# Patient Record
Sex: MALE | Race: WHITE | NOT HISPANIC OR LATINO | URBAN - METROPOLITAN AREA
[De-identification: names, ages, dates, MRNs, and addresses within clinical notes are randomized per-mention and may not be internally consistent; named-entity substitution may affect disease eponyms.]

---

## 2018-07-27 DIAGNOSIS — K21.00 GERD WITH ESOPHAGITIS: Primary | ICD-10-CM

## 2018-07-27 RX ORDER — OMEPRAZOLE 40 MG/1
CAPSULE, DELAYED RELEASE ORAL EVERY 24 HOURS
COMMUNITY
Start: 2017-08-08 | End: 2018-07-27 | Stop reason: SDUPTHER

## 2018-07-30 RX ORDER — OMEPRAZOLE 40 MG/1
40 CAPSULE, DELAYED RELEASE ORAL EVERY 24 HOURS
Qty: 30 CAPSULE | Refills: 4 | Status: SHIPPED | OUTPATIENT
Start: 2018-07-30 | End: 2019-02-04 | Stop reason: SDUPTHER

## 2018-08-24 DIAGNOSIS — G47.09 OTHER INSOMNIA: ICD-10-CM

## 2018-08-24 DIAGNOSIS — F32.89 OTHER DEPRESSION: Primary | ICD-10-CM

## 2018-08-24 RX ORDER — TRAZODONE HYDROCHLORIDE 100 MG/1
TABLET ORAL
Qty: 180 TABLET | Refills: 0 | Status: SHIPPED | OUTPATIENT
Start: 2018-08-24 | End: 2019-09-20

## 2018-08-30 RX ORDER — BISACODYL 10 MG
SUPPOSITORY, RECTAL RECTAL EVERY 24 HOURS
COMMUNITY
Start: 2017-02-08 | End: 2019-08-21

## 2018-08-30 RX ORDER — ALBUTEROL SULFATE 90 UG/1
AEROSOL, METERED RESPIRATORY (INHALATION)
COMMUNITY
Start: 2017-11-07 | End: 2018-12-13 | Stop reason: SDUPTHER

## 2018-08-30 RX ORDER — METOPROLOL TARTRATE 50 MG/1
TABLET, FILM COATED ORAL EVERY 12 HOURS
COMMUNITY
Start: 2017-12-18 | End: 2018-10-12 | Stop reason: SDUPTHER

## 2018-08-30 RX ORDER — DOCUSATE SODIUM 100 MG/1
CAPSULE, LIQUID FILLED ORAL EVERY 24 HOURS
COMMUNITY
Start: 2017-02-08 | End: 2019-08-21

## 2018-08-30 RX ORDER — OLANZAPINE 5 MG/1
TABLET ORAL EVERY 24 HOURS
COMMUNITY
Start: 2018-02-20 | End: 2018-10-10 | Stop reason: SDUPTHER

## 2018-08-30 RX ORDER — BLOOD-GLUCOSE METER
EACH MISCELLANEOUS
COMMUNITY
Start: 2018-03-27 | End: 2019-08-21

## 2018-08-30 RX ORDER — ECHINACEA PURPUREA AERIAL 350 MG
CAPSULE ORAL
COMMUNITY
End: 2019-08-21

## 2018-08-30 RX ORDER — TRAZODONE HYDROCHLORIDE 100 MG/1
TABLET ORAL EVERY 24 HOURS
COMMUNITY
Start: 2017-08-22 | End: 2018-09-04

## 2018-08-30 RX ORDER — LANOLIN ALCOHOL/MO/W.PET/CERES
CREAM (GRAM) TOPICAL
COMMUNITY
Start: 2017-02-08 | End: 2019-09-20

## 2018-08-30 RX ORDER — OXYCODONE HYDROCHLORIDE AND ACETAMINOPHEN 5; 325 MG/1; MG/1
1 TABLET ORAL 2 TIMES DAILY
COMMUNITY
Start: 2018-03-27 | End: 2019-03-22 | Stop reason: SDUPTHER

## 2018-08-30 RX ORDER — VENLAFAXINE HYDROCHLORIDE 75 MG/1
CAPSULE, EXTENDED RELEASE ORAL EVERY 24 HOURS
COMMUNITY
Start: 2018-06-01 | End: 2019-08-21

## 2018-08-30 RX ORDER — ZOLPIDEM TARTRATE 10 MG/1
TABLET ORAL EVERY 24 HOURS
COMMUNITY
Start: 2018-05-21 | End: 2018-09-04 | Stop reason: SDUPTHER

## 2018-08-30 RX ORDER — SENNA PLUS 8.6 MG/1
TABLET ORAL EVERY 24 HOURS
COMMUNITY
Start: 2017-02-08 | End: 2019-08-21

## 2018-08-30 RX ORDER — FUROSEMIDE 20 MG/1
TABLET ORAL EVERY 24 HOURS
COMMUNITY
Start: 2017-02-17 | End: 2019-08-21

## 2018-08-30 RX ORDER — DULOXETIN HYDROCHLORIDE 30 MG/1
CAPSULE, DELAYED RELEASE ORAL EVERY 24 HOURS
COMMUNITY
Start: 2017-02-09 | End: 2019-08-21

## 2018-08-30 RX ORDER — TRIAMCINOLONE ACETONIDE 1 MG/G
CREAM TOPICAL EVERY 12 HOURS
COMMUNITY
Start: 2018-03-27 | End: 2019-08-21

## 2018-08-30 RX ORDER — ROSUVASTATIN CALCIUM 10 MG/1
TABLET, COATED ORAL
COMMUNITY
Start: 2018-06-01 | End: 2019-06-17 | Stop reason: SDUPTHER

## 2018-08-30 RX ORDER — DILTIAZEM HYDROCHLORIDE 360 MG/1
CAPSULE, EXTENDED RELEASE ORAL EVERY 24 HOURS
COMMUNITY
Start: 2014-07-31 | End: 2019-08-21

## 2018-08-30 RX ORDER — DOXAZOSIN MESYLATE 1 MG/1
TABLET ORAL
COMMUNITY
Start: 2018-02-27 | End: 2019-03-11 | Stop reason: SDUPTHER

## 2018-08-30 RX ORDER — SENNOSIDES 8.6 MG
CAPSULE ORAL 3 TIMES DAILY
COMMUNITY
Start: 2017-02-08

## 2018-08-30 RX ORDER — BUDESONIDE AND FORMOTEROL FUMARATE DIHYDRATE 160; 4.5 UG/1; UG/1
AEROSOL RESPIRATORY (INHALATION) EVERY 12 HOURS
COMMUNITY
Start: 2017-02-08 | End: 2019-08-21

## 2018-09-04 ENCOUNTER — OFFICE VISIT (OUTPATIENT)
Dept: FAMILY MEDICINE CLINIC | Facility: CLINIC | Age: 68
End: 2018-09-04
Payer: MEDICARE

## 2018-09-04 VITALS
WEIGHT: 254.4 LBS | DIASTOLIC BLOOD PRESSURE: 72 MMHG | OXYGEN SATURATION: 98 % | SYSTOLIC BLOOD PRESSURE: 132 MMHG | RESPIRATION RATE: 16 BRPM | HEART RATE: 88 BPM | TEMPERATURE: 98.2 F

## 2018-09-04 DIAGNOSIS — G47.00 PERSISTENT INSOMNIA: ICD-10-CM

## 2018-09-04 DIAGNOSIS — E11.9 TYPE 2 DIABETES MELLITUS WITHOUT COMPLICATION, WITH LONG-TERM CURRENT USE OF INSULIN (HCC): Primary | ICD-10-CM

## 2018-09-04 DIAGNOSIS — E78.2 MIXED HYPERLIPIDEMIA: ICD-10-CM

## 2018-09-04 DIAGNOSIS — I10 BENIGN ESSENTIAL HYPERTENSION: ICD-10-CM

## 2018-09-04 DIAGNOSIS — Z79.4 TYPE 2 DIABETES MELLITUS WITHOUT COMPLICATION, WITH LONG-TERM CURRENT USE OF INSULIN (HCC): Primary | ICD-10-CM

## 2018-09-04 PROBLEM — H40.9 GLAUCOMA: Status: ACTIVE | Noted: 2018-09-04

## 2018-09-04 PROBLEM — I50.22 CHRONIC SYSTOLIC HEART FAILURE (HCC): Status: ACTIVE | Noted: 2017-08-25

## 2018-09-04 PROBLEM — K21.9 GASTROESOPHAGEAL REFLUX DISEASE WITHOUT ESOPHAGITIS: Status: ACTIVE | Noted: 2017-08-25

## 2018-09-04 PROCEDURE — 99214 OFFICE O/P EST MOD 30 MIN: CPT | Performed by: FAMILY MEDICINE

## 2018-09-04 RX ORDER — ZOLPIDEM TARTRATE 10 MG/1
10 TABLET ORAL
Qty: 30 TABLET | Refills: 5 | Status: SHIPPED | OUTPATIENT
Start: 2018-09-04 | End: 2019-02-22 | Stop reason: SINTOL

## 2018-09-04 NOTE — PROGRESS NOTES
Assessment/Plan:    No problem-specific Assessment & Plan notes found for this encounter  Problem List Items Addressed This Visit     Benign essential hypertension    Relevant Medications    diltiazem (TIAZAC) 360 MG 24 hr capsule    doxazosin (CARDURA) 1 mg tablet    furosemide (LASIX) 20 mg tablet    metoprolol tartrate (LOPRESSOR) 50 mg tablet    Other Relevant Orders    CBC and differential    Comprehensive metabolic panel    Lipid panel    TSH, 3rd generation with Free T4 reflex    Mixed hyperlipidemia    Relevant Medications    rosuvastatin (CRESTOR) 10 MG tablet    Other Relevant Orders    Lipid panel    TSH, 3rd generation with Free T4 reflex    Persistent insomnia    Relevant Medications    zolpidem (AMBIEN) 10 mg tablet    Type 2 diabetes mellitus (HCC) - Primary    Relevant Medications    insulin detemir (LEVEMIR FLEXTOUCH) 100 Units/mL injection pen    Linagliptin (TRADJENTA) 5 MG TABS    Other Relevant Orders    Hemoglobin A1C            Subjective:      Patient ID: Demetrius Watts is a 70 y o  male  78-year-old male with past medical history of coronary artery disease status post CABG, COPD, type 2 diabetes, hyperlipidemia, hypertension, anxiety/depression, and persistent severe insomnia presents today for follow-up of his chronic conditions  He states that he does see his podiatrist, ophthalmologist, cardiologist routinely  He denies any specific concerns or complaints today  He is taking all of his medications as prescribed without any side effects  The following portions of the patient's history were reviewed and updated as appropriate: allergies, current medications, past family history, past medical history, past social history, past surgical history and problem list     Review of Systems   Constitutional: Negative for appetite change and unexpected weight change  Eyes: Negative for visual disturbance  Respiratory: Negative for chest tightness and shortness of breath  Cardiovascular: Negative for chest pain, palpitations and leg swelling  Genitourinary: Negative for frequency  Skin: Negative for color change  Neurological: Negative for dizziness  Psychiatric/Behavioral: Positive for sleep disturbance  Objective:      /72 (BP Location: Left arm, Patient Position: Sitting, Cuff Size: Standard)   Pulse 88   Temp 98 2 °F (36 8 °C) (Tympanic)   Resp 16   Wt 115 kg (254 lb 6 4 oz)   SpO2 98%          Physical Exam   Constitutional: He appears well-developed and well-nourished  No distress  HENT:   Head: Normocephalic and atraumatic  Neck: Normal range of motion  Neck supple  Carotid bruit is not present  No edema present  Cardiovascular: Normal rate, regular rhythm and normal heart sounds  Pulmonary/Chest: Effort normal and breath sounds normal  He has no wheezes  He has no rales  Neurological: He is alert  Psychiatric: He has a normal mood and affect   His behavior is normal  Judgment and thought content normal

## 2018-09-04 NOTE — PATIENT INSTRUCTIONS
Foot Care for People with Diabetes   AMBULATORY CARE:   What you need to know about foot care:   · Foot care helps protect your feet and prevent foot ulcers or sores  Long-term high blood sugar levels can damage the blood vessels and nerves in your legs and feet  This damage makes it hard to feel pressure, pain, temperature, and touch  You may not be able to feel a cut or sore, or shoes that are too tight  Foot care is needed to prevent serious problems, such as an infection or amputation  · Diabetes may cause your toes to become crooked or curved under  These changes may affect the way you walk and can lead to increased pressure on your foot  The pressure can decrease blood flow to your feet  Lack of blood flow increases your risk for a foot ulcer  Do not ignore small problems, such as dry skin or small wounds  These can become life-threatening over time without proper care  Contact your healthcare provider if:   · Your feet become numb, weak, or hard to move  · You have pus draining from a sore on your foot  · You have a wound on your foot that gets bigger, deeper, or does not heal      · You see blisters, cuts, scratches, calluses, or sores on your foot  · You have a fever, and your feet become red, warm, and swollen  · Your toenails become thick, curled, or yellow  · You find it hard to check your feet because your vision is poor  · You have questions or concerns about your condition or care  How to care for your feet:   · Check your feet each day  Look at your whole foot, including the bottom, and between and under your toes  Check for wounds, corns, and calluses  Use a mirror to see the bottom of your feet  The skin on your feet may be shiny, tight, or darker than normal  Your feet may also be cold and pale  Feel your feet by running your hands along the tops, bottoms, sides, and between your toes   Redness, swelling, and warmth are signs of blood flow problems that can lead to a foot ulcer  Do not try to remove corns or calluses yourself  · Wash your feet each day with soap and warm water  Do not use hot water, because this can injure your foot  Dry your feet gently with a towel after you wash them  Dry between and under your toes  · Apply lotion or a moisturizer on your dry feet  Ask your healthcare provider what lotions are best to use  Do not put lotion or moisturizer between your toes  · Cut your toenails correctly  File or cut your toenails straight across  Use a soft brush to clean around your toenails  If your toenails are very thick, you may need to have a healthcare provider or specialist cut them  · Protect your feet  Do not walk barefoot or wear your shoes without socks  Check your shoes for rocks or other objects that can hurt your feet  Wear cotton socks to help keep your feet dry  Wear socks without toe seams, or wear them with the seams inside out  Change your socks each day  Do not wear socks that are dirty or damp  · Wear shoes that fit well  Wear shoes that do not rub against any area of your feet  Your shoes should be ½ to ¾ inch (1 to 2 centimeters) longer than your feet  Your shoes should also have extra space around the widest part of your feet  Walking or athletic shoes with laces or straps that adjust are best  Ask your healthcare provider for help to choose shoes that fit you best  Ask him if you need to wear an insert, orthotic, or bandage on your feet  · Go to your follow-up visits  Your healthcare provider will do a foot exam at least once a year  You may need a foot exam more often if you have nerve damage, foot deformities, or ulcers  He will check for nerve damage and how well you can feel your feet  He will check your shoes to see if they fit well  Follow up with your healthcare provider or foot specialist as directed: You will need to have your feet checked at least once a year   You may need a foot exam more often if you have nerve damage, foot deformities, or ulcers  Write down your questions so you remember to ask them during your visits  © 2017 [employer] [employer] Information is for End User's use only and may not be sold, redistributed or otherwise used for commercial purposes  All illustrations and images included in CareNotes® are the copyrighted property of A [employer]  or Kiran [employer]  The above information is an  only  It is not intended as medical advice for individual conditions or treatments  Talk to your doctor, nurse or pharmacist before following any medical regimen to see if it is safe and effective for you

## 2018-10-10 DIAGNOSIS — F31.81 BIPOLAR 2 DISORDER, MAJOR DEPRESSIVE EPISODE (HCC): Primary | ICD-10-CM

## 2018-10-10 RX ORDER — OLANZAPINE 5 MG/1
TABLET ORAL
Qty: 90 TABLET | Refills: 0 | Status: SHIPPED | OUTPATIENT
Start: 2018-10-10 | End: 2019-02-04 | Stop reason: SDUPTHER

## 2018-10-12 ENCOUNTER — TELEPHONE (OUTPATIENT)
Dept: FAMILY MEDICINE CLINIC | Facility: CLINIC | Age: 68
End: 2018-10-12

## 2018-10-12 DIAGNOSIS — I10 ESSENTIAL HYPERTENSION: Primary | ICD-10-CM

## 2018-10-15 NOTE — TELEPHONE ENCOUNTER
Please call patient and inform that due to me leaving the practice in 2 weeks, I would recommend that he obtain the oxygen prescription through his pulmonary specialist   The issue is because there is significant paperwork involved through Medicare and his specialist would be able to follow through with it

## 2018-10-16 RX ORDER — METOPROLOL TARTRATE 50 MG/1
TABLET, FILM COATED ORAL
Qty: 180 TABLET | Refills: 1 | Status: SHIPPED | OUTPATIENT
Start: 2018-10-16 | End: 2019-04-11 | Stop reason: SDUPTHER

## 2018-10-17 NOTE — TELEPHONE ENCOUNTER
I called this patient and informed him and he states he will call his specialist for the order  He is aware that you are leaving the practice and does not need anything else at this time

## 2018-10-21 DIAGNOSIS — E11.9 TYPE 2 DIABETES MELLITUS WITHOUT COMPLICATION, WITHOUT LONG-TERM CURRENT USE OF INSULIN (HCC): Primary | ICD-10-CM

## 2018-10-21 DIAGNOSIS — E11.65 TYPE 2 DIABETES MELLITUS WITH HYPERGLYCEMIA, WITH LONG-TERM CURRENT USE OF INSULIN (HCC): ICD-10-CM

## 2018-10-21 DIAGNOSIS — Z79.4 TYPE 2 DIABETES MELLITUS WITH HYPERGLYCEMIA, WITH LONG-TERM CURRENT USE OF INSULIN (HCC): ICD-10-CM

## 2018-10-22 RX ORDER — INSULIN DETEMIR 100 [IU]/ML
INJECTION, SOLUTION SUBCUTANEOUS
Qty: 5 PEN | Refills: 2 | Status: SHIPPED | OUTPATIENT
Start: 2018-10-22 | End: 2019-01-08 | Stop reason: SDUPTHER

## 2018-12-05 ENCOUNTER — TRANSITIONAL CARE MANAGEMENT (OUTPATIENT)
Dept: FAMILY MEDICINE CLINIC | Facility: CLINIC | Age: 68
End: 2018-12-05

## 2018-12-10 RX ORDER — GABAPENTIN 100 MG/1
CAPSULE ORAL
Refills: 5 | COMMUNITY
Start: 2018-11-11 | End: 2019-08-21

## 2018-12-10 RX ORDER — LANCETS
EACH MISCELLANEOUS
Refills: 3 | COMMUNITY
Start: 2018-09-29

## 2018-12-10 RX ORDER — WARFARIN SODIUM 1 MG/1
TABLET ORAL
Refills: 3 | COMMUNITY
Start: 2018-10-21 | End: 2019-08-21

## 2018-12-10 RX ORDER — TIOTROPIUM BROMIDE 18 UG/1
CAPSULE ORAL; RESPIRATORY (INHALATION)
Refills: 3 | COMMUNITY
Start: 2018-11-11

## 2018-12-11 ENCOUNTER — OFFICE VISIT (OUTPATIENT)
Dept: FAMILY MEDICINE CLINIC | Facility: CLINIC | Age: 68
End: 2018-12-11
Payer: MEDICARE

## 2018-12-11 VITALS
TEMPERATURE: 96.8 F | HEIGHT: 71 IN | HEART RATE: 77 BPM | RESPIRATION RATE: 20 BRPM | WEIGHT: 240 LBS | BODY MASS INDEX: 33.6 KG/M2 | OXYGEN SATURATION: 95 % | SYSTOLIC BLOOD PRESSURE: 122 MMHG | DIASTOLIC BLOOD PRESSURE: 70 MMHG

## 2018-12-11 DIAGNOSIS — I10 ESSENTIAL HYPERTENSION: ICD-10-CM

## 2018-12-11 DIAGNOSIS — E11.22 TYPE 2 DIABETES MELLITUS WITH STAGE 3 CHRONIC KIDNEY DISEASE, WITH LONG-TERM CURRENT USE OF INSULIN (HCC): ICD-10-CM

## 2018-12-11 DIAGNOSIS — I48.20 ATRIAL FIBRILLATION, CHRONIC (HCC): ICD-10-CM

## 2018-12-11 DIAGNOSIS — R41.0 DELIRIUM: ICD-10-CM

## 2018-12-11 DIAGNOSIS — G47.09 OTHER INSOMNIA: ICD-10-CM

## 2018-12-11 DIAGNOSIS — Z79.4 TYPE 2 DIABETES MELLITUS WITH STAGE 3 CHRONIC KIDNEY DISEASE, WITH LONG-TERM CURRENT USE OF INSULIN (HCC): ICD-10-CM

## 2018-12-11 DIAGNOSIS — J18.9 PNEUMONIA OF RIGHT LOWER LOBE DUE TO INFECTIOUS ORGANISM: ICD-10-CM

## 2018-12-11 DIAGNOSIS — K56.41 FECAL IMPACTION (HCC): ICD-10-CM

## 2018-12-11 DIAGNOSIS — J39.8 TRACHEAL STENOSIS: ICD-10-CM

## 2018-12-11 DIAGNOSIS — K21.00 GERD WITH ESOPHAGITIS: ICD-10-CM

## 2018-12-11 DIAGNOSIS — E11.42 TYPE 2 DIABETES MELLITUS WITH DIABETIC POLYNEUROPATHY, WITH LONG-TERM CURRENT USE OF INSULIN (HCC): ICD-10-CM

## 2018-12-11 DIAGNOSIS — R34 OLIGURIA: Primary | ICD-10-CM

## 2018-12-11 DIAGNOSIS — Z79.4 TYPE 2 DIABETES MELLITUS WITH DIABETIC POLYNEUROPATHY, WITH LONG-TERM CURRENT USE OF INSULIN (HCC): ICD-10-CM

## 2018-12-11 DIAGNOSIS — F32.89 OTHER DEPRESSION: ICD-10-CM

## 2018-12-11 DIAGNOSIS — G93.40 ENCEPHALOPATHY: ICD-10-CM

## 2018-12-11 DIAGNOSIS — G47.33 OSA (OBSTRUCTIVE SLEEP APNEA): ICD-10-CM

## 2018-12-11 DIAGNOSIS — N18.30 TYPE 2 DIABETES MELLITUS WITH STAGE 3 CHRONIC KIDNEY DISEASE, WITH LONG-TERM CURRENT USE OF INSULIN (HCC): ICD-10-CM

## 2018-12-11 DIAGNOSIS — J44.9 CHRONIC OBSTRUCTIVE PULMONARY DISEASE, UNSPECIFIED COPD TYPE (HCC): ICD-10-CM

## 2018-12-11 PROBLEM — J96.10 CHRONIC RESPIRATORY FAILURE (HCC): Status: ACTIVE | Noted: 2017-01-30

## 2018-12-11 PROBLEM — E11.9 INSULIN DEPENDENT TYPE 2 DIABETES MELLITUS (HCC): Status: ACTIVE | Noted: 2018-12-11

## 2018-12-11 PROBLEM — G60.8 PERIPHERAL SENSORY NEUROPATHY: Status: ACTIVE | Noted: 2018-06-29

## 2018-12-11 PROBLEM — R41.82 ALTERED MENTAL STATE: Status: ACTIVE | Noted: 2018-08-30

## 2018-12-11 PROCEDURE — 99495 TRANSJ CARE MGMT MOD F2F 14D: CPT | Performed by: INTERNAL MEDICINE

## 2018-12-11 RX ORDER — WARFARIN SODIUM 1 MG/1
TABLET ORAL
Qty: 60 TABLET | Refills: 3 | Status: CANCELLED | OUTPATIENT
Start: 2018-12-11

## 2018-12-11 RX ORDER — OMEPRAZOLE 40 MG/1
40 CAPSULE, DELAYED RELEASE ORAL EVERY 24 HOURS
Qty: 90 CAPSULE | Refills: 3 | Status: CANCELLED | OUTPATIENT
Start: 2018-12-11

## 2018-12-11 RX ORDER — BUDESONIDE AND FORMOTEROL FUMARATE DIHYDRATE 160; 4.5 UG/1; UG/1
AEROSOL RESPIRATORY (INHALATION)
Refills: 0 | Status: CANCELLED | OUTPATIENT
Start: 2018-12-11

## 2018-12-11 RX ORDER — REPAGLINIDE 0.5 MG/1
0.5 TABLET ORAL
Qty: 180 TABLET | Refills: 11 | Status: CANCELLED | OUTPATIENT
Start: 2018-12-11

## 2018-12-11 RX ORDER — REPAGLINIDE 0.5 MG/1
0.5 TABLET ORAL
COMMUNITY
End: 2019-08-21

## 2018-12-11 RX ORDER — IPRATROPIUM BROMIDE AND ALBUTEROL SULFATE 2.5; .5 MG/3ML; MG/3ML
3 SOLUTION RESPIRATORY (INHALATION) EVERY 6 HOURS PRN
Refills: 0 | Status: CANCELLED | OUTPATIENT
Start: 2018-12-11

## 2018-12-11 RX ORDER — NITROGLYCERIN 80 MG/1
1 PATCH TRANSDERMAL DAILY
Qty: 30 PATCH | Refills: 0 | Status: CANCELLED | OUTPATIENT
Start: 2018-12-11

## 2018-12-11 RX ORDER — METOPROLOL TARTRATE 50 MG/1
TABLET, FILM COATED ORAL
Qty: 180 TABLET | Refills: 0 | Status: CANCELLED | OUTPATIENT
Start: 2018-12-11

## 2018-12-11 RX ORDER — ROSUVASTATIN CALCIUM 10 MG/1
10 TABLET, COATED ORAL DAILY
Qty: 90 TABLET | Refills: 3 | Status: CANCELLED | OUTPATIENT
Start: 2018-12-11

## 2018-12-11 RX ORDER — GABAPENTIN 100 MG/1
100 CAPSULE ORAL
Qty: 30 CAPSULE | Refills: 11 | Status: CANCELLED | OUTPATIENT
Start: 2018-12-11

## 2018-12-11 RX ORDER — TRIAMCINOLONE ACETONIDE 1 MG/G
CREAM TOPICAL 2 TIMES DAILY
Qty: 30 G | Refills: 6 | Status: CANCELLED | OUTPATIENT
Start: 2018-12-11

## 2018-12-11 RX ORDER — NITROGLYCERIN 80 MG/1
1 PATCH TRANSDERMAL DAILY
COMMUNITY
End: 2019-08-21

## 2018-12-11 RX ORDER — LANCETS
EACH MISCELLANEOUS
Qty: 100 EACH | Refills: 0 | Status: CANCELLED | OUTPATIENT
Start: 2018-12-11

## 2018-12-11 RX ORDER — LEVOFLOXACIN 750 MG/1
750 TABLET ORAL EVERY 24 HOURS
COMMUNITY
End: 2019-08-21

## 2018-12-11 RX ORDER — IPRATROPIUM BROMIDE AND ALBUTEROL SULFATE 2.5; .5 MG/3ML; MG/3ML
3 SOLUTION RESPIRATORY (INHALATION) EVERY 2 HOUR PRN
COMMUNITY
Start: 2017-02-01 | End: 2019-02-22

## 2018-12-11 NOTE — PROGRESS NOTES
Assessment/Plan: This patient was discharged from the hospital, following a complex medical admission with right perihilar pneumonia type 2 diabetes mellitus insulin dependent with peripheral neuropathy chronic renal failure with acute deterioration, COPD, obstructive sleep apnea, chronic atrial fibrillation on warfarin 1 mg daily and 2 mg on Monday and Friday, and delirium felt to be secondary to the patient's pneumonia as well as Ambien therapy  The Ambien was discontinued  Patient has been home 8 days and cannot remember the last time he moved his bowels he was urinating freely until 2 or 3 days ago at which time he started urinating 1-2 tbsp of urine every hour and today he has not urinated all  He also has low back pain  Patient will need to have stat laboratory studies done straight catheterization evaluation for fecal impaction and treatment of fecal impaction if necessary  He lives at home alone  His medical condition require stabilization due to his complexity  He was referred to the emergency room and I talked to the staff at in the emergency room personally  Diet reviewed  Lifestyle modifications reviewed  Medications reviewed and ordered  Laboratory tests and studies reviewed and ordered  All patient's questions answered to patient satisfaction  Diagnoses and all orders for this visit:    Oliguria    Type 2 diabetes mellitus with diabetic polyneuropathy, with long-term current use of insulin (HCC)    Fecal impaction (HCC)    Chronic obstructive pulmonary disease, unspecified COPD type ([facility] )  -     budesonide-formoterol (SYMBICORT) 160-4 5 mcg/act inhaler;   -     gabapentin (NEURONTIN) 100 mg capsule;  Take 1 capsule (100 mg total) by mouth daily at bedtime  -     warfarin (COUMADIN) 1 mg tablet; TAKE 1 TO 2 TABLETS BY MOUTH EVERY DAY OR AS DIRECTED  -     Linagliptin (TRADJENTA) 5 MG TABS; Take 5 mg by mouth daily    Pneumonia of right lower lobe due to infectious organism [facility])    Essential hypertension  -     budesonide-formoterol (SYMBICORT) 160-4 5 mcg/act inhaler;   -     gabapentin (NEURONTIN) 100 mg capsule; Take 1 capsule (100 mg total) by mouth daily at bedtime  -     warfarin (COUMADIN) 1 mg tablet; TAKE 1 TO 2 TABLETS BY MOUTH EVERY DAY OR AS DIRECTED  -     Linagliptin (TRADJENTA) 5 MG TABS; Take 5 mg by mouth daily    GERD with esophagitis  -     budesonide-formoterol (SYMBICORT) 160-4 5 mcg/act inhaler;   -     gabapentin (NEURONTIN) 100 mg capsule; Take 1 capsule (100 mg total) by mouth daily at bedtime  -     warfarin (COUMADIN) 1 mg tablet; TAKE 1 TO 2 TABLETS BY MOUTH EVERY DAY OR AS DIRECTED  -     Linagliptin (TRADJENTA) 5 MG TABS; Take 5 mg by mouth daily    Other depression  -     budesonide-formoterol (SYMBICORT) 160-4 5 mcg/act inhaler;   -     gabapentin (NEURONTIN) 100 mg capsule; Take 1 capsule (100 mg total) by mouth daily at bedtime  -     warfarin (COUMADIN) 1 mg tablet; TAKE 1 TO 2 TABLETS BY MOUTH EVERY DAY OR AS DIRECTED  -     Linagliptin (TRADJENTA) 5 MG TABS; Take 5 mg by mouth daily    Other insomnia  -     budesonide-formoterol (SYMBICORT) 160-4 5 mcg/act inhaler;   -     gabapentin (NEURONTIN) 100 mg capsule;  Take 1 capsule (100 mg total) by mouth daily at bedtime  -     warfarin (COUMADIN) 1 mg tablet; TAKE 1 TO 2 TABLETS BY MOUTH EVERY DAY OR AS DIRECTED  -     Linagliptin (TRADJENTA) 5 MG TABS; Take 5 mg by mouth daily    Encephalopathy    Delirium    Atrial fibrillation, chronic (HCC)    SENTHIL (obstructive sleep apnea)    Tracheal stenosis    Type 2 diabetes mellitus with stage 3 chronic kidney disease, with long-term current use of insulin ([facility])    Insulin dependent type 2 diabetes mellitus ([facility] [address] )    Other orders  -     gabapentin (NEURONTIN) 100 mg capsule; TAKE ONE CAPSULE BY MOUTH EVERY EVENING AS NEEDED  -     Lancets (ONETOUCH ULTRASOFT) lancets; CHECK BLOOD SUGARS EVERY DAY  -     SPIRIVA HANDIHALER 18 MCG inhalation capsule; INHALE 1 CAPSULE (18 MCG TOTAL) DAILY  -     warfarin (COUMADIN) 1 mg tablet; TAKE 1 TO 2 TABLETS BY MOUTH EVERY DAY OR AS DIRECTED  -     ipratropium-albuterol (DUO-NEB) 0 5-2 5 mg/3 mL nebulizer solution; Inhale 3 mL every 2 (two) hours as needed  -     levofloxacin (LEVAQUIN) 750 mg tablet; Take 750 mg by mouth every 24 hours  -     nitroglycerin (NITRODUR) 0 4 mg/hr; Place 1 patch on the skin daily  -     repaglinide (PRANDIN) 0 5 mg tablet; Take 0 5 mg by mouth 3 (three) times a day before meals  -     Cancel: ipratropium-albuterol (DUO-NEB) 0 5-2 5 mg/3 mL nebulizer solution; Take 1 vial (3 mL total) by nebulization every 6 (six) hours as needed for wheezing or shortness of breath  -     Cancel: Lancets (ONETOUCH ULTRASOFT) lancets; Use as instructed  -     Cancel: metoprolol tartrate (LOPRESSOR) 50 mg tablet;   -     Cancel: nitroglycerin (NITRODUR) 0 4 mg/hr; Place 1 patch on the skin daily  -     Cancel: omeprazole (PriLOSEC) 40 MG capsule; Take 1 capsule (40 mg total) by mouth every 24 hours  -     Cancel: repaglinide (PRANDIN) 0 5 mg tablet; Take 1 tablet (0 5 mg total) by mouth 3 (three) times a day before meals  -     Cancel: rosuvastatin (CRESTOR) 10 MG tablet; Take 1 tablet (10 mg total) by mouth daily  -     Cancel: triamcinolone (KENALOG) 0 1 % cream; Apply topically 2 (two) times a day        Subjective:      Patient ID: Gilbert Pete is a 70 y o  male  HPI   This 79-year-old male hospitalized at [facility] and discharged 8 days ago following admission for pneumonia right per perihilar and basilar, acute on chronic renal failure, insulin-dependent type 2 diabetes mellitus with peripheral neuropathy, delirium secondary to Ambien therapy and underlying infection, chronic atrial fibrillation on warfarin, COPD  Since discharge patient has not had a bowel movement that he can remember  He does have a visiting nurse and is post be taking senna but I am not certain whether he is or not  He is very worried about constipation and thinks he has severe low back pain because of it  In addition 2 days ago he started urinating in small amounts 1-2 tbsp at a time every hour and today he has not urinated at all      Current Outpatient Prescriptions:     acetaminophen (TYLENOL) 650 mg CR tablet, 3 (three) times a day, Disp: , Rfl:     albuterol (VENTOLIN HFA) 90 mcg/act inhaler, inhale 2 puff by inhalation route  every 4 - 6 hours as needed, Disp: , Rfl:     bisacodyl (DULCOLAX) 10 mg suppository, every 24 hours, Disp: , Rfl:     Blood Glucose Monitoring Suppl (ONE TOUCH ULTRA 2) w/Device KIT, Test blood sugar BID, Disp: , Rfl:     budesonide-formoterol (SYMBICORT) 160-4 5 mcg/act inhaler, Every 12 hours, Disp: , Rfl:     DULoxetine (CYMBALTA) 30 mg delayed release capsule, every 24 hours, Disp: , Rfl:     gabapentin (NEURONTIN) 100 mg capsule, TAKE ONE CAPSULE BY MOUTH EVERY EVENING AS NEEDED, Disp: , Rfl: 5    glucose blood test strip, TEST UP TO 3 TIMES PER DAY, Disp: , Rfl:     Insulin Pen Needle 32G X 6 MM MISC, test blood sugar 1 times a day, Disp: , Rfl:     ipratropium-albuterol (DUO-NEB) 0 5-2 5 mg/3 mL nebulizer solution, Inhale 3 mL every 2 (two) hours as needed, Disp: , Rfl:     Lancets (ONETOUCH ULTRASOFT) lancets, CHECK BLOOD SUGARS EVERY DAY, Disp: , Rfl: 3    LEVEMIR FLEXTOUCH 100 units/mL injection pen, INJECT 40 UNITS BY SUBCUTANEOUS ROUTE PER INSTRUCTIONS, Disp: 5 pen, Rfl: 2    levofloxacin (LEVAQUIN) 750 mg tablet, Take 750 mg by mouth every 24 hours, Disp: , Rfl:     Linagliptin (TRADJENTA) 5 MG TABS, every 24 hours, Disp: , Rfl:     metoprolol tartrate (LOPRESSOR) 50 mg tablet, TAKE 1 TABLET BY ORAL ROUTE 2 TIMES EVERY DAY WITH MEALS, Disp: 180 tablet, Rfl: 1    Milk Thistle 140 MG CAPS, takes 175mg 2 tabs am, 2 tabs pm, Disp: , Rfl:     nitroglycerin (NITRODUR) 0 4 mg/hr, Place 1 patch on the skin daily, Disp: , Rfl:     OLANZapine (ZyPREXA) 5 mg tablet, TAKE 1 TABLET BY MOUTH AT BEDTIME AS NEEDED FOR RESTLESSNESS/AGITATION, Disp: 90 tablet, Rfl: 0    omeprazole (PriLOSEC) 40 MG capsule, Take 1 capsule (40 mg total) by mouth every 24 hours, Disp: 30 capsule, Rfl: 4    repaglinide (PRANDIN) 0 5 mg tablet, Take 0 5 mg by mouth 3 (three) times a day before meals, Disp: , Rfl:     rosuvastatin (CRESTOR) 10 MG tablet, TAKE 1 TABLET BY ORAL ROUTE EVERY DAY, Disp: , Rfl:     SPIRIVA HANDIHALER 18 MCG inhalation capsule, INHALE 1 CAPSULE (18 MCG TOTAL) DAILY  , Disp: , Rfl: 3    triamcinolone (KENALOG) 0 1 % cream, Every 12 hours, Disp: , Rfl:     warfarin (COUMADIN) 1 mg tablet, TAKE 1 TO 2 TABLETS BY MOUTH EVERY DAY OR AS DIRECTED, Disp: , Rfl: 3    diltiazem (TIAZAC) 360 MG 24 hr capsule, every 24 hours, Disp: , Rfl:     docusate sodium (COLACE) 100 mg capsule, every 24 hours, Disp: , Rfl:     doxazosin (CARDURA) 1 mg tablet, TAKE 1 TABLET BY ORAL ROUTE EVERY DAY, Disp: , Rfl:     furosemide (LASIX) 20 mg tablet, every 24 hours, Disp: , Rfl:     melatonin 3 mg, qd, Disp: , Rfl:     oxyCODONE-acetaminophen (PERCOCET) 5-325 mg per tablet, take 1 tablet by oral route  every 6 hours as needed for severe pain, Disp: , Rfl:     Polyethylene Glycol 3350 (MIRALAX PO), every 24 hours, Disp: , Rfl:     senna (SENOKOT) 8 6 MG tablet, every 24 hours, Disp: , Rfl:     traZODone (DESYREL) 100 mg tablet, TAKE 2 TABLET BY ORAL ROUTE EVERY DAY AT BEDTIME, Disp: 180 tablet, Rfl: 0    venlafaxine (EFFEXOR XR) 75 mg 24 hr capsule, every 24 hours, Disp: , Rfl:     zolpidem (AMBIEN) 10 mg tablet, Take 1 tablet (10 mg total) by mouth daily at bedtime as needed for sleep (Patient not taking: Reported on 12/11/2018 ), Disp: 30 tablet, Rfl: 5    The following portions of the patient's history were reviewed and updated as appropriate: allergies, current medications, past family history, past medical history, past social history, past surgical history and problem list     Review of Systems Constitutional: Negative for appetite change, fatigue, fever and unexpected weight change  HENT: Negative for rhinorrhea, sinus pain, sinus pressure, sneezing and sore throat  Eyes: Negative for visual disturbance  Respiratory: Positive for shortness of breath  Negative for cough, chest tightness and wheezing  Cardiovascular: Negative for chest pain, palpitations and leg swelling  Gastrointestinal: Positive for abdominal distention and abdominal pain  Negative for blood in stool, constipation, diarrhea, nausea and vomiting  Endocrine: Negative for polydipsia and polyuria  Genitourinary: Positive for decreased urine volume and difficulty urinating  Negative for dysuria, hematuria and urgency  Musculoskeletal: Negative for arthralgias, back pain, joint swelling and neck pain  Skin: Negative for rash  Allergic/Immunologic: Negative for environmental allergies  Neurological: Negative for tremors, weakness, light-headedness, numbness and headaches  Hematological: Does not bruise/bleed easily  Psychiatric/Behavioral: Negative for agitation, behavioral problems, confusion and dysphoric mood  The patient is not nervous/anxious  No family history on file      Past Medical History:   Diagnosis Date    Colonic polyp 2012    Disc displacement, cervical     Peripheral vascular disease ([facility] [address] ) 2011    Femoral- popliteal bypass       Past Surgical History:   Procedure Laterality Date    BYPASS FEMORAL-POPLITEAL  2011    COLONOSCOPY  2012    With polypectomy    CORONARY ARTERY BYPASS GRAFT  2012    LAMINECTOMY  2010       Social History     Social History    Marital status: Single     Spouse name: N/A    Number of children: 1    Years of education:       Occupational History    retired      Social History Main Topics    Smoking status: Former Smoker     Packs/day: 1 00     Years: 37 00     Types: Cigarettes     Quit date: 1/1/2014    Smokeless tobacco: Never Used      Comment: No passive smoke exposure    Alcohol use No    Drug use: Unknown    Sexual activity: No     Other Topics Concern    None     Social History Narrative    Pt states he drinks soda daily       Allergies   Allergen Reactions    No Active Allergies          Objective:      /70   Pulse 77   Temp (!) 96 8 °F (36 °C)   Resp 20   Ht 5' 11" (1 803 m)   Wt 109 kg (240 lb)   SpO2 95%   BMI 33 47 kg/m²        Physical Exam   Constitutional: He is oriented to person, place, and time  He appears well-developed and well-nourished  No distress  HENT:   Head: Normocephalic and atraumatic  Nose: Nose normal    Mouth/Throat: Oropharynx is clear and moist  No oropharyngeal exudate  Eyes: Pupils are equal, round, and reactive to light  Conjunctivae and EOM are normal  No scleral icterus  Neck: Normal range of motion  Neck supple  No JVD present  No tracheal deviation present  No thyromegaly present  Cardiovascular: Normal rate and normal heart sounds  Exam reveals no gallop and no friction rub  No murmur heard  Irregular rhythm   Pulmonary/Chest: Effort normal  No respiratory distress  He has no wheezes  He has no rales  He exhibits no tenderness  Chest is clear to auscultation percussion   Abdominal: Soft  Bowel sounds are normal  He exhibits no distension and no mass  There is tenderness  There is no rebound and no guarding  Patient has bowel sounds but is tender in the left lower quadrant above the pubis but no masses palpable  There is suprapubic dullness to below the umbilicus however there is no palpable bladder and no other tenderness in the lower abdomen  Moderate abdominal distention is noted  Musculoskeletal: Normal range of motion  He exhibits no edema or deformity  Lymphadenopathy:     He has no cervical adenopathy  Neurological: He is alert and oriented to person, place, and time  No cranial nerve deficit  Coordination normal    Skin: Skin is warm and dry  No rash noted  Psychiatric: He has a normal mood and affect   His behavior is normal  Judgment and thought content normal

## 2018-12-12 ENCOUNTER — TELEPHONE (OUTPATIENT)
Dept: FAMILY MEDICINE CLINIC | Facility: CLINIC | Age: 68
End: 2018-12-12

## 2018-12-12 NOTE — TELEPHONE ENCOUNTER
Pt's sister called stating that the pt was discharged from the hospital today and wanted to know if they can make different living  arrangement for pt after the holidays     Sister's phone # [phone] could you pls give sister a call

## 2018-12-13 DIAGNOSIS — J44.9 CHRONIC OBSTRUCTIVE PULMONARY DISEASE, UNSPECIFIED COPD TYPE (HCC): Primary | ICD-10-CM

## 2018-12-13 DIAGNOSIS — K57.92 DIVERTICULITIS: ICD-10-CM

## 2018-12-13 RX ORDER — ALBUTEROL SULFATE 90 UG/1
1 AEROSOL, METERED RESPIRATORY (INHALATION) EVERY 4 HOURS PRN
Qty: 1 INHALER | Refills: 3 | Status: SHIPPED | OUTPATIENT
Start: 2018-12-13

## 2018-12-13 RX ORDER — CEPHALEXIN 500 MG/1
500 CAPSULE ORAL 3 TIMES DAILY
Qty: 21 CAPSULE | Refills: 0 | Status: SHIPPED | OUTPATIENT
Start: 2018-12-13 | End: 2018-12-23

## 2018-12-13 NOTE — TELEPHONE ENCOUNTER
GREGG for Pts sister to call back  Pt has no one listed on his chart for HIPAA so no medical information can be given to her

## 2018-12-13 NOTE — TELEPHONE ENCOUNTER
Jorge Parsons from [address] [place] [address] [address] home nursing called back to let us know the Er gave Pt Flagyl which interferes with Pts Warfarin  Per Dr Moncho Yanes Pt should stop the Flagyl and start Keflex 500mg TID  Pt verbalizes       I made an appt with Pt to see Dr Fabienne Chavarria on the  12/19/18 per Pts schedule/transportation

## 2018-12-13 NOTE — PROGRESS NOTES
cough with green sputumCough: Patient complains of {respiratory symptoms:85411}  Symptoms began {numbers; 0-10:95069} {units:11} ago  The cough is {cough description:5714::"non-productive","without wheezing, dyspnea or hemoptysis"} and is aggravated by {cough aggravation:39923} Associated symptoms include:{cough - associated symptoms:49058}  Patient {does/do/not:67918} have new pets  Patient {does/do/not:95426} have a history of asthma  Patient {does/do/not:18154} have a history of environmental allergens  Patient {has/not:96804} recent travel  Patient {does/do/not:28401} have a history of smoking  Patient  {has/not:25195} previous Chest X-ray  Patient {has/not:88960} had a PPD done

## 2018-12-13 NOTE — TELEPHONE ENCOUNTER
Home Nurse called with concerns with Pts medication and update Dr Jordy Marsh on Pts hospital stay  Message left for home nurse to call back    [phone]

## 2018-12-18 ENCOUNTER — TELEPHONE (OUTPATIENT)
Dept: FAMILY MEDICINE CLINIC | Facility: CLINIC | Age: 68
End: 2018-12-18

## 2018-12-18 LAB — INR PPP: 1.6 (ref 0.86–1.17)

## 2018-12-18 NOTE — TELEPHONE ENCOUNTER
Rosina from [facility] called in regards to patient, she stated that the patient is non-complaint with his medications  She also stated that he is taking Percocet that was found in the house    Hope can be reached at   [phone]

## 2018-12-19 ENCOUNTER — TELEPHONE (OUTPATIENT)
Dept: FAMILY MEDICINE CLINIC | Facility: CLINIC | Age: 68
End: 2018-12-19

## 2018-12-19 ENCOUNTER — OFFICE VISIT (OUTPATIENT)
Dept: FAMILY MEDICINE CLINIC | Facility: CLINIC | Age: 68
End: 2018-12-19
Payer: MEDICARE

## 2018-12-19 VITALS
BODY MASS INDEX: 34.17 KG/M2 | RESPIRATION RATE: 18 BRPM | TEMPERATURE: 96.8 F | WEIGHT: 245 LBS | DIASTOLIC BLOOD PRESSURE: 96 MMHG | HEART RATE: 77 BPM | OXYGEN SATURATION: 95 % | SYSTOLIC BLOOD PRESSURE: 148 MMHG

## 2018-12-19 DIAGNOSIS — Z09 HOSPITAL DISCHARGE FOLLOW-UP: Primary | ICD-10-CM

## 2018-12-19 DIAGNOSIS — Z11.59 NEED FOR HEPATITIS C SCREENING TEST: ICD-10-CM

## 2018-12-19 DIAGNOSIS — Z79.4 TYPE 2 DIABETES MELLITUS WITHOUT COMPLICATION, WITH LONG-TERM CURRENT USE OF INSULIN (HCC): ICD-10-CM

## 2018-12-19 DIAGNOSIS — F32.A DEPRESSION, UNSPECIFIED DEPRESSION TYPE: ICD-10-CM

## 2018-12-19 DIAGNOSIS — E11.9 TYPE 2 DIABETES MELLITUS WITHOUT COMPLICATION, WITH LONG-TERM CURRENT USE OF INSULIN (HCC): ICD-10-CM

## 2018-12-19 DIAGNOSIS — F17.209 NICOTINE DEPENDENCE WITH NICOTINE-INDUCED DISORDER, UNSPECIFIED NICOTINE PRODUCT TYPE: ICD-10-CM

## 2018-12-19 DIAGNOSIS — Z78.9 ALTERATION IN PERFORMANCE OF ACTIVITIES OF DAILY LIVING: ICD-10-CM

## 2018-12-19 DIAGNOSIS — Z79.899 MEDICATION MANAGEMENT: ICD-10-CM

## 2018-12-19 PROCEDURE — 99214 OFFICE O/P EST MOD 30 MIN: CPT | Performed by: SPECIALIST

## 2018-12-19 RX ORDER — METRONIDAZOLE 500 MG/1
TABLET ORAL
Refills: 0 | COMMUNITY
Start: 2018-12-12 | End: 2019-01-08 | Stop reason: ALTCHOICE

## 2018-12-19 NOTE — TELEPHONE ENCOUNTER
Impression: Question mild acute sigmoid diverticulitis  No renal calculi or hydronephrosis  Diffuse atherosclerotic disease  Cholelithiasis  Workstation:[id number]   Result Narrative   History: Abdominal pain, suprapubic, constipation  Low back pain  Exam: Nonenhanced CT of the abdomen and pelvis      Technique: Using helical technique, axial images were obtained through the  abdomen and pelvis  Coronal and sagittal reformations were performed      Comparison: Prior CT scans of the chest including most recent study dated  12/12/2017        Abdomen:  Lung Bases: Stable mild chronic interstitial changes at the lung bases, right  greater than left  No pleural effusions  Diffuse coronary artery calcifications  Liver: Normal    Gallbladder/Bile ducts: Cholelithiasis  Spleen: Spleen not enlarged  Pancreas: No pancreatitis  Adrenal glands: Normal     Kidneys/Ureters: No renal calculi or hydronephrosis  Nonspecific perinephric  stranding, not significantly changed when compared with the visualized portions  of the kidneys on prior CT scan of the chest      Bowel/Mesentery: Scattered left-sided colonic diverticula  There is mild   proximal to mid sigmoid wall thickening with minimal pericolonic stranding  There appears to be thickening of a diverticulum along the posterior aspect of  the proximal sigmoid  Lymph nodes: No retroperitoneal or mesenteric lymphadenopathy  Vessels: Normal caliber aorta with diffuse atherosclerotic calcifications  Pelvis:  No mass, lymphadenopathy or free fluid  Urinary Bladder: Normal      Bones: Degenerative changes lower lumbar spine       Other Result Information   Interface, Rad Results In - 12/11/2018  7:44 PM EST  History: Abdominal pain, suprapubic, constipation  Low back pain  Exam: Nonenhanced CT of the abdomen and pelvis      Technique: Using helical technique, axial images were obtained through the  abdomen and pelvis   Coronal and sagittal reformations were performed      Comparison: Prior CT scans of the chest including most recent study dated  12/12/2017        Abdomen:  Lung Bases: Stable mild chronic interstitial changes at the lung bases, right  greater than left  No pleural effusions  Diffuse coronary artery calcifications  Liver: Normal    Gallbladder/Bile ducts: Cholelithiasis  Spleen: Spleen not enlarged  Pancreas: No pancreatitis  Adrenal glands: Normal     Kidneys/Ureters: No renal calculi or hydronephrosis  Nonspecific perinephric  stranding, not significantly changed when compared with the visualized portions  of the kidneys on prior CT scan of the chest      Bowel/Mesentery: Scattered left-sided colonic diverticula  There is mild   proximal to mid sigmoid wall thickening with minimal pericolonic stranding  There appears to be thickening of a diverticulum along the posterior aspect of  the proximal sigmoid  Lymph nodes: No retroperitoneal or mesenteric lymphadenopathy  Vessels: Normal caliber aorta with diffuse atherosclerotic calcifications  Pelvis:  No mass, lymphadenopathy or free fluid  Urinary Bladder: Normal      Bones: Degenerative changes lower lumbar spine      IMPRESSION:  Impression: Question mild acute sigmoid diverticulitis  No renal calculi or hydronephrosis  Diffuse atherosclerotic disease  Cholelithiasis              Workstation:[id number]   Status

## 2018-12-19 NOTE — PROGRESS NOTES
Assessment/Plan:    Pt  Seen  For  Post  Er  Follow  Up  [facility]   Ct  Positive  gqall  Stones  And diverticulitis   Symptoms  Are  Gone    Main  Reason  He  Is  Here  Is  For  Placement  In  Nursing  Home       Has  Been  Seen  By   [address]  Depression  scale  >  Than  5      mmse  27/30    Activities  Daily  Living  Scores  Well   But  Does  Feel  He  Needs   Help  At  Times    Does  Not  Drive    Blind  Right  Eye    Can 't  Walk  Greater  Than  100  Feet      Has  Left  Calf  Pain    Copd  And  Cad    ckd3     cabg        When  Asked  Why he  Thinks  He  Needs  To  Be  In a  Home  He  Answers   I  Can't  Drive    Dep[ressed  Lonely  Needs  Reminders  For  meds   Unsteady  On  Feet   And  limited  qability  To  Walk            Diagnoses and all orders for this visit:    Hospital discharge follow-up    Depression, unspecified depression type    Medication management    Nicotine dependence with nicotine-induced disorder, unspecified nicotine product type  -     CT lung screening program; Future    Type 2 diabetes mellitus without complication, with long-term current use of insulin (HCC)  -     Microalbumin / creatinine urine ratio    Need for hepatitis C screening test  -     Hepatitis C antibody; Future    Alteration in performance of activities of daily living    Other orders  -     metroNIDAZOLE (FLAGYL) 500 mg tablet; TAKE 1 TABLET BY MOUTH 3 TIMES A DAY FOR 7 DAYS          Subjective:      Patient ID: Nuria Lynch is a 70 y o  male  69 Yo  Male  Recently  Treated   For  Diverticulitis      Wants  To  Go to  Nursing  Home         Diabetes   Hypoglycemia symptoms include dizziness  Pertinent negatives for hypoglycemia include no headaches, pallor, seizures, speech difficulty or tremors  Pertinent negatives for diabetes include no chest pain, no fatigue and no weakness     Depression   Pertinent negatives include no abdominal pain, arthralgias, chest pain, chills, coughing, diaphoresis, fatigue, fever, headaches, joint swelling, myalgias, neck pain, numbness, rash or weakness  The following portions of the patient's history were reviewed and updated as appropriate: allergies, current medications, past family history, past medical history, past social history, past surgical history and problem list     Review of Systems   Constitutional: Positive for activity change  Negative for chills, diaphoresis, fatigue and fever  HENT: Negative for voice change  Eyes: Positive for visual disturbance  Respiratory: Positive for shortness of breath  Negative for cough, chest tightness and wheezing  Cardiovascular: Negative for chest pain, palpitations and leg swelling  Left  Calf  pains   Gastrointestinal: Negative for abdominal distention and abdominal pain  Genitourinary: Negative for difficulty urinating and dysuria  Musculoskeletal: Positive for back pain and gait problem  Negative for arthralgias, joint swelling, myalgias, neck pain and neck stiffness  Skin: Negative for color change, pallor, rash and wound  Neurological: Positive for dizziness and light-headedness  Negative for tremors, seizures, syncope, facial asymmetry, speech difficulty, weakness, numbness and headaches  Psychiatric/Behavioral: Positive for depression  Negative for agitation and behavioral problems  Objective:      /96 (BP Location: Left arm, Patient Position: Standing, Cuff Size: Large)   Pulse 77   Temp (!) 96 8 °F (36 °C) (Tympanic)   Resp 18   Wt 111 kg (245 lb)   SpO2 95%   BMI 34 17 kg/m²          Physical Exam   Constitutional: He is oriented to person, place, and time  No distress  HENT:   Mouth/Throat: No oropharyngeal exudate  dentures   Neck: No JVD present  Cardiovascular: Normal rate  No murmur heard  sl irreg   Pulmonary/Chest: Effort normal and breath sounds normal    Abdominal: He exhibits no distension     Musculoskeletal: He exhibits no edema or tenderness  Neurological: He is alert and oriented to person, place, and time  Coordination normal    Sl  unsteady   Skin: Skin is warm and dry  He is not diaphoretic     Psychiatric:   Flat  affect

## 2018-12-19 NOTE — PROGRESS NOTES
Patient's shoes and socks removed  Right Foot/Ankle   Right Foot Inspection  Skin Exam: skin normal and skin intact no dry skin, no warmth, no callus, no erythema, no maceration, no abnormal color, no pre-ulcer, no ulcer and no callus                              Vascular    The right DP pulse is 2+  The right PT pulse is 2+  Left Foot/Ankle  Left Foot Inspection  Skin Exam: skin normal and skin intactno dry skin, no warmth, no erythema, no maceration, normal color, no pre-ulcer, no ulcer and no callus                                           Vascular    The left DP pulse is 2+  The left PT pulse is 2+  Assign Risk Category:  No deformity present;  No loss of protective sensation;        Risk: 0

## 2018-12-21 ENCOUNTER — ANTICOAG VISIT (OUTPATIENT)
Dept: FAMILY MEDICINE CLINIC | Facility: CLINIC | Age: 68
End: 2018-12-21

## 2018-12-24 DIAGNOSIS — I48.20 CHRONIC ATRIAL FIBRILLATION (HCC): Primary | ICD-10-CM

## 2018-12-24 NOTE — PROGRESS NOTES
[facility] home nursing called asking for a PT/INR order so they can draw Pts blood work at home  Orders placed and faxed to [phone]    Home nursing will be faxing over orders for a home health aid for Dr Adam Draft to sign  Please place on Dr Alessandro George desk for his signature

## 2018-12-26 ENCOUNTER — TELEPHONE (OUTPATIENT)
Dept: OTHER | Facility: OTHER | Age: 68
End: 2018-12-26

## 2018-12-31 ENCOUNTER — ANTICOAG VISIT (OUTPATIENT)
Dept: FAMILY MEDICINE CLINIC | Facility: CLINIC | Age: 68
End: 2018-12-31

## 2019-01-02 ENCOUNTER — TELEPHONE (OUTPATIENT)
Dept: FAMILY MEDICINE CLINIC | Facility: CLINIC | Age: 69
End: 2019-01-02

## 2019-01-03 LAB — INR PPP: 2.4 (ref 0.86–1.17)

## 2019-01-08 ENCOUNTER — ANTICOAG VISIT (OUTPATIENT)
Dept: GYNECOLOGY | Facility: CLINIC | Age: 69
End: 2019-01-08

## 2019-01-08 ENCOUNTER — OFFICE VISIT (OUTPATIENT)
Dept: FAMILY MEDICINE CLINIC | Facility: CLINIC | Age: 69
End: 2019-01-08
Payer: MEDICARE

## 2019-01-08 VITALS
SYSTOLIC BLOOD PRESSURE: 160 MMHG | HEART RATE: 68 BPM | OXYGEN SATURATION: 96 % | RESPIRATION RATE: 16 BRPM | WEIGHT: 246.6 LBS | DIASTOLIC BLOOD PRESSURE: 80 MMHG | HEIGHT: 71 IN | TEMPERATURE: 97.9 F | BODY MASS INDEX: 34.52 KG/M2

## 2019-01-08 DIAGNOSIS — E11.65 TYPE 2 DIABETES MELLITUS WITH HYPERGLYCEMIA, WITH LONG-TERM CURRENT USE OF INSULIN (HCC): ICD-10-CM

## 2019-01-08 DIAGNOSIS — J43.2 CENTRILOBULAR EMPHYSEMA (HCC): ICD-10-CM

## 2019-01-08 DIAGNOSIS — I70.249 ATHEROSCLEROSIS OF NATIVE ARTERY OF BOTH LOWER EXTREMITIES WITH BILATERAL ULCERATION, UNSPECIFIED ULCERATION SITE (HCC): ICD-10-CM

## 2019-01-08 DIAGNOSIS — I70.239 ATHEROSCLEROSIS OF NATIVE ARTERY OF BOTH LOWER EXTREMITIES WITH BILATERAL ULCERATION, UNSPECIFIED ULCERATION SITE (HCC): ICD-10-CM

## 2019-01-08 DIAGNOSIS — I25.810 ARTERIOSCLEROSIS OF CORONARY ARTERY BYPASS GRAFT: ICD-10-CM

## 2019-01-08 DIAGNOSIS — K57.30 DIVERTICULAR DISEASE OF COLON: ICD-10-CM

## 2019-01-08 DIAGNOSIS — E78.2 MIXED HYPERLIPIDEMIA: ICD-10-CM

## 2019-01-08 DIAGNOSIS — I50.22 CHRONIC SYSTOLIC HEART FAILURE (HCC): ICD-10-CM

## 2019-01-08 DIAGNOSIS — I10 BENIGN ESSENTIAL HYPERTENSION: ICD-10-CM

## 2019-01-08 DIAGNOSIS — Z79.01 CURRENT USE OF LONG TERM ANTICOAGULATION: ICD-10-CM

## 2019-01-08 DIAGNOSIS — G47.33 OSA (OBSTRUCTIVE SLEEP APNEA): ICD-10-CM

## 2019-01-08 DIAGNOSIS — Z79.4 TYPE 2 DIABETES MELLITUS WITH HYPERGLYCEMIA, WITH LONG-TERM CURRENT USE OF INSULIN (HCC): ICD-10-CM

## 2019-01-08 DIAGNOSIS — F32.A DEPRESSIVE DISORDER: ICD-10-CM

## 2019-01-08 DIAGNOSIS — I48.20 ATRIAL FIBRILLATION, CHRONIC (HCC): ICD-10-CM

## 2019-01-08 DIAGNOSIS — N18.30 STAGE 3 CHRONIC KIDNEY DISEASE (HCC): ICD-10-CM

## 2019-01-08 DIAGNOSIS — I48.91 ATRIAL FIBRILLATION, UNSPECIFIED TYPE (HCC): Primary | ICD-10-CM

## 2019-01-08 PROCEDURE — 99214 OFFICE O/P EST MOD 30 MIN: CPT | Performed by: INTERNAL MEDICINE

## 2019-01-09 ENCOUNTER — TELEPHONE (OUTPATIENT)
Dept: FAMILY MEDICINE CLINIC | Facility: CLINIC | Age: 69
End: 2019-01-09

## 2019-01-09 DIAGNOSIS — Z79.4 TYPE 2 DIABETES MELLITUS WITHOUT COMPLICATION, WITH LONG-TERM CURRENT USE OF INSULIN (HCC): Primary | ICD-10-CM

## 2019-01-09 DIAGNOSIS — E11.9 TYPE 2 DIABETES MELLITUS WITHOUT COMPLICATION, WITH LONG-TERM CURRENT USE OF INSULIN (HCC): Primary | ICD-10-CM

## 2019-01-09 PROBLEM — Z79.01 CURRENT USE OF LONG TERM ANTICOAGULATION: Status: ACTIVE | Noted: 2019-01-09

## 2019-01-09 PROBLEM — E11.65 UNCONTROLLED TYPE 2 DIABETES MELLITUS WITH HYPERGLYCEMIA (HCC): Status: ACTIVE | Noted: 2017-01-25

## 2019-01-09 PROBLEM — N18.30 STAGE 3 CHRONIC KIDNEY DISEASE (HCC): Status: ACTIVE | Noted: 2019-01-09

## 2019-01-09 NOTE — TELEPHONE ENCOUNTER
The sister called for pt and wants to make sure that you are working on paperwork for [facility] for his [facility]   She needs this soon      # [phone] Hailee Ulloa

## 2019-01-09 NOTE — PROGRESS NOTES
Assessment/Plan:      Diet reviewed  Lifestyle modifications reviewed  Medications reviewed and ordered  Laboratory tests and studies reviewed and ordered  All patient's questions answered to patient satisfaction  Diagnoses and all orders for this visit:    Atrial fibrillation, unspecified type ([facility] [address] )  -     CBC and differential; Future  -     Comprehensive metabolic panel; Future  -     Protime-INR; Future    Stage 3 chronic kidney disease (HCC)    Benign essential hypertension    Chronic systolic heart failure (HCC)    Diverticular disease of colon    Centrilobular emphysema (HCC)    SENTHIL (obstructive sleep apnea)    Arteriosclerosis of coronary artery bypass graft    Atherosclerosis of native artery of both lower extremities with bilateral ulceration, unspecified ulceration site (HCC)    Atrial fibrillation, chronic (HCC)    Mixed hyperlipidemia    Depressive disorder    Current use of long term anticoagulation        Subjective:      Patient ID: Una Delgado is a 70 y o  male  HPI  This 48-year-old male is being seen for the following:    Because of poor compliance he is considering moving to [facility] where he can have closer medical supervision of his complex medical condition      Insulin-dependent type 2 diabetes mellitus poorly controlled with diabetic polyneuropathy    Diabetic nephropathy with stage 3 chronic kidney disease    Next hyperlipidemia    Obesity    Peripheral arterial disease    Benign essential hypertension    Current Outpatient Prescriptions:     acetaminophen (TYLENOL) 650 mg CR tablet, 3 (three) times a day, Disp: , Rfl:     albuterol (VENTOLIN HFA) 90 mcg/act inhaler, Inhale 1 puff every 4 (four) hours as needed for wheezing, Disp: 1 Inhaler, Rfl: 3    bisacodyl (DULCOLAX) 10 mg suppository, every 24 hours, Disp: , Rfl:     Blood Glucose Monitoring Suppl (ONE TOUCH ULTRA 2) w/Device KIT, Test blood sugar BID, Disp: , Rfl:     budesonide-formoterol (SYMBICORT) 160-4 5 mcg/act inhaler, Every 12 hours, Disp: , Rfl:     diltiazem (TIAZAC) 360 MG 24 hr capsule, every 24 hours, Disp: , Rfl:     docusate sodium (COLACE) 100 mg capsule, every 24 hours, Disp: , Rfl:     doxazosin (CARDURA) 1 mg tablet, TAKE 1 TABLET BY ORAL ROUTE EVERY DAY, Disp: , Rfl:     DULoxetine (CYMBALTA) 30 mg delayed release capsule, every 24 hours, Disp: , Rfl:     furosemide (LASIX) 20 mg tablet, every 24 hours, Disp: , Rfl:     gabapentin (NEURONTIN) 100 mg capsule, TAKE ONE CAPSULE BY MOUTH EVERY EVENING AS NEEDED, Disp: , Rfl: 5    glucose blood test strip, TEST UP TO 3 TIMES PER DAY, Disp: , Rfl:     insulin detemir (LEVEMIR FLEXTOUCH) 100 Units/mL injection pen, Inject 40 Units under the skin daily, Disp: 5 pen, Rfl: 11    Insulin Pen Needle 32G X 6 MM MISC, Inject as directed 3 (three) times a day, Disp: 100 each, Rfl: 11    ipratropium-albuterol (DUO-NEB) 0 5-2 5 mg/3 mL nebulizer solution, Inhale 3 mL every 2 (two) hours as needed, Disp: , Rfl:     Lancets (ONETOUCH ULTRASOFT) lancets, CHECK BLOOD SUGARS EVERY DAY, Disp: , Rfl: 3    levofloxacin (LEVAQUIN) 750 mg tablet, Take 750 mg by mouth every 24 hours, Disp: , Rfl:     Linagliptin (TRADJENTA) 5 MG TABS, every 24 hours, Disp: , Rfl:     melatonin 3 mg, qd, Disp: , Rfl:     metoprolol tartrate (LOPRESSOR) 50 mg tablet, TAKE 1 TABLET BY ORAL ROUTE 2 TIMES EVERY DAY WITH MEALS, Disp: 180 tablet, Rfl: 1    Milk Thistle 140 MG CAPS, takes 175mg 2 tabs am, 2 tabs pm, Disp: , Rfl:     nitroglycerin (NITRODUR) 0 4 mg/hr, Place 1 patch on the skin daily, Disp: , Rfl:     OLANZapine (ZyPREXA) 5 mg tablet, TAKE 1 TABLET BY MOUTH AT BEDTIME AS NEEDED FOR RESTLESSNESS/AGITATION, Disp: 90 tablet, Rfl: 0    omeprazole (PriLOSEC) 40 MG capsule, Take 1 capsule (40 mg total) by mouth every 24 hours, Disp: 30 capsule, Rfl: 4    oxyCODONE-acetaminophen (PERCOCET) 5-325 mg per tablet, take 1 tablet by oral route  every 6 hours as needed for severe pain, Disp: , Rfl:     Polyethylene Glycol 3350 (MIRALAX PO), every 24 hours, Disp: , Rfl:     repaglinide (PRANDIN) 0 5 mg tablet, Take 0 5 mg by mouth 3 (three) times a day before meals, Disp: , Rfl:     rosuvastatin (CRESTOR) 10 MG tablet, TAKE 1 TABLET BY ORAL ROUTE EVERY DAY, Disp: , Rfl:     senna (SENOKOT) 8 6 MG tablet, every 24 hours, Disp: , Rfl:     SPIRIVA HANDIHALER 18 MCG inhalation capsule, INHALE 1 CAPSULE (18 MCG TOTAL) DAILY  , Disp: , Rfl: 3    traZODone (DESYREL) 100 mg tablet, TAKE 2 TABLET BY ORAL ROUTE EVERY DAY AT BEDTIME, Disp: 180 tablet, Rfl: 0    triamcinolone (KENALOG) 0 1 % cream, Every 12 hours, Disp: , Rfl:     venlafaxine (EFFEXOR XR) 75 mg 24 hr capsule, every 24 hours, Disp: , Rfl:     warfarin (COUMADIN) 1 mg tablet, TAKE 1 TO 2 TABLETS BY MOUTH EVERY DAY OR AS DIRECTED, Disp: , Rfl: 3    zolpidem (AMBIEN) 10 mg tablet, Take 1 tablet (10 mg total) by mouth daily at bedtime as needed for sleep (Patient not taking: Reported on 12/11/2018 ), Disp: 30 tablet, Rfl: 5    The following portions of the patient's history were reviewed and updated as appropriate: allergies, current medications, past family history, past medical history, past social history, past surgical history and problem list     Review of Systems   Constitutional: Negative for appetite change, fatigue, fever and unexpected weight change  HENT: Negative for rhinorrhea, sinus pain, sinus pressure, sneezing and sore throat  Eyes: Negative for visual disturbance  Respiratory: Negative for cough, chest tightness, shortness of breath and wheezing  Cardiovascular: Negative for chest pain, palpitations and leg swelling  Gastrointestinal: Negative for abdominal distention, abdominal pain, blood in stool, constipation, diarrhea, nausea and vomiting  Endocrine: Negative for polydipsia and polyuria  Genitourinary: Negative for decreased urine volume, difficulty urinating, dysuria, hematuria and urgency  Musculoskeletal: Negative for arthralgias, back pain, joint swelling and neck pain  Skin: Negative for rash  Allergic/Immunologic: Negative for environmental allergies  Neurological: Negative for tremors, weakness, light-headedness, numbness and headaches  Hematological: Does not bruise/bleed easily  Psychiatric/Behavioral: Negative for agitation, behavioral problems, confusion and dysphoric mood  The patient is not nervous/anxious  History reviewed  No pertinent family history  Past Medical History:   Diagnosis Date    Colonic polyp 2012    Disc displacement, cervical     Peripheral vascular disease ([address] ) 2011    Femoral- popliteal bypass       Past Surgical History:   Procedure Laterality Date    BYPASS FEMORAL-POPLITEAL  2011    COLONOSCOPY  2012    With polypectomy    CORONARY ARTERY BYPASS GRAFT  2012    LAMINECTOMY  2010       Social History     Social History    Marital status: Single     Spouse name: N/A    Number of children: 1    Years of education:       Occupational History    retired      Social History Main Topics    Smoking status: Former Smoker     Packs/day: 1 00     Years: 37 00     Types: Cigarettes     Quit date: 1/1/2014    Smokeless tobacco: Never Used      Comment: No passive smoke exposure    Alcohol use No    Drug use: No    Sexual activity: No     Other Topics Concern    None     Social History Narrative    Pt states he drinks soda daily       Allergies   Allergen Reactions    No Active Allergies     No Known Allergies          Objective:      /80 (BP Location: Right arm, Patient Position: Sitting, Cuff Size: Large)   Pulse 68   Temp 97 9 °F (36 6 °C) (Tympanic)   Resp 16   Ht 5' 11" (1 803 m)   Wt 112 kg (246 lb 9 6 oz)   SpO2 96%   BMI 34 39 kg/m²        Physical Exam   Constitutional: He is oriented to person, place, and time  He appears well-developed and well-nourished  No distress  HENT:   Head: Normocephalic and atraumatic  Nose: Nose normal    Mouth/Throat: Oropharynx is clear and moist  No oropharyngeal exudate  Eyes: Pupils are equal, round, and reactive to light  Conjunctivae and EOM are normal  No scleral icterus  Neck: Normal range of motion  Neck supple  No JVD present  No tracheal deviation present  No thyromegaly present  Cardiovascular: Normal rate and regular rhythm  Exam reveals no gallop and no friction rub  Murmur heard  Pulmonary/Chest: Effort normal  No respiratory distress  He has no wheezes  He has no rales  He exhibits no tenderness  Abdominal: Soft  Bowel sounds are normal  He exhibits no distension and no mass  There is no tenderness  There is no rebound and no guarding  Musculoskeletal: Normal range of motion  He exhibits no edema or deformity  Lymphadenopathy:     He has no cervical adenopathy  Neurological: He is alert and oriented to person, place, and time  No cranial nerve deficit  Coordination normal    Skin: Skin is warm and dry  No rash noted  Psychiatric: He has a normal mood and affect   His behavior is normal  Judgment and thought content normal

## 2019-01-09 NOTE — TELEPHONE ENCOUNTER
Ligia Walsh would have been handling that  Can you contact her  th or fri and find out its status andlet the family know please

## 2019-01-09 NOTE — TELEPHONE ENCOUNTER
Called for refill of pen needles not insulin  Per patient Radha Chinchilla told him he could have 11 refills

## 2019-01-10 ENCOUNTER — TELEPHONE (OUTPATIENT)
Dept: FAMILY MEDICINE CLINIC | Facility: CLINIC | Age: 69
End: 2019-01-10

## 2019-01-10 DIAGNOSIS — E11.9 TYPE 2 DIABETES MELLITUS WITHOUT COMPLICATION, WITH LONG-TERM CURRENT USE OF INSULIN (HCC): ICD-10-CM

## 2019-01-10 DIAGNOSIS — Z79.4 TYPE 2 DIABETES MELLITUS WITHOUT COMPLICATION, WITH LONG-TERM CURRENT USE OF INSULIN (HCC): ICD-10-CM

## 2019-01-10 LAB
ALBUMIN SERPL-MCNC: 3.6 G/DL (ref 3.6–5.1)
ALBUMIN/GLOB SERPL: 1.2 (CALC) (ref 1–2.5)
ALP SERPL-CCNC: 60 U/L (ref 40–115)
ALT SERPL-CCNC: 37 U/L (ref 9–46)
AST SERPL-CCNC: 30 U/L (ref 10–35)
BASOPHILS # BLD AUTO: 40 CELLS/UL (ref 0–200)
BASOPHILS NFR BLD AUTO: 0.5 %
BILIRUB SERPL-MCNC: 0.3 MG/DL (ref 0.2–1.2)
BUN SERPL-MCNC: 17 MG/DL (ref 7–25)
BUN/CREAT SERPL: 11 (CALC) (ref 6–22)
CALCIUM SERPL-MCNC: 9 MG/DL (ref 8.6–10.3)
CHLORIDE SERPL-SCNC: 102 MMOL/L (ref 98–110)
CO2 SERPL-SCNC: 25 MMOL/L (ref 20–32)
CREAT SERPL-MCNC: 1.58 MG/DL (ref 0.7–1.25)
EOSINOPHIL # BLD AUTO: 253 CELLS/UL (ref 15–500)
EOSINOPHIL NFR BLD AUTO: 3.2 %
ERYTHROCYTE [DISTWIDTH] IN BLOOD BY AUTOMATED COUNT: 14 % (ref 11–15)
GLOBULIN SER CALC-MCNC: 2.9 G/DL (CALC) (ref 1.9–3.7)
GLUCOSE SERPL-MCNC: 90 MG/DL (ref 65–99)
HCT VFR BLD AUTO: 44 % (ref 38.5–50)
HGB BLD-MCNC: 14.5 G/DL (ref 13.2–17.1)
INR PPP: 5.8
INR PPP: 5.8 (ref 0.86–1.17)
LYMPHOCYTES # BLD AUTO: 1280 CELLS/UL (ref 850–3900)
LYMPHOCYTES NFR BLD AUTO: 16.2 %
MCH RBC QN AUTO: 28.5 PG (ref 27–33)
MCHC RBC AUTO-ENTMCNC: 33 G/DL (ref 32–36)
MCV RBC AUTO: 86.4 FL (ref 80–100)
MONOCYTES # BLD AUTO: 751 CELLS/UL (ref 200–950)
MONOCYTES NFR BLD AUTO: 9.5 %
NEUTROPHILS # BLD AUTO: 5577 CELLS/UL (ref 1500–7800)
NEUTROPHILS NFR BLD AUTO: 70.6 %
PLATELET # BLD AUTO: 225 THOUSAND/UL (ref 140–400)
PMV BLD REES-ECKER: 9.5 FL (ref 7.5–12.5)
POTASSIUM SERPL-SCNC: 4.2 MMOL/L (ref 3.5–5.3)
PROT SERPL-MCNC: 6.5 G/DL (ref 6.1–8.1)
PROTHROMBIN TIME: 58.5 SEC (ref 9–11.5)
RBC # BLD AUTO: 5.09 MILLION/UL (ref 4.2–5.8)
SL AMB EGFR AFRICAN AMERICAN: 51 ML/MIN/1.73M2
SL AMB EGFR NON AFRICAN AMERICAN: 44 ML/MIN/1.73M2
SODIUM SERPL-SCNC: 137 MMOL/L (ref 135–146)
WBC # BLD AUTO: 7.9 THOUSAND/UL (ref 3.8–10.8)

## 2019-01-10 RX ORDER — PREDNISOLONE ACETATE 10 MG/ML
SUSPENSION/ DROPS OPHTHALMIC
Refills: 3 | COMMUNITY
Start: 2019-01-07 | End: 2019-08-21

## 2019-01-10 RX ORDER — PEN NEEDLE, DIABETIC 32GX 5/32"
NEEDLE, DISPOSABLE MISCELLANEOUS
Refills: 0 | OUTPATIENT
Start: 2019-01-10

## 2019-01-10 RX ORDER — ATROPINE SULFATE 10 MG/ML
SOLUTION/ DROPS OPHTHALMIC
Refills: 3 | COMMUNITY
Start: 2019-01-07

## 2019-01-10 RX ORDER — DORZOLAMIDE HCL 20 MG/ML
SOLUTION/ DROPS OPHTHALMIC
Refills: 3 | COMMUNITY
Start: 2019-01-07

## 2019-01-10 NOTE — TELEPHONE ENCOUNTER
I sent in an order , see if its correct please  If not call pharmacy and see what they want us to order, they have past orders

## 2019-01-10 NOTE — TELEPHONE ENCOUNTER
There no needles in his chart   Dr Honey Coburn, please advise what size needle patient needs so we can send to pharmacy

## 2019-01-10 NOTE — TELEPHONE ENCOUNTER
Patient called and requested his needles for his insulin pens be sent to [facility] [phone]

## 2019-01-10 NOTE — TELEPHONE ENCOUNTER
I called [place] and she did not get the forms  So, I called the sister back and just asked to have Cal resend any forms attn Dr Barrett Harris to our private fax and we will complete them  Sister said no problem at all, and will have them sent for today or tomm

## 2019-01-10 NOTE — PROGRESS NOTES
I called pt and told him to stop warfarin for full 6 days ie 4 more days and resume 7/15 at 1 mg daily  Repeat 7/22  Pt has retinal hemorrhage   Pleease note in book

## 2019-01-11 ENCOUNTER — TELEPHONE (OUTPATIENT)
Dept: FAMILY MEDICINE CLINIC | Facility: CLINIC | Age: 69
End: 2019-01-11

## 2019-01-11 NOTE — TELEPHONE ENCOUNTER
Okay I talked to home care and they state that he was very non compliant, and that he no longer needed or qualified for home care as he is able to handle his meds, and that this patient also is going to be placed in [facility]    Will call patient back and inform him

## 2019-01-11 NOTE — TELEPHONE ENCOUNTER
This patient called and states that home care released him and informed him that he is better  The patient does not think he can stop home care, he is in need of bathing care, etc     Please help arrange with [address]   He will also need new home care orders    Thanks!

## 2019-01-14 ENCOUNTER — TELEPHONE (OUTPATIENT)
Dept: FAMILY MEDICINE CLINIC | Facility: CLINIC | Age: 69
End: 2019-01-14

## 2019-01-14 NOTE — TELEPHONE ENCOUNTER
Patient's sister wants to know if you received a form for the waiver program and if your working on it to get it fill   Please advise her name is johnnie and her phone number is [phone]

## 2019-01-15 ENCOUNTER — TELEPHONE (OUTPATIENT)
Dept: FAMILY MEDICINE CLINIC | Facility: CLINIC | Age: 69
End: 2019-01-15

## 2019-01-15 ENCOUNTER — ANTICOAG VISIT (OUTPATIENT)
Dept: GYNECOLOGY | Facility: CLINIC | Age: 69
End: 2019-01-15

## 2019-01-18 ENCOUNTER — OFFICE VISIT (OUTPATIENT)
Dept: FAMILY MEDICINE CLINIC | Facility: CLINIC | Age: 69
End: 2019-01-18
Payer: MEDICARE

## 2019-01-18 VITALS
OXYGEN SATURATION: 96 % | HEART RATE: 78 BPM | RESPIRATION RATE: 20 BRPM | SYSTOLIC BLOOD PRESSURE: 124 MMHG | BODY MASS INDEX: 34.03 KG/M2 | TEMPERATURE: 97.6 F | WEIGHT: 244 LBS | DIASTOLIC BLOOD PRESSURE: 80 MMHG

## 2019-01-18 DIAGNOSIS — J43.2 CENTRILOBULAR EMPHYSEMA (HCC): ICD-10-CM

## 2019-01-18 DIAGNOSIS — K57.30 DIVERTICULAR DISEASE OF COLON: ICD-10-CM

## 2019-01-18 DIAGNOSIS — G60.8 PERIPHERAL SENSORY NEUROPATHY: ICD-10-CM

## 2019-01-18 DIAGNOSIS — Z79.4 TYPE 2 DIABETES MELLITUS WITH HYPERGLYCEMIA, WITH LONG-TERM CURRENT USE OF INSULIN (HCC): Primary | ICD-10-CM

## 2019-01-18 DIAGNOSIS — I25.810 ARTERIOSCLEROSIS OF CORONARY ARTERY BYPASS GRAFT: ICD-10-CM

## 2019-01-18 DIAGNOSIS — I70.249 ATHEROSCLEROSIS OF NATIVE ARTERY OF BOTH LOWER EXTREMITIES WITH BILATERAL ULCERATION, UNSPECIFIED ULCERATION SITE (HCC): ICD-10-CM

## 2019-01-18 DIAGNOSIS — I70.239 ATHEROSCLEROSIS OF NATIVE ARTERY OF BOTH LOWER EXTREMITIES WITH BILATERAL ULCERATION, UNSPECIFIED ULCERATION SITE (HCC): ICD-10-CM

## 2019-01-18 DIAGNOSIS — E11.65 TYPE 2 DIABETES MELLITUS WITH HYPERGLYCEMIA, WITH LONG-TERM CURRENT USE OF INSULIN (HCC): Primary | ICD-10-CM

## 2019-01-18 DIAGNOSIS — I48.20 ATRIAL FIBRILLATION, CHRONIC (HCC): ICD-10-CM

## 2019-01-18 DIAGNOSIS — G47.33 OSA (OBSTRUCTIVE SLEEP APNEA): ICD-10-CM

## 2019-01-18 DIAGNOSIS — Z79.01 CURRENT USE OF LONG TERM ANTICOAGULATION: ICD-10-CM

## 2019-01-18 DIAGNOSIS — N18.30 STAGE 3 CHRONIC KIDNEY DISEASE (HCC): ICD-10-CM

## 2019-01-18 DIAGNOSIS — I10 ESSENTIAL HYPERTENSION: ICD-10-CM

## 2019-01-18 PROCEDURE — 99214 OFFICE O/P EST MOD 30 MIN: CPT | Performed by: INTERNAL MEDICINE

## 2019-01-21 ENCOUNTER — TELEPHONE (OUTPATIENT)
Dept: FAMILY MEDICINE CLINIC | Facility: CLINIC | Age: 69
End: 2019-01-21

## 2019-01-21 NOTE — TELEPHONE ENCOUNTER
Patient called and would like to know what dosage he should take  of his Coumadin  Please call him asap because has been waiting since Friday    He will be home till noon because has a doctor appt at that time, please call him at [phone]

## 2019-01-22 NOTE — PROGRESS NOTES
Assessment/Plan:  The patient was considering moving and to [facility] in the assisted-living section the however decided he would rather stay at home and apply for medical waiver in order to get proper nursing support as well as   I discussed this in detail with the patient and completed the form improving his application  The patient is struggling medically at home and needs assistance and direction following even the most basic instructions  He has complex medical case which requires support in the home if he his stay there and not live in assisted living  Diet reviewed  Lifestyle modifications reviewed  Medications reviewed and ordered  Laboratory tests and studies reviewed and ordered  All patient's questions answered to patient satisfaction  Visit lasted 40 min with greater than 50% counseling      Diagnoses and all orders for this visit:    Type 2 diabetes mellitus with hyperglycemia, with long-term current use of insulin (HCC)  -     Microalbumin / creatinine urine ratio    Current use of long term anticoagulation    Stage 3 chronic kidney disease (HCC)    Peripheral sensory neuropathy    Essential hypertension    Atrial fibrillation, chronic (HCC)    Atherosclerosis of native artery of both lower extremities with bilateral ulceration, unspecified ulceration site ([facility] [address] )    Arteriosclerosis of coronary artery bypass graft    SENTHIL (obstructive sleep apnea)    Centrilobular emphysema (HCC)    Diverticular disease of colon    Other orders  -     Cancel: Hepatitis C antibody; Future        Subjective:      Patient ID: Carina Luis is a 70 y o  male  HPI  This 50-year-old male is here to review his current living situation and medical issues and discuss applying for a home waiver program and complete the proper forms      Current Outpatient Prescriptions:     acetaminophen (TYLENOL) 650 mg CR tablet, 3 (three) times a day, Disp: , Rfl:     albuterol (VENTOLIN HFA) 90 mcg/act inhaler, Inhale 1 puff every 4 (four) hours as needed for wheezing, Disp: 1 Inhaler, Rfl: 3    atropine (ISOPTO ATROPINE) 1 % ophthalmic solution, PUT 1 DROP INTO RIGHT EYE 3 TIMES A DAY, Disp: , Rfl: 3    bisacodyl (DULCOLAX) 10 mg suppository, every 24 hours, Disp: , Rfl:     Blood Glucose Monitoring Suppl (ONE TOUCH ULTRA 2) w/Device KIT, Test blood sugar BID, Disp: , Rfl:     budesonide-formoterol (SYMBICORT) 160-4 5 mcg/act inhaler, Every 12 hours, Disp: , Rfl:     diltiazem (TIAZAC) 360 MG 24 hr capsule, every 24 hours, Disp: , Rfl:     docusate sodium (COLACE) 100 mg capsule, every 24 hours, Disp: , Rfl:     dorzolamide (TRUSOPT) 2 % ophthalmic solution, INSTILL 1 DROP INTO AFFECTED EYE 3 TIMES A DAY, Disp: , Rfl: 3    doxazosin (CARDURA) 1 mg tablet, TAKE 1 TABLET BY ORAL ROUTE EVERY DAY, Disp: , Rfl:     DULoxetine (CYMBALTA) 30 mg delayed release capsule, every 24 hours, Disp: , Rfl:     furosemide (LASIX) 20 mg tablet, every 24 hours, Disp: , Rfl:     gabapentin (NEURONTIN) 100 mg capsule, TAKE ONE CAPSULE BY MOUTH EVERY EVENING AS NEEDED, Disp: , Rfl: 5    glucose blood test strip, TEST UP TO 3 TIMES PER DAY, Disp: , Rfl:     insulin detemir (LEVEMIR FLEXTOUCH) 100 Units/mL injection pen, Inject 40 Units under the skin daily, Disp: 5 pen, Rfl: 11    Insulin Pen Needle 32G X 6 MM MISC, Inject as directed 3 (three) times a day Poorly controlled insulin dependent DM2, Disp: 100 each, Rfl: 11    ipratropium-albuterol (DUO-NEB) 0 5-2 5 mg/3 mL nebulizer solution, Inhale 3 mL every 2 (two) hours as needed, Disp: , Rfl:     Lancets (ONETOUCH ULTRASOFT) lancets, CHECK BLOOD SUGARS EVERY DAY, Disp: , Rfl: 3    Linagliptin (TRADJENTA) 5 MG TABS, every 24 hours, Disp: , Rfl:     melatonin 3 mg, qd, Disp: , Rfl:     metoprolol tartrate (LOPRESSOR) 50 mg tablet, TAKE 1 TABLET BY ORAL ROUTE 2 TIMES EVERY DAY WITH MEALS, Disp: 180 tablet, Rfl: 1    Milk Thistle 140 MG CAPS, takes 175mg 2 tabs am, 2 tabs pm, Disp: , Rfl:     nitroglycerin (NITRODUR) 0 4 mg/hr, Place 1 patch on the skin daily, Disp: , Rfl:     omeprazole (PriLOSEC) 40 MG capsule, Take 1 capsule (40 mg total) by mouth every 24 hours, Disp: 30 capsule, Rfl: 4    oxyCODONE-acetaminophen (PERCOCET) 5-325 mg per tablet, take 1 tablet by oral route  every 6 hours as needed for severe pain, Disp: , Rfl:     Polyethylene Glycol 3350 (MIRALAX PO), every 24 hours, Disp: , Rfl:     prednisoLONE acetate (PRED FORTE) 1 % ophthalmic suspension, INSTILL 1 DROP BY OPHTHALMIC ROUTE 3 TIMES EVERY DAY IN RIGHT EYE, Disp: , Rfl: 3    repaglinide (PRANDIN) 0 5 mg tablet, Take 0 5 mg by mouth 3 (three) times a day before meals, Disp: , Rfl:     rosuvastatin (CRESTOR) 10 MG tablet, TAKE 1 TABLET BY ORAL ROUTE EVERY DAY, Disp: , Rfl:     senna (SENOKOT) 8 6 MG tablet, every 24 hours, Disp: , Rfl:     SPIRIVA HANDIHALER 18 MCG inhalation capsule, INHALE 1 CAPSULE (18 MCG TOTAL) DAILY  , Disp: , Rfl: 3    traZODone (DESYREL) 100 mg tablet, TAKE 2 TABLET BY ORAL ROUTE EVERY DAY AT BEDTIME, Disp: 180 tablet, Rfl: 0    triamcinolone (KENALOG) 0 1 % cream, Every 12 hours, Disp: , Rfl:     venlafaxine (EFFEXOR XR) 75 mg 24 hr capsule, every 24 hours, Disp: , Rfl:     warfarin (COUMADIN) 1 mg tablet, TAKE 1 TO 2 TABLETS BY MOUTH EVERY DAY OR AS DIRECTED, Disp: , Rfl: 3    zolpidem (AMBIEN) 10 mg tablet, Take 1 tablet (10 mg total) by mouth daily at bedtime as needed for sleep, Disp: 30 tablet, Rfl: 5    levofloxacin (LEVAQUIN) 750 mg tablet, Take 750 mg by mouth every 24 hours, Disp: , Rfl:     OLANZapine (ZyPREXA) 5 mg tablet, TAKE 1 TABLET BY MOUTH AT BEDTIME AS NEEDED FOR RESTLESSNESS/AGITATION, Disp: 90 tablet, Rfl: 0    The following portions of the patient's history were reviewed and updated as appropriate: allergies, current medications, past family history, past medical history, past social history, past surgical history and problem list     Review of Systems Constitutional: Negative for appetite change, fatigue, fever and unexpected weight change  HENT: Negative for rhinorrhea, sinus pain, sinus pressure, sneezing and sore throat  Eyes: Negative for visual disturbance  Respiratory: Negative for cough, chest tightness, shortness of breath and wheezing  Cardiovascular: Negative for chest pain, palpitations and leg swelling  Gastrointestinal: Negative for abdominal distention, abdominal pain, blood in stool, constipation, diarrhea, nausea and vomiting  Endocrine: Negative for polydipsia and polyuria  Genitourinary: Negative for decreased urine volume, difficulty urinating, dysuria, hematuria and urgency  Musculoskeletal: Negative for arthralgias, back pain, joint swelling and neck pain  Skin: Negative for rash  Allergic/Immunologic: Negative for environmental allergies  Neurological: Negative for tremors, weakness, light-headedness, numbness and headaches  Hematological: Does not bruise/bleed easily  Psychiatric/Behavioral: Negative for agitation, behavioral problems, confusion and dysphoric mood  The patient is not nervous/anxious  History reviewed  No pertinent family history      Past Medical History:   Diagnosis Date    Colonic polyp 2012    Disc displacement, cervical     Peripheral vascular disease ([facility] [address] ) 2011    Femoral- popliteal bypass       Past Surgical History:   Procedure Laterality Date    BYPASS FEMORAL-POPLITEAL  2011    COLONOSCOPY  2012    With polypectomy    CORONARY ARTERY BYPASS GRAFT  2012    LAMINECTOMY  2010       Social History     Social History    Marital status: Single     Spouse name: N/A    Number of children: 1    Years of education:       Occupational History    retired      Social History Main Topics    Smoking status: Former Smoker     Packs/day: 1 00     Years: 37 00     Types: Cigarettes     Quit date: 1/1/2014    Smokeless tobacco: Never Used      Comment: No passive smoke exposure    Alcohol use No    Drug use: No    Sexual activity: No     Other Topics Concern    None     Social History Narrative    Pt states he drinks soda daily       Allergies   Allergen Reactions    No Active Allergies     No Known Allergies          Objective:      /80 (BP Location: Left arm, Patient Position: Sitting, Cuff Size: Large)   Pulse 78   Temp 97 6 °F (36 4 °C) (Tympanic)   Resp 20   Wt 111 kg (244 lb)   SpO2 96%   BMI 34 03 kg/m²        Physical Exam   Constitutional: He is oriented to person, place, and time  He appears well-developed and well-nourished  No distress  HENT:   Head: Normocephalic and atraumatic  Nose: Nose normal    Mouth/Throat: Oropharynx is clear and moist  No oropharyngeal exudate  Eyes: Pupils are equal, round, and reactive to light  Conjunctivae and EOM are normal  No scleral icterus  Neck: Normal range of motion  Neck supple  No JVD present  No tracheal deviation present  No thyromegaly present  Cardiovascular: Normal rate and normal heart sounds  Exam reveals no gallop and no friction rub  No murmur heard  Pulmonary/Chest: Effort normal  No respiratory distress  He has no wheezes  He has no rales  He exhibits no tenderness  Abdominal: Soft  Bowel sounds are normal  He exhibits no distension and no mass  There is no tenderness  There is no rebound and no guarding  Musculoskeletal: Normal range of motion  He exhibits no edema or deformity  Lymphadenopathy:     He has no cervical adenopathy  Neurological: He is alert and oriented to person, place, and time  No cranial nerve deficit  Coordination normal    Skin: Skin is warm and dry  No rash noted  Psychiatric: He has a normal mood and affect   His behavior is normal  Judgment and thought content normal

## 2019-01-23 ENCOUNTER — TELEPHONE (OUTPATIENT)
Dept: FAMILY MEDICINE CLINIC | Facility: CLINIC | Age: 69
End: 2019-01-23

## 2019-01-23 LAB
INR PPP: 1.4
PROTHROMBIN TIME: 14.4 SEC (ref 9–11.5)

## 2019-01-23 NOTE — TELEPHONE ENCOUNTER
Area of aging forms completed wrong for waiver program    1  Timeframe needs to be long term    2   Level of care needs to be nursing facility clinically eligible    Please fax back asap they are faxing us a new script  [phone]

## 2019-01-24 ENCOUNTER — TELEPHONE (OUTPATIENT)
Dept: FAMILY MEDICINE CLINIC | Facility: CLINIC | Age: 69
End: 2019-01-24

## 2019-01-24 NOTE — TELEPHONE ENCOUNTER
Patient called for his PT/INR results and his Coumadin dosage    Please advise the patient [phone]

## 2019-01-25 ENCOUNTER — TELEPHONE (OUTPATIENT)
Dept: FAMILY MEDICINE CLINIC | Facility: CLINIC | Age: 69
End: 2019-01-25

## 2019-01-25 NOTE — TELEPHONE ENCOUNTER
I spoke to Pt about Coumadin instructions, they are as followed, per Dr Maddie Parmar;    2mg to be taken today, Monday and Friday    1mg every other day    Repeat BW Thursday 01/31/19    Pt verbalizes understanding

## 2019-02-04 DIAGNOSIS — K21.00 GERD WITH ESOPHAGITIS: ICD-10-CM

## 2019-02-04 DIAGNOSIS — F31.81 BIPOLAR 2 DISORDER, MAJOR DEPRESSIVE EPISODE (HCC): ICD-10-CM

## 2019-02-05 RX ORDER — OMEPRAZOLE 40 MG/1
CAPSULE, DELAYED RELEASE ORAL
Qty: 30 CAPSULE | Refills: 3 | Status: SHIPPED | OUTPATIENT
Start: 2019-02-05 | End: 2019-04-18 | Stop reason: SDUPTHER

## 2019-02-05 RX ORDER — OLANZAPINE 5 MG/1
TABLET ORAL
Qty: 90 TABLET | Refills: 0 | Status: SHIPPED | OUTPATIENT
Start: 2019-02-05 | End: 2019-05-20 | Stop reason: SDUPTHER

## 2019-02-14 ENCOUNTER — TRANSITIONAL CARE MANAGEMENT (OUTPATIENT)
Dept: FAMILY MEDICINE CLINIC | Facility: CLINIC | Age: 69
End: 2019-02-14

## 2019-02-22 ENCOUNTER — OFFICE VISIT (OUTPATIENT)
Dept: FAMILY MEDICINE CLINIC | Facility: CLINIC | Age: 69
End: 2019-02-22
Payer: COMMERCIAL

## 2019-02-22 VITALS
TEMPERATURE: 97.9 F | WEIGHT: 249 LBS | SYSTOLIC BLOOD PRESSURE: 142 MMHG | OXYGEN SATURATION: 96 % | HEART RATE: 60 BPM | DIASTOLIC BLOOD PRESSURE: 72 MMHG | BODY MASS INDEX: 34.86 KG/M2 | HEIGHT: 71 IN | RESPIRATION RATE: 18 BRPM

## 2019-02-22 DIAGNOSIS — F31.81 BIPOLAR 2 DISORDER, MAJOR DEPRESSIVE EPISODE (HCC): Primary | ICD-10-CM

## 2019-02-22 DIAGNOSIS — Z79.4 INSULIN DEPENDENT TYPE 2 DIABETES MELLITUS (HCC): ICD-10-CM

## 2019-02-22 DIAGNOSIS — G47.33 OSA (OBSTRUCTIVE SLEEP APNEA): ICD-10-CM

## 2019-02-22 DIAGNOSIS — I48.20 ATRIAL FIBRILLATION, CHRONIC (HCC): ICD-10-CM

## 2019-02-22 DIAGNOSIS — Z79.01 CURRENT USE OF LONG TERM ANTICOAGULATION: ICD-10-CM

## 2019-02-22 DIAGNOSIS — N18.4 TYPE 2 DIABETES MELLITUS WITH STAGE 4 CHRONIC KIDNEY DISEASE, WITHOUT LONG-TERM CURRENT USE OF INSULIN (HCC): ICD-10-CM

## 2019-02-22 DIAGNOSIS — I25.810 ARTERIOSCLEROSIS OF CORONARY ARTERY BYPASS GRAFT: ICD-10-CM

## 2019-02-22 DIAGNOSIS — Z79.4 TYPE 2 DIABETES MELLITUS WITH DIABETIC POLYNEUROPATHY, WITH LONG-TERM CURRENT USE OF INSULIN (HCC): ICD-10-CM

## 2019-02-22 DIAGNOSIS — E11.65 UNCONTROLLED TYPE 2 DIABETES MELLITUS WITH HYPERGLYCEMIA (HCC): ICD-10-CM

## 2019-02-22 DIAGNOSIS — J43.2 CENTRILOBULAR EMPHYSEMA (HCC): ICD-10-CM

## 2019-02-22 DIAGNOSIS — E11.65 TYPE 2 DIABETES MELLITUS WITH HYPERGLYCEMIA, WITH LONG-TERM CURRENT USE OF INSULIN (HCC): ICD-10-CM

## 2019-02-22 DIAGNOSIS — E11.42 TYPE 2 DIABETES MELLITUS WITH DIABETIC POLYNEUROPATHY, WITH LONG-TERM CURRENT USE OF INSULIN (HCC): ICD-10-CM

## 2019-02-22 DIAGNOSIS — I10 BENIGN ESSENTIAL HYPERTENSION: ICD-10-CM

## 2019-02-22 DIAGNOSIS — E11.9 INSULIN DEPENDENT TYPE 2 DIABETES MELLITUS (HCC): ICD-10-CM

## 2019-02-22 DIAGNOSIS — E11.22 TYPE 2 DIABETES MELLITUS WITH STAGE 4 CHRONIC KIDNEY DISEASE, WITHOUT LONG-TERM CURRENT USE OF INSULIN (HCC): ICD-10-CM

## 2019-02-22 DIAGNOSIS — Z79.4 TYPE 2 DIABETES MELLITUS WITH HYPERGLYCEMIA, WITH LONG-TERM CURRENT USE OF INSULIN (HCC): ICD-10-CM

## 2019-02-22 PROBLEM — R33.9 URINARY RETENTION: Status: ACTIVE | Noted: 2019-02-06

## 2019-02-22 PROCEDURE — 99214 OFFICE O/P EST MOD 30 MIN: CPT | Performed by: INTERNAL MEDICINE

## 2019-02-22 RX ORDER — ALBUTEROL SULFATE 2.5 MG/3ML
2.5 SOLUTION RESPIRATORY (INHALATION) EVERY 6 HOURS PRN
Qty: 100 VIAL | Refills: 6 | Status: SHIPPED | OUTPATIENT
Start: 2019-02-22

## 2019-02-22 NOTE — PROGRESS NOTES
Assessment/Plan:  Patient become short of breath very easily  And leads a sedentary life his house  Was recently of the [facility] about 5 weeks ago for several days had echo, and full workup and found to have COPD exacerbation  He was discharged on duo nebs which she uses twice a day in addition to his multiple other inhalers  He will continue these  He has finished his taper prednisone and is off this now with improvement in his blood sugars which now run about 200  Patient has succeeded applying for the waiver has been approved and is now awaiting services  Diet reviewed  Lifestyle modifications reviewed  Medications reviewed and ordered  Laboratory tests and studies reviewed and ordered  All patient's questions answered to patient satisfaction  Diagnoses and all orders for this visit:    Bipolar 2 disorder, major depressive episode ([facility] )    Type 2 diabetes mellitus with hyperglycemia, with long-term current use of insulin ([facility] )    Current use of long term anticoagulation    Insulin dependent type 2 diabetes mellitus ([facility] )    Type 2 diabetes mellitus with stage 4 chronic kidney disease, without long-term current use of insulin ([facility])    Type 2 diabetes mellitus with diabetic polyneuropathy, with long-term current use of insulin ([facility])    Uncontrolled type 2 diabetes mellitus with hyperglycemia (HCC)    Centrilobular emphysema (HCC)    SENTHIL (obstructive sleep apnea)    Atrial fibrillation, chronic (HCC)    Arteriosclerosis of coronary artery bypass graft    Benign essential hypertension    Other orders  -     albuterol (2 5 mg/3 mL) 0 083 % nebulizer solution; Take 1 vial (2 5 mg total) by nebulization every 6 (six) hours as needed for wheezing or shortness of breath        Subjective:      Patient ID: Deb Negro is a 70 y o  male      HPI  This 62-year-old male was hospitalized for exacerbation of COPD with an echo that demonstrated ejection fraction 55% with no significant wall motion abnormalities noted but limited visualization was present  He was sent home on tapering doses of prednisone which caused elevation of sugars but did resulted in gradual improvement  Patient complains now of shortness of breath with exertion S and says he can walk half a block  He was given oxygen to use at night so apparently has document nocturnal hypoxia and I suspect exertional desaturation  He is using Ventolin his rescue inhaler Symbicort 160 2 twice a day, Spiriva once daily  He is on warfarin long-term and will be getting a prothrombin time done Monday in 3 days    Apparently he got confused in the hospital during 1 of his hospitalization and mention today that it was from the Ambien which was stopped and he does not take it any further      Current Outpatient Medications:     acetaminophen (TYLENOL) 650 mg CR tablet, 3 (three) times a day, Disp: , Rfl:     albuterol (VENTOLIN HFA) 90 mcg/act inhaler, Inhale 1 puff every 4 (four) hours as needed for wheezing, Disp: 1 Inhaler, Rfl: 3    atropine (ISOPTO ATROPINE) 1 % ophthalmic solution, PUT 1 DROP INTO RIGHT EYE 3 TIMES A DAY, Disp: , Rfl: 3    bisacodyl (DULCOLAX) 10 mg suppository, every 24 hours, Disp: , Rfl:     Blood Glucose Monitoring Suppl (ONE TOUCH ULTRA 2) w/Device KIT, Test blood sugar BID, Disp: , Rfl:     budesonide-formoterol (SYMBICORT) 160-4 5 mcg/act inhaler, Every 12 hours, Disp: , Rfl:     diltiazem (TIAZAC) 360 MG 24 hr capsule, every 24 hours, Disp: , Rfl:     docusate sodium (COLACE) 100 mg capsule, every 24 hours, Disp: , Rfl:     dorzolamide (TRUSOPT) 2 % ophthalmic solution, INSTILL 1 DROP INTO AFFECTED EYE 3 TIMES A DAY, Disp: , Rfl: 3    doxazosin (CARDURA) 1 mg tablet, TAKE 1 TABLET BY ORAL ROUTE EVERY DAY, Disp: , Rfl:     DULoxetine (CYMBALTA) 30 mg delayed release capsule, every 24 hours, Disp: , Rfl:     furosemide (LASIX) 20 mg tablet, every 24 hours, Disp: , Rfl:     gabapentin (NEURONTIN) 100 mg capsule, TAKE ONE CAPSULE BY MOUTH EVERY EVENING AS NEEDED, Disp: , Rfl: 5    glucose blood test strip, TEST UP TO 3 TIMES PER DAY, Disp: , Rfl:     insulin detemir (LEVEMIR FLEXTOUCH) 100 Units/mL injection pen, Inject 40 Units under the skin daily, Disp: 5 pen, Rfl: 11    Insulin Pen Needle 32G X 6 MM MISC, Inject as directed 3 (three) times a day Poorly controlled insulin dependent DM2, Disp: 100 each, Rfl: 11    Lancets (ONETOUCH ULTRASOFT) lancets, CHECK BLOOD SUGARS EVERY DAY, Disp: , Rfl: 3    levofloxacin (LEVAQUIN) 750 mg tablet, Take 750 mg by mouth every 24 hours, Disp: , Rfl:     Linagliptin (TRADJENTA) 5 MG TABS, every 24 hours, Disp: , Rfl:     melatonin 3 mg, qd, Disp: , Rfl:     metoprolol tartrate (LOPRESSOR) 50 mg tablet, TAKE 1 TABLET BY ORAL ROUTE 2 TIMES EVERY DAY WITH MEALS, Disp: 180 tablet, Rfl: 1    Milk Thistle 140 MG CAPS, takes 175mg 2 tabs am, 2 tabs pm, Disp: , Rfl:     nitroglycerin (NITRODUR) 0 4 mg/hr, Place 1 patch on the skin daily, Disp: , Rfl:     OLANZapine (ZyPREXA) 5 mg tablet, TAKE 1 TABLET BY MOUTH AT BEDTIME AS NEEDED FOR RESTLESSNESS/AGITATION, Disp: 90 tablet, Rfl: 0    omeprazole (PriLOSEC) 40 MG capsule, TAKE 1 CAPSULE BY ORAL ROUTE EVERY DAY, Disp: 30 capsule, Rfl: 3    oxyCODONE-acetaminophen (PERCOCET) 5-325 mg per tablet, take 1 tablet by oral route  every 6 hours as needed for severe pain, Disp: , Rfl:     Polyethylene Glycol 3350 (MIRALAX PO), every 24 hours, Disp: , Rfl:     prednisoLONE acetate (PRED FORTE) 1 % ophthalmic suspension, INSTILL 1 DROP BY OPHTHALMIC ROUTE 3 TIMES EVERY DAY IN RIGHT EYE, Disp: , Rfl: 3    repaglinide (PRANDIN) 0 5 mg tablet, Take 0 5 mg by mouth 3 (three) times a day before meals, Disp: , Rfl:     rosuvastatin (CRESTOR) 10 MG tablet, TAKE 1 TABLET BY ORAL ROUTE EVERY DAY, Disp: , Rfl:     senna (SENOKOT) 8 6 MG tablet, every 24 hours, Disp: , Rfl:     SPIRIVA HANDIHALER 18 MCG inhalation capsule, INHALE 1 CAPSULE (18 MCG TOTAL) DAILY  , Disp: , Rfl: 3    traZODone (DESYREL) 100 mg tablet, TAKE 2 TABLET BY ORAL ROUTE EVERY DAY AT BEDTIME, Disp: 180 tablet, Rfl: 0    triamcinolone (KENALOG) 0 1 % cream, Every 12 hours, Disp: , Rfl:     venlafaxine (EFFEXOR XR) 75 mg 24 hr capsule, every 24 hours, Disp: , Rfl:     warfarin (COUMADIN) 1 mg tablet, TAKE 1 TO 2 TABLETS BY MOUTH EVERY DAY OR AS DIRECTED, Disp: , Rfl: 3    The following portions of the patient's history were reviewed and updated as appropriate: allergies, current medications, past family history, past medical history, past social history, past surgical history and problem list     Review of Systems   Constitutional: Negative for appetite change, fatigue, fever and unexpected weight change  HENT: Negative for rhinorrhea, sinus pressure, sinus pain, sneezing and sore throat  Eyes: Negative for visual disturbance  Respiratory: Negative for cough, chest tightness, shortness of breath and wheezing  Cardiovascular: Negative for chest pain, palpitations and leg swelling  Gastrointestinal: Negative for abdominal distention, abdominal pain, blood in stool, constipation, diarrhea, nausea and vomiting  Endocrine: Negative for polydipsia and polyuria  Genitourinary: Negative for decreased urine volume, difficulty urinating, dysuria, hematuria and urgency  Musculoskeletal: Negative for arthralgias, back pain, joint swelling and neck pain  Skin: Negative for rash  Allergic/Immunologic: Negative for environmental allergies  Neurological: Negative for tremors, weakness, light-headedness, numbness and headaches  Hematological: Does not bruise/bleed easily  Psychiatric/Behavioral: Negative for agitation, behavioral problems, confusion and dysphoric mood  The patient is not nervous/anxious  History reviewed  No pertinent family history      Past Medical History:   Diagnosis Date    Colonic polyp 2012    Disc displacement, cervical     Peripheral vascular disease ([facility] [address] ) 2011    Femoral- popliteal bypass       Past Surgical History:   Procedure Laterality Date    BYPASS FEMORAL-POPLITEAL  2011    COLONOSCOPY  2012    With polypectomy    CORONARY ARTERY BYPASS GRAFT  2012    LAMINECTOMY  2010       Social History     Socioeconomic History    Marital status: Single     Spouse name: None    Number of children: 1    Years of education:      Highest education level: None   Occupational History    Occupation: retired   Social Needs    Financial resource strain: None    Food insecurity:     Worry: None     Inability: None    Transportation needs:     Medical: None     Non-medical: None   Tobacco Use    Smoking status: Former Smoker     Packs/day: 1 00     Years: 37 00     Pack years: 37 00     Types: Cigarettes     Last attempt to quit: 2014     Years since quittin 1    Smokeless tobacco: Never Used    Tobacco comment: No passive smoke exposure   Substance and Sexual Activity    Alcohol use: No    Drug use: No    Sexual activity: Never     Partners: Female   Lifestyle    Physical activity:     Days per week: None     Minutes per session: None    Stress: None   Relationships    Social connections:     Talks on phone: None     Gets together: None     Attends [employer] service: None     Active member of club or organization: None     Attends meetings of clubs or organizations: None     Relationship status: None    Intimate partner violence:     Fear of current or ex partner: None     Emotionally abused: None     Physically abused: None     Forced sexual activity: None   Other Topics Concern    None   Social History Narrative    Pt states he drinks soda daily       Allergies   Allergen Reactions    No Active Allergies     No Known Allergies          Objective:      /72   Pulse 60   Temp 97 9 °F (36 6 °C)   Resp 18   Ht 5' 11" (1 803 m)   Wt 113 kg (249 lb)   SpO2 96%   BMI 34 73 kg/m²        Physical Exam Constitutional: He is oriented to person, place, and time  He appears well-developed and well-nourished  No distress  HENT:   Head: Normocephalic and atraumatic  Nose: Nose normal    Mouth/Throat: Oropharynx is clear and moist  No oropharyngeal exudate  Eyes: Pupils are equal, round, and reactive to light  Conjunctivae and EOM are normal  No scleral icterus  Neck: Normal range of motion  Neck supple  No JVD present  No tracheal deviation present  No thyromegaly present  Cardiovascular: Normal rate, regular rhythm and normal heart sounds  Exam reveals no gallop and no friction rub  No murmur heard  Pulmonary/Chest: Effort normal  No respiratory distress  He has no wheezes  He has no rales  He exhibits no tenderness  Abdominal: Soft  Bowel sounds are normal  He exhibits no distension and no mass  There is no tenderness  There is no rebound and no guarding  Musculoskeletal: Normal range of motion  He exhibits no edema or deformity  Lymphadenopathy:     He has no cervical adenopathy  Neurological: He is alert and oriented to person, place, and time  No cranial nerve deficit  Coordination normal    Skin: Skin is warm and dry  No rash noted  Psychiatric: He has a normal mood and affect   His behavior is normal  Judgment and thought content normal

## 2019-03-07 DIAGNOSIS — E11.65 TYPE 2 DIABETES MELLITUS WITH HYPERGLYCEMIA, WITH LONG-TERM CURRENT USE OF INSULIN (HCC): ICD-10-CM

## 2019-03-07 DIAGNOSIS — Z79.4 TYPE 2 DIABETES MELLITUS WITH HYPERGLYCEMIA, WITH LONG-TERM CURRENT USE OF INSULIN (HCC): ICD-10-CM

## 2019-03-07 DIAGNOSIS — F31.81 BIPOLAR 2 DISORDER, MAJOR DEPRESSIVE EPISODE (HCC): Primary | ICD-10-CM

## 2019-03-07 RX ORDER — LINAGLIPTIN 5 MG/1
TABLET, FILM COATED ORAL
Qty: 90 TABLET | Refills: 0 | Status: SHIPPED | OUTPATIENT
Start: 2019-03-07 | End: 2019-06-05 | Stop reason: SDUPTHER

## 2019-03-11 DIAGNOSIS — I10 BENIGN ESSENTIAL HYPERTENSION: Primary | ICD-10-CM

## 2019-03-12 RX ORDER — DOXAZOSIN MESYLATE 1 MG/1
TABLET ORAL
Qty: 90 TABLET | Refills: 2 | Status: SHIPPED | OUTPATIENT
Start: 2019-03-12 | End: 2019-09-20

## 2019-03-18 ENCOUNTER — TELEPHONE (OUTPATIENT)
Dept: FAMILY MEDICINE CLINIC | Facility: CLINIC | Age: 69
End: 2019-03-18

## 2019-03-18 NOTE — TELEPHONE ENCOUNTER
Patient home nurse said patient has doxazosin 1mg on his list from the hospital  Patient is usually not on this at home  They were curious where this originated from  I said patient has appointment 3- with  and he will review all medications with him  Home nurse said that's great  She said we should send a updated list home with patient

## 2019-03-20 DIAGNOSIS — G47.00 PERSISTENT INSOMNIA: ICD-10-CM

## 2019-03-20 NOTE — PROGRESS NOTES
Assessment and Plan:  Problem List Items Addressed This Visit     None        Health Maintenance Due   Topic Date Due    Hepatitis C Screening  1950    URINE MICROALBUMIN  04/12/1959    BMI: Followup Plan  04/12/1967         HPI:  Patient Active Problem List   Diagnosis    Arteriosclerosis of coronary artery bypass graft    Atrial fibrillation, chronic (HCC)    Benign essential hypertension    Chronic obstructive lung disease (HCC)    Chronic systolic heart failure (HCC)    Depressive disorder    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    Diverticular disease of colon    Gastroesophageal reflux disease without esophagitis    Glaucoma    Gout    Mixed hyperlipidemia    Persistent insomnia    Type 2 diabetes mellitus with diabetic polyneuropathy, with long-term current use of insulin (HCC)    Altered mental state    Chest wall pain, chronic    Chronic respiratory failure (HCC)    Delirium    Encephalopathy    Essential hypertension    SENTHIL (obstructive sleep apnea)    Peripheral sensory neuropathy    Tracheal stenosis    Type 2 diabetes mellitus with stage 4 chronic kidney disease, without long-term current use of insulin ([facility])    Insulin dependent type 2 diabetes mellitus ([address]    Atherosclerosis of native artery of extremity ([address] )    Obesity    Uncontrolled type 2 diabetes mellitus with hyperglycemia ([facility])    Stage 3 chronic kidney disease ([address] )    Peripheral vascular disease ([address] )    Current use of long term anticoagulation    Urinary retention     Past Medical History:   Diagnosis Date    Colonic polyp 2012    Disc displacement, cervical     Peripheral vascular disease ([address] ) 2011    Femoral- popliteal bypass     Past Surgical History:   Procedure Laterality Date    BYPASS FEMORAL-POPLITEAL  2011    COLONOSCOPY  2012    With polypectomy    CORONARY ARTERY BYPASS GRAFT  2012    LAMINECTOMY  2010     No family history on file    Social History     Tobacco Use Smoking Status Former Smoker    Packs/day: 1 00    Years: 37 00    Pack years: 37 00    Types: Cigarettes    Last attempt to quit: 2014    Years since quittin 2   Smokeless Tobacco Never Used   Tobacco Comment    No passive smoke exposure     Social History     Substance and Sexual Activity   Alcohol Use No      Social History     Substance and Sexual Activity   Drug Use No         Current Outpatient Medications   Medication Sig Dispense Refill    acetaminophen (TYLENOL) 650 mg CR tablet 3 (three) times a day      albuterol (2 5 mg/3 mL) 0 083 % nebulizer solution Take 1 vial (2 5 mg total) by nebulization every 6 (six) hours as needed for wheezing or shortness of breath 100 vial 6    albuterol (VENTOLIN HFA) 90 mcg/act inhaler Inhale 1 puff every 4 (four) hours as needed for wheezing 1 Inhaler 3    atropine (ISOPTO ATROPINE) 1 % ophthalmic solution PUT 1 DROP INTO RIGHT EYE 3 TIMES A DAY  3    bisacodyl (DULCOLAX) 10 mg suppository every 24 hours      Blood Glucose Monitoring Suppl (ONE TOUCH ULTRA 2) w/Device KIT Test blood sugar BID      budesonide-formoterol (SYMBICORT) 160-4 5 mcg/act inhaler Every 12 hours      diltiazem (TIAZAC) 360 MG 24 hr capsule every 24 hours      docusate sodium (COLACE) 100 mg capsule every 24 hours      dorzolamide (TRUSOPT) 2 % ophthalmic solution INSTILL 1 DROP INTO AFFECTED EYE 3 TIMES A DAY  3    doxazosin (CARDURA) 1 mg tablet TAKE 1 TABLET BY ORAL ROUTE EVERY DAY 90 tablet 2    DULoxetine (CYMBALTA) 30 mg delayed release capsule every 24 hours      furosemide (LASIX) 20 mg tablet every 24 hours      gabapentin (NEURONTIN) 100 mg capsule TAKE ONE CAPSULE BY MOUTH EVERY EVENING AS NEEDED  5    glucose blood test strip TEST UP TO 3 TIMES PER DAY      insulin detemir (LEVEMIR FLEXTOUCH) 100 Units/mL injection pen Inject 40 Units under the skin daily 5 pen 11    Insulin Pen Needle 32G X 6 MM MISC Inject as directed 3 (three) times a day Poorly controlled insulin dependent DM2 100 each 11    Lancets (ONETOUCH ULTRASOFT) lancets CHECK BLOOD SUGARS EVERY DAY  3    levofloxacin (LEVAQUIN) 750 mg tablet Take 750 mg by mouth every 24 hours      melatonin 3 mg qd      metoprolol tartrate (LOPRESSOR) 50 mg tablet TAKE 1 TABLET BY ORAL ROUTE 2 TIMES EVERY DAY WITH MEALS 180 tablet 1    Milk Thistle 140 MG CAPS takes 175mg 2 tabs am, 2 tabs pm      nitroglycerin (NITRODUR) 0 4 mg/hr Place 1 patch on the skin daily      OLANZapine (ZyPREXA) 5 mg tablet TAKE 1 TABLET BY MOUTH AT BEDTIME AS NEEDED FOR RESTLESSNESS/AGITATION 90 tablet 0    omeprazole (PriLOSEC) 40 MG capsule TAKE 1 CAPSULE BY ORAL ROUTE EVERY DAY 30 capsule 3    oxyCODONE-acetaminophen (PERCOCET) 5-325 mg per tablet take 1 tablet by oral route  every 6 hours as needed for severe pain      Polyethylene Glycol 3350 (MIRALAX PO) every 24 hours      prednisoLONE acetate (PRED FORTE) 1 % ophthalmic suspension INSTILL 1 DROP BY OPHTHALMIC ROUTE 3 TIMES EVERY DAY IN RIGHT EYE  3    repaglinide (PRANDIN) 0 5 mg tablet Take 0 5 mg by mouth 3 (three) times a day before meals      rosuvastatin (CRESTOR) 10 MG tablet TAKE 1 TABLET BY ORAL ROUTE EVERY DAY      senna (SENOKOT) 8 6 MG tablet every 24 hours      SPIRIVA HANDIHALER 18 MCG inhalation capsule INHALE 1 CAPSULE (18 MCG TOTAL) DAILY  3    TRADJENTA 5 MG TABS TAKE 1 TABLET BY MOUTH EVERY DAY 90 tablet 0    traZODone (DESYREL) 100 mg tablet TAKE 2 TABLET BY ORAL ROUTE EVERY DAY AT BEDTIME 180 tablet 0    triamcinolone (KENALOG) 0 1 % cream Every 12 hours      venlafaxine (EFFEXOR XR) 75 mg 24 hr capsule every 24 hours      warfarin (COUMADIN) 1 mg tablet TAKE 1 TO 2 TABLETS BY MOUTH EVERY DAY OR AS DIRECTED  3     No current facility-administered medications for this visit        Allergies   Allergen Reactions    No Active Allergies     No Known Allergies      Immunization History   Administered Date(s) Administered     Influenza (IM) Preservative Free 10/17/2012    INFLUENZA 02/12/2013, 12/23/2013, 01/28/2015, 10/14/2015, 11/14/2016, 12/19/2017, 11/30/2018    Influenza Split 10/09/2013    Influenza Split High Dose Preservative Free IM 11/25/2014, 10/14/2015, 10/12/2016, 11/14/2016, 12/19/2017, 11/30/2018    Influenza TIV (IM) 09/25/2012    Pneumococcal Conjugate 13-Valent 10/14/2015, 12/03/2018    Pneumococcal Polysaccharide PPV23 10/01/2011, 12/19/2016       Patient Care Team:  Nori Gray MD as PCP - General (Internal Medicine)    Medicare Screening Tests and Risk Assessments:  Gucci Juarez is here for his Subsequent Wellness visit  Health Risk Assessment:  Patient rates overall health as good  Patient feels that their physical health rating is Same  Eyesight was rated as Same  Hearing was rated as Same  Patient feels that their emotional and mental health rating is Same  Pain experienced by patient in the last 7 days has been None  Patient states that he has experienced no weight loss or gain in last 6 months  Emotional/Mental Health:  Patient has been feeling nervous/anxious  PHQ-9 Depression Screening:    Frequency of the following problems over the past two weeks:      1  Little interest or pleasure in doing things: 0 - not at all      2  Feeling down, depressed, or hopeless: 0 - not at all  PHQ-2 Score: 0          Broken Bones/Falls: Fall Risk Assessment:    In the past year, patient has experienced: No history of falling in past year          Bladder/Bowel:  Patient has not leaked urine accidently in the last six months  Patient reports no loss of bowel control  Immunizations:  Patient has had a flu vaccination within the last year  Patient has received a pneumonia shot  Patient has received a shingles shot  Patient has received tetanus/diphtheria shot  Home Safety:  Patient does not have trouble with stairs inside or outside of their home     Patient currently reports that there are no safety hazards present in home, working smoke alarms, working carbon monoxide detectors  Preventative Screenings:   prostate cancer screen performed, colon cancer screen completed, cholesterol screen completed, glaucoma eye exam completed,     Nutrition:  Current diet: Regular with servings of the following:    Medications:  Patient is not currently taking any over-the-counter supplements  Patient is able to manage medications  Lifestyle Choices:  Patient reports no tobacco use  Patient has not smoked or used tobacco in the past   Patient reports no alcohol use  Patient drives a vehicle  Patient wears seat belt  Activities of Daily Living:  Can get out of bed by his or her self, able to dress self, able to make own meals, able to do own shopping, able to bathe self, can do own laundry/housekeeping, can manage own money, pay bills and track expenses    Previous Hospitalizations:  No hospitalization or ED visit in past 12 months        Advanced Directives:  Patient has decided on a power of   Patient has spoken to designated power of   Patient has completed advanced directive  No exam data present    Physical Exam:  Review of Systems   Gastrointestinal: Negative for bowel incontinence  Psychiatric/Behavioral: The patient is not nervous/anxious  There were no vitals filed for this visit  There is no height or weight on file to calculate BMI      Physical Exam

## 2019-03-22 ENCOUNTER — OFFICE VISIT (OUTPATIENT)
Dept: FAMILY MEDICINE CLINIC | Facility: CLINIC | Age: 69
End: 2019-03-22
Payer: COMMERCIAL

## 2019-03-22 VITALS
HEIGHT: 71 IN | RESPIRATION RATE: 19 BRPM | WEIGHT: 245 LBS | DIASTOLIC BLOOD PRESSURE: 70 MMHG | BODY MASS INDEX: 34.3 KG/M2 | TEMPERATURE: 97.8 F | HEART RATE: 80 BPM | SYSTOLIC BLOOD PRESSURE: 140 MMHG | OXYGEN SATURATION: 96 %

## 2019-03-22 DIAGNOSIS — Z79.4 TYPE 2 DIABETES MELLITUS WITH HYPERGLYCEMIA, WITH LONG-TERM CURRENT USE OF INSULIN (HCC): ICD-10-CM

## 2019-03-22 DIAGNOSIS — Z79.4 INSULIN DEPENDENT TYPE 2 DIABETES MELLITUS (HCC): ICD-10-CM

## 2019-03-22 DIAGNOSIS — E11.65 TYPE 2 DIABETES MELLITUS WITH HYPERGLYCEMIA, WITH LONG-TERM CURRENT USE OF INSULIN (HCC): ICD-10-CM

## 2019-03-22 DIAGNOSIS — E11.9 INSULIN DEPENDENT TYPE 2 DIABETES MELLITUS (HCC): ICD-10-CM

## 2019-03-22 DIAGNOSIS — I10 BENIGN ESSENTIAL HYPERTENSION: ICD-10-CM

## 2019-03-22 DIAGNOSIS — J44.1 ACUTE EXACERBATION OF CHRONIC OBSTRUCTIVE PULMONARY DISEASE (COPD) (HCC): Primary | ICD-10-CM

## 2019-03-22 DIAGNOSIS — G47.00 PERSISTENT INSOMNIA: ICD-10-CM

## 2019-03-22 DIAGNOSIS — G60.8 PERIPHERAL SENSORY NEUROPATHY: ICD-10-CM

## 2019-03-22 PROCEDURE — 99214 OFFICE O/P EST MOD 30 MIN: CPT | Performed by: INTERNAL MEDICINE

## 2019-03-22 RX ORDER — OXYCODONE HYDROCHLORIDE AND ACETAMINOPHEN 5; 325 MG/1; MG/1
1 TABLET ORAL 2 TIMES DAILY
Qty: 60 TABLET | Refills: 0 | Status: SHIPPED | OUTPATIENT
Start: 2019-03-22 | End: 2019-09-20

## 2019-03-22 RX ORDER — ZOLPIDEM TARTRATE 5 MG/1
5 TABLET ORAL
Qty: 30 TABLET | Refills: 0 | Status: SHIPPED | OUTPATIENT
Start: 2019-03-22 | End: 2019-08-21

## 2019-03-22 NOTE — PROGRESS NOTES
Assessment/Plan:  Patient was in the emergency room yesterday for shortness of breath apparently was given prednisone 60 milligrams by mouth and nebulizer treatment and sent home with appointments [facility]  He still feels short of breath  He has not obtained his prednisone prescription  We discussed that is likely that her sugars will go up when he is on the prednisone and that is a problem that he should go to the emergency room  He feels that he is comfortable and wants prescriptions for his Percocet and zolpidem  Told he could only have 5 milligram zolpidem not 10 milligrams and that he should be careful not to over sedate him self  He has been on chronic zolpidem 10 milligrams daily in the past and has run out  The Percocet he only takes if he has pain in his legs or his knee and takes 1 or 2 at the most daily for 3 days in a row at the most     Diet reviewed  Lifestyle modifications reviewed  Medications reviewed and ordered  Laboratory tests and studies reviewed and ordered  All patient's questions answered to patient satisfaction  Diagnoses and all orders for this visit:    Acute exacerbation of chronic obstructive pulmonary disease (COPD) ([facility] )    Type 2 diabetes mellitus with hyperglycemia, with long-term current use of insulin ([facility])  -     oxyCODONE-acetaminophen (PERCOCET) 5-325 mg per tablet; Take 1 tablet by mouth 2 (two) times a dayMax Daily Amount: 2 tablets  -     zolpidem (AMBIEN) 5 mg tablet; Take 1 tablet (5 mg total) by mouth daily at bedtime as needed for sleep    Insulin dependent type 2 diabetes mellitus (HCC)  -     oxyCODONE-acetaminophen (PERCOCET) 5-325 mg per tablet; Take 1 tablet by mouth 2 (two) times a dayMax Daily Amount: 2 tablets  -     zolpidem (AMBIEN) 5 mg tablet;  Take 1 tablet (5 mg total) by mouth daily at bedtime as needed for sleep    Benign essential hypertension    Persistent insomnia  -     oxyCODONE-acetaminophen (PERCOCET) 5-325 mg per tablet; Take 1 tablet by mouth 2 (two) times a dayMax Daily Amount: 2 tablets  -     zolpidem (AMBIEN) 5 mg tablet; Take 1 tablet (5 mg total) by mouth daily at bedtime as needed for sleep    Peripheral sensory neuropathy  -     oxyCODONE-acetaminophen (PERCOCET) 5-325 mg per tablet; Take 1 tablet by mouth 2 (two) times a dayMax Daily Amount: 2 tablets  -     zolpidem (AMBIEN) 5 mg tablet; Take 1 tablet (5 mg total) by mouth daily at bedtime as needed for sleep        Subjective:      Patient ID: Deb Negro is a 70 y o  male  HPI  This is a 79-year-old male with a long history of COPD who was seen in [facility] Emergency room yesterday for exacerbation of COPD given 60 mg of prednisone orally and a prescription for prednisone to take along with his nebulizer at home  He is on home oxygen also  He has insulin-dependent diabetes mellitus and sugars tend to be somewhat labile although sugar in the emergency room was well controlled  He has not yet filled the prescription for prednisone  He is not coughing and has no fever  But he does feel a little breathless and is mildly dyspneic at rest   He says he did not sleep well last night because he did in have any Ambien which she takes 10 mg HS daily and he has run out of  He has also run out of his Percocet which she takes if needed to a day for 3 days at a time for bilateral calf pain and right knee pain      Current Outpatient Medications:     acetaminophen (TYLENOL) 650 mg CR tablet, 3 (three) times a day, Disp: , Rfl:     albuterol (2 5 mg/3 mL) 0 083 % nebulizer solution, Take 1 vial (2 5 mg total) by nebulization every 6 (six) hours as needed for wheezing or shortness of breath, Disp: 100 vial, Rfl: 6    albuterol (VENTOLIN HFA) 90 mcg/act inhaler, Inhale 1 puff every 4 (four) hours as needed for wheezing, Disp: 1 Inhaler, Rfl: 3    atropine (ISOPTO ATROPINE) 1 % ophthalmic solution, PUT 1 DROP INTO RIGHT EYE 3 TIMES A DAY, Disp: , Rfl: 3    bisacodyl (DULCOLAX) 10 mg suppository, every 24 hours, Disp: , Rfl:     Blood Glucose Monitoring Suppl (ONE TOUCH ULTRA 2) w/Device KIT, Test blood sugar BID, Disp: , Rfl:     budesonide-formoterol (SYMBICORT) 160-4 5 mcg/act inhaler, Every 12 hours, Disp: , Rfl:     diltiazem (TIAZAC) 360 MG 24 hr capsule, every 24 hours, Disp: , Rfl:     docusate sodium (COLACE) 100 mg capsule, every 24 hours, Disp: , Rfl:     dorzolamide (TRUSOPT) 2 % ophthalmic solution, INSTILL 1 DROP INTO AFFECTED EYE 3 TIMES A DAY, Disp: , Rfl: 3    doxazosin (CARDURA) 1 mg tablet, TAKE 1 TABLET BY ORAL ROUTE EVERY DAY, Disp: 90 tablet, Rfl: 2    DULoxetine (CYMBALTA) 30 mg delayed release capsule, every 24 hours, Disp: , Rfl:     furosemide (LASIX) 20 mg tablet, every 24 hours, Disp: , Rfl:     gabapentin (NEURONTIN) 100 mg capsule, TAKE ONE CAPSULE BY MOUTH EVERY EVENING AS NEEDED, Disp: , Rfl: 5    glucose blood test strip, TEST UP TO 3 TIMES PER DAY, Disp: , Rfl:     insulin detemir (LEVEMIR FLEXTOUCH) 100 Units/mL injection pen, Inject 40 Units under the skin daily, Disp: 5 pen, Rfl: 11    Insulin Pen Needle 32G X 6 MM MISC, Inject as directed 3 (three) times a day Poorly controlled insulin dependent DM2, Disp: 100 each, Rfl: 11    Lancets (ONETOUCH ULTRASOFT) lancets, CHECK BLOOD SUGARS EVERY DAY, Disp: , Rfl: 3    levofloxacin (LEVAQUIN) 750 mg tablet, Take 750 mg by mouth every 24 hours, Disp: , Rfl:     melatonin 3 mg, qd, Disp: , Rfl:     metoprolol tartrate (LOPRESSOR) 50 mg tablet, TAKE 1 TABLET BY ORAL ROUTE 2 TIMES EVERY DAY WITH MEALS, Disp: 180 tablet, Rfl: 1    Milk Thistle 140 MG CAPS, takes 175mg 2 tabs am, 2 tabs pm, Disp: , Rfl:     nitroglycerin (NITRODUR) 0 4 mg/hr, Place 1 patch on the skin daily, Disp: , Rfl:     OLANZapine (ZyPREXA) 5 mg tablet, TAKE 1 TABLET BY MOUTH AT BEDTIME AS NEEDED FOR RESTLESSNESS/AGITATION, Disp: 90 tablet, Rfl: 0    omeprazole (PriLOSEC) 40 MG capsule, TAKE 1 CAPSULE BY ORAL ROUTE EVERY DAY, Disp: 30 capsule, Rfl: 3    oxyCODONE-acetaminophen (PERCOCET) 5-325 mg per tablet, Take 1 tablet by mouth 2 (two) times a dayMax Daily Amount: 2 tablets, Disp: 60 tablet, Rfl: 0    Polyethylene Glycol 3350 (MIRALAX PO), every 24 hours, Disp: , Rfl:     prednisoLONE acetate (PRED FORTE) 1 % ophthalmic suspension, INSTILL 1 DROP BY OPHTHALMIC ROUTE 3 TIMES EVERY DAY IN RIGHT EYE, Disp: , Rfl: 3    repaglinide (PRANDIN) 0 5 mg tablet, Take 0 5 mg by mouth 3 (three) times a day before meals, Disp: , Rfl:     rosuvastatin (CRESTOR) 10 MG tablet, TAKE 1 TABLET BY ORAL ROUTE EVERY DAY, Disp: , Rfl:     senna (SENOKOT) 8 6 MG tablet, every 24 hours, Disp: , Rfl:     SPIRIVA HANDIHALER 18 MCG inhalation capsule, INHALE 1 CAPSULE (18 MCG TOTAL) DAILY  , Disp: , Rfl: 3    TRADJENTA 5 MG TABS, TAKE 1 TABLET BY MOUTH EVERY DAY, Disp: 90 tablet, Rfl: 0    traZODone (DESYREL) 100 mg tablet, TAKE 2 TABLET BY ORAL ROUTE EVERY DAY AT BEDTIME, Disp: 180 tablet, Rfl: 0    triamcinolone (KENALOG) 0 1 % cream, Every 12 hours, Disp: , Rfl:     venlafaxine (EFFEXOR XR) 75 mg 24 hr capsule, every 24 hours, Disp: , Rfl:     warfarin (COUMADIN) 1 mg tablet, TAKE 1 TO 2 TABLETS BY MOUTH EVERY DAY OR AS DIRECTED, Disp: , Rfl: 3    zolpidem (AMBIEN) 5 mg tablet, Take 1 tablet (5 mg total) by mouth daily at bedtime as needed for sleep, Disp: 30 tablet, Rfl: 0    The following portions of the patient's history were reviewed and updated as appropriate: allergies, current medications, past family history, past medical history, past social history, past surgical history and problem list     Review of Systems   Respiratory: Positive for shortness of breath  No family history on file      Past Medical History:   Diagnosis Date    Colonic polyp 2012    Disc displacement, cervical     Peripheral vascular disease ([facility] [address] ) 2011    Femoral- popliteal bypass       Past Surgical History: Procedure Laterality Date    BYPASS FEMORAL-POPLITEAL      COLONOSCOPY  2012    With polypectomy    CORONARY ARTERY BYPASS GRAFT  2012    LAMINECTOMY  2010       Social History     Socioeconomic History    Marital status: Single     Spouse name: Not on file    Number of children: 1    Years of education:      Highest education level: Not on file   Occupational History    Occupation: retired   Social Needs    Financial resource strain: Not on file    Food insecurity:     Worry: Not on file     Inability: Not on file   [employer] needs:     Medical: Not on file     Non-medical: Not on file   Tobacco Use    Smoking status: Former Smoker     Packs/day: 1 00     Years: 37 00     Pack years: 37 00     Types: Cigarettes     Last attempt to quit: 2014     Years since quittin 2    Smokeless tobacco: Never Used    Tobacco comment: No passive smoke exposure   Substance and Sexual Activity    Alcohol use: No    Drug use: No    Sexual activity: Never     Partners: Female   Lifestyle    Physical activity:     Days per week: Not on file     Minutes per session: Not on file    Stress: Not on file   Relationships    Social connections:     Talks on phone: Not on file     Gets together: Not on file     Attends [employer] service: Not on file     Active member of club or organization: Not on file     Attends meetings of clubs or organizations: Not on file     Relationship status: Not on file    Intimate partner violence:     Fear of current or ex partner: Not on file     Emotionally abused: Not on file     Physically abused: Not on file     Forced sexual activity: Not on file   Other Topics Concern    Not on file   Social History Narrative    Pt states he drinks soda daily       Allergies   Allergen Reactions    No Active Allergies     No Known Allergies          Objective:      /70   Pulse 80   Temp 97 8 °F (36 6 °C)   Resp 19   Ht 5' 11" (1 803 m)   Wt 111 kg (245 lb)   SpO2 96% BMI 34 17 kg/m²        Physical Exam   Constitutional: He is oriented to person, place, and time  He appears well-developed and well-nourished  No distress  HENT:   Head: Normocephalic and atraumatic  Nose: Nose normal    Mouth/Throat: Oropharynx is clear and moist  No oropharyngeal exudate  Eyes: Pupils are equal, round, and reactive to light  Conjunctivae and EOM are normal  No scleral icterus  Neck: Normal range of motion  Neck supple  No JVD present  No tracheal deviation present  No thyromegaly present  Cardiovascular: Normal rate, regular rhythm and normal heart sounds  Exam reveals no gallop and no friction rub  No murmur heard  Pulmonary/Chest: Effort normal  No respiratory distress  He has no wheezes  He has no rales  He exhibits no tenderness  Abdominal: Soft  Bowel sounds are normal  He exhibits no distension and no mass  There is no tenderness  There is no rebound and no guarding  Musculoskeletal: Normal range of motion  He exhibits no edema or deformity  Lymphadenopathy:     He has no cervical adenopathy  Neurological: He is alert and oriented to person, place, and time  No cranial nerve deficit  Coordination normal    Skin: Skin is warm and dry  No rash noted  Psychiatric: He has a normal mood and affect   His behavior is normal  Judgment and thought content normal

## 2019-03-29 ENCOUNTER — PATIENT OUTREACH (OUTPATIENT)
Dept: FAMILY MEDICINE CLINIC | Facility: CLINIC | Age: 69
End: 2019-03-29

## 2019-03-29 DIAGNOSIS — Z78.9 ENROLLED IN CHRONIC CARE MANAGEMENT: Primary | ICD-10-CM

## 2019-03-29 NOTE — PROGRESS NOTES
Contacted [facility] to check on respiratory status  He saw pulmonary this week and was started on prednisone taper  He did  medication and is knowledgeable of taper dosage  He states he was not taking Symbicort properly, he was only using it daily  He is now taking 2 puffs BID and states his breathing has improved  He is also using nebulizer treatments as needed  He wears oxygen at 2L nightly, does uses at times during the day  He has started the waiver process and will be having assessment done to set up caregiver services  He drives and does not have transportation issues  He is having no issues affording his medications  He abruptly ended our conversation, saying he was lying down  He was agreeable to further outreach, will continue to follow

## 2019-04-02 RX ORDER — ZOLPIDEM TARTRATE 10 MG/1
TABLET ORAL
Qty: 30 TABLET | OUTPATIENT
Start: 2019-04-02

## 2019-04-11 DIAGNOSIS — I10 ESSENTIAL HYPERTENSION: ICD-10-CM

## 2019-04-11 RX ORDER — METOPROLOL TARTRATE 50 MG/1
50 TABLET, FILM COATED ORAL EVERY 12 HOURS SCHEDULED
Qty: 180 TABLET | Refills: 1 | Status: SHIPPED | OUTPATIENT
Start: 2019-04-11

## 2019-04-12 ENCOUNTER — PATIENT OUTREACH (OUTPATIENT)
Dept: FAMILY MEDICINE CLINIC | Facility: CLINIC | Age: 69
End: 2019-04-12

## 2019-04-18 DIAGNOSIS — K21.00 GERD WITH ESOPHAGITIS: ICD-10-CM

## 2019-04-19 ENCOUNTER — TELEPHONE (OUTPATIENT)
Dept: FAMILY MEDICINE CLINIC | Facility: CLINIC | Age: 69
End: 2019-04-19

## 2019-04-19 RX ORDER — OMEPRAZOLE 40 MG/1
CAPSULE, DELAYED RELEASE ORAL
Qty: 30 CAPSULE | Refills: 11 | Status: SHIPPED | OUTPATIENT
Start: 2019-04-19 | End: 2019-08-21

## 2019-05-03 ENCOUNTER — PATIENT OUTREACH (OUTPATIENT)
Dept: FAMILY MEDICINE CLINIC | Facility: CLINIC | Age: 69
End: 2019-05-03

## 2019-05-20 DIAGNOSIS — F31.81 BIPOLAR 2 DISORDER, MAJOR DEPRESSIVE EPISODE (HCC): ICD-10-CM

## 2019-05-20 RX ORDER — OLANZAPINE 5 MG/1
TABLET ORAL
Qty: 90 TABLET | Refills: 0 | Status: SHIPPED | OUTPATIENT
Start: 2019-05-20 | End: 2019-08-21

## 2019-05-28 ENCOUNTER — PATIENT OUTREACH (OUTPATIENT)
Dept: FAMILY MEDICINE CLINIC | Facility: CLINIC | Age: 69
End: 2019-05-28

## 2019-06-05 DIAGNOSIS — E11.65 TYPE 2 DIABETES MELLITUS WITH HYPERGLYCEMIA, WITH LONG-TERM CURRENT USE OF INSULIN (HCC): ICD-10-CM

## 2019-06-05 DIAGNOSIS — F31.81 BIPOLAR 2 DISORDER, MAJOR DEPRESSIVE EPISODE (HCC): ICD-10-CM

## 2019-06-05 DIAGNOSIS — Z79.4 TYPE 2 DIABETES MELLITUS WITH HYPERGLYCEMIA, WITH LONG-TERM CURRENT USE OF INSULIN (HCC): ICD-10-CM

## 2019-06-17 DIAGNOSIS — I10 ESSENTIAL HYPERTENSION: Primary | ICD-10-CM

## 2019-06-17 RX ORDER — ROSUVASTATIN CALCIUM 10 MG/1
10 TABLET, COATED ORAL DAILY
Qty: 90 TABLET | Refills: 1 | Status: SHIPPED | OUTPATIENT
Start: 2019-06-17

## 2019-08-14 ENCOUNTER — TELEPHONE (OUTPATIENT)
Dept: FAMILY MEDICINE CLINIC | Facility: CLINIC | Age: 69
End: 2019-08-14

## 2019-08-14 NOTE — TELEPHONE ENCOUNTER
Pt called to tell us that [facility] is releasing him this Sat and he does not feel he is ready to go home  Pt request I call nursing home to see if there is anything our office can do to help  I called and spoke to Pts  who states Pts insurance will no longer cover Pt to stay and Pt has no other way to pay for his stay with them  He also states that Pt is being released because he no longer meets the criteria for nursing home care  I tried to call Pt back to let him know but number on Pts chart is not working       Pt has a f/u appt with Dr Yevgeniy Leal on 08/30/19

## 2019-08-21 ENCOUNTER — OFFICE VISIT (OUTPATIENT)
Dept: FAMILY MEDICINE CLINIC | Facility: CLINIC | Age: 69
End: 2019-08-21
Payer: COMMERCIAL

## 2019-08-21 VITALS
TEMPERATURE: 98.9 F | SYSTOLIC BLOOD PRESSURE: 132 MMHG | OXYGEN SATURATION: 96 % | DIASTOLIC BLOOD PRESSURE: 80 MMHG | HEIGHT: 71 IN | HEART RATE: 73 BPM | RESPIRATION RATE: 18 BRPM | BODY MASS INDEX: 34.17 KG/M2

## 2019-08-21 DIAGNOSIS — R35.1 NOCTURIA: ICD-10-CM

## 2019-08-21 DIAGNOSIS — N18.30 STAGE 3 CHRONIC KIDNEY DISEASE (HCC): ICD-10-CM

## 2019-08-21 DIAGNOSIS — R53.82 CHRONIC FATIGUE: ICD-10-CM

## 2019-08-21 DIAGNOSIS — I25.810 ATHEROSCLEROSIS OF CORONARY ARTERY BYPASS GRAFT OF NATIVE HEART WITHOUT ANGINA PECTORIS: ICD-10-CM

## 2019-08-21 DIAGNOSIS — I48.20 ATRIAL FIBRILLATION, CHRONIC (HCC): ICD-10-CM

## 2019-08-21 DIAGNOSIS — J43.2 CENTRILOBULAR EMPHYSEMA (HCC): ICD-10-CM

## 2019-08-21 DIAGNOSIS — K21.00 GERD WITH ESOPHAGITIS: ICD-10-CM

## 2019-08-21 DIAGNOSIS — G60.8 PERIPHERAL SENSORY NEUROPATHY: ICD-10-CM

## 2019-08-21 DIAGNOSIS — F31.81 BIPOLAR 2 DISORDER, MAJOR DEPRESSIVE EPISODE (HCC): ICD-10-CM

## 2019-08-21 DIAGNOSIS — Z79.4 TYPE 2 DIABETES MELLITUS WITH HYPERGLYCEMIA, WITH LONG-TERM CURRENT USE OF INSULIN (HCC): ICD-10-CM

## 2019-08-21 DIAGNOSIS — I10 ESSENTIAL HYPERTENSION: Primary | ICD-10-CM

## 2019-08-21 DIAGNOSIS — Z79.4 INSULIN DEPENDENT TYPE 2 DIABETES MELLITUS (HCC): ICD-10-CM

## 2019-08-21 DIAGNOSIS — E78.2 MIXED HYPERLIPIDEMIA: ICD-10-CM

## 2019-08-21 DIAGNOSIS — J34.89 RHINORRHEA: ICD-10-CM

## 2019-08-21 DIAGNOSIS — E11.65 TYPE 2 DIABETES MELLITUS WITH HYPERGLYCEMIA, WITH LONG-TERM CURRENT USE OF INSULIN (HCC): ICD-10-CM

## 2019-08-21 DIAGNOSIS — E11.9 INSULIN DEPENDENT TYPE 2 DIABETES MELLITUS (HCC): ICD-10-CM

## 2019-08-21 DIAGNOSIS — Z79.01 CURRENT USE OF LONG TERM ANTICOAGULATION: ICD-10-CM

## 2019-08-21 DIAGNOSIS — F32.A DEPRESSIVE DISORDER: ICD-10-CM

## 2019-08-21 DIAGNOSIS — G47.33 OSA (OBSTRUCTIVE SLEEP APNEA): ICD-10-CM

## 2019-08-21 DIAGNOSIS — K57.30 DIVERTICULAR DISEASE OF COLON: ICD-10-CM

## 2019-08-21 PROCEDURE — 1036F TOBACCO NON-USER: CPT | Performed by: SPECIALIST

## 2019-08-21 PROCEDURE — 3079F DIAST BP 80-89 MM HG: CPT | Performed by: SPECIALIST

## 2019-08-21 PROCEDURE — 99214 OFFICE O/P EST MOD 30 MIN: CPT | Performed by: SPECIALIST

## 2019-08-21 PROCEDURE — 1160F RVW MEDS BY RX/DR IN RCRD: CPT | Performed by: SPECIALIST

## 2019-08-21 PROCEDURE — 3075F SYST BP GE 130 - 139MM HG: CPT | Performed by: SPECIALIST

## 2019-08-21 PROCEDURE — 3066F NEPHROPATHY DOC TX: CPT | Performed by: SPECIALIST

## 2019-08-21 RX ORDER — MELATONIN
1000 DAILY
COMMUNITY

## 2019-08-21 RX ORDER — NICOTINE POLACRILEX 4 MG
15 LOZENGE BUCCAL ONCE
COMMUNITY
End: 2019-09-20

## 2019-08-21 RX ORDER — FAMOTIDINE 20 MG/1
20 TABLET, FILM COATED ORAL 2 TIMES DAILY
COMMUNITY

## 2019-08-21 RX ORDER — BUDESONIDE 0.5 MG/2ML
0.5 INHALANT ORAL 2 TIMES DAILY
COMMUNITY
End: 2019-09-20

## 2019-08-21 RX ORDER — FLUTICASONE PROPIONATE 50 MCG
2 SPRAY, SUSPENSION (ML) NASAL DAILY
Qty: 1 BOTTLE | Refills: 2 | Status: SHIPPED | OUTPATIENT
Start: 2019-08-21 | End: 2019-09-20

## 2019-08-21 RX ORDER — LORAZEPAM 0.5 MG/1
0.5 TABLET ORAL EVERY 8 HOURS PRN
COMMUNITY

## 2019-08-21 NOTE — PATIENT INSTRUCTIONS
Go to [facility] Emergency Room as per your request if you feel that you cannot be home because of your symptoms    If possible do all the blood tests and a noncontrast CT scan of your chest and we could see you within 1 week    This would help us to determine whether or shortness of breath is all due to his chronic obstructive pulmonary disease or is there underlying congestive heart failure or a combination of both   this would allow is to adjust her medications    Increase year pulmonary treatments with nebulized 4 times a day and 4 year watery nasal discharge which could be making you feel uncomfortable this could be allergy I recommend Flonase 2 sprays each nostril daily

## 2019-08-21 NOTE — PROGRESS NOTES
Assessment/Plan:    Pt  Was  At  [facility]     Wants  To   Go  To  [address] [place] [address]        I   Wrote  For  Lab  And   Ct   Increase  Nebulizer  To   Qid    Try  flonase    Says  He  Is  Going  To  [facility]   ER    Needs  Help   Does  Not  Want  To  Be  Home             Patient seen in office today  During the visit I was accompanied by MA while physical exam was completed  No problem-specific Assessment & Plan notes found for this encounter  Problem List Items Addressed This Visit     None            Subjective:     BMI Counseling: Body mass index is 34 17 kg/m²  Discussed the patient's BMI with him  The BMI is above average  BMI counseling and education was provided to the patient  Nutrition recommendations include decreasing overall calorie intake  Patient ID: Bladimir Chan is a 78 y o  male      71-year-old male who was at [place] [address] and discharged to home he is short of breath with 20 ft exertion he has watery nasal discharge he does not have a cough he does have headaches does not complain of wheezing he smoked age 11 2 age 57 3 pack-a-day and worked as a  his known COPD approximately 10 years ago had coronary artery bypass graft he does not have chest pain or palpitations he is noted to have chronic atrial fibrillation and is on anticoagulation he is a diabetic on insulin and his home sugars seem pretty much under control hemoglobin A1c was ordered as well as other laboratory test including a proBNP    Also I CT of the chest without contrast since he has been a long smoker and short of breath    After ordering the test and increasing his nebulizer from twice a day to 4 times a day and adding Flonase for watery nasal discharge he is very reluctant to follow through on this testing and a follow-up within a week    With this in mind he said I am going to [facility] I did tell him that if he does at take along the paperwork I gave him and perhaps they can do it on the spot and decide whether he truly needs to be in the hospital based on testing    He is fairly comfortable at rest sitting in the chair on 1 L nasal oximetry is over 90% he is not having chest pain and is not short of breath at rest and also there is no edema of the lower legs      The following portions of the patient's history were reviewed and updated as appropriate: allergies, current medications, past family history, past medical history, past social history, past surgical history and problem list     Review of Systems   Constitutional: Positive for activity change and fatigue  Negative for appetite change, chills and diaphoresis  HENT: Positive for congestion, dental problem and rhinorrhea  Negative for sinus pressure, sinus pain and voice change  Full dentures    Neck is watery nasal discharge and appears to have some congestion related to this and postnasal drip   Eyes: Negative for visual disturbance  Respiratory: Positive for shortness of breath  Negative for cough, choking, chest tightness, wheezing and stridor  According to the patient he does not have wheezing   Cardiovascular: Negative for chest pain, palpitations and leg swelling  Gastrointestinal: Negative for abdominal distention, abdominal pain and blood in stool  Genitourinary: Negative for difficulty urinating  Musculoskeletal: Negative for arthralgias, back pain, gait problem, joint swelling, myalgias, neck pain and neck stiffness  Skin: Negative  Neurological: Positive for weakness and headaches  Negative for dizziness, tremors, seizures, syncope, facial asymmetry, speech difficulty, light-headedness and numbness  Psychiatric/Behavioral: Positive for sleep disturbance           Objective:      /80   Pulse 73   Temp 98 9 °F (37 2 °C)   Resp 18   Ht 5' 11" (1 803 m)   SpO2 96%   BMI 34 17 kg/m²          Physical Exam Constitutional: He is oriented to person, place, and time  Non-toxic appearance  He appears ill  No distress  Patient seen sitting in a chair 1 L nasal oxygen not short of breath at rest but he does have a watery nasal discharge and some nasal congestion   HENT:   Mouth/Throat: No oropharyngeal exudate  Full dentures present lower once do not appear to be fitted well   Neck: No JVD present  Cardiovascular:   He has a scar from bypass surgery that was done approximately 10 years ago he has a soft heart tones I did not heart murmur and slightly irregular but rate is controlled   Pulmonary/Chest: Tachypnea noted  No respiratory distress  He has decreased breath sounds  He has wheezes  He has no rhonchi  He has no rales  He exhibits tenderness and deformity  He exhibits no mass, no laceration, no edema and no retraction  Abdominal: He exhibits no distension  Musculoskeletal:        Right lower leg: He exhibits no edema  Left lower leg: He exhibits no edema  Neurological: He is alert and oriented to person, place, and time  Skin: No rash noted  No erythema  Psychiatric: His mood appears anxious     Mood is somewhat anxious but also unhappy about being home and looking to be placed in a nursing home

## 2019-08-30 ENCOUNTER — TRANSITIONAL CARE MANAGEMENT (OUTPATIENT)
Dept: FAMILY MEDICINE CLINIC | Facility: CLINIC | Age: 69
End: 2019-08-30

## 2019-08-30 ENCOUNTER — OFFICE VISIT (OUTPATIENT)
Dept: FAMILY MEDICINE CLINIC | Facility: CLINIC | Age: 69
End: 2019-08-30
Payer: COMMERCIAL

## 2019-08-30 VITALS
DIASTOLIC BLOOD PRESSURE: 70 MMHG | HEIGHT: 71 IN | BODY MASS INDEX: 31.36 KG/M2 | OXYGEN SATURATION: 93 % | WEIGHT: 224 LBS | HEART RATE: 93 BPM | SYSTOLIC BLOOD PRESSURE: 138 MMHG

## 2019-08-30 DIAGNOSIS — IMO0001 TRANSITION OF CARE PERFORMED WITH SHARING OF CLINICAL SUMMARY: Primary | ICD-10-CM

## 2019-08-30 DIAGNOSIS — E11.65 UNCONTROLLED TYPE 2 DIABETES MELLITUS WITH HYPERGLYCEMIA (HCC): ICD-10-CM

## 2019-08-30 DIAGNOSIS — J44.1 COPD EXACERBATION (HCC): ICD-10-CM

## 2019-08-30 DIAGNOSIS — Z79.4 TYPE 2 DIABETES MELLITUS WITH DIABETIC POLYNEUROPATHY, WITH LONG-TERM CURRENT USE OF INSULIN (HCC): ICD-10-CM

## 2019-08-30 DIAGNOSIS — E11.42 TYPE 2 DIABETES MELLITUS WITH DIABETIC POLYNEUROPATHY, WITH LONG-TERM CURRENT USE OF INSULIN (HCC): ICD-10-CM

## 2019-08-30 DIAGNOSIS — E11.9 INSULIN DEPENDENT TYPE 2 DIABETES MELLITUS (HCC): ICD-10-CM

## 2019-08-30 DIAGNOSIS — J96.11 CHRONIC RESPIRATORY FAILURE WITH HYPOXIA (HCC): ICD-10-CM

## 2019-08-30 DIAGNOSIS — J43.2 CENTRILOBULAR EMPHYSEMA (HCC): ICD-10-CM

## 2019-08-30 DIAGNOSIS — Z79.4 INSULIN DEPENDENT TYPE 2 DIABETES MELLITUS (HCC): ICD-10-CM

## 2019-08-30 PROBLEM — B35.3 TINEA PEDIS OF BOTH FEET: Status: ACTIVE | Noted: 2019-07-05

## 2019-08-30 PROBLEM — J96.20 ACUTE ON CHRONIC RESPIRATORY FAILURE (HCC): Status: ACTIVE | Noted: 2017-01-30

## 2019-08-30 PROCEDURE — 99496 TRANSJ CARE MGMT HIGH F2F 7D: CPT | Performed by: INTERNAL MEDICINE

## 2019-08-30 RX ORDER — PREDNISONE 20 MG/1
20 TABLET ORAL 2 TIMES DAILY
COMMUNITY
Start: 2019-08-26 | End: 2019-09-20 | Stop reason: ALTCHOICE

## 2019-08-30 RX ORDER — ERGOCALCIFEROL (VITAMIN D2) 1250 MCG
50000 CAPSULE ORAL
COMMUNITY
End: 2019-09-20

## 2019-08-30 RX ORDER — CHOLECALCIFEROL (VITAMIN D3) 1250 MCG
1 CAPSULE ORAL WEEKLY
Refills: 0 | COMMUNITY
Start: 2019-08-16

## 2019-08-30 NOTE — PROGRESS NOTES
Assessment/Plan:   Patient's medications were reviewed  He is not on prednisone 40 mg daily at this time has prescriptions for 20 mg pills  It was suggested that she become short of breath that he start the 20 mg daily and then make plans to come and see us or go to the emergency room if he does not respond  He will continue his other medications as listed along with his home oxygen  2 liters/minute  No problem-specific Assessment & Plan notes found for this encounter  Problem List Items Addressed This Visit        Endocrine    Type 2 diabetes mellitus with diabetic polyneuropathy, with long-term current use of insulin ([facility])    Insulin dependent type 2 diabetes mellitus ([facility] )    Uncontrolled type 2 diabetes mellitus with hyperglycemia ([facility])       Respiratory    Chronic obstructive lung disease (HCC)    Relevant Medications    predniSONE 20 mg tablet    Chronic respiratory failure (HCC)    COPD exacerbation ([facility])    Relevant Medications    predniSONE 20 mg tablet      Other Visit Diagnoses     Transition of care performed with sharing of clinical summary    -  Primary           Subjective:     Patient ID: Robi Zhao is a 78 y o  male  This 79-year-old male insulin-dependent type 2 diabetic with severe COPD was hospitalized at [facility] for 4 days and has now been home for 4 days  He was hospitalized with acute hypoxic respiratory failure  He is oxygen dependent on 2 L as he was before the admission, the the the period he has been home for 4 days but has not been able to obtain prednisone 40 mg that he was post to take and his breathing has not deteriorated  He is using his nebulizer 4 times a day  He feels that he does not want to take prednisone as he is back to his baseline status at this time  He is living at home  This is a transitional care visit  Review of Systems   Constitutional: Negative for appetite change, fatigue, fever and unexpected weight change     HENT: Negative for rhinorrhea, sinus pressure, sinus pain, sneezing and sore throat  Eyes: Negative for visual disturbance  Respiratory: Negative for cough, chest tightness, shortness of breath and wheezing  Cardiovascular: Negative for chest pain, palpitations and leg swelling  Gastrointestinal: Negative for abdominal distention, abdominal pain, blood in stool, constipation, diarrhea and vomiting  Endocrine: Negative for polydipsia and polyuria  Genitourinary: Negative for decreased urine volume, difficulty urinating, dysuria, hematuria and urgency  Musculoskeletal: Negative for arthralgias, back pain, joint swelling and neck pain  Skin: Negative for rash  Allergic/Immunologic: Negative for environmental allergies  Neurological: Negative for tremors, weakness, light-headedness, numbness and headaches  Hematological: Does not bruise/bleed easily  Psychiatric/Behavioral: Negative for agitation, behavioral problems, confusion and dysphoric mood  The patient is not nervous/anxious  Objective:     Physical Exam   Constitutional: He is oriented to person, place, and time  He appears well-developed and well-nourished  No distress  HENT:   Head: Normocephalic and atraumatic  Nose: Nose normal    Mouth/Throat: Oropharynx is clear and moist  No oropharyngeal exudate  Eyes: Pupils are equal, round, and reactive to light  Conjunctivae and EOM are normal  No scleral icterus  Neck: Normal range of motion  Neck supple  No JVD present  No tracheal deviation present  No thyromegaly present  Cardiovascular: Normal rate, regular rhythm and normal heart sounds  Exam reveals no gallop and no friction rub  No murmur heard  Pulmonary/Chest: Effort normal  No respiratory distress  He has no wheezes  He has no rales  He exhibits no tenderness  Abdominal: Soft  Bowel sounds are normal  He exhibits no distension and no mass  There is no tenderness  There is no rebound and no guarding  Musculoskeletal: Normal range of motion  He exhibits no edema or deformity  Lymphadenopathy:     He has no cervical adenopathy  Neurological: He is alert and oriented to person, place, and time  No cranial nerve deficit  Coordination normal    Skin: Skin is warm and dry  No rash noted  Psychiatric: He has a normal mood and affect  His behavior is normal  Judgment and thought content normal          Vitals:    08/30/19 1301   BP: 138/70   BP Location: Right arm   Patient Position: Sitting   Cuff Size: Standard   Pulse: 93   SpO2: 93%   Weight: 102 kg (224 lb)   Height: 5' 11" (1 803 m)       Transitional Care Management Review:  Betty Jaimes is a 78 y o  male here for TCM follow up  During the TCM phone call patient stated:    TCM Call (since 7/30/2019)     Date and time call was made  8/28/2019 12:51 PM    Hospital care reviewed  Discussed with Inpatient Physician; Records reviewed    Patient was hospitialized at  [facility]    Date of Admission  08/22/19    Date of discharge  08/26/19    Diagnosis  copd    Disposition  Home    Were the patients medications reviewed and updated  No    Current Symptoms  Weakness; Fatigue    Weakness severity  Moderate    Fatigue severity  Moderate      TCM Call (since 7/30/2019)     Post hospital issues  None    Should patient be enrolled in anticoag monitoring? No    Scheduled for follow up?   Yes    Did you obtain your prescribed medications  Yes    Do you need help managing your prescriptions or medications  No    Is transportation to your appointment needed  No    I have advised the patient to call PCP with any new or worsening symptoms  sharan Crane MD

## 2019-09-16 ENCOUNTER — TELEPHONE (OUTPATIENT)
Dept: FAMILY MEDICINE CLINIC | Facility: CLINIC | Age: 69
End: 2019-09-16

## 2019-09-16 NOTE — PROGRESS NOTES
Assessment and Plan:     Problem List Items Addressed This Visit     None           Preventive health issues were discussed with patient, and age appropriate screening tests were ordered as noted in patient's After Visit Summary  Personalized health advice and appropriate referrals for health education or preventive services given if needed, as noted in patient's After Visit Summary       History of Present Illness:     Patient presents for Medicare Annual Wellness visit    Patient Care Team:  Pedro Pablo Curiel MD as PCP - General (Internal Medicine)     Problem List:     Patient Active Problem List   Diagnosis    Arteriosclerosis of coronary artery bypass graft    Atrial fibrillation, chronic (HCC)    Benign essential hypertension    Chronic obstructive lung disease ([address] )    Chronic systolic heart failure (HCC)    Depressive disorder    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    Diverticular disease of colon    Gastroesophageal reflux disease without esophagitis    Glaucoma    Gout    Mixed hyperlipidemia    Persistent insomnia    Type 2 diabetes mellitus with diabetic polyneuropathy, with long-term current use of insulin ([address] )    Altered mental state    Chest wall pain, chronic    Chronic respiratory failure ([address] )    Delirium    Encephalopathy    Essential hypertension    SENTHIL (obstructive sleep apnea)    Peripheral sensory neuropathy    Tracheal stenosis    Type 2 diabetes mellitus with stage 4 chronic kidney disease, without long-term current use of insulin ([facility])    Insulin dependent type 2 diabetes mellitus ([address] )    Atherosclerosis of native artery of extremity ([facility])    Obesity    Uncontrolled type 2 diabetes mellitus with hyperglycemia ([facility])    Stage 3 chronic kidney disease ([facility])    Peripheral vascular disease ([address] )    Current use of long term anticoagulation    Urinary retention    Tinea pedis of both feet    COPD exacerbation ([address] )    Acute on chronic respiratory failure [facility])      Past Medical and Surgical History:     Past Medical History:   Diagnosis Date    Colonic polyp 2012    Disc displacement, cervical     Peripheral vascular disease ([address] ) 2011    Femoral- popliteal bypass     Past Surgical History:   Procedure Laterality Date    BYPASS FEMORAL-POPLITEAL  2011    COLONOSCOPY  2012    With polypectomy    CORONARY ARTERY BYPASS GRAFT  2012    LAMINECTOMY        Family History:     No family history on file     Social History:     Social History     Socioeconomic History    Marital status: Single     Spouse name: Not on file    Number of children: 1    Years of education:      Highest education level: Not on file   Occupational History    Occupation: retired   Social Needs    Financial resource strain: Not on file    Food insecurity:     Worry: Not on file     Inability: Not on file   [employer] needs:     Medical: Not on file     Non-medical: Not on file   Tobacco Use    Smoking status: Former Smoker     Packs/day: 1 00     Years: 37 00     Pack years: 37 00     Types: Cigarettes     Last attempt to quit: 2014     Years since quittin 7    Smokeless tobacco: Never Used    Tobacco comment: No passive smoke exposure   Substance and Sexual Activity    Alcohol use: No    Drug use: No    Sexual activity: Never     Partners: Female   Lifestyle    Physical activity:     Days per week: Not on file     Minutes per session: Not on file    Stress: Not on file   Relationships    Social connections:     Talks on phone: Not on file     Gets together: Not on file     Attends Mu-ism service: Not on file     Active member of club or organization: Not on file     Attends meetings of clubs or organizations: Not on file     Relationship status: Not on file    Intimate partner violence:     Fear of current or ex partner: Not on file     Emotionally abused: Not on file     Physically abused: Not on file     Forced sexual activity: Not on file Other Topics Concern    Not on file   Social History Narrative    Pt states he drinks soda daily       Medications and Allergies:     Current Outpatient Medications   Medication Sig Dispense Refill    acetaminophen (TYLENOL) 650 mg CR tablet 3 (three) times a day      albuterol (2 5 mg/3 mL) 0 083 % nebulizer solution Take 1 vial (2 5 mg total) by nebulization every 6 (six) hours as needed for wheezing or shortness of breath (Patient taking differently: Take 2 5 mg by nebulization every 6 (six) hours as needed for wheezing or shortness of breath ) 100 vial 6    albuterol (VENTOLIN HFA) 90 mcg/act inhaler Inhale 1 puff every 4 (four) hours as needed for wheezing (Patient taking differently: Inhale 1 puff every 4 (four) hours as needed for wheezing ) 1 Inhaler 3    atropine (ISOPTO ATROPINE) 1 % ophthalmic solution PUT 1 DROP INTO RIGHT EYE 3 TIMES A DAY  3    bisacodyl (DULCOLAX) 5 mg EC tablet Take 10 mg by mouth daily as needed      budesonide (PULMICORT) 0 5 mg/2 mL nebulizer solution Take 0 5 mg by nebulization 2 (two) times a day Rinse mouth after use        cholecalciferol (VITAMIN D3) 1,000 units tablet Take 1,000 Units by mouth daily      Cholecalciferol (VITAMIN D3) 42719 units CAPS Take 1 capsule by mouth once a week  0    dorzolamide (TRUSOPT) 2 % ophthalmic solution INSTILL 1 DROP INTO AFFECTED EYE 3 TIMES A DAY  3    doxazosin (CARDURA) 1 mg tablet TAKE 1 TABLET BY ORAL ROUTE EVERY DAY 90 tablet 2    ergocalciferol (ERGOCALCIFEROL) 88448 units capsule Take 50,000 Units by mouth      famotidine (PEPCID) 20 mg tablet Take 20 mg by mouth 2 (two) times a day      fluticasone (FLONASE) 50 mcg/act nasal spray 2 sprays into each nostril daily 1 Bottle 2    glucose 40 % Take 15 g by mouth once      glucose blood test strip TEST UP TO 3 TIMES PER DAY      insulin aspart (NOVOLOG FLEXPEN) 100 Units/mL injection pen Inject under the skin 3 (three) times a day with meals      insulin detemir (LEVEMIR FLEXTOUCH) 100 Units/mL injection pen Inject 40 Units under the skin daily 5 pen 11    Insulin Pen Needle 32G X 6 MM MISC Inject as directed 3 (three) times a day Poorly controlled insulin dependent DM2 100 each 11    Lancets (ONETOUCH ULTRASOFT) lancets CHECK BLOOD SUGARS EVERY DAY  3    LORazepam (ATIVAN) 0 5 mg tablet Take 0 5 mg by mouth every 8 (eight) hours as needed for anxiety      melatonin 3 mg qd      metoprolol tartrate (LOPRESSOR) 50 mg tablet Take 1 tablet (50 mg total) by mouth every 12 (twelve) hours 180 tablet 1    oxyCODONE-acetaminophen (PERCOCET) 5-325 mg per tablet Take 1 tablet by mouth 2 (two) times a dayMax Daily Amount: 2 tablets 60 tablet 0    predniSONE 20 mg tablet Take 20 mg by mouth 2 (two) times a day      rivaroxaban (XARELTO) 15 mg tablet Take 15 mg by mouth      rosuvastatin (CRESTOR) 10 MG tablet Take 1 tablet (10 mg total) by mouth daily 90 tablet 1    sitaGLIPtin (JANUVIA) 25 mg tablet Take 25 mg by mouth daily      SPIRIVA HANDIHALER 18 MCG inhalation capsule INHALE 1 CAPSULE (18 MCG TOTAL) DAILY  3    traZODone (DESYREL) 100 mg tablet TAKE 2 TABLET BY ORAL ROUTE EVERY DAY AT BEDTIME (Patient taking differently: 50 mg ) 180 tablet 0     No current facility-administered medications for this visit        Allergies   Allergen Reactions    No Active Allergies     No Known Allergies       Immunizations:     Immunization History   Administered Date(s) Administered     Influenza (IM) Preservative Free 10/17/2012    INFLUENZA 02/12/2013, 12/23/2013, 01/28/2015, 10/14/2015, 11/14/2016, 12/19/2017, 11/30/2018    Influenza Split 10/09/2013    Influenza Split High Dose Preservative Free IM 11/25/2014, 10/14/2015, 10/12/2016, 11/14/2016, 12/19/2017, 11/30/2018    Influenza TIV (IM) 09/25/2012    Pneumococcal Conjugate 13-Valent 10/14/2015, 12/03/2018    Pneumococcal Polysaccharide PPV23 10/01/2011, 12/19/2016      Health Maintenance:         Topic Date Due    Hepatitis C Screening  1950    CRC Screening: Colonoscopy  12/13/2028         Topic Date Due    INFLUENZA VACCINE  07/01/2019      Medicare Health Risk Assessment:     There were no vitals taken for this visit           PREVENTIVE SCREENINGS      Cardiovascular Screening:    General: Screening Not Indicated and History Lipid Disorder      Diabetes Screening:     General: Screening Not Indicated and History Diabetes      Colorectal Cancer Screening:     General: Screening Current      Abdominal Aortic Aneurysm (AAA) Screening:    Risk factors include: age between 73-69 yo and tobacco use        Lung Cancer Screening:     General: Screening Not Indicated      Ck Murrell MD

## 2019-09-16 NOTE — TELEPHONE ENCOUNTER
Pt was seen in ER on 09/15/19 and scheduled for a f/u visit with Dr Olivier Madrid for 09/20/19 at 10am

## 2019-09-20 ENCOUNTER — OFFICE VISIT (OUTPATIENT)
Dept: FAMILY MEDICINE CLINIC | Facility: CLINIC | Age: 69
End: 2019-09-20
Payer: COMMERCIAL

## 2019-09-20 VITALS
WEIGHT: 219 LBS | RESPIRATION RATE: 19 BRPM | DIASTOLIC BLOOD PRESSURE: 100 MMHG | HEART RATE: 66 BPM | BODY MASS INDEX: 31.35 KG/M2 | OXYGEN SATURATION: 97 % | HEIGHT: 70 IN | SYSTOLIC BLOOD PRESSURE: 160 MMHG | TEMPERATURE: 97.2 F

## 2019-09-20 DIAGNOSIS — E11.9 INSULIN DEPENDENT TYPE 2 DIABETES MELLITUS (HCC): ICD-10-CM

## 2019-09-20 DIAGNOSIS — F31.81 BIPOLAR 2 DISORDER, MAJOR DEPRESSIVE EPISODE (HCC): ICD-10-CM

## 2019-09-20 DIAGNOSIS — I10 ESSENTIAL HYPERTENSION: ICD-10-CM

## 2019-09-20 DIAGNOSIS — Z72.0 TOBACCO ABUSE: ICD-10-CM

## 2019-09-20 DIAGNOSIS — J44.1 COPD EXACERBATION (HCC): ICD-10-CM

## 2019-09-20 DIAGNOSIS — J96.11 CHRONIC RESPIRATORY FAILURE WITH HYPOXIA (HCC): ICD-10-CM

## 2019-09-20 DIAGNOSIS — Z00.00 MEDICARE ANNUAL WELLNESS VISIT, SUBSEQUENT: Primary | ICD-10-CM

## 2019-09-20 DIAGNOSIS — I48.20 ATRIAL FIBRILLATION, CHRONIC (HCC): ICD-10-CM

## 2019-09-20 DIAGNOSIS — Z79.4 INSULIN DEPENDENT TYPE 2 DIABETES MELLITUS (HCC): ICD-10-CM

## 2019-09-20 DIAGNOSIS — E11.42 TYPE 2 DIABETES MELLITUS WITH DIABETIC POLYNEUROPATHY, WITH LONG-TERM CURRENT USE OF INSULIN (HCC): ICD-10-CM

## 2019-09-20 DIAGNOSIS — Z23 ENCOUNTER FOR IMMUNIZATION: ICD-10-CM

## 2019-09-20 DIAGNOSIS — I25.810 ATHEROSCLEROSIS OF CORONARY ARTERY BYPASS GRAFT OF NATIVE HEART WITHOUT ANGINA PECTORIS: ICD-10-CM

## 2019-09-20 DIAGNOSIS — Z79.4 TYPE 2 DIABETES MELLITUS WITH DIABETIC POLYNEUROPATHY, WITH LONG-TERM CURRENT USE OF INSULIN (HCC): ICD-10-CM

## 2019-09-20 DIAGNOSIS — J43.2 CENTRILOBULAR EMPHYSEMA (HCC): ICD-10-CM

## 2019-09-20 DIAGNOSIS — N18.30 CHRONIC KIDNEY DISEASE (CKD), ACTIVE MEDICAL MANAGEMENT WITHOUT DIALYSIS, STAGE 3 (MODERATE) (HCC): ICD-10-CM

## 2019-09-20 PROCEDURE — 99214 OFFICE O/P EST MOD 30 MIN: CPT | Performed by: INTERNAL MEDICINE

## 2019-09-20 PROCEDURE — 90662 IIV NO PRSV INCREASED AG IM: CPT

## 2019-09-20 PROCEDURE — 3008F BODY MASS INDEX DOCD: CPT | Performed by: INTERNAL MEDICINE

## 2019-09-20 PROCEDURE — 1170F FXNL STATUS ASSESSED: CPT

## 2019-09-20 PROCEDURE — 1125F AMNT PAIN NOTED PAIN PRSNT: CPT

## 2019-09-20 PROCEDURE — G0439 PPPS, SUBSEQ VISIT: HCPCS | Performed by: INTERNAL MEDICINE

## 2019-09-20 PROCEDURE — G0008 ADMIN INFLUENZA VIRUS VAC: HCPCS

## 2019-09-20 RX ORDER — VARENICLINE TARTRATE 1 MG/1
1 TABLET, FILM COATED ORAL 2 TIMES DAILY
Qty: 60 TABLET | Refills: 1 | Status: SHIPPED | OUTPATIENT
Start: 2019-09-20 | End: 2021-05-14

## 2019-09-20 RX ORDER — TRAZODONE HYDROCHLORIDE 50 MG/1
100 TABLET ORAL
COMMUNITY

## 2019-09-20 RX ORDER — VARENICLINE TARTRATE 25 MG
KIT ORAL
Qty: 53 TABLET | Refills: 0 | Status: SHIPPED | OUTPATIENT
Start: 2019-09-20 | End: 2021-05-14

## 2019-09-20 RX ORDER — BUDESONIDE 0.5 MG/2ML
0.5 INHALANT ORAL 2 TIMES DAILY
COMMUNITY
End: 2019-09-20 | Stop reason: SDUPTHER

## 2019-09-20 RX ORDER — BUDESONIDE 0.5 MG/2ML
0.5 INHALANT ORAL 2 TIMES DAILY
Qty: 120 VIAL | Refills: 6 | Status: SHIPPED | OUTPATIENT
Start: 2019-09-20

## 2019-09-20 RX ORDER — PREDNISONE 10 MG/1
10 TABLET ORAL DAILY
Qty: 7 TABLET | Refills: 0 | Status: SHIPPED | OUTPATIENT
Start: 2019-09-20 | End: 2019-09-27

## 2019-09-20 NOTE — PROGRESS NOTES
Assessment and Plan:     Problem List Items Addressed This Visit        Endocrine    Type 2 diabetes mellitus with diabetic polyneuropathy, with long-term current use of insulin ([facility])    Type 2 diabetes mellitus with stage 4 chronic kidney disease, without long-term current use of insulin ([facility])    Relevant Medications    varenicline (CHANTIX FRANCK) 0 5 MG X 11 & 1 MG X 42 tablet    varenicline (CHANTIX) 1 mg tablet    predniSONE 10 mg tablet    Insulin dependent type 2 diabetes mellitus ([facility])    Relevant Medications    varenicline (CHANTIX FRANCK) 0 5 MG X 11 & 1 MG X 42 tablet    varenicline (CHANTIX) 1 mg tablet    predniSONE 10 mg tablet       Respiratory    Chronic obstructive lung disease ([facility])    Relevant Medications    budesonide (PULMICORT) 0 5 mg/2 mL nebulizer solution    predniSONE 10 mg tablet    Chronic respiratory failure ([facility])    Relevant Medications    budesonide (PULMICORT) 0 5 mg/2 mL nebulizer solution    varenicline (CHANTIX FRANCK) 0 5 MG X 11 & 1 MG X 42 tablet    varenicline (CHANTIX) 1 mg tablet    predniSONE 10 mg tablet    COPD exacerbation ([facility])    Relevant Medications    budesonide (PULMICORT) 0 5 mg/2 mL nebulizer solution    varenicline (CHANTIX FRANCK) 0 5 MG X 11 & 1 MG X 42 tablet    varenicline (CHANTIX) 1 mg tablet    predniSONE 10 mg tablet       Cardiovascular and Mediastinum    Arteriosclerosis of coronary artery bypass graft    Atrial fibrillation, chronic ([facility])    Relevant Medications    varenicline (CHANTIX FRANCK) 0 5 MG X 11 & 1 MG X 42 tablet    varenicline (CHANTIX) 1 mg tablet    predniSONE 10 mg tablet    Essential hypertension       Other    Tobacco abuse    Relevant Medications    varenicline (CHANTIX FRANCK) 0 5 MG X 11 & 1 MG X 42 tablet    varenicline (CHANTIX) 1 mg tablet    predniSONE 10 mg tablet      Other Visit Diagnoses     Medicare annual wellness visit, subsequent    -  Primary    Relevant Medications    varenicline (CHANTIX FRANCK) 0 5 MG X 11 & 1 MG X 42 tablet varenicline (CHANTIX) 1 mg tablet    predniSONE 10 mg tablet    Bipolar 2 disorder, major depressive episode (HCC)        Relevant Medications    traZODone (DESYREL) 50 mg tablet    varenicline (CHANTIX FRANCK) 0 5 MG X 11 & 1 MG X 42 tablet    varenicline (CHANTIX) 1 mg tablet    Other Relevant Orders    Microalbumin / creatinine urine ratio    Encounter for immunization        Relevant Orders    influenza vaccine, high-dose, PF 0 5 mL (Completed)           Preventive health issues were discussed with patient, and age appropriate screening tests were ordered as noted in patient's After Visit Summary  Personalized health advice and appropriate referrals for health education or preventive services given if needed, as noted in patient's After Visit Summary  History of Present Illness:     Patient presents for Welcome to Medicare visit       Patient Care Team:  Tigist Shelby MD as PCP - General (Internal Medicine)     Review of Systems:     Review of Systems   Problem List:     Patient Active Problem List   Diagnosis    Arteriosclerosis of coronary artery bypass graft    Atrial fibrillation, chronic (HCC)    Benign essential hypertension    Chronic obstructive lung disease ([address] )    Chronic systolic heart failure (HCC)    Depressive disorder    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    Diverticular disease of colon    Gastroesophageal reflux disease without esophagitis    Glaucoma    Gout    Mixed hyperlipidemia    Persistent insomnia    Type 2 diabetes mellitus with diabetic polyneuropathy, with long-term current use of insulin ([address] )    Altered mental state    Chest wall pain, chronic    Chronic respiratory failure ([address] )    Delirium    Encephalopathy    Essential hypertension    SENTHIL (obstructive sleep apnea)    Peripheral sensory neuropathy    Tracheal stenosis    Type 2 diabetes mellitus with stage 4 chronic kidney disease, without long-term current use of insulin ([facility])    Insulin dependent type 2 diabetes mellitus ([place] [address] )    Atherosclerosis of native artery of extremity ([place] [address] )    Obesity    Uncontrolled type 2 diabetes mellitus with hyperglycemia (HCC)    Stage 3 chronic kidney disease ([place] [address] )    Peripheral vascular disease ([place] [address] )    Current use of long term anticoagulation    Urinary retention    Tinea pedis of both feet    COPD exacerbation ([place] [address] )    Acute on chronic respiratory failure ([place] [address] )    Tobacco abuse      Past Medical and Surgical History:     Past Medical History:   Diagnosis Date    Colonic polyp 2012    Disc displacement, cervical     Peripheral vascular disease ([place] [address] ) 2011    Femoral- popliteal bypass     Past Surgical History:   Procedure Laterality Date    BYPASS FEMORAL-POPLITEAL  2011    COLONOSCOPY  2012    With polypectomy    CORONARY ARTERY BYPASS GRAFT  2012    LAMINECTOMY  2010      Family History:     Family History   Family history unknown: Yes      Social History:     Social History     Socioeconomic History    Marital status: Single     Spouse name: None    Number of children: 1    Years of education:      Highest education level: None   Occupational History    Occupation: retired   Social Needs    Financial resource strain: None    Food insecurity:     Worry: None     Inability: None    Transportation needs:     Medical: None     Non-medical: None   Tobacco Use    Smoking status: Current Some Day Smoker     Packs/day: 1 00     Years: 37 00     Pack years: 37 00     Types: Cigarettes     Last attempt to quit: 2014     Years since quittin 7    Smokeless tobacco: Never Used    Tobacco comment: Pt states he restarted smoking a few weeks ago   Substance and Sexual Activity    Alcohol use: No    Drug use: No    Sexual activity: Not Currently     Partners: Female     Birth control/protection: None   Lifestyle    Physical activity:     Days per week: None     Minutes per session: None    Stress: None   Relationships    Social connections:     Talks on phone: None     Gets together: None     Attends Hoahaoism service: None     Active member of club or organization: None     Attends meetings of clubs or organizations: None     Relationship status: None    Intimate partner violence:     Fear of current or ex partner: None     Emotionally abused: None     Physically abused: None     Forced sexual activity: None   Other Topics Concern    None   Social History Narrative    Pt states he drinks soda daily      Medications and Allergies:     Current Outpatient Medications   Medication Sig Dispense Refill    acetaminophen (TYLENOL) 650 mg CR tablet 3 (three) times a day      albuterol (2 5 mg/3 mL) 0 083 % nebulizer solution Take 1 vial (2 5 mg total) by nebulization every 6 (six) hours as needed for wheezing or shortness of breath (Patient taking differently: Take 2 5 mg by nebulization every 6 (six) hours as needed for wheezing or shortness of breath ) 100 vial 6    albuterol (VENTOLIN HFA) 90 mcg/act inhaler Inhale 1 puff every 4 (four) hours as needed for wheezing (Patient taking differently: Inhale 1 puff every 4 (four) hours as needed for wheezing ) 1 Inhaler 3    atropine (ISOPTO ATROPINE) 1 % ophthalmic solution PUT 1 DROP INTO RIGHT EYE 3 TIMES A DAY  3    budesonide (PULMICORT) 0 5 mg/2 mL nebulizer solution Take 1 vial (0 5 mg total) by nebulization 2 (two) times a day Rinse mouth after use   120 vial 6    cholecalciferol (VITAMIN D3) 1,000 units tablet Take 1,000 Units by mouth daily      Cholecalciferol (VITAMIN D3) 04990 units CAPS Take 1 capsule by mouth once a week  0    dorzolamide (TRUSOPT) 2 % ophthalmic solution INSTILL 1 DROP INTO AFFECTED EYE 3 TIMES A DAY  3    famotidine (PEPCID) 20 mg tablet Take 20 mg by mouth 2 (two) times a day      glucose blood test strip TEST UP TO 3 TIMES PER DAY      insulin aspart (NOVOLOG FLEXPEN) 100 Units/mL injection pen Inject under the skin 3 (three) times a day with meals      insulin detemir (LEVEMIR FLEXTOUCH) 100 Units/mL injection pen Inject 40 Units under the skin daily 5 pen 11    Insulin Pen Needle 32G X 6 MM MISC Inject as directed 3 (three) times a day Poorly controlled insulin dependent DM2 100 each 11    Lancets (ONETOUCH ULTRASOFT) lancets CHECK BLOOD SUGARS EVERY DAY  3    LORazepam (ATIVAN) 0 5 mg tablet Take 0 5 mg by mouth every 8 (eight) hours as needed for anxiety      metoprolol tartrate (LOPRESSOR) 50 mg tablet Take 1 tablet (50 mg total) by mouth every 12 (twelve) hours 180 tablet 1    rivaroxaban (XARELTO) 15 mg tablet Take 15 mg by mouth      rosuvastatin (CRESTOR) 10 MG tablet Take 1 tablet (10 mg total) by mouth daily 90 tablet 1    SPIRIVA HANDIHALER 18 MCG inhalation capsule INHALE 1 CAPSULE (18 MCG TOTAL) DAILY  3    traZODone (DESYREL) 50 mg tablet Take 50 mg by mouth daily at bedtime      bisacodyl (DULCOLAX) 5 mg EC tablet Take 10 mg by mouth daily as needed      predniSONE 10 mg tablet Take 1 tablet (10 mg total) by mouth daily for 7 days 7 tablet 0    varenicline (CHANTIX FRANCK) 0 5 MG X 11 & 1 MG X 42 tablet Take one 0 5mg tab by mouth 1x daily for 3 days, then increase to one 0 5mg tab 2x daily for 3 days, then increase to one 1mg tab 2x daily 53 tablet 0    varenicline (CHANTIX) 1 mg tablet Take 1 tablet (1 mg total) by mouth 2 (two) times a day Start after starter franck completed  60 tablet 1     No current facility-administered medications for this visit        Allergies   Allergen Reactions    No Active Allergies     No Known Allergies       Immunizations:     Immunization History   Administered Date(s) Administered     Influenza (IM) Preservative Free 10/17/2012    INFLUENZA 02/12/2013, 12/23/2013, 01/28/2015, 10/14/2015, 11/14/2016, 12/19/2017, 11/30/2018    Influenza Split 10/09/2013    Influenza Split High Dose Preservative Free IM 11/25/2014, 10/14/2015, 10/12/2016, 11/14/2016, 12/19/2017, 11/30/2018    Influenza TIV (IM) 09/25/2012    Influenza, high dose seasonal 0 5 mL 09/20/2019    Pneumococcal Conjugate 13-Valent 10/14/2015, 12/03/2018    Pneumococcal Polysaccharide PPV23 10/01/2011, 12/19/2016      Health Maintenance:         Topic Date Due    Hepatitis C Screening  1950    CRC Screening: Colonoscopy  12/13/2028         Topic Date Due    INFLUENZA VACCINE  07/01/2019      Medicare Screening Tests and Risk Assessments:     Marcelo Ruelas is here for his Subsequent Wellness visit  Health Risk Assessment:   Patient rates overall health as fair  Patient feels that their physical health rating is same  Eyesight was rated as same  Hearing was rated as same  Patient feels that their emotional and mental health rating is same  Pain experienced in the last 7 days has been some  Patient's pain rating has been 4/10  Depression Screening:   PHQ-2 Score: 0  PHQ-9 Score: 0      Fall Risk Screening: In the past year, patient has experienced: no history of falling in past year      Home Safety:  Patient does not have trouble with stairs inside or outside of their home  Patient has working smoke alarms and has no working carbon monoxide detector  Home safety hazards include: not having non-slip bath and/or shower mats  Pt has caretakers that come into the home to help PT with everyday needs    Nutrition:   Current diet is Regular  Medications:   Patient is not currently taking any over-the-counter supplements  Patient is able to manage medications  Activities of Daily Living (ADLs)/Instrumental Activities of Daily Living (IADLs):   Walk and transfer into and out of bed and chair?: Yes  Dress and groom yourself?: Yes    Bathe or shower yourself?: No    Feed yourself?  Yes  Do your laundry/housekeeping?: No  Manage your money, pay your bills and track your expenses?: Yes  Make your own meals?: No    Do your own shopping?: No    ADL comments: Pt has caretakers that come into the home to help PT with everyday needs    Previous Hospitalizations:   Any hospitalizations or ED visits within the last 12 months?: Yes    How many hospitalizations have you had in the last year?: more than 4    Advance Care Planning:   Living will: Yes    Durable POA for healthcare:  Yes    Advanced directive: Yes    Advanced directive counseling given: Yes    Patient declined ACP directive: Yes    End of Life Decisions reviewed with patient: Yes    Provider agrees with end of life decisions: Yes      Cognitive Screening:   Provider or family/friend/caregiver concerned regarding cognition?: No    PREVENTIVE SCREENINGS      Cardiovascular Screening:    General: Screening Not Indicated, History Lipid Disorder and Risks and Benefits Discussed      Diabetes Screening:     General: Screening Not Indicated, History Diabetes and Risks and Benefits Discussed      Colorectal Cancer Screening:     General: Screening Current      Prostate Cancer Screening:    General: Risks and Benefits Discussed      Osteoporosis Screening:    General: Risks and Benefits Discussed      Abdominal Aortic Aneurysm (AAA) Screening:    Risk factors include: age between 73-69 yo and tobacco use        General: Risks and Benefits Discussed      Lung Cancer Screening:     General: Screening Not Indicated and Risks and Benefits Discussed      Hepatitis C Screening:    General: Risks and Benefits Discussed    No exam data present     Physical Exam:     /100   Pulse 66   Temp (!) 97 2 °F (36 2 °C)   Resp 19   Ht 5' 10" (1 778 m)   Wt 99 3 kg (219 lb)   SpO2 97%   BMI 31 42 kg/m²     Physical Exam    Joselyn Crane MD

## 2019-09-20 NOTE — PROGRESS NOTES
Assessment/Plan:  Patient was given add Chantix prescription and a starter pack with 3 months worth of medicine with the refills  He has report if he has any difficulty but tolerated well in the past   The process was reviewed in detail  Patient will continue with prednisone 10 mg daily for 7 days and then stop  Nebulized albuterol and budesonide were refilled  He is also on Spiriva once daily  Other medicines were reviewed and ordered and continued  Diet reviewed  Lifestyle modifications reviewed  Medications reviewed and ordered  Laboratory tests and studies reviewed and ordered  All patient's questions answered to patient satisfaction  Chief Complaint   Patient presents with   [facility]ness Visit     Pt here for annual wellness exam    Hypertension     Pts BP in office today is 160/100    Diabetes     last A1C dated 08/02/19 was 6 3         Diagnoses and all orders for this visit:    Medicare annual wellness visit, subsequent  -     varenicline (CHANTIX FRANCK) 0 5 MG X 11 & 1 MG X 42 tablet; Take one 0 5mg tab by mouth 1x daily for 3 days, then increase to one 0 5mg tab 2x daily for 3 days, then increase to one 1mg tab 2x daily  -     varenicline (CHANTIX) 1 mg tablet; Take 1 tablet (1 mg total) by mouth 2 (two) times a day Start after starter franck completed  -     predniSONE 10 mg tablet; Take 1 tablet (10 mg total) by mouth daily for 7 days    COPD exacerbation (HCC)  -     varenicline (CHANTIX FRANCK) 0 5 MG X 11 & 1 MG X 42 tablet; Take one 0 5mg tab by mouth 1x daily for 3 days, then increase to one 0 5mg tab 2x daily for 3 days, then increase to one 1mg tab 2x daily  -     varenicline (CHANTIX) 1 mg tablet; Take 1 tablet (1 mg total) by mouth 2 (two) times a day Start after starter franck completed  -     predniSONE 10 mg tablet;  Take 1 tablet (10 mg total) by mouth daily for 7 days    Chronic respiratory failure with hypoxia (HCC)  -     budesonide (PULMICORT) 0 5 mg/2 mL nebulizer solution; Take 1 vial (0 5 mg total) by nebulization 2 (two) times a day Rinse mouth after use  -     varenicline (CHANTIX FRANCK) 0 5 MG X 11 & 1 MG X 42 tablet; Take one 0 5mg tab by mouth 1x daily for 3 days, then increase to one 0 5mg tab 2x daily for 3 days, then increase to one 1mg tab 2x daily  -     varenicline (CHANTIX) 1 mg tablet; Take 1 tablet (1 mg total) by mouth 2 (two) times a day Start after starter franck completed  -     predniSONE 10 mg tablet; Take 1 tablet (10 mg total) by mouth daily for 7 days    Insulin dependent type 2 diabetes mellitus (HCC)  -     varenicline (CHANTIX FRANCK) 0 5 MG X 11 & 1 MG X 42 tablet; Take one 0 5mg tab by mouth 1x daily for 3 days, then increase to one 0 5mg tab 2x daily for 3 days, then increase to one 1mg tab 2x daily  -     varenicline (CHANTIX) 1 mg tablet; Take 1 tablet (1 mg total) by mouth 2 (two) times a day Start after starter franck completed  -     predniSONE 10 mg tablet; Take 1 tablet (10 mg total) by mouth daily for 7 days    Tobacco abuse  -     varenicline (CHANTIX FRANCK) 0 5 MG X 11 & 1 MG X 42 tablet; Take one 0 5mg tab by mouth 1x daily for 3 days, then increase to one 0 5mg tab 2x daily for 3 days, then increase to one 1mg tab 2x daily  -     varenicline (CHANTIX) 1 mg tablet; Take 1 tablet (1 mg total) by mouth 2 (two) times a day Start after starter franck completed  -     predniSONE 10 mg tablet; Take 1 tablet (10 mg total) by mouth daily for 7 days    Essential hypertension    Bipolar 2 disorder, major depressive episode (HCC)  -     Microalbumin / creatinine urine ratio    Centrilobular emphysema (HCC)    Chronic kidney disease (CKD), active medical management without dialysis, stage 3 (moderate) (HCC)  -     varenicline (CHANTIX FRANCK) 0 5 MG X 11 & 1 MG X 42 tablet; Take one 0 5mg tab by mouth 1x daily for 3 days, then increase to one 0 5mg tab 2x daily for 3 days, then increase to one 1mg tab 2x daily  -     varenicline (CHANTIX) 1 mg tablet;  Take 1 tablet (1 mg total) by mouth 2 (two) times a day Start after starter franck completed  -     predniSONE 10 mg tablet; Take 1 tablet (10 mg total) by mouth daily for 7 days    Type 2 diabetes mellitus with diabetic polyneuropathy, with long-term current use of insulin (HCC)    Atrial fibrillation, chronic (HCC)  -     varenicline (CHANTIX FRANCK) 0 5 MG X 11 & 1 MG X 42 tablet; Take one 0 5mg tab by mouth 1x daily for 3 days, then increase to one 0 5mg tab 2x daily for 3 days, then increase to one 1mg tab 2x daily  -     varenicline (CHANTIX) 1 mg tablet; Take 1 tablet (1 mg total) by mouth 2 (two) times a day Start after starter franck completed  -     predniSONE 10 mg tablet; Take 1 tablet (10 mg total) by mouth daily for 7 days    Atherosclerosis of coronary artery bypass graft of native heart without angina pectoris    Encounter for immunization  -     influenza vaccine, high-dose, PF 0 5 mL    Other orders  -     traZODone (DESYREL) 50 mg tablet; Take 50 mg by mouth daily at bedtime  -     Discontinue: budesonide (PULMICORT) 0 5 mg/2 mL nebulizer solution; Take 0 5 mg by nebulization 2 (two) times a day Rinse mouth after use  Subjective: This 59-year-old male was in the emergency room again 6 days ago for increasing shortness of breath  He was taken by ambulance to the emergency room where he received nebulizer treatments and corticosteroids and oxygen with improvement  The record was reviewed and a in its entirety  Chest x-ray revealed no evidence of congestive heart failure  His BNP was 310  GFR was 50  He is oxygen dependent at home 2 liters/minute  Patient was sent home on 60 mil g of prednisone daily and this is his last day  Patient has started smoking again a couple months ago after hospitalization when there was increase stress in his life with his family and smoking 1 pack per day    He stop with Chantix in the past and requested to a Chantix prescription that he knows he will have to pay for        Patient ID: Ninoska Saenz is a 78 y o  male          Current Outpatient Medications:     acetaminophen (TYLENOL) 650 mg CR tablet, 3 (three) times a day, Disp: , Rfl:     albuterol (2 5 mg/3 mL) 0 083 % nebulizer solution, Take 1 vial (2 5 mg total) by nebulization every 6 (six) hours as needed for wheezing or shortness of breath (Patient taking differently: Take 2 5 mg by nebulization every 6 (six) hours as needed for wheezing or shortness of breath ), Disp: 100 vial, Rfl: 6    albuterol (VENTOLIN HFA) 90 mcg/act inhaler, Inhale 1 puff every 4 (four) hours as needed for wheezing (Patient taking differently: Inhale 1 puff every 4 (four) hours as needed for wheezing ), Disp: 1 Inhaler, Rfl: 3    atropine (ISOPTO ATROPINE) 1 % ophthalmic solution, PUT 1 DROP INTO RIGHT EYE 3 TIMES A DAY, Disp: , Rfl: 3    budesonide (PULMICORT) 0 5 mg/2 mL nebulizer solution, Take 1 vial (0 5 mg total) by nebulization 2 (two) times a day Rinse mouth after use , Disp: 120 vial, Rfl: 6    cholecalciferol (VITAMIN D3) 1,000 units tablet, Take 1,000 Units by mouth daily, Disp: , Rfl:     Cholecalciferol (VITAMIN D3) 53087 units CAPS, Take 1 capsule by mouth once a week, Disp: , Rfl: 0    dorzolamide (TRUSOPT) 2 % ophthalmic solution, INSTILL 1 DROP INTO AFFECTED EYE 3 TIMES A DAY, Disp: , Rfl: 3    famotidine (PEPCID) 20 mg tablet, Take 20 mg by mouth 2 (two) times a day, Disp: , Rfl:     glucose blood test strip, TEST UP TO 3 TIMES PER DAY, Disp: , Rfl:     insulin aspart (NOVOLOG FLEXPEN) 100 Units/mL injection pen, Inject under the skin 3 (three) times a day with meals, Disp: , Rfl:     insulin detemir (LEVEMIR FLEXTOUCH) 100 Units/mL injection pen, Inject 40 Units under the skin daily, Disp: 5 pen, Rfl: 11    Insulin Pen Needle 32G X 6 MM MISC, Inject as directed 3 (three) times a day Poorly controlled insulin dependent DM2, Disp: 100 each, Rfl: 11    Lancets (ONETOUCH ULTRASOFT) lancets, CHECK BLOOD SUGARS EVERY DAY, Disp: , Rfl: 3    LORazepam (ATIVAN) 0 5 mg tablet, Take 0 5 mg by mouth every 8 (eight) hours as needed for anxiety, Disp: , Rfl:     metoprolol tartrate (LOPRESSOR) 50 mg tablet, Take 1 tablet (50 mg total) by mouth every 12 (twelve) hours, Disp: 180 tablet, Rfl: 1    rivaroxaban (XARELTO) 15 mg tablet, Take 15 mg by mouth, Disp: , Rfl:     rosuvastatin (CRESTOR) 10 MG tablet, Take 1 tablet (10 mg total) by mouth daily, Disp: 90 tablet, Rfl: 1    SPIRIVA HANDIHALER 18 MCG inhalation capsule, INHALE 1 CAPSULE (18 MCG TOTAL) DAILY  , Disp: , Rfl: 3    traZODone (DESYREL) 50 mg tablet, Take 50 mg by mouth daily at bedtime, Disp: , Rfl:     bisacodyl (DULCOLAX) 5 mg EC tablet, Take 10 mg by mouth daily as needed, Disp: , Rfl:     predniSONE 10 mg tablet, Take 1 tablet (10 mg total) by mouth daily for 7 days, Disp: 7 tablet, Rfl: 0    varenicline (CHANTIX FRANCK) 0 5 MG X 11 & 1 MG X 42 tablet, Take one 0 5mg tab by mouth 1x daily for 3 days, then increase to one 0 5mg tab 2x daily for 3 days, then increase to one 1mg tab 2x daily, Disp: 53 tablet, Rfl: 0    varenicline (CHANTIX) 1 mg tablet, Take 1 tablet (1 mg total) by mouth 2 (two) times a day Start after starter franck completed  , Disp: 60 tablet, Rfl: 1    The following portions of the patient's history were reviewed and updated as appropriate: allergies, current medications, past family history, past medical history, past social history, past surgical history and problem list     Review of Systems   Constitutional: Negative for appetite change, fatigue, fever and unexpected weight change  HENT: Negative for rhinorrhea, sinus pressure, sinus pain, sneezing and sore throat  Eyes: Negative for visual disturbance  Respiratory: Positive for shortness of breath  Negative for cough, chest tightness and wheezing  Cardiovascular: Positive for leg swelling  Negative for chest pain and palpitations     Gastrointestinal: Negative for abdominal distention, abdominal pain, blood in stool, constipation, diarrhea, nausea and vomiting  Endocrine: Negative for polydipsia and polyuria  Genitourinary: Negative for decreased urine volume, difficulty urinating, dysuria, hematuria and urgency  Musculoskeletal: Negative for arthralgias, back pain, joint swelling and neck pain  Skin: Negative for rash  Allergic/Immunologic: Negative for environmental allergies  Neurological: Negative for tremors, weakness, light-headedness, numbness and headaches  Hematological: Does not bruise/bleed easily  Psychiatric/Behavioral: Positive for sleep disturbance  Negative for agitation, behavioral problems, confusion and dysphoric mood  The patient is not nervous/anxious            Family History   Family history unknown: Yes       Past Medical History:   Diagnosis Date    Colonic polyp 2012    Disc displacement, cervical     Peripheral vascular disease ([facility] [address] )     Femoral- popliteal bypass       Past Surgical History:   Procedure Laterality Date    BYPASS FEMORAL-POPLITEAL  2011    COLONOSCOPY  2012    With polypectomy    CORONARY ARTERY BYPASS GRAFT  2012    LAMINECTOMY  2010       Social History     Socioeconomic History    Marital status: Single     Spouse name: None    Number of children: 1    Years of education:      Highest education level: None   Occupational History    Occupation: retired   Social Needs    Financial resource strain: None    Food insecurity:     Worry: None     Inability: None    Transportation needs:     Medical: None     Non-medical: None   Tobacco Use    Smoking status: Current Some Day Smoker     Packs/day: 1 00     Years: 37 00     Pack years: 37 00     Types: Cigarettes     Last attempt to quit: 2014     Years since quittin 7    Smokeless tobacco: Never Used    Tobacco comment: Pt states he restarted smoking a few weeks ago   Substance and Sexual Activity    Alcohol use: No    Drug use: No    Sexual activity: Not Currently     Partners: Female     Birth control/protection: None   Lifestyle    Physical activity:     Days per week: None     Minutes per session: None    Stress: None   Relationships    Social connections:     Talks on phone: None     Gets together: None     Attends Confucianism service: None     Active member of club or organization: None     Attends meetings of clubs or organizations: None     Relationship status: None    Intimate partner violence:     Fear of current or ex partner: None     Emotionally abused: None     Physically abused: None     Forced sexual activity: None   Other Topics Concern    None   Social History Narrative    Pt states he drinks soda daily       Allergies   Allergen Reactions    No Active Allergies     No Known Allergies        BMI Counseling: Body mass index is 31 42 kg/m²  Discussed the patient's BMI with him  The BMI is above normal  No BMI follow-up plan is appropriate  Patient is 71 years old and weight reduction/weight gain would further complicate their underlying nutritional deficiency (vitamin or mineral deficiency)  Objective:    /100   Pulse 66   Temp (!) 97 2 °F (36 2 °C)   Resp 19   Ht 5' 10" (1 778 m)   Wt 99 3 kg (219 lb)   SpO2 97%   BMI 31 42 kg/m²        Physical Exam   Constitutional: He is oriented to person, place, and time  He appears well-developed and well-nourished  No distress  HENT:   Head: Normocephalic and atraumatic  Nose: Nose normal    Mouth/Throat: Oropharynx is clear and moist  No oropharyngeal exudate  Eyes: Pupils are equal, round, and reactive to light  Conjunctivae and EOM are normal  No scleral icterus  Neck: Normal range of motion  Neck supple  No JVD present  No tracheal deviation present  No thyromegaly present  Cardiovascular: Normal rate, regular rhythm and normal heart sounds  Exam reveals no gallop and no friction rub  No murmur heard  Pulmonary/Chest: Effort normal  No respiratory distress   He has no wheezes  He has no rales  He exhibits no tenderness  Distant breath sounds bilaterally no wheezing heard   Abdominal: Soft  Bowel sounds are normal  He exhibits no distension and no mass  There is no tenderness  There is no rebound and no guarding  Musculoskeletal: Normal range of motion  He exhibits no edema or deformity  Lymphadenopathy:     He has no cervical adenopathy  Neurological: He is alert and oriented to person, place, and time  No cranial nerve deficit  Coordination normal    Skin: Skin is warm and dry  No rash noted  Psychiatric: He has a normal mood and affect   His behavior is normal  Judgment and thought content normal

## 2020-01-08 ENCOUNTER — TELEPHONE (OUTPATIENT)
Dept: FAMILY MEDICINE CLINIC | Facility: CLINIC | Age: 70
End: 2020-01-08

## 2020-01-08 NOTE — TELEPHONE ENCOUNTER
Pt called office today states that for the last 4 months he's been residing at the [facility] in [facility]  Pt is complaining of pain in the left foot that is radiating up his leg  Pt states that the facility is refusing to give him the medication Percocet that he has a script for? Facility is giving him Tylenol which pt states does nothing for his pain  Pt also states that he has a script for the medication Nitroglycerin? Pt was complaining of chest pain and states that the facility refused this medication to him as well  Pt wants to know what Dr Castellanos can do regarding this issue  Pt can be reached at [phone]  Please advise thank you

## 2020-03-28 ENCOUNTER — TELEPHONE (OUTPATIENT)
Dept: OTHER | Facility: OTHER | Age: 70
End: 2020-03-28

## 2020-03-28 NOTE — TELEPHONE ENCOUNTER
[facility] connect:   [phone] / Luzmaria Allison / [facility] / Ryan Min / [phone] / Needs call back regarding a procedure he was discharged for

## 2020-03-29 ENCOUNTER — TELEPHONE (OUTPATIENT)
Dept: OTHER | Facility: OTHER | Age: 70
End: 2020-03-29

## 2020-03-29 PROBLEM — M79.672 PAIN OF LEFT HEEL: Status: ACTIVE | Noted: 2020-02-05

## 2020-03-29 PROBLEM — J43.1 PANLOBULAR EMPHYSEMA (HCC): Status: ACTIVE | Noted: 2019-04-20

## 2020-03-29 PROBLEM — I70.409 ATHEROSCLEROSIS OF AUTOLOGOUS VEIN BYPASS GRAFT OF EXTREMITY (HCC): Status: ACTIVE | Noted: 2020-03-03

## 2020-03-29 PROBLEM — T82.858A BYPASS GRAFT STENOSIS (HCC): Status: ACTIVE | Noted: 2020-02-25

## 2020-03-29 PROBLEM — D64.9 ANEMIA: Status: ACTIVE | Noted: 2020-03-29

## 2020-03-29 PROBLEM — H21.01 HYPHEMA, RIGHT EYE: Status: ACTIVE | Noted: 2020-03-29

## 2020-03-29 PROBLEM — H04.123 DRY EYE SYNDROME OF BILATERAL LACRIMAL GLANDS: Status: ACTIVE | Noted: 2020-03-29

## 2020-03-29 PROBLEM — S05.11XA: Status: ACTIVE | Noted: 2020-03-29

## 2020-03-29 PROBLEM — R60.0 LOCALIZED EDEMA: Status: ACTIVE | Noted: 2019-12-30

## 2020-03-29 PROBLEM — H26.491 OTHER SECONDARY CATARACT, RIGHT EYE: Status: ACTIVE | Noted: 2020-03-29

## 2020-03-29 NOTE — PROGRESS NOTES
[address]  [address] [place] [address]  Facility: [facility]    NAME: Ryan Schaeffer  AGE: 78 y o  SEX: male    DATE OF ENCOUNTER: 3/29/2020    Code status:  CPR  D/W pt the code status and pt stated wants to have CPR "I want to live!"    Assessment and Plan     Peripheral vascular disease ([facility] )  With revision of LLE bypass graft, will continue with NH care  Will continue with Palvix, pain management with scheduled tylenol  Close monitoring  F/u with vascular on 4/10/2020  Atrial fibrillation, chronic (HCC)  Rate controlled with Metoprolol, on Xarelto, will monitor closely  SENTHIL (obstructive sleep apnea)  Will continue with HS O2  Type 2 diabetes mellitus with diabetic polyneuropathy, with long-term current use of insulin (HCC)    Lab Results   Component Value Date    HGBA1C 6 4 (H) 03/25/2020     BS at good levels, on Lantus, and Novolog  Will continue with monitoring  Essential hypertension  BP is stable with Metoprolol  Will continue with monitoring  Chronic systolic heart failure (HCC)  On Lasix, no sign or symptoms of CHF exacerbation  Peripheral sensory neuropathy  On Gabapentin, will continue with monitoring  Stage 3 chronic kidney disease ([facility] )  Will continue with monitoring  Open angle with borderline findings and high glaucoma risk in both eyes  Will continue with eye drops  Gastroesophageal reflux disease without esophagitis  Will continue with Famotidine  Chronic obstructive lung disease (HCC)  No sign or symptoms of COPD exacerbation, on Albuterol, and Budesonide, will continue with monitoring  Depressive disorder  On Trazodone, will continue with monitoring  Anemia  ? ?? Due to recent vascular procedure, will continue with monitoring  All medications and routine orders were reviewed and updated as needed      Plan discussed with: Patient, and nursing staff    Chief Complaint     Left leg pain last night and also could not sleep last night     History of Present Illness   Pt with PMSFH and medical problem as this note who is a LTC pt at [facility] AT [facility] and previously was under [facility] provider's care, had revision of LLE bypass graft on 3/25/2020 and was hospitatlized after procedure and now is back to [facility] since 3/28/2020 to continue with LTC  Pt's BS and BP have been stable  Continues with NH care  States that has LLE pain last night but it is ok now, with only mild pain at the surgical site  Pt states that he doesn't need therapy, and he walks with staff holding on wall side bar  No other specific issues or concerns       HISTORY:  Past Medical History:   Diagnosis Date    Anemia 3/29/2020    Colonic polyp 2012    Disc displacement, cervical     Peripheral vascular disease ([facility] [address] [facility] )     Femoral- popliteal bypass     Family History   Family history unknown: Yes     Social History     Socioeconomic History    Marital status: Single     Spouse name: None    Number of children: 1    Years of education:      Highest education level: None   Occupational History    Occupation: retired   Social Needs    Financial resource strain: None    Food insecurity:     Worry: None     Inability: None    Transportation needs:     Medical: None     Non-medical: None   Tobacco Use    Smoking status: Former Smoker     Packs/day: 1 00     Years: 37 00     Pack years: 37 00     Types: Cigarettes     Last attempt to quit: 2014     Years since quittin 2    Smokeless tobacco: Never Used    Tobacco comment: Pt states he restarted smoking a few weeks ago   Substance and Sexual Activity    Alcohol use: No    Drug use: No    Sexual activity: Not Currently     Partners: Female     Birth control/protection: None   Lifestyle    Physical activity:     Days per week: None     Minutes per session: None    Stress: None   Relationships    Social connections:     Talks on phone: None     Gets together: None     Attends Yazidism service: None     Active member of club or organization: None     Attends meetings of clubs or organizations: None     Relationship status: None    Intimate partner violence:     Fear of current or ex partner: None     Emotionally abused: None     Physically abused: None     Forced sexual activity: None   Other Topics Concern    None   Social History Narrative    Pt states he drinks soda daily       Allergies: Allergies   Allergen Reactions    No Active Allergies     No Known Allergies        Review of Systems     Review of Systems   Constitutional: Negative  "I woke up in the middle of night and I couldn't sleep"   HENT: Negative  Eyes: Negative  Respiratory: Positive for shortness of breath ("I can't walk too long")  Negative for apnea, cough, choking, chest tightness, wheezing and stridor  Cardiovascular: Negative  Gastrointestinal: Negative  Endocrine: Negative  Genitourinary: Negative  Musculoskeletal: Negative  LLE pain last night  Skin: Negative  Allergic/Immunologic: Negative  Neurological: Negative  Hematological: Negative  Psychiatric/Behavioral: Negative  All other systems reviewed and are negative  Medications and orders     All medications reviewed and updated in Nursing Home EMR  Objective     Vitals: Wt:223 3 Ibs   BP:118/62  MD:68  RR:20  BS log was reviewed     Physical Exam   Constitutional: He is oriented to person, place, and time  He appears well-developed and well-nourished  No distress  HENT:   Head: Normocephalic and atraumatic  Right Ear: External ear normal    Left Ear: External ear normal    Nose: Nose normal    Mouth/Throat: Oropharynx is clear and moist  No oropharyngeal exudate  Eyes: Pupils are equal, round, and reactive to light  Conjunctivae and EOM are normal  Right eye exhibits no discharge  Left eye exhibits no discharge  No scleral icterus  Neck: Normal range of motion  Neck supple     Cardiovascular: Normal rate and normal heart sounds  Exam reveals no gallop and no friction rub  No murmur heard  Irregular, irregular  Pulmonary/Chest: Effort normal and breath sounds normal  No stridor  No respiratory distress  He has no wheezes  He has no rales  He exhibits no tenderness  Distant BS, and pt showed some RODAS with wheeling his WC in hallway to his room   Abdominal: Soft  Bowel sounds are normal  He exhibits no distension and no mass  There is no tenderness  There is no rebound and no guarding  No hernia  Genitourinary:   Genitourinary Comments: deferred   Musculoskeletal: Normal range of motion  He exhibits no edema, tenderness or deformity  Lymphadenopathy:     He has no cervical adenopathy  Neurological: He is alert and oriented to person, place, and time  A cranial nerve deficit ([place]) is present  Skin: Skin is warm and dry  No rash noted  He is not diaphoretic  No erythema  No pallor  Didn't examine sacral area, + ecchymotic changes of skin on L groin area  Stiches in place, incision line I/D/C   Psychiatric: He has a normal mood and affect  His behavior is normal    Nursing note and vitals reviewed  Pertinent Laboratory/Diagnostic Studies: The following labs/studies were reviewed please see chart or hospital paperwork for details  Ref Range & Units 3/28/20 0447    Glucose 65 - 99 mg/dL 148High     BUN 7 - 28 mg/dL 9    Creatinine 0 53 - 1 30 mg/dL 1 25    Sodium 135 - 145 mmol/L 139    Potassium 3 5 - 5 2 mmol/L 3 9    Chloride 100 - 109 mmol/L 106    Carbon Dioxide 23 - 31 mmol/L 28    Calcium 8 5 - 10 1 mg/dL 8 6    Alkaline Phosphatase 35 - 120 U/L 45    Albumin 3 5 - 4 8 g/dL 2 5Low     Bilirubin, Total 0 2 - 1 0 mg/dL 0 4    Comment: Use of this assay is not recommended for patients undergoing treatment with eltrombopag due to the potential for falsely elevated results     Protein, Total 6 3 - 8 3 g/dL 5 7Low     AST <41 U/L 10    ALT <56 U/L 9    Anion Gap 3 - 11  5    GFR, Calculated >60 mL/min/1 73m2 58Low        Ref Range & Units 3/28/20 0447   Magnesium 1 4 - 2 2 mg/dL 1 9      Ref Range & Units 3/28/20 0447    Phosphorus 2 3 - 4 6 mg/dL 2 6       Ref Range & Units 3/28/20 0447   Hemoglobin 12 5 - 17 0 g/dL 9 4Low     Hematocrit 37 0 - 48 0 % 27 8Low     WBC 4 0 - 10 5 thou/cmm 5 8    RBC 4 00 - 5 40 mill/cmm 3 28Low     Platelet Count 427 - 350 thou/cmm 144    MPV 7 5 - 11 3 fL 7 4Low     MCV 80 - 100 fL 85    MCH 27 0 - 36 0 pg 28 8    MCHC 32 0 - 37 0 g/dL 34 0    RDW 12 0 - 16 0 % 15 2    Differential Type   AUTO    Absolute Neutrophils 1 8 - 7 9 thou/cmm 3 6    Absolute Lymphocytes 0 6 - 4 2 thou/cmm 1 0    Absolute Monocytes 0 2 - 1 4 thou/cmm 0 8    Absolute Eosinophils 0 - 0 7 thou/cmm 0 3    Absolute Basophils 0 - 0 2 thou/cmm 0 0    Neutrophils 45 - 75 % 61    Lymphocytes 15 - 40 % 17    Monocytes 5 - 13 % 15High     Eosinophils 0 - 7 % 6    Basophils 0 - 2 % 1      0  Hemoglobin A1C 6 4 (H)  Comment:   Reference Range  Non-diabetic                     <5 7  Pre-diabetic                     5 7-6 4  Diabetic                         >=6 5  ADA target for diabetic control  <=7   <5 7 %   VAS DUPLEX ABDOMINAL AORTA IVC ILIAC COMPLETE (03/19/2020 9:31 AM EDT)  VAS DUPLEX ABDOMINAL AORTA IVC ILIAC COMPLETE (03/19/2020 9:31 AM EDT)   Specimen         VAS DUPLEX ABDOMINAL AORTA IVC ILIAC COMPLETE (03/19/2020 9:31 AM EDT)   Impressions Performed At   IMPRESSION: Technically difficult study  No evidence of significant stenosis of    the abdominal aorta or iliac arteries                     Workstation:[id number]   RADIOLOGY       VAS DUPLEX ABDOMINAL AORTA IVC ILIAC COMPLETE (03/19/2020 9:31 AM EDT)   Narrative Performed At   Study: Duplex scan abdominal aorta and iliac arteries         INDICATION: Claudication        Technically difficult study  Normal diameter of the abdominal aorta with no    evidence of aneurysm   Systolic velocities within normal range, 76 cm/s     Significant calcification  Left common iliac velocity 130 cm/s, external iliac    99 cm/s, right common iliac artery 94 cm/s and external iliac artery 82 cm/s         VAS PHYSIOLOGIC ARTERIAL DOPPLER LOWER EXTREMITY SINGLE LEVEL BILATERAL (2020 8:59 AM EDT)  VAS PHYSIOLOGIC ARTERIAL DOPPLER LOWER EXTREMITY SINGLE LEVEL BILATERAL (2020 8:59 AM EDT)   Specimen         VAS PHYSIOLOGIC ARTERIAL DOPPLER LOWER EXTREMITY SINGLE LEVEL BILATERAL (2020 8:59 AM EDT)   Impressions Performed At   Impression: [address] right lower extremity arterial disease  Moderate left lower    extremity arterial disease         Be aware that ankle/brachial indices are frequently factitiously elevated, thus    over-estimating perfusion, in patients with Diabetes mellitus and/or End Stage    Renal Disease due to medial arterial calcifications (Arterial calcinosis)     This Doppler study may complement, but does not replace, the physical exam;    clinical correlation is required         Ankle/Brachial Index Interpretation Levels:    >1 3 Noncompressible (Arterio calcinosis)    1 00-1 29 Normal    0 91-0 99 Borderline (equivocal)    0 41-0 90 Mild-to-Moderate PAD    <0 41 Severe PAD        Digital Pressure Interpretation levels:    <20mmHg=<29% ulcer healing probability    20-30mmHg=50% ulcer healing probability    >30mmHg=>90% ulcer healing probability    Add 20 mmHg to each value above for Diabetics          References: 1) American Association for Vascular Surgery/Society for Vascular    Surgery    2) Annals of Vascular Surgery, Vol 8, #1, pg99                                  Renell Olszewski, MD  3/29/2020 5:17 PM      Name: Marge Silva  : 1950  MRN: [id number]  DOS: 3/29/2020  BILLING CODE: [id number]  Diagnoses:   Diagnosis ICD-10-CM Associated Orders   1  Peripheral vascular disease (HCC) I73 9    2  Atrial fibrillation, chronic I48 20    3  SENTHIL (obstructive sleep apnea) G47 33    4   Type 2 diabetes mellitus with diabetic polyneuropathy, with long-term current use of insulin ([facility]) E11 42     Z79 4    5  Essential hypertension I10    6  Chronic systolic heart failure ([facility]) I50 22    7  Peripheral sensory neuropathy G62 9    8  Stage 3 chronic kidney disease ([facility]) N18 3    9  Open angle with borderline findings and high glaucoma risk in both eyes H40 023    10  Gastroesophageal reflux disease without esophagitis K21 9    11  Centrilobular emphysema ([address] ) J43 2    12  Depressive disorder F32 9    13   Anemia, unspecified type D64 9

## 2020-03-29 NOTE — ASSESSMENT & PLAN NOTE
No sign or symptoms of COPD exacerbation, on Albuterol, and Budesonide, will continue with monitoring

## 2020-03-29 NOTE — ASSESSMENT & PLAN NOTE
With revision of LLE bypass graft, will continue with NH care  Will continue with Palvix, pain management with scheduled tylenol  Close monitoring  F/u with vascular on 4/10/2020

## 2020-03-29 NOTE — ASSESSMENT & PLAN NOTE
Lab Results   Component Value Date    HGBA1C 6 4 (H) 03/25/2020     BS at good levels, on Lantus, and Novolog  Will continue with monitoring

## 2020-03-30 ENCOUNTER — NURSING HOME VISIT (OUTPATIENT)
Dept: GERIATRICS | Facility: OTHER | Age: 70
End: 2020-03-30
Payer: MEDICARE

## 2020-03-30 DIAGNOSIS — G47.33 OSA (OBSTRUCTIVE SLEEP APNEA): ICD-10-CM

## 2020-03-30 DIAGNOSIS — F32.A DEPRESSIVE DISORDER: ICD-10-CM

## 2020-03-30 DIAGNOSIS — Z79.4 TYPE 2 DIABETES MELLITUS WITH DIABETIC POLYNEUROPATHY, WITH LONG-TERM CURRENT USE OF INSULIN (HCC): ICD-10-CM

## 2020-03-30 DIAGNOSIS — I10 ESSENTIAL HYPERTENSION: ICD-10-CM

## 2020-03-30 DIAGNOSIS — G60.8 PERIPHERAL SENSORY NEUROPATHY: ICD-10-CM

## 2020-03-30 DIAGNOSIS — J43.2 CENTRILOBULAR EMPHYSEMA (HCC): ICD-10-CM

## 2020-03-30 DIAGNOSIS — E11.42 TYPE 2 DIABETES MELLITUS WITH DIABETIC POLYNEUROPATHY, WITH LONG-TERM CURRENT USE OF INSULIN (HCC): ICD-10-CM

## 2020-03-30 DIAGNOSIS — D64.9 ANEMIA, UNSPECIFIED TYPE: ICD-10-CM

## 2020-03-30 DIAGNOSIS — K21.9 GASTROESOPHAGEAL REFLUX DISEASE WITHOUT ESOPHAGITIS: ICD-10-CM

## 2020-03-30 DIAGNOSIS — I73.9 PERIPHERAL VASCULAR DISEASE (HCC): Primary | ICD-10-CM

## 2020-03-30 DIAGNOSIS — N18.30 STAGE 3 CHRONIC KIDNEY DISEASE (HCC): ICD-10-CM

## 2020-03-30 DIAGNOSIS — I48.20 ATRIAL FIBRILLATION, CHRONIC (HCC): ICD-10-CM

## 2020-03-30 DIAGNOSIS — I50.22 CHRONIC SYSTOLIC HEART FAILURE (HCC): ICD-10-CM

## 2020-03-30 DIAGNOSIS — H40.023 OPEN ANGLE WITH BORDERLINE FINDINGS AND HIGH GLAUCOMA RISK IN BOTH EYES: ICD-10-CM

## 2020-03-30 PROCEDURE — 99305 1ST NF CARE MODERATE MDM 35: CPT | Performed by: FAMILY MEDICINE

## 2020-04-24 ENCOUNTER — TELEMEDICINE (OUTPATIENT)
Dept: GERIATRICS | Facility: OTHER | Age: 70
End: 2020-04-24
Payer: MEDICARE

## 2020-04-24 DIAGNOSIS — J43.2 CENTRILOBULAR EMPHYSEMA (HCC): ICD-10-CM

## 2020-04-24 DIAGNOSIS — I48.20 ATRIAL FIBRILLATION, CHRONIC (HCC): Primary | ICD-10-CM

## 2020-04-24 DIAGNOSIS — I50.22 CHRONIC SYSTOLIC HEART FAILURE (HCC): ICD-10-CM

## 2020-04-24 DIAGNOSIS — N18.30 STAGE 3 CHRONIC KIDNEY DISEASE (HCC): ICD-10-CM

## 2020-04-24 DIAGNOSIS — I10 ESSENTIAL HYPERTENSION: ICD-10-CM

## 2020-04-24 DIAGNOSIS — I73.9 PERIPHERAL VASCULAR DISEASE (HCC): ICD-10-CM

## 2020-04-24 PROCEDURE — 99442 PR PHYS/QHP TELEPHONE EVALUATION 11-20 MIN: CPT | Performed by: FAMILY MEDICINE

## 2020-05-27 ENCOUNTER — TELEMEDICINE (OUTPATIENT)
Dept: GERIATRICS | Facility: OTHER | Age: 70
End: 2020-05-27
Payer: MEDICARE

## 2020-05-27 DIAGNOSIS — N18.30 STAGE 3 CHRONIC KIDNEY DISEASE (HCC): ICD-10-CM

## 2020-05-27 DIAGNOSIS — G60.8 PERIPHERAL SENSORY NEUROPATHY: ICD-10-CM

## 2020-05-27 DIAGNOSIS — J43.2 CENTRILOBULAR EMPHYSEMA (HCC): ICD-10-CM

## 2020-05-27 DIAGNOSIS — E11.42 TYPE 2 DIABETES MELLITUS WITH DIABETIC POLYNEUROPATHY, WITH LONG-TERM CURRENT USE OF INSULIN (HCC): Primary | ICD-10-CM

## 2020-05-27 DIAGNOSIS — Z79.4 TYPE 2 DIABETES MELLITUS WITH DIABETIC POLYNEUROPATHY, WITH LONG-TERM CURRENT USE OF INSULIN (HCC): Primary | ICD-10-CM

## 2020-05-27 DIAGNOSIS — I50.22 CHRONIC SYSTOLIC HEART FAILURE (HCC): ICD-10-CM

## 2020-05-27 DIAGNOSIS — I10 ESSENTIAL HYPERTENSION: ICD-10-CM

## 2020-05-27 DIAGNOSIS — I48.20 ATRIAL FIBRILLATION, CHRONIC (HCC): ICD-10-CM

## 2020-05-27 PROBLEM — J44.1 COPD EXACERBATION (HCC): Status: RESOLVED | Noted: 2019-04-20 | Resolved: 2020-05-27

## 2020-05-27 PROBLEM — J96.20 ACUTE ON CHRONIC RESPIRATORY FAILURE (HCC): Status: RESOLVED | Noted: 2017-01-30 | Resolved: 2020-05-27

## 2020-05-27 PROCEDURE — 99309 SBSQ NF CARE MODERATE MDM 30: CPT | Performed by: FAMILY MEDICINE

## 2020-06-09 ENCOUNTER — TELEMEDICINE (OUTPATIENT)
Dept: PSYCHIATRY | Facility: CLINIC | Age: 70
End: 2020-06-09
Payer: MEDICARE

## 2020-06-09 DIAGNOSIS — F39 MOOD DISORDER (HCC): ICD-10-CM

## 2020-06-09 DIAGNOSIS — F51.01 PRIMARY INSOMNIA: ICD-10-CM

## 2020-06-09 DIAGNOSIS — F32.A DEPRESSIVE DISORDER: Primary | ICD-10-CM

## 2020-06-09 PROCEDURE — 99204 OFFICE O/P NEW MOD 45 MIN: CPT | Performed by: PHYSICIAN ASSISTANT

## 2020-06-09 RX ORDER — PAROXETINE 10 MG/1
10 TABLET, FILM COATED ORAL DAILY
Qty: 30 TABLET | Refills: 0
Start: 2020-06-09 | End: 2021-04-08

## 2020-06-24 ENCOUNTER — NURSING HOME VISIT (OUTPATIENT)
Dept: GERIATRICS | Facility: OTHER | Age: 70
End: 2020-06-24
Payer: MEDICARE

## 2020-06-24 DIAGNOSIS — N18.30 STAGE 3 CHRONIC KIDNEY DISEASE (HCC): ICD-10-CM

## 2020-06-24 DIAGNOSIS — I10 ESSENTIAL HYPERTENSION: ICD-10-CM

## 2020-06-24 DIAGNOSIS — E11.42 TYPE 2 DIABETES MELLITUS WITH DIABETIC POLYNEUROPATHY, WITH LONG-TERM CURRENT USE OF INSULIN (HCC): Primary | ICD-10-CM

## 2020-06-24 DIAGNOSIS — Z79.4 TYPE 2 DIABETES MELLITUS WITH DIABETIC POLYNEUROPATHY, WITH LONG-TERM CURRENT USE OF INSULIN (HCC): Primary | ICD-10-CM

## 2020-06-24 DIAGNOSIS — I48.20 ATRIAL FIBRILLATION, CHRONIC (HCC): ICD-10-CM

## 2020-06-24 DIAGNOSIS — I50.22 CHRONIC SYSTOLIC HEART FAILURE (HCC): ICD-10-CM

## 2020-06-24 DIAGNOSIS — R63.4 WEIGHT LOSS: ICD-10-CM

## 2020-06-24 DIAGNOSIS — F32.A DEPRESSIVE DISORDER: ICD-10-CM

## 2020-06-24 DIAGNOSIS — J43.2 CENTRILOBULAR EMPHYSEMA (HCC): ICD-10-CM

## 2020-06-24 PROCEDURE — 99309 SBSQ NF CARE MODERATE MDM 30: CPT | Performed by: FAMILY MEDICINE

## 2020-06-25 PROBLEM — R63.4 WEIGHT LOSS: Status: ACTIVE | Noted: 2020-06-25

## 2020-07-01 ENCOUNTER — NURSING HOME VISIT (OUTPATIENT)
Dept: GERIATRICS | Facility: OTHER | Age: 70
End: 2020-07-01
Payer: MEDICARE

## 2020-07-01 DIAGNOSIS — R41.82 ALTERED MENTAL STATUS, UNSPECIFIED ALTERED MENTAL STATUS TYPE: Primary | ICD-10-CM

## 2020-07-01 DIAGNOSIS — J34.89 NOSE DRYNESS: ICD-10-CM

## 2020-07-01 PROCEDURE — 99308 SBSQ NF CARE LOW MDM 20: CPT | Performed by: NURSE PRACTITIONER

## 2020-07-02 VITALS — RESPIRATION RATE: 20 BRPM | DIASTOLIC BLOOD PRESSURE: 69 MMHG | SYSTOLIC BLOOD PRESSURE: 137 MMHG | HEART RATE: 62 BPM

## 2020-07-02 PROBLEM — J34.89 NOSE DRYNESS: Status: ACTIVE | Noted: 2020-07-02

## 2020-07-02 NOTE — ASSESSMENT & PLAN NOTE
· Doing well at long-term care facility  · Pleasantly confused  · Reorient often  · At time of assessment patient was with a 1-1 observation  · Fall precautions  · Continue supportive care

## 2020-07-02 NOTE — ASSESSMENT & PLAN NOTE
· Patient complaining of a sore in his right nostril  · No redness or swelling inside nostril at time of assessment  · Patient uses oxygen nasal cannula at night time  · Continue using Ayr gel each nostril b i d  Especially at hour of sleep  · Patient at times refuses, explained to patient it will help the soreness in his nose  · Monitor for any increased pain or redness or swelling

## 2020-07-02 NOTE — PROGRESS NOTES
[facility]  [address]  [phone]  [facility]  LTCF Acute Note        NAME: Odessa Jauregui  AGE: 69 y o  SEX: male    DATE OF ENCOUNTER: 7/1/20    Assessment and Plan     Problem List Items Addressed This Visit        Other    Altered mental state - Primary     · Doing well at long-term care facility  · Pleasantly confused  · Reorient often  · At time of assessment patient was with a 1-1 observation  · Fall precautions  · Continue supportive care         Nose dryness     · Patient complaining of a sore in his right nostril  · No redness or swelling inside nostril at time of assessment  · Patient uses oxygen nasal cannula at night time  · Continue using Ayr gel each nostril b i d  Especially at hour of sleep  · Patient at times refuses, explained to patient it will help the soreness in his nose  · Monitor for any increased pain or redness or swelling               No orders of the defined types were placed in this encounter       - Counseling Documentation: patient was counseled regarding: diagnostic results, instructions for management, risk factor reductions, prognosis, patient and family education, impressions, risks and benefits of treatment options and importance of compliance with treatment    History of Present Illness     Odessa Jauregui 69 y o  male seen for follow-up of care and treatment plan for ambulatory dysfunction doing well at LTCF, sore right nostril of nose     The following portions of the patient's history were reviewed and updated as appropriate: allergies, current medications, past family history, past medical history, past social history, past surgical history and problem list     Review of Systems     Review of Systems   Constitutional: Negative for activity change, appetite change and fatigue  HENT: Negative  Eyes: Negative  Respiratory: Negative for cough and shortness of breath      Cardiovascular: Negative for chest pain and leg swelling  Gastrointestinal: Negative for abdominal distention, abdominal pain, constipation and diarrhea  Genitourinary: Negative for difficulty urinating  Musculoskeletal: Negative  Skin: Negative for rash  Neurological: Negative for headaches  Psychiatric/Behavioral: The patient is not nervous/anxious          Active Problem List     Patient Active Problem List   Diagnosis    Arteriosclerosis of coronary artery bypass graft    Atrial fibrillation, chronic ([facility]    Benign essential hypertension    Chronic obstructive lung disease ([facility])    Chronic systolic heart failure ([facility])    Depressive disorder    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    Diverticular disease of colon    Gastroesophageal reflux disease without esophagitis    Glaucoma    Gout    Mixed hyperlipidemia    Persistent insomnia    Type 2 diabetes mellitus with diabetic polyneuropathy, with long-term current use of insulin ([facility])    Altered mental state    Chest wall pain, chronic    Chronic respiratory failure ([facility])    Delirium    Encephalopathy    Essential hypertension    SENTHIL (obstructive sleep apnea)    Peripheral sensory neuropathy    Tracheal stenosis    Type 2 diabetes mellitus with stage 4 chronic kidney disease, without long-term current use of insulin ([facility])    Insulin dependent type 2 diabetes mellitus ([facility] [address] )    Atherosclerosis of native artery of extremity ([facility])    Obesity    Uncontrolled type 2 diabetes mellitus with hyperglycemia ([facility])    Stage 3 chronic kidney disease ([facility])    Peripheral vascular disease ([facility] [address] )    Current use of long term anticoagulation    Urinary retention    Tinea pedis of both feet    Tobacco abuse    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 ([facility])    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[facility])    Contusion of right eyeball    Dry eye syndrome of bilateral lacrimal glands    Hyphema, right eye    Lens replaced by other means    Localized edema    Age-related nuclear cataract of left eye    Open angle with borderline findings and high glaucoma risk in both eyes    Other secondary cataract, right eye    Pain of left heel    Panlobular emphysema (HCC)    Primary open-angle glaucoma, right eye, severe stage    Pterygium    Tear film insufficiency    Anemia    Weight loss    Nose dryness       Objective     /69   Pulse 62   Resp 20     Physical Exam   Constitutional: Vital signs are normal  He appears well-developed and well-nourished  HENT:   Head: Normocephalic and atraumatic  Nose: Sinus tenderness present  Nostril dryness   Eyes: Conjunctivae are normal    Neck: Normal range of motion  Cardiovascular: Normal rate, regular rhythm, S1 normal, S2 normal and normal heart sounds  No murmur heard  Pulmonary/Chest: Effort normal and breath sounds normal  No respiratory distress  He has no wheezes  Abdominal: Soft  Bowel sounds are normal  He exhibits no distension  There is no tenderness  Musculoskeletal: Normal range of motion  Neurological: He is alert  Skin: Skin is warm, dry and intact  Psychiatric: He has a normal mood and affect  His speech is normal and behavior is normal  Thought content normal  Cognition and memory are impaired  Vitals reviewed  Current Medications   Medication list reviewed and updated today  Please see paper chart for updated medication list      Melissa Ozuna  :27 PM      Name: Alex Reese  : 1950  MRN: [id number]  DOS: 20    Diagnoses:   Diagnosis ICD-10-CM Associated Orders   1  Altered mental status, unspecified altered mental status type R41 82    2   Nose dryness J34 89

## 2020-07-22 ENCOUNTER — NURSING HOME VISIT (OUTPATIENT)
Dept: GERIATRICS | Facility: OTHER | Age: 70
End: 2020-07-22
Payer: MEDICARE

## 2020-07-22 DIAGNOSIS — M79.5 FOREIGN BODY (FB) IN SOFT TISSUE: Primary | ICD-10-CM

## 2020-07-22 PROCEDURE — 99308 SBSQ NF CARE LOW MDM 20: CPT | Performed by: FAMILY MEDICINE

## 2020-07-22 NOTE — ASSESSMENT & PLAN NOTE
Possible, under skin of R dorsal hand, will check Xray  Pt was instructed to not pick on the skin due to possible increased risk on infection

## 2020-07-22 NOTE — PROGRESS NOTES
[facility]  [address]  [phone]  [facility]          NAME: Frankie Yu  AGE: 69 y o  SEX: male    DATE OF ENCOUNTER: 7/22/2020    Chief Complaint     Stone under skin     History of Present Illness     HPI    The following portions of the patient's history were reviewed and updated as appropriate (from facility chart and hospital records): allergies, current medications, past family history, past medical history, past social history, past surgical history and problem list   Pt was seen and examined per staff request regarding pt's concern of possible foreign body subject under his R dorsal hand  Pt states that when was hammering a subject 8  Months ago a piece of stone went under his skin and now is bothering him and he wants to have it out and has been picking on it       Review of Systems     Review of Systems   Skin:        R dorsal hand discomfort "I have a stone under my skin which got there 8 months ago when I was hammering something it hit my hand, and piece of stone went under my skin:       Active Problem List     Patient Active Problem List   Diagnosis    Arteriosclerosis of coronary artery bypass graft    Atrial fibrillation, chronic (HCC)    Benign essential hypertension    Chronic obstructive lung disease ([address] )    Chronic systolic heart failure (HCC)    Depressive disorder    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    Diverticular disease of colon    Gastroesophageal reflux disease without esophagitis    Glaucoma    Gout    Mixed hyperlipidemia    Persistent insomnia    Type 2 diabetes mellitus with diabetic polyneuropathy, with long-term current use of insulin ([address] )    Altered mental state    Chest wall pain, chronic    Chronic respiratory failure (HCC)    Delirium    Encephalopathy    Essential hypertension    SENTHIL (obstructive sleep apnea)    Peripheral sensory neuropathy    Tracheal stenosis    Type 2 diabetes mellitus with stage 4 chronic kidney disease, without long-term current use of insulin (HCC)    Insulin dependent type 2 diabetes mellitus ([address] )    Atherosclerosis of native artery of extremity (HCC)    Obesity    Uncontrolled type 2 diabetes mellitus with hyperglycemia (HCC)    Stage 3 chronic kidney disease (HCC)    Peripheral vascular disease (HCC)    Current use of long term anticoagulation    Urinary retention    Tinea pedis of both feet    Tobacco abuse    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 (HCC)    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HCC)    Contusion of right eyeball    Dry eye syndrome of bilateral lacrimal glands    Hyphema, right eye    Lens replaced by other means    Localized edema    Age-related nuclear cataract of left eye    Open angle with borderline findings and high glaucoma risk in both eyes    Other secondary cataract, right eye    Pain of left heel    Panlobular emphysema (HCC)    Primary open-angle glaucoma, right eye, severe stage    Pterygium    Tear film insufficiency    Anemia    Weight loss    Nose dryness    Foreign body (FB) in soft tissue       Objective     Vitals: Afebrile     Physical Exam   Skin:   R dorsal hand between 3rd and 4th metacarpophalangeal area has circular scab  No drainage, I didn't feel any hard subject under skin  Pertinent Laboratory/Diagnostic Studies:  No lab for this visit     Current Medications   Medication list in facility chart was reviewed and no  changes made  Assessment and Plan     Foreign body (FB) in soft tissue  Possible, under skin of R dorsal hand, will check Xray  Pt was instructed to not pick on the skin due to possible increased risk on infection

## 2020-08-05 ENCOUNTER — NURSING HOME VISIT (OUTPATIENT)
Dept: GERIATRICS | Facility: OTHER | Age: 70
End: 2020-08-05
Payer: MEDICARE

## 2020-08-05 DIAGNOSIS — I10 ESSENTIAL HYPERTENSION: ICD-10-CM

## 2020-08-05 DIAGNOSIS — R63.4 WEIGHT LOSS: ICD-10-CM

## 2020-08-05 DIAGNOSIS — I48.20 ATRIAL FIBRILLATION, CHRONIC (HCC): Primary | ICD-10-CM

## 2020-08-05 DIAGNOSIS — Z79.4 INSULIN DEPENDENT TYPE 2 DIABETES MELLITUS (HCC): ICD-10-CM

## 2020-08-05 DIAGNOSIS — F32.A DEPRESSIVE DISORDER: ICD-10-CM

## 2020-08-05 DIAGNOSIS — N18.30 STAGE 3 CHRONIC KIDNEY DISEASE (HCC): ICD-10-CM

## 2020-08-05 DIAGNOSIS — I50.22 CHRONIC SYSTOLIC HEART FAILURE (HCC): ICD-10-CM

## 2020-08-05 DIAGNOSIS — J43.2 CENTRILOBULAR EMPHYSEMA (HCC): ICD-10-CM

## 2020-08-05 DIAGNOSIS — E11.9 INSULIN DEPENDENT TYPE 2 DIABETES MELLITUS (HCC): ICD-10-CM

## 2020-08-05 DIAGNOSIS — E78.2 MIXED HYPERLIPIDEMIA: ICD-10-CM

## 2020-08-05 PROCEDURE — 99309 SBSQ NF CARE MODERATE MDM 30: CPT | Performed by: FAMILY MEDICINE

## 2020-08-05 NOTE — ASSESSMENT & PLAN NOTE
With inappropriate sextual behaviors, stable with 1:1 observations and on Paroxetine, and Trazodone, psych on board

## 2020-08-05 NOTE — PROGRESS NOTES
[address] [facility]  [address]  [phone]  Facility: [place] [address]          NAME: Shyam Rodriguez  AGE: 69 y o  SEX: male    DATE OF ENCOUNTER: 8/5/2020    Chief Complaint     "I had diarrhea for last 3 days"    History of Present Illness     HPI    The following portions of the patient's history were reviewed and updated as appropriate (from facility chart and hospital records): allergies, current medications, past family history, past medical history, past social history, past surgical history and problem list   Pt was seen and examined for f/u on Afib, HTN, depressive disorder, DM, CKD, CHF, wt loss  pt's wt has been sable in last 2 months  BP, BS and HR has been stable  Continues with NH care  Inappropriate sextual behaviors have been stable, pt is on 1:1 supervision  Moves using his WC and using his legs  Last labs in June have been stable  Pt reports diarrhea for last 3 days, no BM today yet  No other specific issues or concerns  No COPD exacerbation  Review of Systems     Review of Systems   Constitutional: Negative  HENT: Negative  Eyes: Negative  Respiratory: Negative  Cardiovascular: Negative  Gastrointestinal: Positive for diarrhea  Negative for abdominal distention, abdominal pain, anal bleeding, blood in stool, constipation, nausea, rectal pain and vomiting         "I have been having diarrhea for last 3 days"   Endocrine: Negative  Genitourinary: Negative  Musculoskeletal: Negative  Skin: Negative  Allergic/Immunologic: Negative  Neurological: Negative  Hematological: Negative  Psychiatric/Behavioral: Negative  All other systems reviewed and are negative        Active Problem List     Patient Active Problem List   Diagnosis    Arteriosclerosis of coronary artery bypass graft    Atrial fibrillation, chronic (HCC)    Benign essential hypertension    Chronic obstructive lung disease ([facility] [address] [facility]    Chronic systolic heart failure ([facility] [address] )  Depressive disorder    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    Diverticular disease of colon    Gastroesophageal reflux disease without esophagitis    Glaucoma    Gout    Mixed hyperlipidemia    Persistent insomnia    Type 2 diabetes mellitus with diabetic polyneuropathy, with long-term current use of insulin ([facility])    Altered mental state    Chest wall pain, chronic    Chronic respiratory failure ([facility])    Delirium    Encephalopathy    Essential hypertension    SENTHIL (obstructive sleep apnea)    Peripheral sensory neuropathy    Tracheal stenosis    Type 2 diabetes mellitus with stage 4 chronic kidney disease, without long-term current use of insulin ([facility])    Insulin dependent type 2 diabetes mellitus ([facility] [address] )    Atherosclerosis of native artery of extremity ([facility])    Obesity    Uncontrolled type 2 diabetes mellitus with hyperglycemia ([facility])    Stage 3 chronic kidney disease ([facility])    Peripheral vascular disease ([facility])    Current use of long term anticoagulation    Urinary retention    Tinea pedis of both feet    Tobacco abuse    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 ([facility])    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[facility])    Contusion of right eyeball    Dry eye syndrome of bilateral lacrimal glands    Hyphema, right eye    Lens replaced by other means    Localized edema    Age-related nuclear cataract of left eye    Open angle with borderline findings and high glaucoma risk in both eyes    Other secondary cataract, right eye    Pain of left heel    Panlobular emphysema ([facility] [address] )    Primary open-angle glaucoma, right eye, severe stage    Pterygium    Tear film insufficiency    Anemia    Weight loss    Nose dryness    Foreign body (FB) in soft tissue       Objective     Vitals: wt:212Ibs              BP:137/69            IA:60       RR:16    BS log was reviewed     Physical Exam   Constitutional: He is oriented to person, place, and time  He appears well-developed  He does not appear ill  No distress  HENT:   Head: Normocephalic and atraumatic  Right Ear: External ear normal    Left Ear: External ear normal    Nose: Nose normal    Mouth/Throat: No oropharyngeal exudate  Eyes: Pupils are equal, round, and reactive to light  Conjunctivae are normal  Right eye exhibits no discharge  Left eye exhibits no discharge  No scleral icterus  Neck: Normal range of motion  Neck supple  No muscular tenderness present  Cardiovascular: Normal rate and normal heart sounds  An irregular rhythm present  Exam reveals no gallop and no friction rub  No murmur heard  Pulmonary/Chest: Effort normal and breath sounds normal  No stridor  No respiratory distress  He has no wheezes  He has no rhonchi  He has no rales  He exhibits no tenderness  Abdominal: Soft  Normal appearance and bowel sounds are normal  He exhibits no distension and no mass  There is no abdominal tenderness  There is no rebound and no guarding  Genitourinary:    Genitourinary Comments: deferred     Musculoskeletal: Normal range of motion  General: No swelling, tenderness or deformity  Right lower leg: No edema (trace if any )  Left lower leg: No edema (trace if any )  Lymphadenopathy:     He has no cervical adenopathy  Neurological: He is alert and oriented to person, place, and time  A cranial nerve deficit ([place], ) is present  Pt follows commands,  Forgetful  Skin: Skin is warm and dry  No bruising, no lesion and no rash noted  He is not diaphoretic  No erythema  No pallor  Didn't examine sacral area   Psychiatric: His behavior is normal    Nursing note and vitals reviewed  Pertinent Laboratory/Diagnostic Studies:  6/3/2020: CBC, CMP, Mg, TSH     Current Medications   Medication list in facility chart was reviewed and necessary changes made  Assessment and Plan   Atrial fibrillation, chronic (HCC)  HR is controlled with Metoprolol , on Xarleto, last lab in June stable   Will check labs next week  Weight loss  Wt has been stable in last 2 months, will check TSH , and continue with monitoring  Mixed hyperlipidemia  On Rosuvastatin, will check labs next week  Insulin dependent type 2 diabetes mellitus (HCC)    Lab Results   Component Value Date    HGBA1C 6 4 (H) 03/25/2020   Bs stable with Novofine and Basaglar, will check HbA1c next week  Essential hypertension  BP has been stable with Metoprolol, will continue with monitoring, will check labs next week  Chronic obstructive lung disease ([facility] [address] )  On Albuterol, will continue with monitoring  Chronic systolic heart failure (HCC)  Asymptomatic, will continue with monitoring  Stage 3 chronic kidney disease ([facility] [address] )  Last labs in June, will check labs next week  Depressive disorder  With inappropriate sextual behaviors, stable with 1:1 observations and on Paroxetine, and Trazodone, psych on board         Anselmo Russell MD

## 2020-08-05 NOTE — ASSESSMENT & PLAN NOTE
Lab Results   Component Value Date    HGBA1C 6 4 (H) 03/25/2020   Bs stable with Novofine and Basaglar, will check HbA1c next week

## 2021-02-06 NOTE — ASSESSMENT & PLAN NOTE
HR is controlled with Metoprolol , on Caryle Coffin, last lab in June stable  Will check labs next week  No

## 2021-04-08 ENCOUNTER — NURSING HOME VISIT (OUTPATIENT)
Dept: GERIATRICS | Facility: OTHER | Age: 71
End: 2021-04-08
Payer: COMMERCIAL

## 2021-04-08 DIAGNOSIS — F22 PARANOIA (HCC): ICD-10-CM

## 2021-04-08 DIAGNOSIS — F32.A DEPRESSIVE DISORDER: Primary | ICD-10-CM

## 2021-04-08 DIAGNOSIS — G47.00 PERSISTENT INSOMNIA: ICD-10-CM

## 2021-04-08 DIAGNOSIS — G31.84 MILD COGNITIVE IMPAIRMENT: ICD-10-CM

## 2021-04-08 PROCEDURE — 99309 SBSQ NF CARE MODERATE MDM 30: CPT | Performed by: NURSE PRACTITIONER

## 2021-04-08 RX ORDER — PAROXETINE 10 MG/1
20 TABLET, FILM COATED ORAL DAILY
Start: 2021-04-08

## 2021-04-09 NOTE — PROGRESS NOTES
[address]  [address]  POS: 32: [facility]- Long Term, [place]    MEDICATION MANAGEMENT NOTE    NAME: Lizzette Nguyen  AGE: 69 y o  SEX: male [id number]    DATE OF ENCOUNTER: 4/8/2021    Assessment and Plan      Diagnosis ICD-10-CM Associated Orders   1  Depressive disorder  F32 9 PARoxetine (PAXIL) 10 mg tablet   2  Paranoia ([address] )  F22    3  Mild cognitive impairment  G31 84    4  Persistent insomnia  G47 00        Add Abilify 5 mg for agitation and parnaoia    Treatment Recommendations/Precautions:      Medication management every 1 months    Medications Risks/Benefits      Risks, Benefits And Possible Side Effects Of Medications:    Risks, benefits, and possible side effects of medications explained to Gucci Juarez and he verbalizes understanding and agreement for treatment  Controlled Medication Discussion:     Gucci Juarez has been filling controlled prescriptions on time as prescribed according to [facility]    Evaluation of Psychotropic Drugs for possible gradual dose reductions    Psychotropic medications have been reviewed  Patient continues with symptoms of mood lability, agitation and paranoia as noted below  Any dose reductions at this time would be clinically contraindicated, as it would be likely to cause worsening of symptoms  Psychotherapy Provided:     Individual psychotherapy provided: Medications, treatment progress and treatment plan reviewed with Gucci Juarez  Reassurance and supportive therapy provided  Chief Complaint     Follow up for mood lability, agitation, paranoia    History of Present Illness     Gucci Juarez is seen in follow up for mood disorder, and recent increase in irritability/agitation with paranoia  Patient had recently been overheard with some sexually explicit conversation over the phone with a female resident on another unit  He has not been able to call her since    He has become increasingly agitated and some paranoia toward his previous , thinking he was being audiotaped  Feeling rules are more strict for him, than others  He has had history of sexual preoccupation since coming to [facility] last year and on Paxil 20 mg  He is seen in his room, with irritable edge and paranoia noted toward staff  He denies any depressed mood  Will add Abilify 5 mg for mood lability/irritability and paranoia  He reports fluctuating sleep pattern, fluctuating appetite, fluctuating energy levels    The following portions of the patient's history were reviewed and updated as appropriate: allergies, current medications, past family history, past medical history, past social history, past surgical history and problem list     Review of Systems     Review of Systems   Psychiatric/Behavioral: Positive for agitation, behavioral problems, confusion, decreased concentration and dysphoric mood  All other systems reviewed and are negative        Active Problem List     Patient Active Problem List   Diagnosis    Arteriosclerosis of coronary artery bypass graft    Atrial fibrillation, chronic (HCC)    Benign essential hypertension    Chronic obstructive lung disease (HCC)    Chronic systolic heart failure ([facility]    Depressive disorder    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    Diverticular disease of colon    Gastroesophageal reflux disease without esophagitis    Glaucoma    Gout    Mixed hyperlipidemia    Persistent insomnia    Type 2 diabetes mellitus with diabetic polyneuropathy, with long-term current use of insulin ([address] )    Altered mental state    Chest wall pain, chronic    Chronic respiratory failure ([facility])    Delirium    Encephalopathy    Essential hypertension    SENTHIL (obstructive sleep apnea)    Peripheral sensory neuropathy    Tracheal stenosis    Type 2 diabetes mellitus with stage 4 chronic kidney disease, without long-term current use of insulin ([facility])    Insulin dependent type 2 diabetes mellitus ([facility] [address] [facility]    Atherosclerosis of native artery of extremity ([facility] [address] )    Obesity    Uncontrolled type 2 diabetes mellitus with hyperglycemia ([facility])    Stage 3 chronic kidney disease    Peripheral vascular disease ([facility] [address] [facility]    Current use of long term anticoagulation    Urinary retention    Tinea pedis of both feet    Tobacco abuse    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 ([facility])    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[facility])    Contusion of right eyeball    Dry eye syndrome of bilateral lacrimal glands    Hyphema, right eye    Lens replaced by other means    Localized edema    Age-related nuclear cataract of left eye    Open angle with borderline findings and high glaucoma risk in both eyes    Other secondary cataract, right eye    Pain of left heel    Panlobular emphysema ([facility] [address] )    Primary open-angle glaucoma, right eye, severe stage    Pterygium    Tear film insufficiency    Anemia    Weight loss    Nose dryness    Foreign body (FB) in soft tissue       Objective       Physical Exam  Vitals signs and nursing note reviewed  Psychiatric:         Mood and Affect: Affect is angry  Behavior: Behavior is agitated  Thought Content: Thought content is paranoid  Cognition and Memory: Cognition is impaired  Judgment: Judgment is impulsive and inappropriate  Pertinent Laboratory/Diagnostic Studies:  I have personally reviewed pertinent lab results        Mental Status Evaluation:    Appearance age appropriate, casually dressed   Behavior angry   Speech normal rate, normal volume, normal pitch   Mood dysphoric, irritable   Affect normal range and intensity, appropriate   Thought Processes organized, perseverative   Associations circumstantial associations   Thought Content some paranoia   Perceptual Disturbances: no auditory hallucinations, no visual hallucinations   Abnormal Thoughts  Risk Potential Suicidal ideation - None  Homicidal ideation - None  Potential for aggression - No   Orientation oriented to person and place   Memory recent memory impaired   Consciousness alert and awake   Attention Span Concentration Span decreased attention span  decreased concentration   Intellect appears to be of average intelligence   Insight limited   Judgement poor   Muscle Strength and  Gait uses wheelchair   Motor activity no abnormal movements   Language no difficulty naming common objects, no difficulty repeating a phrase, no difficulty writing a sentence   Fund of Knowledge adequate knowledge of current events  adequate fund of knowledge regarding past history  adequate fund of knowledge regarding vocabulary        Current Medications       Current Outpatient Medications:     acetaminophen (TYLENOL) 650 mg CR tablet, 3 (three) times a day, Disp: , Rfl:     albuterol (2 5 mg/3 mL) 0 083 % nebulizer solution, Take 1 vial (2 5 mg total) by nebulization every 6 (six) hours as needed for wheezing or shortness of breath (Patient taking differently: Take 2 5 mg by nebulization every 6 (six) hours as needed for wheezing or shortness of breath ), Disp: 100 vial, Rfl: 6    albuterol (VENTOLIN HFA) 90 mcg/act inhaler, Inhale 1 puff every 4 (four) hours as needed for wheezing (Patient taking differently: Inhale 1 puff every 4 (four) hours as needed for wheezing ), Disp: 1 Inhaler, Rfl: 3    atropine (ISOPTO ATROPINE) 1 % ophthalmic solution, PUT 1 DROP INTO RIGHT EYE 3 TIMES A DAY, Disp: , Rfl: 3    bisacodyl (DULCOLAX) 5 mg EC tablet, Take 10 mg by mouth daily as needed, Disp: , Rfl:     budesonide (PULMICORT) 0 5 mg/2 mL nebulizer solution, Take 1 vial (0 5 mg total) by nebulization 2 (two) times a day Rinse mouth after use , Disp: 120 vial, Rfl: 6    cholecalciferol (VITAMIN D3) 1,000 units tablet, Take 1,000 Units by mouth daily, Disp: , Rfl:     Cholecalciferol (VITAMIN D3) 29941 units CAPS, Take 1 capsule by mouth once a week, Disp: , Rfl: 0   dorzolamide (TRUSOPT) 2 % ophthalmic solution, INSTILL 1 DROP INTO AFFECTED EYE 3 TIMES A DAY, Disp: , Rfl: 3    famotidine (PEPCID) 20 mg tablet, Take 20 mg by mouth 2 (two) times a day, Disp: , Rfl:     glucose blood test strip, TEST UP TO 3 TIMES PER DAY, Disp: , Rfl:     insulin aspart (NOVOLOG FLEXPEN) 100 Units/mL injection pen, Inject under the skin 3 (three) times a day with meals, Disp: , Rfl:     insulin detemir (LEVEMIR FLEXTOUCH) 100 Units/mL injection pen, Inject 40 Units under the skin daily, Disp: 5 pen, Rfl: 11    Insulin Pen Needle 32G X 6 MM MISC, Inject as directed 3 (three) times a day Poorly controlled insulin dependent DM2, Disp: 100 each, Rfl: 11    Lancets (ONETOUCH ULTRASOFT) lancets, CHECK BLOOD SUGARS EVERY DAY, Disp: , Rfl: 3    LORazepam (ATIVAN) 0 5 mg tablet, Take 0 5 mg by mouth every 8 (eight) hours as needed for anxiety, Disp: , Rfl:     metoprolol tartrate (LOPRESSOR) 50 mg tablet, Take 1 tablet (50 mg total) by mouth every 12 (twelve) hours, Disp: 180 tablet, Rfl: 1    PARoxetine (PAXIL) 10 mg tablet, Take 2 tablets (20 mg total) by mouth daily, Disp: , Rfl:     rivaroxaban (XARELTO) 15 mg tablet, Take 15 mg by mouth, Disp: , Rfl:     rosuvastatin (CRESTOR) 10 MG tablet, Take 1 tablet (10 mg total) by mouth daily, Disp: 90 tablet, Rfl: 1    SPIRIVA HANDIHALER 18 MCG inhalation capsule, INHALE 1 CAPSULE (18 MCG TOTAL) DAILY  , Disp: , Rfl: 3    traZODone (DESYREL) 50 mg tablet, Take 50 mg by mouth daily at bedtime, Disp: , Rfl:     varenicline (CHANTIX FRANCK) 0 5 MG X 11 & 1 MG X 42 tablet, Take one 0 5mg tab by mouth 1x daily for 3 days, then increase to one 0 5mg tab 2x daily for 3 days, then increase to one 1mg tab 2x daily, Disp: 53 tablet, Rfl: 0    varenicline (CHANTIX) 1 mg tablet, Take 1 tablet (1 mg total) by mouth 2 (two) times a day Start after starter franck completed  , Disp: 60 tablet, Rfl: 1      Counseling / Coordination of Care  Total floor / unit time spent today 25 minutes  Greater than 50% of total time was spent with the patient and / or family counseling and / or coordination of care       JACINTA Jones 04/08/21

## 2021-04-20 ENCOUNTER — VBI (OUTPATIENT)
Dept: ADMINISTRATIVE | Facility: OTHER | Age: 71
End: 2021-04-20

## 2021-04-20 NOTE — TELEPHONE ENCOUNTER
04/20/21 9:26 AM     See documentation in the [employer] SmartForm       Charlcbijal Rodriguez, [place]

## 2021-05-13 ENCOUNTER — NURSING HOME VISIT (OUTPATIENT)
Dept: GERIATRICS | Facility: OTHER | Age: 71
End: 2021-05-13
Payer: COMMERCIAL

## 2021-05-13 DIAGNOSIS — G31.84 MILD COGNITIVE IMPAIRMENT: ICD-10-CM

## 2021-05-13 DIAGNOSIS — F32.A DEPRESSIVE DISORDER: Primary | ICD-10-CM

## 2021-05-13 DIAGNOSIS — G47.00 PERSISTENT INSOMNIA: ICD-10-CM

## 2021-05-13 DIAGNOSIS — F22 PARANOIA (HCC): ICD-10-CM

## 2021-05-13 PROCEDURE — 99308 SBSQ NF CARE LOW MDM 20: CPT | Performed by: NURSE PRACTITIONER

## 2021-05-15 NOTE — PROGRESS NOTES
[id number] Pending sale to [facility] [address]  [address]  POS: 32: NF- Long Term, [facility]    MEDICATION MANAGEMENT NOTE    NAME: Nata Mccann  AGE: 66 y o  SEX: male [id number]    DATE OF ENCOUNTER: 5/13/2021    Assessment and Plan      Diagnosis ICD-10-CM Associated Orders   1  Depressive disorder  F32 9    2  Persistent insomnia  G47 00    3  Mild cognitive impairment  G31 84    4  Paranoia ([address] )  F22        Continue medications the same  Treatment Recommendations/Precautions:      Medication management every 1 month    Medications Risks/Benefits      Risks, Benefits And Possible Side Effects Of Medications:    Risks, benefits, and possible side effects of medications explained to Aggie Higgins and he verbalizes understanding and agreement for treatment  Controlled Medication Discussion:     Not applicable - controlled prescriptions are not prescribed by this practice    Evaluation of Psychotropic Drugs for possible gradual dose reductions    Psychotropic medications have been reviewed  Patient continues with symptoms of depression with irritability as noted below  Any dose reductions at this time would be clinically contraindicated, as it would be likely to cause worsening of symptoms  Psychotherapy Provided:     Individual psychotherapy provided: Medications, treatment progress and treatment plan reviewed with Aggie Higgins  Reassurance and supportive therapy provided  Chief Complaint     Follow up for depression and irritability    History of Present Illness     Aggie Higgins is seen in follow up for mood disorder with some agitation and paranoia  Staff report some improvement with addition of Abilify  Melatonin has also been added for sleep  He does still test boundaries with staff, recently met with another female resident while off the unit for a facility activity, even though he is aware he is not to meet with this resident    He still has some sexually inappropriate behaviors, and difficult to redirect  Can be argumentative at baseline  Will continue medications the same for now and  Follow up on one month  Depression  This is a chronic problem  The current episode started more than 1 year ago  The problem has been unchanged  The treatment provided mild relief  He reports fluctuating sleep pattern, fluctuating appetite, fluctuating energy levels    The following portions of the patient's history were reviewed and updated as appropriate: allergies, current medications, past family history, past medical history, past social history, past surgical history and problem list     Review of Systems     Review of Systems   Psychiatric/Behavioral: Positive for agitation, behavioral problems, decreased concentration, depression and dysphoric mood  All other systems reviewed and are negative        Active Problem List     Patient Active Problem List   Diagnosis    Arteriosclerosis of coronary artery bypass graft    Atrial fibrillation, chronic (HCC)    Benign essential hypertension    Chronic obstructive lung disease (HCC)    Chronic systolic heart failure (HCC)    Depressive disorder    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    Diverticular disease of colon    Gastroesophageal reflux disease without esophagitis    Glaucoma    Gout    Mixed hyperlipidemia    Persistent insomnia    Type 2 diabetes mellitus with diabetic polyneuropathy, with long-term current use of insulin (HCC)    Altered mental state    Chest wall pain, chronic    Chronic respiratory failure (HCC)    Delirium    Encephalopathy    Essential hypertension    SENTHIL (obstructive sleep apnea)    Peripheral sensory neuropathy    Tracheal stenosis    Type 2 diabetes mellitus with stage 4 chronic kidney disease, without long-term current use of insulin (HCC)    Insulin dependent type 2 diabetes mellitus ([address] )    Atherosclerosis of native artery of extremity ([address] )    Obesity    Uncontrolled type 2 diabetes mellitus with hyperglycemia (HCC)    Stage 3 chronic kidney disease (HCC)    Peripheral vascular disease (HCC)    Current use of long term anticoagulation    Urinary retention    Tinea pedis of both feet    Tobacco abuse    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 (HCC)    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HCC)    Contusion of right eyeball    Dry eye syndrome of bilateral lacrimal glands    Hyphema, right eye    Lens replaced by other means    Localized edema    Age-related nuclear cataract of left eye    Open angle with borderline findings and high glaucoma risk in both eyes    Other secondary cataract, right eye    Pain of left heel    Panlobular emphysema ([address] )    Primary open-angle glaucoma, right eye, severe stage    Pterygium    Tear film insufficiency    Anemia    Weight loss    Nose dryness    Foreign body (FB) in soft tissue    Paranoia (HCC)    Mild cognitive impairment       Objective       Physical Exam  Vitals signs and nursing note reviewed  Psychiatric:         Mood and Affect: Mood is depressed  Affect is labile  Behavior: Behavior is agitated  Thought Content: Thought content is paranoid  Judgment: Judgment is impulsive and inappropriate  Pertinent Laboratory/Diagnostic Studies:  I have personally reviewed pertinent lab results        Mental Status Evaluation:    Appearance age appropriate, casually dressed   Behavior uncooperative   Speech normal rate, normal volume   Mood dysphoric, irritable   Affect labile   Thought Processes circumstantial   Associations intact associations   Thought Content some paranoia   Perceptual Disturbances: no auditory hallucinations, no visual hallucinations   Abnormal Thoughts  Risk Potential Suicidal ideation - None  Homicidal ideation - None  Potential for aggression - No   Orientation oriented to person and place   Memory recent memory mildly impaired   Consciousness alert and awake Attention Span Concentration Span decreased attention span  decreased concentration   Intellect appears to be of average intelligence   Insight poor   Judgement poor   Muscle Strength and  Gait uses wheelchair   Motor activity no abnormal movements   Language no difficulty naming common objects, no difficulty repeating a phrase, no difficulty writing a sentence   Fund of Knowledge adequate knowledge of current events  adequate fund of knowledge regarding past history  adequate fund of knowledge regarding vocabulary        Current Medications       Current Outpatient Medications:     acetaminophen (TYLENOL) 650 mg CR tablet, 3 (three) times a day, Disp: , Rfl:     albuterol (2 5 mg/3 mL) 0 083 % nebulizer solution, Take 1 vial (2 5 mg total) by nebulization every 6 (six) hours as needed for wheezing or shortness of breath (Patient taking differently: Take 2 5 mg by nebulization every 6 (six) hours as needed for wheezing or shortness of breath ), Disp: 100 vial, Rfl: 6    albuterol (VENTOLIN HFA) 90 mcg/act inhaler, Inhale 1 puff every 4 (four) hours as needed for wheezing (Patient taking differently: Inhale 1 puff every 4 (four) hours as needed for wheezing ), Disp: 1 Inhaler, Rfl: 3    atropine (ISOPTO ATROPINE) 1 % ophthalmic solution, PUT 1 DROP INTO RIGHT EYE 3 TIMES A DAY, Disp: , Rfl: 3    bisacodyl (DULCOLAX) 5 mg EC tablet, Take 10 mg by mouth daily as needed, Disp: , Rfl:     budesonide (PULMICORT) 0 5 mg/2 mL nebulizer solution, Take 1 vial (0 5 mg total) by nebulization 2 (two) times a day Rinse mouth after use , Disp: 120 vial, Rfl: 6    cholecalciferol (VITAMIN D3) 1,000 units tablet, Take 1,000 Units by mouth daily, Disp: , Rfl:     Cholecalciferol (VITAMIN D3) 24537 units CAPS, Take 1 capsule by mouth once a week, Disp: , Rfl: 0    dorzolamide (TRUSOPT) 2 % ophthalmic solution, INSTILL 1 DROP INTO AFFECTED EYE 3 TIMES A DAY, Disp: , Rfl: 3    famotidine (PEPCID) 20 mg tablet, Take 20 mg by mouth 2 (two) times a day, Disp: , Rfl:     glucose blood test strip, TEST UP TO 3 TIMES PER DAY, Disp: , Rfl:     insulin aspart (NOVOLOG FLEXPEN) 100 Units/mL injection pen, Inject under the skin 3 (three) times a day with meals, Disp: , Rfl:     insulin detemir (LEVEMIR FLEXTOUCH) 100 Units/mL injection pen, Inject 40 Units under the skin daily, Disp: 5 pen, Rfl: 11    Insulin Pen Needle 32G X 6 MM MISC, Inject as directed 3 (three) times a day Poorly controlled insulin dependent DM2, Disp: 100 each, Rfl: 11    Lancets (ONETOUCH ULTRASOFT) lancets, CHECK BLOOD SUGARS EVERY DAY, Disp: , Rfl: 3    LORazepam (ATIVAN) 0 5 mg tablet, Take 0 5 mg by mouth every 8 (eight) hours as needed for anxiety, Disp: , Rfl:     metoprolol tartrate (LOPRESSOR) 50 mg tablet, Take 1 tablet (50 mg total) by mouth every 12 (twelve) hours, Disp: 180 tablet, Rfl: 1    PARoxetine (PAXIL) 10 mg tablet, Take 2 tablets (20 mg total) by mouth daily, Disp: , Rfl:     rivaroxaban (XARELTO) 15 mg tablet, Take 15 mg by mouth, Disp: , Rfl:     rosuvastatin (CRESTOR) 10 MG tablet, Take 1 tablet (10 mg total) by mouth daily, Disp: 90 tablet, Rfl: 1    SPIRIVA HANDIHALER 18 MCG inhalation capsule, INHALE 1 CAPSULE (18 MCG TOTAL) DAILY  , Disp: , Rfl: 3    traZODone (DESYREL) 50 mg tablet, Take 50 mg by mouth daily at bedtime, Disp: , Rfl:       Counseling / Coordination of Care  Total floor / unit time spent today 15 minutes  Greater than 50% of total time was spent with the patient and / or family counseling and / or coordination of care       JACINTA Dempsey 5/13/2021

## 2021-06-17 NOTE — TELEPHONE ENCOUNTER
Patient is calling for PT/INR results  Says it was taken last week (December 27)  I do not see in chart  Rommel Ty, please advise  Patient expressed frustration because he was told someone would call him yesterday  Yes

## 2021-07-19 ENCOUNTER — NURSING HOME VISIT (OUTPATIENT)
Dept: GERIATRICS | Facility: OTHER | Age: 71
End: 2021-07-19
Payer: COMMERCIAL

## 2021-07-19 DIAGNOSIS — G47.00 PERSISTENT INSOMNIA: ICD-10-CM

## 2021-07-19 DIAGNOSIS — G31.84 MILD COGNITIVE IMPAIRMENT: ICD-10-CM

## 2021-07-19 DIAGNOSIS — F22 PARANOIA (HCC): ICD-10-CM

## 2021-07-19 DIAGNOSIS — F32.A DEPRESSIVE DISORDER: Primary | ICD-10-CM

## 2021-07-19 PROCEDURE — 99308 SBSQ NF CARE LOW MDM 20: CPT | Performed by: NURSE PRACTITIONER

## 2021-07-22 NOTE — PROGRESS NOTES
MEDICATION MANAGEMENT NOTE        [facility]  POS: 28: NF- [facility] Lowejemma, [facility]      Name and Date of Birth:  Nia Cross 66 y o  1950 MRN: [id number]    Date of Visit: July 19, 2021    Allergies   Allergen Reactions    No Active Allergies     No Known Allergies      SUBJECTIVE:    Taya Burrows is seen today for a follow up for mood disorder  He continues to improve gradually since the last visit  He is seen in his room, presents with irritable edge  He is still upset he cannot speak to female resident to whom he had made some sexually explicit comments  He has poor insight and states "she like"  There have been not recent incidents or angry outburst on unit per staff  Seems somewhat improved with addition of Abilify  He denies any side effects from current psychiatric medications  PLAN:    All medications and routine orders were reviewed and updated as needed  Continue current medications:  Medication management every 2 months  Plan discussed with: Patient    Diagnoses and all orders for this visit:    Depressive disorder    Persistent insomnia    Paranoia ([address] )    Mild cognitive impairment    Other orders  -     ARIPiprazole (ABILIFY) 5 mg tablet;  Take 5 mg by mouth daily        Current Outpatient Medications on File Prior to Visit   Medication Sig Dispense Refill    ARIPiprazole (ABILIFY) 5 mg tablet Take 5 mg by mouth daily      acetaminophen (TYLENOL) 650 mg CR tablet 3 (three) times a day      albuterol (2 5 mg/3 mL) 0 083 % nebulizer solution Take 1 vial (2 5 mg total) by nebulization every 6 (six) hours as needed for wheezing or shortness of breath (Patient taking differently: Take 2 5 mg by nebulization every 6 (six) hours as needed for wheezing or shortness of breath ) 100 vial 6    albuterol (VENTOLIN HFA) 90 mcg/act inhaler Inhale 1 puff every 4 (four) hours as needed for wheezing (Patient taking differently: Inhale 1 puff every 4 (four) hours as needed for wheezing ) 1 Inhaler 3    atropine (ISOPTO ATROPINE) 1 % ophthalmic solution PUT 1 DROP INTO RIGHT EYE 3 TIMES A DAY  3    bisacodyl (DULCOLAX) 5 mg EC tablet Take 10 mg by mouth daily as needed      budesonide (PULMICORT) 0 5 mg/2 mL nebulizer solution Take 1 vial (0 5 mg total) by nebulization 2 (two) times a day Rinse mouth after use  120 vial 6    cholecalciferol (VITAMIN D3) 1,000 units tablet Take 1,000 Units by mouth daily      Cholecalciferol (VITAMIN D3) 65584 units CAPS Take 1 capsule by mouth once a week  0    dorzolamide (TRUSOPT) 2 % ophthalmic solution INSTILL 1 DROP INTO AFFECTED EYE 3 TIMES A DAY  3    famotidine (PEPCID) 20 mg tablet Take 20 mg by mouth 2 (two) times a day      glucose blood test strip TEST UP TO 3 TIMES PER DAY      insulin aspart (NOVOLOG FLEXPEN) 100 Units/mL injection pen Inject under the skin 3 (three) times a day with meals      insulin detemir (LEVEMIR FLEXTOUCH) 100 Units/mL injection pen Inject 40 Units under the skin daily 5 pen 11    Insulin Pen Needle 32G X 6 MM MISC Inject as directed 3 (three) times a day Poorly controlled insulin dependent DM2 100 each 11    Lancets (ONETOUCH ULTRASOFT) lancets CHECK BLOOD SUGARS EVERY DAY  3    LORazepam (ATIVAN) 0 5 mg tablet Take 0 5 mg by mouth every 8 (eight) hours as needed for anxiety      metoprolol tartrate (LOPRESSOR) 50 mg tablet Take 1 tablet (50 mg total) by mouth every 12 (twelve) hours 180 tablet 1    PARoxetine (PAXIL) 10 mg tablet Take 2 tablets (20 mg total) by mouth daily      rivaroxaban (XARELTO) 15 mg tablet Take 15 mg by mouth      rosuvastatin (CRESTOR) 10 MG tablet Take 1 tablet (10 mg total) by mouth daily 90 tablet 1    SPIRIVA HANDIHALER 18 MCG inhalation capsule INHALE 1 CAPSULE (18 MCG TOTAL) DAILY  3    traZODone (DESYREL) 50 mg tablet Take 50 mg by mouth daily at bedtime       No current facility-administered medications on file prior to visit  Psychotherapy Provided:     Individual psychotherapy provided: Yes  Supportive counseling provided  Reassurance and supportive therapy provided  Reoriented to reality and reassured  HPI ROS Appetite Changes and Sleep:     He reports fluctuating sleep pattern, fluctuating appetite, fluctuating energy levels   Patient denies suicidal or homicidal ideation    Review Of Systems:   all other systems are negative         Mental Status Evaluation:    Appearance:  age appropriate   Behavior:  guarded   Speech:  normal rate, normal volume   Mood:  irritable   Affect:  blunted   Thought Process:  goal directed   Associations: intact associations   Thought Content:  no overt delusions   Perceptual Disturbances: none   Risk Potential: Suicidal ideation - None  Homicidal ideation - None  Potential for aggression - No   Sensorium:  oriented to person and place   Memory:  recent memory mildly impaired   Consciousness:  alert and awake   Attention: decreased concentration and decreased attention span   Insight:  poor   Judgment: poor   Gait/Station: seen in bed   Motor Activity: no abnormal movements     Past Psychiatric History Update:     Inpatient Psychiatric Admission Since Last Encounter:   no  Suicide Attempt Or Self Mutilation Since Last Encounter:   no  Incidence of Violent Behavior Since Last Encounter:   no    Traumatic History Update:     New Onset of Abuse Since Last Encounter:   no  Traumatic Events Since Last Encounter:   no    Past Medical History:    Past Medical History:   Diagnosis Date    Acute on chronic respiratory failure ([facility] ) 1/30/2017    Anemia 3/29/2020    Colonic polyp 2012    COPD exacerbation ([facility] ) 4/20/2019    Disc displacement, cervical     Peripheral vascular disease ([facility] ) 2011    Femoral- popliteal bypass     Past Medical History Pertinent Negatives:   Diagnosis Date Noted    Allergic 01/18/2019    Anxiety 01/18/2019    Arthritis 01/18/2019    Asthma 01/18/2019    Cancer ([facility] ) 01/18/2019    Clotting disorder ([facility] ) 01/18/2019    Coronary artery disease 01/18/2019    Dementia ([facility] ) 01/18/2019    Disease of thyroid gland 01/18/2019    Diverticulitis of colon 01/18/2019    Eating disorder 01/18/2019    Heart murmur 01/18/2019    HL (hearing loss) 01/18/2019    Infectious viral hepatitis 01/18/2019    Inflammatory bowel disease 01/18/2019    Kidney stone 01/18/2019    Memory loss 01/18/2019    Myocardial infarction ([facility] ) 01/18/2019    Osteoporosis 01/18/2019    Otitis media 01/18/2019    Pneumonia 01/18/2019    Scoliosis 01/18/2019    Seizures ([place] [address] ) 01/18/2019    Shingles 01/18/2019    Stroke ([place] [address] ) 01/18/2019    Substance abuse ([place] [address] ) 01/18/2019    Urinary tract infection 01/18/2019    Visual impairment 01/18/2019     Past Surgical History:   Procedure Laterality Date    BYPASS FEMORAL-POPLITEAL  2011    COLONOSCOPY  2012    With polypectomy    CORONARY ARTERY BYPASS GRAFT  2012    LAMINECTOMY  2010     Allergies   Allergen Reactions    No Active Allergies     No Known Allergies      Substance Abuse History:    Social History     Substance and Sexual Activity   Alcohol Use No     Social History     Substance and Sexual Activity   Drug Use No     Social History:    Changes since last encounter:  no  Family Psychiatric History:     Family History   Problem Relation Age of Onset    Heart attack Mother     Heart attack Father      History Review: The following portions of the patient's history were reviewed and updated as appropriate: allergies, current medications, past family history, past medical history, past social history, past surgical history and problem list     OBJECTIVE:     Vital signs in last 24 hours: Vital signs and nursing notes reviewed in facility chart  Laboratory Results: Lab results reviewed in facility chart        Medications Risks/Benefits:      Risks, Benefits And Possible Side Effects Of Medications:    Discussed risks and benefits of treatment with patient including risk of parkinsonian symptoms, metabolic syndrome, tardive dyskinesia and neuroleptic malignant syndrome related to treatment with antipsychotic medications     Controlled Medication Discussion:     Not applicable - controlled prescriptions are not prescribed by this practice    Evaluation of Psychotropic Drugs for possible gradual dose reductions    Psychotropic medications have been reviewed  Patient continues with symptoms of mood disorder and paranoia as noted above  Any/or further dose reductions at this time would be clinically contraindicated, as it would be likely to cause worsening of symptoms          JACINTA Humphries

## 2021-07-23 RX ORDER — ARIPIPRAZOLE 5 MG/1
5 TABLET ORAL DAILY
COMMUNITY

## 2021-08-16 ENCOUNTER — VBI (OUTPATIENT)
Dept: ADMINISTRATIVE | Facility: OTHER | Age: 71
End: 2021-08-16

## 2022-06-17 ENCOUNTER — TELEPHONE (OUTPATIENT)
Dept: HEMATOLOGY ONCOLOGY | Facility: CLINIC | Age: 72
End: 2022-06-17

## 2022-06-17 NOTE — TELEPHONE ENCOUNTER
New Patient Intake Form   Patient Details:    Donna Echevarria  1950    Appointment Information   Who is calling to schedule? Physician Office   If not self, what is the caller's name? Fany   DID YOU CONFIRM INSURANCE WITH PATIENT? 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 No     Is there a biopsy ordered or pending? Please specify dates  If yes, route to NN/OCC   No Biopsy, Nothing pending     Is patient aware of diagnosis? Yes     Have you had any imaging or labs done? If yes, where? (If imaging done outside of [facility], please remind patient to bring a disk ) Yes - [facility]     If imaging done at outside facility, did you instruct patient to obtain discs and bring to visit? N/A   Have you been seen by another Oncologist/Hematologist?  If so, who and where? No   Are the records in [facility] or Care Everywhere? Yes   Does the patient have records at another facility/hospital?    If yes, Name of facility, city and state where facility is located  Possibly at [facility]     Did you instruct patient to have records faxed to [facility] ([place], [place]) and provide rightfax number? Yes   Preferred Banks   Is the patient willing to be seen by another provider? (This is for breast patients only) N/A     Did you send new patient paperwork? Email or mail?  Yes, mailed   Miscellaneous Information: 7/7/2022 @ [facility] with Sasha Montemayor

## 2022-06-29 ENCOUNTER — APPOINTMENT (EMERGENCY)
Dept: CT IMAGING | Facility: HOSPITAL | Age: 72
End: 2022-06-29
Payer: COMMERCIAL

## 2022-06-29 ENCOUNTER — HOSPITAL ENCOUNTER (EMERGENCY)
Facility: HOSPITAL | Age: 72
Discharge: HOME | End: 2022-06-30
Attending: EMERGENCY MEDICINE
Payer: COMMERCIAL

## 2022-06-29 VITALS
BODY MASS INDEX: 35.21 KG/M2 | HEART RATE: 103 BPM | RESPIRATION RATE: 20 BRPM | SYSTOLIC BLOOD PRESSURE: 137 MMHG | WEIGHT: 260 LBS | DIASTOLIC BLOOD PRESSURE: 67 MMHG | OXYGEN SATURATION: 100 % | HEIGHT: 72 IN | TEMPERATURE: 98.3 F

## 2022-06-29 DIAGNOSIS — K57.90 DIVERTICULOSIS: ICD-10-CM

## 2022-06-29 DIAGNOSIS — N28.9 RENAL INSUFFICIENCY: ICD-10-CM

## 2022-06-29 DIAGNOSIS — K80.20 GALLSTONES: ICD-10-CM

## 2022-06-29 DIAGNOSIS — R10.9 ABDOMINAL PAIN: Primary | ICD-10-CM

## 2022-06-29 LAB
ALBUMIN SERPL BCP-MCNC: 3.5 G/DL (ref 3.5–5)
ALP SERPL-CCNC: 52 U/L (ref 34–104)
ALT SERPL W P-5'-P-CCNC: 16 U/L (ref 7–52)
ANION GAP SERPL CALCULATED.3IONS-SCNC: 6 MMOL/L (ref 4–13)
AST SERPL W P-5'-P-CCNC: 20 U/L (ref 13–39)
BACTERIA UR QL AUTO: ABNORMAL /HPF
BASOPHILS # BLD AUTO: 0.04 THOUSANDS/ΜL (ref 0–0.1)
BASOPHILS NFR BLD AUTO: 1 % (ref 0–1)
BILIRUB SERPL-MCNC: 0.51 MG/DL (ref 0.2–1)
BILIRUB UR QL STRIP: NEGATIVE
BUN SERPL-MCNC: 29 MG/DL (ref 5–25)
CALCIUM SERPL-MCNC: 9.1 MG/DL (ref 8.4–10.2)
CHLORIDE SERPL-SCNC: 95 MMOL/L (ref 96–108)
CLARITY UR: CLEAR
CO2 SERPL-SCNC: 36 MMOL/L (ref 21–32)
COLOR UR: YELLOW
CREAT SERPL-MCNC: 1.95 MG/DL (ref 0.6–1.3)
EOSINOPHIL # BLD AUTO: 0.17 THOUSAND/ΜL (ref 0–0.61)
EOSINOPHIL NFR BLD AUTO: 2 % (ref 0–6)
ERYTHROCYTE [DISTWIDTH] IN BLOOD BY AUTOMATED COUNT: 17.7 % (ref 11.6–15.1)
FLUAV RNA RESP QL NAA+PROBE: NEGATIVE
FLUBV RNA RESP QL NAA+PROBE: NEGATIVE
GFR SERPL CREATININE-BSD FRML MDRD: 33 ML/MIN/1.73SQ M
GLUCOSE SERPL-MCNC: 180 MG/DL (ref 65–140)
GLUCOSE UR STRIP-MCNC: NEGATIVE MG/DL
HCT VFR BLD AUTO: 39.2 % (ref 36.5–49.3)
HGB BLD-MCNC: 10.7 G/DL (ref 12–17)
HGB UR QL STRIP.AUTO: NEGATIVE
HYALINE CASTS #/AREA URNS LPF: ABNORMAL /LPF
IMM GRANULOCYTES # BLD AUTO: 0.03 THOUSAND/UL (ref 0–0.2)
IMM GRANULOCYTES NFR BLD AUTO: 0 % (ref 0–2)
KETONES UR STRIP-MCNC: NEGATIVE MG/DL
LACTATE SERPL-SCNC: 0.8 MMOL/L (ref 0.5–2)
LEUKOCYTE ESTERASE UR QL STRIP: NEGATIVE
LIPASE SERPL-CCNC: 80 U/L (ref 11–82)
LYMPHOCYTES # BLD AUTO: 0.73 THOUSANDS/ΜL (ref 0.6–4.47)
LYMPHOCYTES NFR BLD AUTO: 10 % (ref 14–44)
MAGNESIUM SERPL-MCNC: 1.9 MG/DL (ref 1.9–2.7)
MCH RBC QN AUTO: 21.4 PG (ref 26.8–34.3)
MCHC RBC AUTO-ENTMCNC: 27.3 G/DL (ref 31.4–37.4)
MCV RBC AUTO: 79 FL (ref 82–98)
MONOCYTES # BLD AUTO: 0.91 THOUSAND/ΜL (ref 0.17–1.22)
MONOCYTES NFR BLD AUTO: 12 % (ref 4–12)
NEUTROPHILS # BLD AUTO: 5.53 THOUSANDS/ΜL (ref 1.85–7.62)
NEUTS SEG NFR BLD AUTO: 75 % (ref 43–75)
NITRITE UR QL STRIP: NEGATIVE
NON-SQ EPI CELLS URNS QL MICRO: ABNORMAL /HPF
NRBC BLD AUTO-RTO: 0 /100 WBCS
PH UR STRIP.AUTO: 6 [PH]
PLATELET # BLD AUTO: 191 THOUSANDS/UL (ref 149–390)
PMV BLD AUTO: 8.8 FL (ref 8.9–12.7)
POTASSIUM SERPL-SCNC: 3.8 MMOL/L (ref 3.5–5.3)
PROT SERPL-MCNC: 6.6 G/DL (ref 6.4–8.4)
PROT UR STRIP-MCNC: ABNORMAL MG/DL
RBC # BLD AUTO: 4.99 MILLION/UL (ref 3.88–5.62)
RBC #/AREA URNS AUTO: ABNORMAL /HPF
RSV RNA RESP QL NAA+PROBE: NEGATIVE
SARS-COV-2 RNA RESP QL NAA+PROBE: NEGATIVE
SODIUM SERPL-SCNC: 137 MMOL/L (ref 135–147)
SP GR UR STRIP.AUTO: 1.01 (ref 1–1.03)
UROBILINOGEN UR QL STRIP.AUTO: 1 E.U./DL
WBC # BLD AUTO: 7.41 THOUSAND/UL (ref 4.31–10.16)
WBC #/AREA URNS AUTO: ABNORMAL /HPF

## 2022-06-29 PROCEDURE — 74176 CT ABD & PELVIS W/O CONTRAST: CPT

## 2022-06-29 PROCEDURE — 83690 ASSAY OF LIPASE: CPT | Performed by: EMERGENCY MEDICINE

## 2022-06-29 PROCEDURE — 93005 ELECTROCARDIOGRAM TRACING: CPT

## 2022-06-29 PROCEDURE — 83735 ASSAY OF MAGNESIUM: CPT | Performed by: EMERGENCY MEDICINE

## 2022-06-29 PROCEDURE — 83605 ASSAY OF LACTIC ACID: CPT | Performed by: EMERGENCY MEDICINE

## 2022-06-29 PROCEDURE — 85025 COMPLETE CBC W/AUTO DIFF WBC: CPT | Performed by: EMERGENCY MEDICINE

## 2022-06-29 PROCEDURE — 99285 EMERGENCY DEPT VISIT HI MDM: CPT | Performed by: EMERGENCY MEDICINE

## 2022-06-29 PROCEDURE — 81001 URINALYSIS AUTO W/SCOPE: CPT | Performed by: EMERGENCY MEDICINE

## 2022-06-29 PROCEDURE — 99285 EMERGENCY DEPT VISIT HI MDM: CPT

## 2022-06-29 PROCEDURE — 0241U HB NFCT DS VIR RESP RNA 4 TRGT: CPT | Performed by: EMERGENCY MEDICINE

## 2022-06-29 PROCEDURE — 81003 URINALYSIS AUTO W/O SCOPE: CPT | Performed by: EMERGENCY MEDICINE

## 2022-06-29 PROCEDURE — 80053 COMPREHEN METABOLIC PANEL: CPT | Performed by: EMERGENCY MEDICINE

## 2022-06-29 PROCEDURE — 36415 COLL VENOUS BLD VENIPUNCTURE: CPT | Performed by: EMERGENCY MEDICINE

## 2022-06-30 LAB
ATRIAL RATE: 80 BPM
PR INTERVAL: 37 MS
QRS AXIS: 217 DEGREES
QRSD INTERVAL: 150 MS
QT INTERVAL: 429 MS
QTC INTERVAL: 483 MS
T WAVE AXIS: -26 DEGREES
VENTRICULAR RATE: 76 BPM

## 2022-06-30 PROCEDURE — 93010 ELECTROCARDIOGRAM REPORT: CPT | Performed by: INTERNAL MEDICINE

## 2022-06-30 NOTE — ED NOTES
Transfer arranged via Sharon Adkins to [address] [place] [address] Drive @ Bob Robertson RN  06/30/22 [id number]

## 2022-06-30 NOTE — ED NOTES
Pt given recliner, new blankets, and a cup of water  Call bell is within reach and pt is watching tv with no distress noted       Tiffany Jimenez RN  06/30/22 [id number]

## 2022-06-30 NOTE — ED NOTES
[facility] called for transport back to facility, will call back with update     Romana Antonio, RN  06/30/22 0932

## 2022-06-30 NOTE — DISCHARGE INSTRUCTIONS
No definite cause of your symptoms was discovered  Creatinine(kidney function test) was elevated at 1 95  Follow up with your primary doctor/outpatient providers, and return to the emergency department for new or worsening symptoms  CT ABDOMEN AND PELVIS WITHOUT IV CONTRAST     INDICATION:   lower abdominal pain  COMPARISON:  CT of abdomen pelvis on November 18, 2017  TECHNIQUE:  CT examination of the abdomen and pelvis was performed without intravenous contrast  This examination was performed without intravenous contrast in the context of the critical nationwide Omnipaque shortage  Axial, sagittal, and coronal 2D   reformatted images were created from the source data and submitted for interpretation  Radiation dose length product (DLP) for this visit:  980 2 mGy-cm   This examination, like all CT scans performed in the [facility], was performed utilizing techniques to minimize radiation dose exposure, including the use of iterative   reconstruction and automated exposure control  Enteric contrast was not administered  FINDINGS:     ABDOMEN     LOWER CHEST:  Mild cardiomegaly  Coronary artery calcifications  LIVER/BILIARY TREE:  Unremarkable  GALLBLADDER:  There are gallstone(s) within the gallbladder, without pericholecystic inflammatory changes  SPLEEN:  Unremarkable  PANCREAS:  Unremarkable  ADRENAL GLANDS:  Unremarkable  KIDNEYS/URETERS:  Unremarkable  No hydronephrosis  STOMACH AND BOWEL:  No bowel obstruction  Diverticulosis without evidence of diverticulitis  APPENDIX:  A normal appendix was visualized  ABDOMINOPELVIC CAVITY:  No ascites  No pneumoperitoneum  No lymphadenopathy  VESSELS:  Moderate atherosclerotic calcifications  PELVIS     REPRODUCTIVE ORGANS:  Unremarkable for patient's age  URINARY BLADDER:  Unremarkable       ABDOMINAL WALL/INGUINAL REGIONS:  Mild skin thickening about the umbilicus and midline ventral pelvic wall  Moderate fat-containing subxiphoid hernia  OSSEOUS STRUCTURES:  Degenerative changes the osseous structures  IMPRESSION:     1  Mild periumbilical and ventral pelvic abdominal wall skin thickening, correlate for cellulitis  2   Cholelithiasis without evidence of cholecystitis  3   Diverticulosis without evidence of diverticulitis

## 2022-06-30 NOTE — ED PROVIDER NOTES
History  Chief Complaint   Patient presents with    Abdominal Pain     Pt staTES HE HAS HAD 2 DAYS OF ABDOMINAL PAIN AND DIZZINESS      Patient is a 76-year-old male seen in the emergency department with concern for lower abdominal pain over approximately past 2 days  Patient notes intermittent pain, without radiation  Patient notes no nausea, vomiting, diarrhea, fever, chest pain, or shortness of breath  Patient notes no definite clear exacerbating or alleviating factors for his symptoms  Patient denies recent abdominal surgery  Patient states that he has had similar symptoms the past due to eating food that did not agree with him  Prior to Admission Medications   Prescriptions Last Dose Informant Patient Reported? Taking? ARIPiprazole (ABILIFY) 5 mg tablet   Yes No   Sig: Take 5 mg by mouth daily   Cholecalciferol (VITAMIN D3) 65720 units CAPS   Yes No   Sig: Take 1 capsule by mouth once a week   Insulin Pen Needle 32G X 6 MM MISC   No No   Sig: Inject as directed 3 (three) times a day Poorly controlled insulin dependent DM2   LORazepam (ATIVAN) 0 5 mg tablet   Yes No   Sig: Take 0 5 mg by mouth every 8 (eight) hours as needed for anxiety   Lancets (ONETOUCH ULTRASOFT) lancets   Yes No   Sig: CHECK BLOOD SUGARS EVERY DAY   PARoxetine (PAXIL) 10 mg tablet   No No   Sig: Take 2 tablets (20 mg total) by mouth daily   SPIRIVA HANDIHALER 18 MCG inhalation capsule   Yes No   Sig: INHALE 1 CAPSULE (18 MCG TOTAL) DAILY     acetaminophen (TYLENOL) 650 mg CR tablet   Yes No   Sig: 3 (three) times a day   albuterol (2 5 mg/3 mL) 0 083 % nebulizer solution   No No   Sig: Take 1 vial (2 5 mg total) by nebulization every 6 (six) hours as needed for wheezing or shortness of breath   Patient taking differently: Take 2 5 mg by nebulization every 6 (six) hours as needed for wheezing or shortness of breath    albuterol (VENTOLIN HFA) 90 mcg/act inhaler   No No   Sig: Inhale 1 puff every 4 (four) hours as needed for wheezing   Patient taking differently: Inhale 1 puff every 4 (four) hours as needed for wheezing    atropine (ISOPTO ATROPINE) 1 % ophthalmic solution   Yes No   Sig: PUT 1 DROP INTO RIGHT EYE 3 TIMES A DAY   bisacodyl (DULCOLAX) 5 mg EC tablet   Yes No   Sig: Take 10 mg by mouth daily as needed   budesonide (PULMICORT) 0 5 mg/2 mL nebulizer solution   No No   Sig: Take 1 vial (0 5 mg total) by nebulization 2 (two) times a day Rinse mouth after use     cholecalciferol (VITAMIN D3) 1,000 units tablet   Yes No   Sig: Take 1,000 Units by mouth daily   dorzolamide (TRUSOPT) 2 % ophthalmic solution   Yes No   Sig: INSTILL 1 DROP INTO AFFECTED EYE 3 TIMES A DAY   famotidine (PEPCID) 20 mg tablet   Yes No   Sig: Take 20 mg by mouth 2 (two) times a day   glucose blood test strip   Yes No   Sig: TEST UP TO 3 TIMES PER DAY   insulin aspart (NOVOLOG FLEXPEN) 100 Units/mL injection pen   Yes No   Sig: Inject under the skin 3 (three) times a day with meals   insulin detemir (LEVEMIR FLEXTOUCH) 100 Units/mL injection pen   No No   Sig: Inject 40 Units under the skin daily   metoprolol tartrate (LOPRESSOR) 50 mg tablet   No No   Sig: Take 1 tablet (50 mg total) by mouth every 12 (twelve) hours   rivaroxaban (XARELTO) 15 mg tablet   Yes No   Sig: Take 15 mg by mouth   rosuvastatin (CRESTOR) 10 MG tablet   No No   Sig: Take 1 tablet (10 mg total) by mouth daily   traZODone (DESYREL) 50 mg tablet  Self Yes No   Sig: Take 50 mg by mouth daily at bedtime      Facility-Administered Medications: None       Past Medical History:   Diagnosis Date    [facility] 1/30/2017    Anemia 3/29/2020    Colonic polyp 2012    [facility] exacerbation ([facility] ) 4/20/2019    Disc displacement, cervical     Peripheral vascular disease ([facility] ) 2011    Femoral- popliteal bypass       Past Surgical History:   Procedure Laterality Date    BYPASS FEMORAL-POPLITEAL  2011    COLONOSCOPY  2012    With polypectomy    CORONARY ARTERY BYPASS GRAFT  2012    LAMINECTOMY  2010       Family History   Problem Relation Age of Onset    Heart attack Mother     Heart attack Father      I have reviewed and agree with the history as documented  E-Cigarette/Vaping    E-Cigarette Use Never User      E-Cigarette/Vaping Substances     Social History     Tobacco Use    Smoking status: Former Smoker     Packs/day: 1 00     Years: 37 00     Pack years: 37 00     Types: Cigarettes     Quit date: 2014     Years since quittin 4    Smokeless tobacco: Never Used    Tobacco comment: Pt states he restarted smoking a few weeks ago   Vaping Use    Vaping Use: Never used   Substance Use Topics    Alcohol use: No    Drug use: No       Review of Systems   Constitutional: Negative for chills and fever  HENT: Negative for ear pain and sore throat  Eyes: Negative for pain and visual disturbance  Respiratory: Negative for cough and shortness of breath  Cardiovascular: Negative for chest pain and palpitations  Gastrointestinal: Positive for abdominal pain  Negative for diarrhea, nausea and vomiting  Genitourinary: Positive for decreased urine volume  Negative for flank pain  Musculoskeletal: Negative for arthralgias and back pain  Skin: Negative for color change and rash  Neurological: Negative for seizures and syncope  Psychiatric/Behavioral: Negative for agitation and confusion  All other systems reviewed and are negative  Physical Exam  Physical Exam  Vitals and nursing note reviewed  Constitutional:       General: He is not in acute distress  Appearance: He is well-developed  HENT:      Head: Normocephalic and atraumatic  Right Ear: External ear normal       Left Ear: External ear normal       Nose: Nose normal       Mouth/Throat:      Pharynx: Oropharynx is clear  Eyes:      General: No scleral icterus  Conjunctiva/sclera: Conjunctivae normal    Cardiovascular:      Rate and Rhythm: Normal rate        Heart sounds: No murmur heard  Pulmonary:      Effort: Pulmonary effort is normal  No respiratory distress  Breath sounds: Normal breath sounds  Abdominal:      General: There is no distension  Palpations: Abdomen is soft  Tenderness: There is abdominal tenderness  Comments: mild suprapubic tenderness   Musculoskeletal:         General: No deformity or signs of injury  Cervical back: Normal range of motion and neck supple  Skin:     General: Skin is warm and dry  Neurological:      General: No focal deficit present  Mental Status: He is alert  Cranial Nerves: No cranial nerve deficit  Sensory: No sensory deficit  Psychiatric:         Mood and Affect: Mood normal          Behavior: Behavior normal          Thought Content:  Thought content normal          Vital Signs  ED Triage Vitals [06/29/22 2211]   Temperature Pulse Respirations Blood Pressure SpO2   98 3 °F (36 8 °C) 103 20 137/67 100 %      Temp Source Heart Rate Source Patient Position - Orthostatic VS BP Location FiO2 (%)   Oral Monitor Sitting Left arm --      Pain Score       No Pain           Vitals:    06/29/22 2211   BP: 137/67   Pulse: 103   Patient Position - Orthostatic VS: Sitting         Visual Acuity      ED Medications  Medications - No data to display    Diagnostic Studies  Results Reviewed     Procedure Component Value Units Date/Time    Urine Microscopic [[id number]]  (Abnormal) Collected: 06/29/22 2307    Lab Status: Final result Specimen: Urine, Other Updated: 06/29/22 2338     RBC, UA 0-5 /hpf      WBC, UA 0-5 /hpf      Epithelial Cells Occasional /hpf      Bacteria, UA Occasional /hpf      Hyaline Casts, UA 0-1 /lpf     COVID19, Influenza A/B, RSV PCR, [facility] [[id number]]  (Normal) Collected: 06/29/22 2232    Lab Status: Final result Specimen: Nares from Nose Updated: 06/29/22 2329     SARS-CoV-2 Negative     INFLUENZA A PCR Negative     INFLUENZA B PCR Negative     RSV PCR Negative    Narrative: FOR PEDIATRIC PATIENTS - copy/paste COVID Guidelines URL to browser: [email or web address]    SARS-CoV-2 assay is a Nucleic Acid Amplification assay intended for the  qualitative detection of nucleic acid from SARS-CoV-2 in nasopharyngeal  swabs  Results are for the presumptive identification of SARS-CoV-2 RNA  Positive results are indicative of infection with SARS-CoV-2, the virus  causing COVID-19, but do not rule out bacterial infection or co-infection  with other viruses  Laboratories within the [place] and its  territories are required to report all positive results to the appropriate  public health authorities  Negative results do not preclude SARS-CoV-2  infection and should not be used as the sole basis for treatment or other  patient management decisions  Negative results must be combined with  clinical observations, patient history, and epidemiological information  This test has not been FDA cleared or approved  This test has been authorized by FDA under an Emergency Use Authorization  (EUA)  This test is only authorized for the duration of time the  declaration that circumstances exist justifying the authorization of the  emergency use of an in vitro diagnostic tests for detection of SARS-CoV-2  virus and/or diagnosis of COVID-19 infection under section 564(b)(1) of  the Act, 21 U  S C  338KNS-9(X)(3), unless the authorization is terminated  or revoked sooner  The test has been validated but independent review by FDA  and CLIA is pending  Test performed using [employer] Gene[employer]pert: This RT-PCR assay targets N2,  a region unique to SARS-CoV-2  A conserved region in the E-gene was chosen  for pan-Sarbecovirus detection which includes SARS-CoV-2      UA w Reflex to Microscopic w Reflex to Culture [[id number]]  (Abnormal) Collected: 06/29/22 2303    Lab Status: Final result Specimen: Urine, Other Updated: 06/29/22 2314     Color, UA Yellow Clarity, UA Clear     Specific Gravity, UA 1 015     pH, UA 6 0     Leukocytes, UA Negative     Nitrite, UA Negative     Protein, UA 2+ mg/dl      Glucose, UA Negative mg/dl      Ketones, UA Negative mg/dl      Urobilinogen, UA 1 0 E U /dl      Bilirubin, UA Negative     Occult Blood, UA Negative    Lactic acid, plasma [[id number]]  (Normal) Collected: 06/29/22 2232    Lab Status: Final result Specimen: Blood from Arm, Left Updated: 06/29/22 2304     LACTIC ACID 0 8 mmol/L     Narrative:      Result may be elevated if tourniquet was used during collection      Comprehensive metabolic panel [[id number]]  (Abnormal) Collected: 06/29/22 2232    Lab Status: Final result Specimen: Blood from Arm, Left Updated: 06/29/22 2304     Sodium 137 mmol/L      Potassium 3 8 mmol/L      Chloride 95 mmol/L      CO2 36 mmol/L      ANION GAP 6 mmol/L      BUN 29 mg/dL      Creatinine 1 95 mg/dL      Glucose 180 mg/dL      Calcium 9 1 mg/dL      AST 20 U/L      ALT 16 U/L      Alkaline Phosphatase 52 U/L      Total Protein 6 6 g/dL      Albumin 3 5 g/dL      Total Bilirubin 0 51 mg/dL      eGFR 33 ml/min/1 73sq m     Narrative:      [place] guidelines for Chronic Kidney Disease (CKD):     Stage 1 with normal or high GFR (GFR > 90 mL/min/1 73 square meters)    Stage 2 Mild CKD (GFR = 60-89 mL/min/1 73 square meters)    Stage 3A Moderate CKD (GFR = 45-59 mL/min/1 73 square meters)    Stage 3B Moderate CKD (GFR = 30-44 mL/min/1 73 square meters)    Stage 4 Severe CKD (GFR = 15-29 mL/min/1 73 square meters)    Stage 5 End Stage CKD (GFR <15 mL/min/1 73 square meters)  Note: GFR calculation is accurate only with a steady state creatinine    Magnesium [[id number]]  (Normal) Collected: 06/29/22 2232    Lab Status: Final result Specimen: Blood from Arm, Left Updated: 06/29/22 2304     Magnesium 1 9 mg/dL     Lipase [[id number]]  (Normal) Collected: 06/29/22 2232    Lab Status: Final result Specimen: Blood from Arm, Left Updated: 06/29/22 2304     Lipase 80 u/L     CBC and differential [[id number]]  (Abnormal) Collected: 06/29/22 2232    Lab Status: Final result Specimen: Blood from Arm, Left Updated: 06/29/22 2240     WBC 7 41 Thousand/uL      RBC 4 99 Million/uL      Hemoglobin 10 7 g/dL      Hematocrit 39 2 %      MCV 79 fL      MCH 21 4 pg      MCHC 27 3 g/dL      RDW 17 7 %      MPV 8 8 fL      Platelets 087 Thousands/uL      nRBC 0 /100 WBCs      Neutrophils Relative 75 %      Immat GRANS % 0 %      Lymphocytes Relative 10 %      Monocytes Relative 12 %      Eosinophils Relative 2 %      Basophils Relative 1 %      Neutrophils Absolute 5 53 Thousands/µL      Immature Grans Absolute 0 03 Thousand/uL      Lymphocytes Absolute 0 73 Thousands/µL      Monocytes Absolute 0 91 Thousand/µL      Eosinophils Absolute 0 17 Thousand/µL      Basophils Absolute 0 04 Thousands/µL                  CT abdomen pelvis wo contrast   Final Result by Román Sanches MD (06/30 0136)      1  Mild periumbilical and ventral pelvic abdominal wall skin thickening, correlate for cellulitis  2   Cholelithiasis without evidence of cholecystitis  3   Diverticulosis without evidence of diverticulitis              Workstation performed: [id number]                    Procedures  ECG 12 Lead Documentation Only    Date/Time: 6/29/2022 11:07 PM  Performed by: Janey Beavers MD  Authorized by: Janey Beavers MD     Indications / Diagnosis:  Abdominal pain  Patient location:  ED  Rate:     ECG rate:  76    ECG rate assessment: normal    Rhythm:     Rhythm: atrial fibrillation    QRS:     QRS axis:  Right  ST segments:     ST segments:  Non-specific  T waves:     T waves: non-specific    Comments:      Atrial fibrillation at 76, right axis deviation, , QTc 483, nonspecific ST-T wave abnormality, no definite evidence of acute ischemia             ED Course  ED Course as of 06/30/22 0209   Thu Jun 30, 2022   0147 CT abdomen/pelvis-    IMPRESSION:     1  Mild periumbilical and ventral pelvic abdominal wall skin thickening, correlate for cellulitis  2   Cholelithiasis without evidence of cholecystitis  3   Diverticulosis without evidence of diverticulitis  SBIRT 20yo+    Flowsheet Row Most Recent Value   SBIRT (25 yo +)    In order to provide better care to our patients, we are screening all of our patients for alcohol and drug use  Would it be okay to ask you these screening questions? No Filed at: 06/29/2022 2308                    MDM  Number of Diagnoses or Management Options  Abdominal pain  Diverticulosis  Gallstones  Renal insufficiency  Diagnosis management comments: Patient is a 69-year-old male seen in the emergency department with concern for abdominal pain  EKG was obtained and noted  COVID-19 test was ordered in the emergency department  Laboratory evaluation remarkable for urinalysis positive for 2+ protein, low chloride of 95, elevated CO2 of 36, elevated BUN of 29, elevated creatinine of 1 95, elevated glucose of 180, low hemoglobin of 10 7, low MCV of 79  CT abdomen/pelvis was obtained to evaluate for acute intra-abdominal abnormality  CT imaging showed mild periumbilical and ventral pelvic abdominal wall skin thickening, correlate for cellulitis  CT also showed cholelithiasis without evidence of cholecystitis  CT also showed diverticulosis without evidence of diverticulitis  There is no evidence of cellulitis on exam   No definite cause of the patient's symptoms was discovered  On re-evaluation, patient was pain-free in the emergency department  Evaluation is not consistent with acute appendicitis, cholecystitis, pancreatitis, diverticulitis, urinary tract infection, or bowel obstruction  Plan to have patient follow up with PCP  Patient stable for discharge  Discharge instructions were reviewed with patient         Amount and/or Complexity of Data Reviewed  Clinical lab tests: ordered and reviewed  Tests in the radiology section of CPT®: ordered and reviewed  Tests in the medicine section of CPT®: ordered and reviewed        Disposition  Final diagnoses:   Abdominal pain   Renal insufficiency   Gallstones   Diverticulosis     Time reflects when diagnosis was documented in both MDM as applicable and the Disposition within this note     Time User Action Codes Description Comment    6/30/2022  1:54 AM Nell Cleverly Add [R10 9] Abdominal pain     6/30/2022  1:55 AM Nell Cleverly Add [N28 9] Renal insufficiency     6/30/2022  1:55 AM Nell Cleverly Add [K80 20] Gallstones     6/30/2022  1:56 AM Nell Cleverly Add [K57 90] Diverticulosis       ED Disposition     ED Disposition   Discharge    Condition   Stable    Date/Time   u Jun 30, 2022  1:54 AM    Comment   Sherice Herndon discharge to home/self care  Follow-up Information     Follow up With Specialties Details Why Contact Info Additional Information    Your primary doctor  Call in 1 day       [place] Call  As needed [address] [id number]  [address] , [place], [place], [address] [phone]          Patient's Medications   Discharge Prescriptions    No medications on file       No discharge procedures on file      PDMP Review     None          ED Provider  Electronically Signed by           Linda Coulter MD  06/30/22 1637

## 2022-06-30 NOTE — ED NOTES
Pt frequently getting out of bed and found in hallway multiple times  Pt given hospital bed with new sheets and bed alarm turned on  Pt currently laying in bed without issue and compliant       Tasha Jackson RN  06/30/22 4598

## 2022-06-30 NOTE — ED NOTES
Pt found naked in bed  Pt redressed and transferred to recliner  TV on for pt and new water cup provided       Ninoska Agudelo RN  06/30/22 2863

## 2022-07-07 ENCOUNTER — CONSULT (OUTPATIENT)
Dept: HEMATOLOGY ONCOLOGY | Facility: CLINIC | Age: 72
End: 2022-07-07
Payer: COMMERCIAL

## 2022-07-07 VITALS
TEMPERATURE: 97.2 F | OXYGEN SATURATION: 98 % | SYSTOLIC BLOOD PRESSURE: 122 MMHG | DIASTOLIC BLOOD PRESSURE: 70 MMHG | HEART RATE: 67 BPM | RESPIRATION RATE: 16 BRPM

## 2022-07-07 DIAGNOSIS — N18.30 STAGE 3 CHRONIC KIDNEY DISEASE, UNSPECIFIED WHETHER STAGE 3A OR 3B CKD (HCC): ICD-10-CM

## 2022-07-07 DIAGNOSIS — D50.0 IRON DEFICIENCY ANEMIA DUE TO CHRONIC BLOOD LOSS: ICD-10-CM

## 2022-07-07 DIAGNOSIS — D50.9 MICROCYTIC ANEMIA: Primary | ICD-10-CM

## 2022-07-07 DIAGNOSIS — R19.5 ABNORMAL STOOL COLOR: ICD-10-CM

## 2022-07-07 DIAGNOSIS — K57.30 DIVERTICULAR DISEASE OF COLON: ICD-10-CM

## 2022-07-07 PROCEDURE — 93010 ELECTROCARDIOGRAM REPORT: CPT | Performed by: INTERNAL MEDICINE

## 2022-07-07 PROCEDURE — 99204 OFFICE O/P NEW MOD 45 MIN: CPT

## 2022-07-07 RX ORDER — GABAPENTIN 100 MG/1
100 CAPSULE ORAL 3 TIMES DAILY
COMMUNITY
End: 2022-10-10 | Stop reason: ALTCHOICE

## 2022-07-07 RX ORDER — GABAPENTIN 600 MG/1
600 TABLET ORAL 3 TIMES DAILY
COMMUNITY
End: 2022-10-10 | Stop reason: ALTCHOICE

## 2022-07-07 NOTE — PROGRESS NOTES
[facility] [id number]  Hematology Ambulatory Consult  Johnny Her, 1950, [id number]  7/7/2022    Assessment/Plan:  1  Microcytic anemia  2  Iron deficiency anemia due to chronic blood loss  Mr Johny Mann is a 69-year-old male seen in consultation for microcytic anemia  This is likely due to chronic blood loss from the colon but is not a new problem as his hemoglobin has been stable in the high 10-11 range since March of 2020  He is not very symptomatic with this as he is mostly confined to a wheelchair in a long-term care facility  Patient is also a poor historian and therefore difficult to assess symptoms  I did explain to the patient that I will discuss iron infusions with the facility and figure out how he will be transported in order to receive IV iron  He is taking H91 and folic acid supplements therefore I will also check those levels as well  Explained that in order to assess proper bone marrow response I will also check reticulocyte count  These will be drawn at his facility  3  Diverticular disease of colon  4  Abnormal stool color  Was recently seen in the ER for abdominal pain and found to have some diverticulosis and cholelithiasis  Diverticular disease of the colon is likely the cause of iron deficiency anemia due to chronic blood loss  He has also overdue for colonoscopy as his last 1 according to review of records was in 2012  Referral placed to gastroenterology for evaluation and workup  I ordered a fecal occult stool test to be completed  5  Stage 3 chronic kidney disease, unspecified whether stage 3a or 3b CKD (HCC)  Baseline creatinine around 1 5-1 9 with a GFR 33-48  This appears to be stable  Will check erythropoietin to see if he would be a candidate for EPO once ferritin is corrected       - CBC and differential  - Comprehensive metabolic panel  - Iron Panel (Includes Ferritin, Iron Sat%, Iron, and TIBC); Future  - Vitamin B12; Future  - Reticulocytes; Future  - Occult Blood, Fecal Immunochemical; Future  - Folate; Future  - Methylmalonic acid, serum; Future  - Erythropoietin; Future  - Ambulatory referral to Gastroenterology; Future      The patient is scheduled for follow-up in approximately 1 month  Patient voiced agreement and understanding to the above  Patient knows to call the Hematology/Oncology office with any questions and concerns regarding the above  Barrier(s) to care: None  The patient is able to self care     -------------------------------------------------------------------------------------------------------    Chief Complaint   Patient presents with    Consult       Referring provider:  No referring provider defined for this encounter  History of present illness:  Katherine Richards is a 59-year-old male from [facility], [address] [place] [address], seen in consultation for anemia  He has past medical history of diverticular disease of the colon, GERD, type 2 diabetes, COPD, emphysema, AFib on Xarelto, heart failure, CKD stage 3, mood disorder, and mild cognitive impairment  History obtained from the patient who is not a very great historian and other information gathered from the electronic medical record  He has been in a wheelchair for 2 years due to neuropathy in his legs and muscle weakness  His hemoglobin appears to be decreasing since March of 2020  He previously had hemoglobin of 14 to 15 with most recently his hemoglobin ranging from 10-11  Mariah Fajardo denies ever being told that she or a family member is diagnosed with thalassemia  He denies history of gastric bypass  According to review of records his last colonoscopy was in 2012  He states occasionally his stools or coffee-ground color but denies any bright red blood per rectum or tarry stools  He denies any hematuria      He denies pagophagia, pica, restless leg syndrome, brittle nails, thinning hair, or pallor  He denies fatigue, shortness of breath, RODAS, palpitations, or chest pain  He denies fevers, chills, unintentional weight loss, nausea, vomiting, constipation, diarrhea, petechiae/purpura, unexplained ecchymosis  His current symptoms include lightheadedness, dizziness, lower extremity swelling  He was recently seen in the ER for abdominal pain  And CT scan at that time demonstrated some mild lucrecia umbilical and ventral pelvic abdominal wall thickening along with cholelithiasis without cholecystitis and diverticulosis without diverticulitis  He was not seen by Gastroenterology  08/30/2018:  Hemoglobin 14 8, MCV 88, platelets 822, WBC 7 9  01/09/2019:  Hemoglobin 14 5, MCV 86 4, platelets 736, WBC 7 9  11/12/2019:  Hemoglobin 12 3, MCV 83, platelets 806, WBC 6 8  03/26/2020:  Hemoglobin 11 3, MCV 85, platelets 989, WBC 7 5  08/12/2020:  Hemoglobin 10 7, MCV 76, platelets 170, WBC 5 1  01/18/2021:  Hemoglobin 11 2, MCV 82, platelets 287, WBC 4 7  04/19/2022:  Hemoglobin 10 9, MCV 72, platelets 131, WBC 8 8  05/27/2022:  B12 388, folate 8 5, ferritin 26, serum iron 19, TIBC 380, iron saturation 5%  06/29/2022:  Hemoglobin 10 7, MCV 79, platelets 480, WBC 0 09        Review of Systems   Constitutional: Negative for activity change, appetite change, diaphoresis, fatigue, fever and unexpected weight change  HENT: Negative for trouble swallowing and voice change  Eyes: Negative for photophobia and visual disturbance  Respiratory: Positive for shortness of breath (chronic COPD on supplemental O2)  Negative for cough and chest tightness  Cardiovascular: Positive for leg swelling  Negative for chest pain and palpitations  Gastrointestinal: Positive for abdominal pain (resolved since being seen in ER) and blood in stool (coffee ground color stool)  Negative for abdominal distention, anal bleeding, constipation, diarrhea, nausea and vomiting     Endocrine: Negative for cold intolerance and heat intolerance  Genitourinary: Negative for dysuria, hematuria and urgency  Musculoskeletal: Positive for gait problem (in a wheelchair)  Negative for arthralgias, back pain, joint swelling and myalgias  Skin: Negative for color change, pallor and rash  Neurological: Positive for dizziness, weakness and light-headedness  Negative for tremors and headaches  Hematological: Negative for adenopathy  Does not bruise/bleed easily  Psychiatric/Behavioral: Negative for confusion and sleep disturbance         Patient Active Problem List   Diagnosis    Arteriosclerosis of coronary artery bypass graft    Atrial fibrillation, chronic [facility]    Benign essential hypertension    Chronic obstructive lung disease ([facility]    Chronic systolic heart failure ([facility])    Depressive disorder    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    Diverticular disease of colon    Gastroesophageal reflux disease without esophagitis    Glaucoma    Gout    Mixed hyperlipidemia    Persistent insomnia    Type 2 diabetes mellitus with diabetic polyneuropathy, with long-term current use of insulin ([facility])    Altered mental state    Chest wall pain, chronic    Chronic respiratory failure ([facility])    Delirium    Encephalopathy    Essential hypertension    SENTHIL (obstructive sleep apnea)    Peripheral sensory neuropathy    Tracheal stenosis    Type 2 diabetes mellitus with stage 4 chronic kidney disease, without long-term current use of insulin ([facility])    Insulin dependent type 2 diabetes mellitus ([facility] )    Atherosclerosis of native artery of extremity ([facility])    Obesity    Uncontrolled type 2 diabetes mellitus with hyperglycemia ([facility])    Stage 3 chronic kidney disease ([facility])    Peripheral vascular disease ([facility]    Current use of long term anticoagulation    Urinary retention    Tinea pedis of both feet    Tobacco abuse    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 ([facility])    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[facility] )  Contusion of right eyeball    Dry eye syndrome of bilateral lacrimal glands    Hyphema, right eye    Lens replaced by other means    Localized edema    Age-related nuclear cataract of left eye    Open angle with borderline findings and high glaucoma risk in both eyes    Other secondary cataract, right eye    Pain of left heel    Panlobular emphysema (HCC)    Primary open-angle glaucoma, right eye, severe stage    Pterygium    Tear film insufficiency    Anemia    Weight loss    Nose dryness    Foreign body (FB) in soft tissue    Paranoia (HCC)    Mild cognitive impairment       Past Medical History:   Diagnosis Date    Acute on chronic respiratory failure ([facility] ) 2017    Anemia 3/29/2020    Colonic polyp 2012    COPD exacerbation ([facility] ) 2019    Disc displacement, cervical     Peripheral vascular disease ([facility] )     Femoral- popliteal bypass       Past Surgical History:   Procedure Laterality Date    BYPASS FEMORAL-POPLITEAL      COLONOSCOPY  2012    With polypectomy    CORONARY ARTERY BYPASS GRAFT      LAMINECTOMY  2010       Family History   Problem Relation Age of Onset    Heart attack Mother     Heart attack Father        Social History     Socioeconomic History    Marital status: Single     Spouse name: None    Number of children: 1    Years of education:      Highest education level: None   Occupational History    Occupation: retired   Tobacco Use    Smoking status: Former Smoker     Packs/day: 1 00     Years: 37 00     Pack years: 37 00     Types: Cigarettes     Quit date: 2014     Years since quittin 5    Smokeless tobacco: Never Used    Tobacco comment: Pt states he restarted smoking a few weeks ago   Vaping Use    Vaping Use: Never used   Substance and Sexual Activity    Alcohol use: No    Drug use: No    Sexual activity: Not Currently     Partners: Female     Birth control/protection: None   Other Topics Concern    None   Social History Narrative    Pt states he drinks soda daily     Social Determinants of Health     Financial Resource Strain: Not on file   Food Insecurity: Not on file   Transportation Needs: Not on file   Physical Activity: Not on file   Stress: Not on file   Social Connections: Not on file   Intimate Partner Violence: Not on file   Housing Stability: Not on file         Current Outpatient Medications:     acetaminophen (TYLENOL) 650 mg CR tablet, 3 (three) times a day, Disp: , Rfl:     albuterol (2 5 mg/3 mL) 0 083 % nebulizer solution, Take 1 vial (2 5 mg total) by nebulization every 6 (six) hours as needed for wheezing or shortness of breath, Disp: 100 vial, Rfl: 6    albuterol (VENTOLIN HFA) 90 mcg/act inhaler, Inhale 1 puff every 4 (four) hours as needed for wheezing, Disp: 1 Inhaler, Rfl: 3    ARIPiprazole (ABILIFY) 5 mg tablet, Take 5 mg by mouth daily, Disp: , Rfl:     atropine (ISOPTO ATROPINE) 1 % ophthalmic solution, PUT 1 DROP INTO RIGHT EYE 3 TIMES A DAY, Disp: , Rfl: 3    bisacodyl (DULCOLAX) 5 mg EC tablet, Take 10 mg by mouth daily as needed, Disp: , Rfl:     budesonide (PULMICORT) 0 5 mg/2 mL nebulizer solution, Take 1 vial (0 5 mg total) by nebulization 2 (two) times a day Rinse mouth after use , Disp: 120 vial, Rfl: 6    cholecalciferol (VITAMIN D3) 1,000 units tablet, Take 1,000 Units by mouth daily, Disp: , Rfl:     Cholecalciferol (VITAMIN D3) 30530 units CAPS, Take 1 capsule by mouth once a week, Disp: , Rfl: 0    dorzolamide (TRUSOPT) 2 % ophthalmic solution, INSTILL 1 DROP INTO AFFECTED EYE 3 TIMES A DAY, Disp: , Rfl: 3    famotidine (PEPCID) 20 mg tablet, Take 20 mg by mouth 2 (two) times a day, Disp: , Rfl:     gabapentin (NEURONTIN) 100 mg capsule, Take 100 mg by mouth 3 (three) times a day, Disp: , Rfl:     gabapentin (NEURONTIN) 600 MG tablet, Take 600 mg by mouth 3 (three) times a day, Disp: , Rfl:     glucose blood test strip, TEST UP TO 3 TIMES PER DAY, Disp: , Rfl:     insulin aspart (NovoLOG) 100 Units/mL injection pen, Inject under the skin 3 (three) times a day with meals, Disp: , Rfl:     insulin detemir (LEVEMIR FLEXTOUCH) 100 Units/mL injection pen, Inject 40 Units under the skin daily, Disp: 5 pen, Rfl: 11    Insulin Pen Needle 32G X 6 MM MISC, Inject as directed 3 (three) times a day Poorly controlled insulin dependent DM2, Disp: 100 each, Rfl: 11    Lancets (ONETOUCH ULTRASOFT) lancets, CHECK BLOOD SUGARS EVERY DAY, Disp: , Rfl: 3    LORazepam (ATIVAN) 0 5 mg tablet, Take 0 5 mg by mouth every 8 (eight) hours as needed for anxiety, Disp: , Rfl:     metoprolol tartrate (LOPRESSOR) 50 mg tablet, Take 1 tablet (50 mg total) by mouth every 12 (twelve) hours, Disp: 180 tablet, Rfl: 1    PARoxetine (PAXIL) 10 mg tablet, Take 2 tablets (20 mg total) by mouth daily, Disp: , Rfl:     rivaroxaban (XARELTO) 15 mg tablet, Take 15 mg by mouth, Disp: , Rfl:     rosuvastatin (CRESTOR) 10 MG tablet, Take 1 tablet (10 mg total) by mouth daily, Disp: 90 tablet, Rfl: 1    SPIRIVA HANDIHALER 18 MCG inhalation capsule, INHALE 1 CAPSULE (18 MCG TOTAL) DAILY  , Disp: , Rfl: 3    traZODone (DESYREL) 50 mg tablet, Take 100 mg by mouth daily at bedtime, Disp: , Rfl:     Allergies   Allergen Reactions    No Active Allergies     No Known Allergies        Objective:  /70 (BP Location: Left arm, Patient Position: Sitting, Cuff Size: Adult)   Pulse 67   Temp (!) 97 2 °F (36 2 °C)   Resp 16   SpO2 98%   Physical Exam  Constitutional:       General: He is not in acute distress  Appearance: Normal appearance  He is not ill-appearing  HENT:      Head: Atraumatic  Eyes:      Extraocular Movements: Extraocular movements intact  Conjunctiva/sclera: Conjunctivae normal    Cardiovascular:      Rate and Rhythm: Normal rate and regular rhythm  Pulses: Normal pulses  Heart sounds: Normal heart sounds  Pulmonary:      Effort: Pulmonary effort is normal  No respiratory distress  Breath sounds: Normal breath sounds  Comments: On supplemental oxygen  Abdominal:      General: Bowel sounds are normal  There is no distension  Palpations: Abdomen is soft  Tenderness: There is no abdominal tenderness  Musculoskeletal:      Cervical back: Normal range of motion and neck supple  Right lower leg: Edema present  Left lower leg: Edema present  Lymphadenopathy:      Cervical: No cervical adenopathy  Skin:     General: Skin is warm and dry  Capillary Refill: Capillary refill takes less than 2 seconds  Coloration: Skin is pale  Findings: No bruising or lesion  Neurological:      General: No focal deficit present  Mental Status: He is alert  Mental status is at baseline  He is disoriented  Motor: Weakness present  Gait: Gait abnormal (in a wheelchair)     Psychiatric:         Behavior: Behavior normal          Result Review  Labs:   Admission on 06/29/2022, Discharged on 06/30/2022   Component Date Value Ref Range Status    WBC 06/29/2022 7 41  4 31 - 10 16 Thousand/uL Final    RBC 06/29/2022 4 99  3 88 - 5 62 Million/uL Final    Hemoglobin 06/29/2022 10 7 (A) 12 0 - 17 0 g/dL Final    Hematocrit 06/29/2022 39 2  36 5 - 49 3 % Final    MCV 06/29/2022 79 (A) 82 - 98 fL Final    MCH 06/29/2022 21 4 (A) 26 8 - 34 3 pg Final    MCHC 06/29/2022 27 3 (A) 31 4 - 37 4 g/dL Final    RDW 06/29/2022 17 7 (A) 11 6 - 15 1 % Final    MPV 06/29/2022 8 8 (A) 8 9 - 12 7 fL Final    Platelets 87/83/6125 191  149 - 390 Thousands/uL Final    nRBC 06/29/2022 0  /100 WBCs Final    Neutrophils Relative 06/29/2022 75  43 - 75 % Final    Immat GRANS % 06/29/2022 0  0 - 2 % Final    Lymphocytes Relative 06/29/2022 10 (A) 14 - 44 % Final    Monocytes Relative 06/29/2022 12  4 - 12 % Final    Eosinophils Relative 06/29/2022 2  0 - 6 % Final    Basophils Relative 06/29/2022 1  0 - 1 % Final    Neutrophils Absolute 06/29/2022 5 53  1 85 - 7 62 Thousands/µL Final    Immature Grans Absolute 06/29/2022 0 03  0 00 - 0 20 Thousand/uL Final    Lymphocytes Absolute 06/29/2022 0 73  0 60 - 4 47 Thousands/µL Final    Monocytes Absolute 06/29/2022 0 91  0 17 - 1 22 Thousand/µL Final    Eosinophils Absolute 06/29/2022 0 17  0 00 - 0 61 Thousand/µL Final    Basophils Absolute 06/29/2022 0 04  0 00 - 0 10 Thousands/µL Final    Sodium 06/29/2022 137  135 - 147 mmol/L Final    Potassium 06/29/2022 3 8  3 5 - 5 3 mmol/L Final    Chloride 06/29/2022 95 (A) 96 - 108 mmol/L Final    CO2 06/29/2022 36 (A) 21 - 32 mmol/L Final    ANION GAP 06/29/2022 6  4 - 13 mmol/L Final    BUN 06/29/2022 29 (A) 5 - 25 mg/dL Final    Creatinine 06/29/2022 1 95 (A) 0 60 - 1 30 mg/dL Final    Standardized to IDMS reference method    Glucose 06/29/2022 180 (A) 65 - 140 mg/dL Final    If the patient is fasting, the ADA then defines impaired fasting glucose as > 100 mg/dL and diabetes as > or equal to 123 mg/dL  Specimen collection should occur prior to Sulfasalazine administration due to the potential for falsely depressed results  Specimen collection should occur prior to Sulfapyridine administration due to the potential for falsely elevated results   Calcium 06/29/2022 9 1  8 4 - 10 2 mg/dL Final    AST 06/29/2022 20  13 - 39 U/L Final    Specimen collection should occur prior to Sulfasalazine administration due to the potential for falsely depressed results   ALT 06/29/2022 16  7 - 52 U/L Final    Specimen collection should occur prior to Sulfasalazine administration due to the potential for falsely depressed results       Alkaline Phosphatase 06/29/2022 52  34 - 104 U/L Final    Total Protein 06/29/2022 6 6  6 4 - 8 4 g/dL Final    Albumin 06/29/2022 3 5  3 5 - 5 0 g/dL Final    Total Bilirubin 06/29/2022 0 51  0 20 - 1 00 mg/dL Final    eGFR 06/29/2022 33  ml/min/1 73sq m Final    Magnesium 06/29/2022 1 9  1 9 - 2 7 mg/dL Final    Lipase 06/29/2022 80  11 - 82 u/L Final    LACTIC ACID 06/29/2022 0 8  0 5 - 2 0 mmol/L Final    SARS-CoV-2 06/29/2022 Negative  Negative Final    INFLUENZA A PCR 06/29/2022 Negative  Negative Final    INFLUENZA B PCR 06/29/2022 Negative  Negative Final    RSV PCR 06/29/2022 Negative  Negative Final    Color, UA 06/29/2022 Yellow  Yellow Final    Clarity, UA 06/29/2022 Clear  Clear Final    Specific [place], UA 06/29/2022 1 015  1 001 - 1 030 Final    pH, UA 06/29/2022 6 0  5 0, 5 5, 6 0, 6 5, 7 0, 7 5, 8 0 Final    Leukocytes, UA 06/29/2022 Negative  Negative Final    Nitrite, UA 06/29/2022 Negative  Negative Final    Protein, UA 06/29/2022 2+ (A) Negative, Interference- unable to analyze mg/dl Final    Glucose, UA 06/29/2022 Negative  Negative mg/dl Final    Ketones, UA 06/29/2022 Negative  Negative mg/dl Final    Urobilinogen, UA 06/29/2022 1 0  0 2, 1 0 E U /dl E U /dl Final    Bilirubin, UA 06/29/2022 Negative  Negative Final    Occult Blood, UA 06/29/2022 Negative  Negative Final    RBC, UA 06/29/2022 0-5  None Seen, 0-1, 1-2, 2-4, 0-5 /hpf Final    WBC, UA 06/29/2022 0-5  None Seen, 0-1, 1-2, 0-5, 2-4 /hpf Final    Epithelial Cells 06/29/2022 Occasional  None Seen, Occasional /hpf Final    Bacteria, UA 06/29/2022 Occasional  None Seen, Occasional /hpf Final    Hyaline Casts, UA 06/29/2022 0-1 (A) (none) /lpf Final    Ventricular Rate 06/29/2022 76  BPM Final    Atrial Rate 06/29/2022 80  BPM Final    MO Interval 06/29/2022 37  ms Final    QRSD Interval 06/29/2022 150  ms Final    QT Interval 06/29/2022 429  ms Final    QTC Interval 06/29/2022 483  ms Final    QRS Axis 06/29/2022 217  degrees Final    T Wave Axis 06/29/2022 -26  degrees Final    Ventricular Rate 06/29/2022 76  BPM Final    Atrial Rate 06/29/2022 80  BPM Final    MO Interval 06/29/2022 37  ms Final    QRSD Interval 06/29/2022 150  ms Final    QT Interval 06/29/2022 429  ms Final    QTC Interval 06/29/2022 483  ms Final    QRS Axis 2022 217  degrees Final    T Wave Axis 2022 -26  degrees Final           Imagin/29/22: CT abdomen pelvis wo contrast  IMPRESSION:   1  Mild periumbilical and ventral pelvic abdominal wall skin thickening, correlate for cellulitis  2  Cholelithiasis without evidence of cholecystitis  3  Diverticulosis without evidence of diverticulitis  Please note: This report has been generated by a voice recognition software system  Therefore there may be syntax, spelling, and/or grammatical errors  Please call if you have any questions

## 2022-07-08 ENCOUNTER — TELEPHONE (OUTPATIENT)
Dept: HEMATOLOGY ONCOLOGY | Facility: CLINIC | Age: 72
End: 2022-07-08

## 2022-07-08 NOTE — TELEPHONE ENCOUNTER
Appointment Confirmation (to confirm pre existing appointments - ONLY)  No need to route   Appointment with  Essence Brownlee   Appointment date & time 8/10 1:30PM   Location [facility]   Patient verbilized Understanding yes

## 2022-08-09 ENCOUNTER — CONSULT (OUTPATIENT)
Dept: GASTROENTEROLOGY | Facility: AMBULARY SURGERY CENTER | Age: 72
End: 2022-08-09
Payer: COMMERCIAL

## 2022-08-09 VITALS
HEIGHT: 72 IN | SYSTOLIC BLOOD PRESSURE: 134 MMHG | OXYGEN SATURATION: 98 % | DIASTOLIC BLOOD PRESSURE: 68 MMHG | HEART RATE: 62 BPM | BODY MASS INDEX: 35.26 KG/M2

## 2022-08-09 DIAGNOSIS — Z12.11 SCREEN FOR COLON CANCER: ICD-10-CM

## 2022-08-09 DIAGNOSIS — K21.9 GASTROESOPHAGEAL REFLUX DISEASE WITHOUT ESOPHAGITIS: Primary | ICD-10-CM

## 2022-08-09 PROCEDURE — 99204 OFFICE O/P NEW MOD 45 MIN: CPT | Performed by: INTERNAL MEDICINE

## 2022-08-09 RX ORDER — INSULIN LISPRO 100 [IU]/ML
INJECTION, SOLUTION INTRAVENOUS; SUBCUTANEOUS
COMMUNITY
Start: 2022-07-26

## 2022-08-09 RX ORDER — MEMANTINE HYDROCHLORIDE 10 MG/1
TABLET ORAL
COMMUNITY
Start: 2022-08-04

## 2022-08-09 RX ORDER — FUROSEMIDE 40 MG/1
TABLET ORAL
COMMUNITY
Start: 2022-08-04 | End: 2022-10-10 | Stop reason: ALTCHOICE

## 2022-08-09 RX ORDER — INSULIN GLARGINE 100 [IU]/ML
INJECTION, SOLUTION SUBCUTANEOUS
COMMUNITY
Start: 2022-08-07

## 2022-08-09 NOTE — PROGRESS NOTES
Consultation - [facility]) [facility]  Addis Smith 1950 male         Chief Complaint:  For colonoscopy    HPI:  44-year-old male, nursing home resident with history of COPD, diabetes mellitus chronic kidney disease, coronary artery disease depression/anxiety was referred for colonoscopy  Patient reports having colonoscopy more than 10 years ago  He is having problems with acid reflux for which he was recently started on omeprazole with help  Denies any difficulty swallowing  Good appetite, no recent weight loss  No abdominal pain, nausea or vomiting  Good appetite, no recent weight loss  Chaperon: Ms Saul Walsh: Review of Systems   Constitutional: Negative for activity change, appetite change, chills, diaphoresis, fatigue, fever and unexpected weight change  HENT: Negative for ear discharge, ear pain, facial swelling, hearing loss, nosebleeds, sore throat, tinnitus and voice change  Eyes: Negative for pain, discharge, redness, itching and visual disturbance  Respiratory: Negative for apnea, cough, chest tightness, shortness of breath and wheezing  Cardiovascular: Negative for chest pain and palpitations  Gastrointestinal:        As noted in HPI   Endocrine: Negative for cold intolerance, heat intolerance and polyuria  Genitourinary: Negative for difficulty urinating, dysuria, flank pain, hematuria and urgency  Musculoskeletal: Positive for gait problem  Negative for arthralgias, back pain, joint swelling and myalgias  Skin: Negative for rash and wound  Neurological: Negative for dizziness, tremors, seizures, speech difficulty, light-headedness, numbness and headaches  Hematological: Negative for adenopathy  Does not bruise/bleed easily  Psychiatric/Behavioral: Negative for agitation, behavioral problems and confusion  The patient is not nervous/anxious           Past Medical History:   Diagnosis Date    Acute on chronic respiratory failure ([facility] [address] ) 2017    Anemia 3/29/2020    Colonic polyp 2012    COPD exacerbation [facility] [address] ) 2019    Disc displacement, cervical     Peripheral vascular disease ([facility] )     Femoral- popliteal bypass      Past Surgical History:   Procedure Laterality Date    BYPASS FEMORAL-POPLITEAL  2011    COLONOSCOPY      With polypectomy    CORONARY ARTERY BYPASS GRAFT      LAMINECTOMY       Social History     Socioeconomic History    Marital status: Single     Spouse name: Not on file    Number of children: 1    Years of education:      Highest education level: Not on file   Occupational History    Occupation: retired   Tobacco Use    Smoking status: Former Smoker     Packs/day: 1 00     Years: 37 00     Pack years: 37 00     Types: Cigarettes     Quit date: 2014     Years since quittin 6    Smokeless tobacco: Never Used    Tobacco comment: Pt states he restarted smoking a few weeks ago   Vaping Use    Vaping Use: Never used   Substance and Sexual Activity    Alcohol use: No    Drug use: No    Sexual activity: Not Currently     Partners: Female     Birth control/protection: None   Other Topics Concern    Not on file   Social History Narrative    Pt states he drinks soda daily     Social Determinants of Health     Financial Resource Strain: Not on file   Food Insecurity: Not on file   Transportation Needs: Not on file   Physical Activity: Not on file   Stress: Not on file   Social Connections: Not on file   Intimate Partner Violence: Not on file   Housing Stability: Not on file     Family History   Problem Relation Age of Onset    Heart attack Mother     Heart attack Father      Vancomycin and No active allergies  Current Outpatient Medications   Medication Sig Dispense Refill    acetaminophen (TYLENOL) 650 mg CR tablet 3 (three) times a day      albuterol (2 5 mg/3 mL) 0 083 % nebulizer solution Take 1 vial (2 5 mg total) by nebulization every 6 (six) hours as needed for wheezing or shortness of breath 100 vial 6    albuterol (VENTOLIN HFA) 90 mcg/act inhaler Inhale 1 puff every 4 (four) hours as needed for wheezing 1 Inhaler 3    ARIPiprazole (ABILIFY) 5 mg tablet Take 5 mg by mouth daily      atropine (ISOPTO ATROPINE) 1 % ophthalmic solution PUT 1 DROP INTO RIGHT EYE 3 TIMES A DAY  3    bisacodyl (DULCOLAX) 5 mg EC tablet Take 10 mg by mouth daily as needed      bisacodyl (DULCOLAX) 5 mg EC tablet Take 2 tablets (10 mg total) by mouth once for 1 dose 2 tablet 0    budesonide (PULMICORT) 0 5 mg/2 mL nebulizer solution Take 1 vial (0 5 mg total) by nebulization 2 (two) times a day Rinse mouth after use   120 vial 6    cholecalciferol (VITAMIN D3) 1,000 units tablet Take 1,000 Units by mouth daily      dorzolamide (TRUSOPT) 2 % ophthalmic solution INSTILL 1 DROP INTO AFFECTED EYE 3 TIMES A DAY  3    famotidine (PEPCID) 20 mg tablet Take 20 mg by mouth 2 (two) times a day      furosemide (LASIX) 40 mg tablet       gabapentin (NEURONTIN) 100 mg capsule Take 100 mg by mouth 3 (three) times a day      gabapentin (NEURONTIN) 600 MG tablet Take 600 mg by mouth 3 (three) times a day      insulin aspart (NovoLOG) 100 Units/mL injection pen Inject under the skin 3 (three) times a day with meals      insulin detemir (LEVEMIR FLEXTOUCH) 100 Units/mL injection pen Inject 40 Units under the skin daily 5 pen 11    insulin lispro (HumaLOG) 100 units/mL injection pen       Lantus SoloStar 100 units/mL SOPN       LORazepam (ATIVAN) 0 5 mg tablet Take 0 5 mg by mouth every 8 (eight) hours as needed for anxiety      memantine (NAMENDA) 10 mg tablet       metoprolol tartrate (LOPRESSOR) 50 mg tablet Take 1 tablet (50 mg total) by mouth every 12 (twelve) hours 180 tablet 1    PARoxetine (PAXIL) 10 mg tablet Take 2 tablets (20 mg total) by mouth daily      polyethylene glycol (GOLYTELY) 4000 mL solution Take 4,000 mL by mouth once for 1 dose 4000 mL 0    rivaroxaban (XARELTO) 15 mg tablet Take 15 mg by mouth      rosuvastatin (CRESTOR) 10 MG tablet Take 1 tablet (10 mg total) by mouth daily 90 tablet 1    SPIRIVA HANDIHALER 18 MCG inhalation capsule INHALE 1 CAPSULE (18 MCG TOTAL) DAILY  3    traZODone (DESYREL) 50 mg tablet Take 100 mg by mouth daily at bedtime      Trelediana Ellipta 100-62 5-25 MCG/INH inhaler       Cholecalciferol (VITAMIN D3) 14042 units CAPS Take 1 capsule by mouth once a week (Patient not taking: Reported on 8/9/2022)  0    glucose blood test strip TEST UP TO 3 TIMES PER DAY (Patient not taking: Reported on 8/9/2022)      Insulin Pen Needle 32G X 6 MM MISC Inject as directed 3 (three) times a day Poorly controlled insulin dependent DM2 (Patient not taking: Reported on 8/9/2022) 100 each 11    Lancets (ONETOUCH ULTRASOFT) lancets CHECK BLOOD SUGARS EVERY DAY (Patient not taking: Reported on 8/9/2022)  3     No current facility-administered medications for this visit  Blood pressure 134/68, pulse 62, height 6' (1 829 m), SpO2 98 %  PHYSICAL EXAM: Physical Exam  Constitutional:       Appearance: He is well-developed  HENT:      Head: Normocephalic and atraumatic  Eyes:      General: No scleral icterus  Right eye: No discharge  Left eye: No discharge  Conjunctiva/sclera: Conjunctivae normal       Pupils: Pupils are equal, round, and reactive to light  Neck:      Thyroid: No thyromegaly  Vascular: No JVD  Trachea: No tracheal deviation  Cardiovascular:      Rate and Rhythm: Normal rate and regular rhythm  Heart sounds: Normal heart sounds  No murmur heard  No friction rub  No gallop  Pulmonary:      Effort: Pulmonary effort is normal  No respiratory distress  Breath sounds: Normal breath sounds  No wheezing or rales  Chest:      Chest wall: No tenderness  Abdominal:      General: Bowel sounds are normal  There is no distension  Palpations: Abdomen is soft  There is no mass  Tenderness:  There is no abdominal tenderness  There is no guarding or rebound  Hernia: No hernia is present  Musculoskeletal:      Cervical back: Neck supple  Lymphadenopathy:      Cervical: No cervical adenopathy  Skin:     General: Skin is warm and dry  Findings: No erythema or rash  Neurological:      Mental Status: He is alert and oriented to person, place, and time  Psychiatric:         Behavior: Behavior normal          Thought Content: Thought content normal           Lab Results   Component Value Date    WBC 7 41 06/29/2022    HGB 10 7 (L) 06/29/2022    HCT 39 2 06/29/2022    MCV 79 (L) 06/29/2022     06/29/2022     Lab Results   Component Value Date    CALCIUM 9 1 06/29/2022    K 3 8 06/29/2022    CO2 36 (H) 06/29/2022    CL 95 (L) 06/29/2022    BUN 29 (H) 06/29/2022    CREATININE 1 95 (H) 06/29/2022     Lab Results   Component Value Date    ALT 16 06/29/2022    AST 20 06/29/2022    ALKPHOS 52 06/29/2022     Lab Results   Component Value Date    INR 1 4 (H) 01/23/2019    INR 5 80 (A) 01/10/2019    INR 5 8 (H) 01/09/2019    PROTIME 14 4 (H) 01/23/2019    PROTIME 58 5 (H) 01/09/2019       CT abdomen pelvis wo contrast    Result Date: 6/30/2022  Impression: 1  Mild periumbilical and ventral pelvic abdominal wall skin thickening, correlate for cellulitis  2   Cholelithiasis without evidence of cholecystitis  3   Diverticulosis without evidence of diverticulitis  Workstation performed: [id number]       ASSESSMENT & PLAN:    Gastroesophageal reflux disease without esophagitis  Patient reports having symptoms of acid reflux with improvement on omeprazole  Rule out Borwn's esophagus     -Patient was explained about the lifestyle and dietary modifications  Advised to avoid fatty foods, chocolates, caffeine, alcohol and any other triggering foods    Avoid eating for at least 3 hours before going to bed         -discussed about the long-term side effects from omeprazole and will recommend to change to Pepcid in 1-2 months    -schedule for EGD    Screen for colon cancer  Screening for colon cancer - patient is at average risk for colon cancer screening  Rule out colorectal lesions including polyps or malignancy         -Schedule for colonoscopy  -High-fiber diet     -Patient was given instructions about the colonoscopy prep     -Patient was explained about  the risks and benefits of the procedure  Risks including but not limited to bleeding, infection, perforation were explained in detail  Also explained about less than 100% sensitivity with the exam and other alternatives

## 2022-08-09 NOTE — ASSESSMENT & PLAN NOTE
Patient reports having symptoms of acid reflux with improvement on omeprazole  Rule out Brown's esophagus     -Patient was explained about the lifestyle and dietary modifications  Advised to avoid fatty foods, chocolates, caffeine, alcohol and any other triggering foods    Avoid eating for at least 3 hours before going to bed         -discussed about the long-term side effects from omeprazole and will recommend to change to Pepcid in 1-2 months    -schedule for EGD

## 2022-08-10 ENCOUNTER — TELEPHONE (OUTPATIENT)
Dept: OTHER | Facility: OTHER | Age: 72
End: 2022-08-10

## 2022-08-10 ENCOUNTER — OFFICE VISIT (OUTPATIENT)
Dept: HEMATOLOGY ONCOLOGY | Facility: CLINIC | Age: 72
End: 2022-08-10
Payer: COMMERCIAL

## 2022-08-10 VITALS
HEIGHT: 72 IN | SYSTOLIC BLOOD PRESSURE: 132 MMHG | HEART RATE: 86 BPM | TEMPERATURE: 96.9 F | OXYGEN SATURATION: 98 % | BODY MASS INDEX: 32.78 KG/M2 | WEIGHT: 242 LBS | RESPIRATION RATE: 16 BRPM | DIASTOLIC BLOOD PRESSURE: 60 MMHG

## 2022-08-10 DIAGNOSIS — D50.0 IRON DEFICIENCY ANEMIA DUE TO CHRONIC BLOOD LOSS: ICD-10-CM

## 2022-08-10 DIAGNOSIS — D50.9 MICROCYTIC ANEMIA: Primary | ICD-10-CM

## 2022-08-10 PROCEDURE — 99213 OFFICE O/P EST LOW 20 MIN: CPT

## 2022-08-10 NOTE — TELEPHONE ENCOUNTER
Spoke with Chilango Small from [facility] she is looking to schedule patient for his egd/ colonoscopy

## 2022-08-10 NOTE — PROGRESS NOTES
[place] [phone]  Hematology Ambulatory Follow-Up  [place] Jauregui, 1950, [id number]  8/10/2022    Assessment/Plan:  1  Microcytic anemia  2  Iron deficiency anemia due to chronic blood loss  Mr Aracelis Perez is a 70-year-old male seen in follow-up for microcytic anemia  This is likely due to chronic blood loss from the colon as he has had a positive fecal occult stool test   He is currently undergoing evaluation with Gastroenterology with EGD and colonoscopy  This is being arranged with the nursing facility to be scheduled in the near future  Most recently his hemoglobins have been ranging from 10-11 since March of 2020  Most recently his hemoglobin has increased to 12 4 with persistent microcytosis  Unfortunately with the initial workup ordered at his consult be iron panel was not drawn/resulted for our visit today  I placed a new order for an iron panel to be drawn now  He should have monthly CBCs (not more frequently), and repeat iron panel with CBC in 4 months prior to his follow-up with me in the office  During that time hopefully the colonoscopy an EGD will be completed to rule out a source for chronic blood loss  He also could have anemia due to chronic disease for which his baseline of 10-11 is more than adequate for him as he is wheelchair bound and not very active  He also has no symptoms associated with his anemia       - Iron Panel (Includes Ferritin, Iron Sat%, Iron, and TIBC); Future  - CBC and differential; Future  - Iron Panel (Includes Ferritin, Iron Sat%, Iron, and TIBC); Future  - CBC and differential; Standing  - CBC and differential    Iron panel now  Monthly CBC  IP +CBC in 4 months with f/u     The patient is scheduled for follow-up in approximately 4 months  Patient voiced agreement and understanding to the above   Patient knows to call the Hematology/Oncology office with any questions and concerns regarding the above  Barrier(s) to care: None  The patient is able to self care   ------------------------------------------------------------------------------------------------------    Chief Complaint   Patient presents with    Follow-up       History of present illness:   Katherine Richards is a 58-year-old male from a long-term care nursing facility, [facility], originally seen in consultation in July of 2022 for anemia  He has past medical history of diverticular disease of the colon, GERD, type 2 diabetes, COPD, emphysema, AFib on Xarelto, heart failure, CKD stage 3, mood disorder, and mild cognitive impairment  He has been in a wheelchair for about 2 years due to neuropathy in his legs and muscle weakness  Since March of 2020 his hemoglobin has been decreasing and his new baseline is 5 ranging from 10-11  He denies any symptoms associated with this  Initial workup:  Iron panel not drawn  vitamin B12 not drawn  Reticulocytes 2 3% 0 1103 (H)  Fecal occult stool +  Folate 7 8  MMA 0 32 WNL  Erythropoietin 40 (H)    08/30/2018:  Hemoglobin 14 8, MCV 88, platelets 232, WBC 7 9  01/09/2019:  Hemoglobin 14 5, MCV 86 4, platelets 715, WBC 7 9  11/12/2019:  Hemoglobin 12 3, MCV 83, platelets 906, WBC 6 8  03/26/2020:  Hemoglobin 11 3, MCV 85, platelets 345, WBC 7 5  08/12/2020:  Hemoglobin 10 7, MCV 76, platelets 828, WBC 5 1  01/18/2021:  Hemoglobin 11 2, MCV 82, platelets 196, WBC 4 7  04/19/2022:  Hemoglobin 10 9, MCV 72, platelets 633, WBC 8 8  05/27/2022:  B12 388, folate 8 5, ferritin 26, serum iron 19, TIBC 380, iron saturation 5%  06/29/2022:  Hemoglobin 10 7, MCV 79, platelets 426, WBC 6 30  07/25/2022:  Hemoglobin 11 5, MCV 74, platelets 228, WBC 8 3  08/09/2022:  Hemoglobin 12 4, MCV 73, platelets 385, WBC 7 4    Interval history:  He was seen by Gastroenterology for his positive fecal occult stool testing  His last colonoscopy was more than 10 years ago    He is scheduled for an EGD and colonoscopy in the near future  Otherwise he feels well and has no complaints or concerns today  Review of Systems   Constitutional: Negative for activity change, appetite change, diaphoresis, fatigue, fever and unexpected weight change  HENT: Negative for trouble swallowing and voice change  Eyes: Negative for photophobia and visual disturbance  Respiratory: Positive for shortness of breath (chronic COPD on supplemental O2)  Negative for cough and chest tightness  Cardiovascular: Positive for leg swelling  Negative for chest pain and palpitations  Gastrointestinal: Negative for abdominal distention, abdominal pain, anal bleeding, blood in stool, constipation, diarrhea, nausea and vomiting  Endocrine: Negative for cold intolerance and heat intolerance  Genitourinary: Negative for dysuria, hematuria and urgency  Musculoskeletal: Positive for gait problem (in a wheelchair)  Negative for arthralgias, back pain, joint swelling and myalgias  Skin: Negative for color change, pallor and rash  Neurological: Positive for weakness  Negative for dizziness, tremors, light-headedness and headaches  Hematological: Negative for adenopathy  Does not bruise/bleed easily  Psychiatric/Behavioral: Negative for confusion and sleep disturbance         Patient Active Problem List   Diagnosis    Arteriosclerosis of coronary artery bypass graft    Atrial fibrillation, chronic (HCC)    Benign essential hypertension    Chronic obstructive lung disease (HCC)    Chronic systolic heart failure (HCC)    Depressive disorder    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    Diverticular disease of colon    Gastroesophageal reflux disease without esophagitis    Glaucoma    Gout    Mixed hyperlipidemia    Persistent insomnia    Type 2 diabetes mellitus with diabetic polyneuropathy, with long-term current use of insulin (HCC)    Altered mental state    Chest wall pain, chronic    Chronic respiratory failure (HCC)    Delirium    Encephalopathy    Essential hypertension    SENTHIL (obstructive sleep apnea)    Peripheral sensory neuropathy    Tracheal stenosis    Type 2 diabetes mellitus with stage 4 chronic kidney disease, without long-term current use of insulin ([facility])    Insulin dependent type 2 diabetes mellitus ([address] )    Atherosclerosis of native artery of extremity ([facility])    Obesity    Uncontrolled type 2 diabetes mellitus with hyperglycemia ([facility])    Stage 3 chronic kidney disease (HCC)    Peripheral vascular disease (HCC)    Current use of long term anticoagulation    Urinary retention    Tinea pedis of both feet    Tobacco abuse    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 ([facility])    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[facility])    Contusion of right eyeball    Dry eye syndrome of bilateral lacrimal glands    Hyphema, right eye    Lens replaced by other means    Localized edema    Age-related nuclear cataract of left eye    Open angle with borderline findings and high glaucoma risk in both eyes    Other secondary cataract, right eye    Pain of left heel    Panlobular emphysema ([address] )    Primary open-angle glaucoma, right eye, severe stage    Pterygium    Tear film insufficiency    Anemia    Weight loss    Nose dryness    Foreign body (FB) in soft tissue    Paranoia ([address] )    Mild cognitive impairment    Screen for colon cancer       Past Medical History:   Diagnosis Date    Acute on chronic respiratory failure ([address] ) 1/30/2017    Anemia 3/29/2020    Colonic polyp 2012    COPD exacerbation ([address] ) 4/20/2019    Disc displacement, cervical     Peripheral vascular disease ([address] ) 2011    Femoral- popliteal bypass       Past Surgical History:   Procedure Laterality Date    BYPASS FEMORAL-POPLITEAL  2011    COLONOSCOPY  2012    With polypectomy    CORONARY ARTERY BYPASS GRAFT  2012    LAMINECTOMY  2010       Family History   Problem Relation Age of Onset    Heart attack Mother     Heart attack Father        Social History     Socioeconomic History    Marital status: Single     Spouse name: Not on file    Number of children: 1    Years of education:      Highest education level: Not on file   Occupational History    Occupation: retired   Tobacco Use    Smoking status: Former Smoker     Packs/day: 1 00     Years: 37 00     Pack years: 37 00     Types: Cigarettes     Quit date: 2014     Years since quittin 6    Smokeless tobacco: Never Used    Tobacco comment: Pt states he restarted smoking a few weeks ago   Vaping Use    Vaping Use: Never used   Substance and Sexual Activity    Alcohol use: No    Drug use: No    Sexual activity: Not Currently     Partners: Female     Birth control/protection: None   Other Topics Concern    Not on file   Social History Narrative    Pt states he drinks soda daily     Social Determinants of Health     Financial Resource Strain: Not on file   Food Insecurity: Not on file   Transportation Needs: Not on file   Physical Activity: Not on file   Stress: Not on file   Social Connections: Not on file   Intimate Partner Violence: Not on file   Housing Stability: Not on file         Current Outpatient Medications:     acetaminophen (TYLENOL) 650 mg CR tablet, 3 (three) times a day, Disp: , Rfl:     albuterol (2 5 mg/3 mL) 0 083 % nebulizer solution, Take 1 vial (2 5 mg total) by nebulization every 6 (six) hours as needed for wheezing or shortness of breath, Disp: 100 vial, Rfl: 6    albuterol (VENTOLIN HFA) 90 mcg/act inhaler, Inhale 1 puff every 4 (four) hours as needed for wheezing, Disp: 1 Inhaler, Rfl: 3    ARIPiprazole (ABILIFY) 5 mg tablet, Take 5 mg by mouth daily, Disp: , Rfl:     atropine (ISOPTO ATROPINE) 1 % ophthalmic solution, PUT 1 DROP INTO RIGHT EYE 3 TIMES A DAY, Disp: , Rfl: 3    bisacodyl (DULCOLAX) 5 mg EC tablet, Take 10 mg by mouth daily as needed, Disp: , Rfl:     budesonide (PULMICORT) 0 5 mg/2 mL nebulizer solution, Take 1 vial (0 5 mg total) by nebulization 2 (two) times a day Rinse mouth after use , Disp: 120 vial, Rfl: 6    cholecalciferol (VITAMIN D3) 1,000 units tablet, Take 1,000 Units by mouth daily, Disp: , Rfl:     dorzolamide (TRUSOPT) 2 % ophthalmic solution, INSTILL 1 DROP INTO AFFECTED EYE 3 TIMES A DAY, Disp: , Rfl: 3    famotidine (PEPCID) 20 mg tablet, Take 20 mg by mouth 2 (two) times a day, Disp: , Rfl:     furosemide (LASIX) 40 mg tablet, , Disp: , Rfl:     gabapentin (NEURONTIN) 100 mg capsule, Take 100 mg by mouth 3 (three) times a day, Disp: , Rfl:     gabapentin (NEURONTIN) 600 MG tablet, Take 600 mg by mouth 3 (three) times a day, Disp: , Rfl:     insulin aspart (NovoLOG) 100 Units/mL injection pen, Inject under the skin 3 (three) times a day with meals, Disp: , Rfl:     insulin detemir (LEVEMIR FLEXTOUCH) 100 Units/mL injection pen, Inject 40 Units under the skin daily, Disp: 5 pen, Rfl: 11    insulin lispro (HumaLOG) 100 units/mL injection pen, , Disp: , Rfl:     Insulin Pen Needle 32G X 6 MM MISC, Inject as directed 3 (three) times a day Poorly controlled insulin dependent DM2, Disp: 100 each, Rfl: 11    Lancets (ONETOUCH ULTRASOFT) lancets, , Disp: , Rfl: 3    Lantus SoloStar 100 units/mL SOPN, , Disp: , Rfl:     LORazepam (ATIVAN) 0 5 mg tablet, Take 0 5 mg by mouth every 8 (eight) hours as needed for anxiety, Disp: , Rfl:     memantine (NAMENDA) 10 mg tablet, , Disp: , Rfl:     metoprolol tartrate (LOPRESSOR) 50 mg tablet, Take 1 tablet (50 mg total) by mouth every 12 (twelve) hours, Disp: 180 tablet, Rfl: 1    PARoxetine (PAXIL) 10 mg tablet, Take 2 tablets (20 mg total) by mouth daily, Disp: , Rfl:     rivaroxaban (XARELTO) 15 mg tablet, Take 15 mg by mouth, Disp: , Rfl:     rosuvastatin (CRESTOR) 10 MG tablet, Take 1 tablet (10 mg total) by mouth daily, Disp: 90 tablet, Rfl: 1    SPIRIVA HANDIHALER 18 MCG inhalation capsule, INHALE 1 CAPSULE (18 MCG TOTAL) DAILY , Disp: , Rfl: 3    traZODone (DESYREL) 50 mg tablet, Take 100 mg by mouth daily at bedtime, Disp: , Rfl:     Trelegy Ellipta 100-62 5-25 MCG/INH inhaler, , Disp: , Rfl:     bisacodyl (DULCOLAX) 5 mg EC tablet, Take 2 tablets (10 mg total) by mouth once for 1 dose, Disp: 2 tablet, Rfl: 0    Cholecalciferol (VITAMIN D3) 43111 units CAPS, Take 1 capsule by mouth once a week (Patient not taking: No sig reported), Disp: , Rfl: 0    glucose blood test strip, TEST UP TO 3 TIMES PER DAY (Patient not taking: No sig reported), Disp: , Rfl:     polyethylene glycol (GOLYTELY) 4000 mL solution, Take 4,000 mL by mouth once for 1 dose, Disp: 4000 mL, Rfl: 0    Allergies   Allergen Reactions    Vancomycin Other (See Comments)    No Active Allergies        Objective:  /60 (BP Location: Left arm, Patient Position: Sitting, Cuff Size: Large)   Pulse 86   Temp (!) 96 9 °F (36 1 °C) (Temporal)   Resp 16   Ht 6' (1 829 m)   Wt 110 kg (242 lb)   SpO2 98%   BMI 32 82 kg/m²    Physical Exam  Constitutional:       General: He is not in acute distress  Appearance: Normal appearance  He is not ill-appearing  HENT:      Head: Atraumatic  Eyes:      Extraocular Movements: Extraocular movements intact  Conjunctiva/sclera: Conjunctivae normal    Cardiovascular:      Rate and Rhythm: Normal rate and regular rhythm  Pulses: Normal pulses  Heart sounds: Normal heart sounds  Pulmonary:      Effort: Pulmonary effort is normal  No respiratory distress  Breath sounds: Normal breath sounds  Comments: On supplemental oxygen  Abdominal:      General: Bowel sounds are normal  There is no distension  Palpations: Abdomen is soft  Tenderness: There is no abdominal tenderness  Musculoskeletal:      Cervical back: Normal range of motion and neck supple  Right lower leg: Edema present  Left lower leg: Edema present  Lymphadenopathy:      Cervical: No cervical adenopathy  Skin:     General: Skin is warm and dry  Capillary Refill: Capillary refill takes less than 2 seconds  Coloration: Skin is pale  Findings: No bruising or lesion  Neurological:      General: No focal deficit present  Mental Status: He is alert  Mental status is at baseline  He is disoriented  Motor: Weakness present  Gait: Gait abnormal (in a wheelchair)  Psychiatric:         Behavior: Behavior normal          Result Review  Labs:  Labs reviewed in Care Everywhere ([facility] lab medicine)    Imagin22: CT abdomen pelvis wo contrast  1  Mild periumbilical and ventral pelvic abdominal wall skin thickening, correlate for cellulitis  2   Cholelithiasis without evidence of cholecystitis  3   Diverticulosis without evidence of diverticulitis  Please note: This report has been generated by a voice recognition software system  Therefore there may be syntax, spelling, and/or grammatical errors  Please call if you have any questions

## 2022-08-11 NOTE — TELEPHONE ENCOUNTER
Spoke w/ Ike @ [facility]/pt scheduled for 10/10/2022 at [facility] w/ dr Fely Castrejon for egd/colonoscopy

## 2022-08-11 NOTE — PATIENT INSTRUCTIONS
Scheduled date of EGD/colonoscopy (as of today):10/10/2022  Physician performing EGD/colonoscopy:DR GEIGER  Location of EGD[facility]colonoscopy:[facility]  Desired bowel prep reviewed with patient:GOLYTELY/TRACEY PER DR GEIGER/PREP AND PAPER WORK AND PAPER SCRIPT FOR BOWEL PREP SENT W/ PT TO [place] [id number]  Instructions reviewed with patient by:LATESHA PARKER  Clearances:

## 2022-10-10 ENCOUNTER — ANESTHESIA (OUTPATIENT)
Dept: GASTROENTEROLOGY | Facility: HOSPITAL | Age: 72
End: 2022-10-10

## 2022-10-10 ENCOUNTER — ANESTHESIA EVENT (OUTPATIENT)
Dept: GASTROENTEROLOGY | Facility: HOSPITAL | Age: 72
End: 2022-10-10

## 2022-10-10 ENCOUNTER — HOSPITAL ENCOUNTER (OUTPATIENT)
Dept: GASTROENTEROLOGY | Facility: HOSPITAL | Age: 72
Setting detail: OUTPATIENT SURGERY
Discharge: HOME | End: 2022-10-10
Attending: INTERNAL MEDICINE
Payer: COMMERCIAL

## 2022-10-10 VITALS
TEMPERATURE: 96 F | RESPIRATION RATE: 18 BRPM | SYSTOLIC BLOOD PRESSURE: 159 MMHG | OXYGEN SATURATION: 95 % | DIASTOLIC BLOOD PRESSURE: 72 MMHG | HEART RATE: 68 BPM

## 2022-10-10 DIAGNOSIS — K21.9 GASTROESOPHAGEAL REFLUX DISEASE WITHOUT ESOPHAGITIS: ICD-10-CM

## 2022-10-10 DIAGNOSIS — Z12.11 SCREEN FOR COLON CANCER: ICD-10-CM

## 2022-10-10 DIAGNOSIS — K62.9 RECTAL LESION: Primary | ICD-10-CM

## 2022-10-10 LAB — GLUCOSE SERPL-MCNC: 114 MG/DL (ref 65–140)

## 2022-10-10 PROCEDURE — 43235 EGD DIAGNOSTIC BRUSH WASH: CPT | Performed by: INTERNAL MEDICINE

## 2022-10-10 PROCEDURE — 45385 COLONOSCOPY W/LESION REMOVAL: CPT | Performed by: INTERNAL MEDICINE

## 2022-10-10 PROCEDURE — 88305 TISSUE EXAM BY PATHOLOGIST: CPT | Performed by: PATHOLOGY

## 2022-10-10 PROCEDURE — 82948 REAGENT STRIP/BLOOD GLUCOSE: CPT

## 2022-10-10 RX ORDER — POTASSIUM CHLORIDE 1.5 G/1.77G
20 POWDER, FOR SOLUTION ORAL 2 TIMES DAILY
COMMUNITY

## 2022-10-10 RX ORDER — LIDOCAINE HYDROCHLORIDE 20 MG/ML
INJECTION, SOLUTION EPIDURAL; INFILTRATION; INTRACAUDAL; PERINEURAL AS NEEDED
Status: DISCONTINUED | OUTPATIENT
Start: 2022-10-10 | End: 2022-10-10

## 2022-10-10 RX ORDER — TORSEMIDE 20 MG/1
20 TABLET ORAL DAILY
COMMUNITY

## 2022-10-10 RX ORDER — SODIUM CHLORIDE 9 MG/ML
INJECTION, SOLUTION INTRAVENOUS CONTINUOUS PRN
Status: DISCONTINUED | OUTPATIENT
Start: 2022-10-10 | End: 2022-10-10

## 2022-10-10 RX ORDER — PROPOFOL 10 MG/ML
INJECTION, EMULSION INTRAVENOUS CONTINUOUS PRN
Status: DISCONTINUED | OUTPATIENT
Start: 2022-10-10 | End: 2022-10-10

## 2022-10-10 RX ORDER — EPHEDRINE SULFATE 50 MG/ML
INJECTION INTRAVENOUS AS NEEDED
Status: DISCONTINUED | OUTPATIENT
Start: 2022-10-10 | End: 2022-10-10

## 2022-10-10 RX ORDER — PROPOFOL 10 MG/ML
INJECTION, EMULSION INTRAVENOUS AS NEEDED
Status: DISCONTINUED | OUTPATIENT
Start: 2022-10-10 | End: 2022-10-10

## 2022-10-10 RX ADMIN — PROPOFOL 50 MG: 10 INJECTION, EMULSION INTRAVENOUS at 09:33

## 2022-10-10 RX ADMIN — PROPOFOL 20 MG: 10 INJECTION, EMULSION INTRAVENOUS at 09:48

## 2022-10-10 RX ADMIN — EPHEDRINE SULFATE 10 MG: 50 INJECTION, SOLUTION INTRAVENOUS at 09:56

## 2022-10-10 RX ADMIN — PROPOFOL 80 MCG/KG/MIN: 10 INJECTION, EMULSION INTRAVENOUS at 09:36

## 2022-10-10 RX ADMIN — PROPOFOL 30 MG: 10 INJECTION, EMULSION INTRAVENOUS at 09:34

## 2022-10-10 RX ADMIN — EPHEDRINE SULFATE 10 MG: 50 INJECTION, SOLUTION INTRAVENOUS at 09:42

## 2022-10-10 RX ADMIN — SODIUM CHLORIDE: 0.9 INJECTION, SOLUTION INTRAVENOUS at 09:26

## 2022-10-10 RX ADMIN — LIDOCAINE HYDROCHLORIDE 100 MG: 20 INJECTION, SOLUTION EPIDURAL; INFILTRATION; INTRACAUDAL; PERINEURAL at 09:33

## 2022-10-10 NOTE — H&P
History and Physical -  [facility]  Joo Solis 67 y o  male MRN: [id number]        HPI: 58-year-old male, nursing home resident with history of COPD, diabetes mellitus chronic kidney disease, coronary artery disease depression/anxiety was referred for colonoscopy  Patient reports having colonoscopy more than 10 years ago  He is having problems with acid reflux for which he was recently started on omeprazole with help  Historical Information   Past Medical History:   Diagnosis Date   • Acute on chronic respiratory failure ([place] [address] ) 2017   • Anemia 3/29/2020   • Colonic polyp    • COPD exacerbation ([place] [address] ) 2019   • Disc displacement, cervical    • Peripheral vascular disease ([place] [address] )     Femoral- popliteal bypass     Past Surgical History:   Procedure Laterality Date   • BYPASS FEMORAL-POPLITEAL     • COLONOSCOPY      With polypectomy   • CORONARY ARTERY BYPASS GRAFT     • LAMINECTOMY       Social History   Social History     Substance and Sexual Activity   Alcohol Use No     Social History     Substance and Sexual Activity   Drug Use No     Social History     Tobacco Use   Smoking Status Former Smoker   • Packs/day: 1 00   • Years: 37 00   • Pack years: 37 00   • Types: Cigarettes   • Quit date: 2014   • Years since quittin 7   Smokeless Tobacco Never Used   Tobacco Comment    Pt states he restarted smoking a few weeks ago     Family History   Problem Relation Age of Onset   • Heart attack Mother    • Heart attack Father        Meds/Allergies     (Not in a hospital admission)      Allergies   Allergen Reactions   • Vancomycin Other (See Comments)   • No Active Allergies        Objective     Blood pressure 115/56, pulse 76, temperature (!) 96 7 °F (35 9 °C), temperature source Temporal, resp  rate 18, SpO2 95 %      PHYSICAL EXAM:    Gen: NAD  CV: S1 & S2 normal, RRR  CHEST: Clear to auscultate  ABD: soft, NT/ND, good bowel sounds  EXT: no edema    ASSESSMENT: GERD, screening for colon cancer    PLAN:    EGD and colonoscopy

## 2022-10-10 NOTE — ANESTHESIA PREPROCEDURE EVALUATION
Procedure:  COLONOSCOPY  EGD    Relevant Problems   CARDIO   (+) Arteriosclerosis of coronary artery bypass graft   (+) Atrial fibrillation, chronic (HCC)   (+) Benign essential hypertension   (+) Chest wall pain, chronic   (+) Essential hypertension   (+) Mixed hyperlipidemia      ENDO   (+) Insulin dependent type 2 diabetes mellitus (HCC)   (+) Type 2 diabetes mellitus with diabetic polyneuropathy, with long-term current use of insulin (HCC)   (+) Type 2 diabetes mellitus with stage 4 chronic kidney disease, without long-term current use of insulin (HCC)      GI/HEPATIC   (+) Gastroesophageal reflux disease without esophagitis      /RENAL   (+) Stage 3 chronic kidney disease (HCC)      HEMATOLOGY   (+) Anemia      MUSCULOSKELETAL   (+) Gout      NEURO/PSYCH   (+) Depressive disorder      PULMONARY   (+) Chronic obstructive lung disease (HCC)   (+) Chronic respiratory failure (HCC)   (+) SENTHIL (obstructive sleep apnea)   (+) Panlobular emphysema (HCC)        Physical Exam    Airway    Mallampati score: II  TM Distance: >3 FB  Neck ROM: full     Dental   upper dentures and lower dentures,     Cardiovascular      Pulmonary      Other Findings        Anesthesia Plan  ASA Score- 3     Anesthesia Type- IV sedation with anesthesia with ASA Monitors  Additional Monitors:   Airway Plan:           Plan Factors-    Chart reviewed  Patient summary reviewed  Induction- intravenous  Postoperative Plan-     Informed Consent- Anesthetic plan and risks discussed with patient  I personally reviewed this patient with the CRNA  Discussed and agreed on the Anesthesia Plan with the JORDAN Lara

## 2022-10-10 NOTE — ANESTHESIA POSTPROCEDURE EVALUATION
Post-Op Assessment Note    CV Status:  Stable  Pain Score: 0    Pain management: adequate     Mental Status:  Awake   Hydration Status:  Euvolemic   PONV Controlled:  Controlled   Airway Patency:  Patent      Post Op Vitals Reviewed: Yes      Staff: CRNA         No complications documented      BP 99/58 (10/10/22 1002)    Temp (!) 96 °F (35 6 °C) (10/10/22 1002)    Pulse 72 (10/10/22 1002)   Resp 18 (10/10/22 1002)    SpO2 95 % (10/10/22 1002)

## 2022-10-11 PROBLEM — Z12.11 SCREEN FOR COLON CANCER: Status: RESOLVED | Noted: 2022-08-09 | Resolved: 2022-10-11

## 2022-10-19 PROCEDURE — 88305 TISSUE EXAM BY PATHOLOGIST: CPT | Performed by: PATHOLOGY

## 2022-12-20 ENCOUNTER — TELEPHONE (OUTPATIENT)
Dept: HEMATOLOGY ONCOLOGY | Facility: CLINIC | Age: 72
End: 2022-12-20

## 2022-12-20 DIAGNOSIS — D50.9 MICROCYTIC ANEMIA: Primary | ICD-10-CM

## 2022-12-20 DIAGNOSIS — R19.5 ABNORMAL STOOL COLOR: ICD-10-CM

## 2022-12-20 DIAGNOSIS — N18.30 STAGE 3 CHRONIC KIDNEY DISEASE, UNSPECIFIED WHETHER STAGE 3A OR 3B CKD (HCC): ICD-10-CM

## 2022-12-20 DIAGNOSIS — D50.0 IRON DEFICIENCY ANEMIA DUE TO CHRONIC BLOOD LOSS: ICD-10-CM

## 2022-12-21 ENCOUNTER — OFFICE VISIT (OUTPATIENT)
Dept: HEMATOLOGY ONCOLOGY | Facility: CLINIC | Age: 72
End: 2022-12-21

## 2022-12-21 VITALS
OXYGEN SATURATION: 97 % | BODY MASS INDEX: 28.71 KG/M2 | DIASTOLIC BLOOD PRESSURE: 78 MMHG | HEART RATE: 51 BPM | HEIGHT: 72 IN | WEIGHT: 212 LBS | SYSTOLIC BLOOD PRESSURE: 128 MMHG | RESPIRATION RATE: 14 BRPM | TEMPERATURE: 97.7 F

## 2022-12-21 DIAGNOSIS — N18.30 STAGE 3 CHRONIC KIDNEY DISEASE, UNSPECIFIED WHETHER STAGE 3A OR 3B CKD (HCC): ICD-10-CM

## 2022-12-21 DIAGNOSIS — D50.0 IRON DEFICIENCY ANEMIA DUE TO CHRONIC BLOOD LOSS: ICD-10-CM

## 2022-12-21 DIAGNOSIS — D50.9 MICROCYTIC ANEMIA: Primary | ICD-10-CM

## 2022-12-21 RX ORDER — CLOPIDOGREL BISULFATE 75 MG/1
TABLET ORAL
COMMUNITY
Start: 2022-12-10

## 2022-12-21 RX ORDER — DIPHENOXYLATE HYDROCHLORIDE AND ATROPINE SULFATE 2.5; .025 MG/1; MG/1
1 TABLET ORAL DAILY
Qty: 30 TABLET | Refills: 6
Start: 2022-12-21

## 2022-12-21 NOTE — PROGRESS NOTES
[address] [facility] [id number]  Hematology Ambulatory Follow-Up  Logan Tate, 1950, [id number]  12/21/2022    Assessment/Plan:    1  Microcytic anemia  2  Iron deficiency anemia due to chronic blood loss  3  Stage 3 chronic kidney disease, unspecified whether stage 3a or 3b CKD [facility])  Mr Allyssa Scott is a 13-year-old male seen in follow-up for microcytic anemia  His anemia is likely multifactorial due to chronic blood loss from the colon due to diverticular disease and chronic anticoagulation as well as underlying CKD not on dialysis  His previous hemoglobins were ranging from 10-11 but most recently he was noted to have a hemoglobin of 13  All of his iron, B12, and folate studies appear to be within normal limits  He underwent GI work-up which did not find a source of blood loss  Kidney function is improving which is likely the cause of improving hemoglobins  We discussed today that with his counts being within normal limits I will only repeat blood work in 6 months prior to follow-up with me in the office  He should be taking a complete multivitamin which I do not see on the medication list from this facility I have placed a new order today  We discussed that if his counts remain normal prior to his follow-up with me in June 2023 he will likely be discharged from our office and continue to follow with his PCP regularly  - multivitamin (THERAGRAN) TABS; Take 1 tablet by mouth daily  Dispense: 30 tablet; Refill: 6  - CBC and differential; Future  - Ferritin; Future  - Iron; Future  - TIBC; Future  - Comprehensive metabolic panel; Future  - Folate; Future  - Vitamin B12; Future  - Methylmalonic acid, serum; Future  - Erythropoietin; Future      The patient is scheduled for follow-up in approximately 6 months  Patient voiced agreement and understanding to the above   Patient knows to call the Hematology/Oncology office with any questions and concerns regarding the above  Barrier(s) to care: None  The patient is able to self care   ------------------------------------------------------------------------------------------------------    Chief Complaint   Patient presents with   • Follow-up       History of present illness:   Kaz Hilton is a 23-year-old male from a long-term care nursing facility originally seen in consultation in July 2022 for anemia  He has past medical history of diverticular disease of the colon, GERD, type 2 diabetes, COPD, emphysema, A  fib on Xarelto, heart failure, CKD stage III not on dialysis, mood disorder, and mild cognitive impairment  He has been in a wheelchair since 2020 due to neuropathy and muscle weakness  He has had decreasing hemoglobin's with his baseline ranging from 10-11 due to worsening kidney function and chronic blood loss from the colon from long-term anticoagulation and diverticular disease  08/30/2018:  Hemoglobin 14 8, MCV 88, platelets 467, WBC 7 9  01/09/2019:  Hemoglobin 14 5, MCV 86 4, platelets 766, WBC 7 9  11/12/2019:  Hemoglobin 12 3, MCV 83, platelets 820, WBC 6 8  03/26/2020:  Hemoglobin 11 3, MCV 85, platelets 847, WBC 7 5  08/12/2020:  Hemoglobin 10 7, MCV 76, platelets 891, WBC 5 1  01/18/2021:  Hemoglobin 11 2, MCV 82, platelets 783, WBC 4 7  04/19/2022:  Hemoglobin 10 9, MCV 72, platelets 594, WBC 8 8  05/27/2022:  B12 388, folate 8 5, ferritin 26, serum iron 19, TIBC 380, iron saturation 5%  06/29/2022:  Hemoglobin 10 7, MCV 79, platelets 218, WBC 6 56  07/25/2022:  Hemoglobin 11 5, MCV 74, platelets 129, WBC 8 3  08/09/2022:  Hemoglobin 12 4, MCV 73, platelets 410, WBC 7 4  8/11/2022: Ferritin 69, iron saturation 8%, TIBC 300, serum iron 24  9/19/2022: Hemoglobin 10 8, MCV 73, platelets 210,, WBC 5 7  12/20/2022: Hemoglobin 13, MCV 80, platelets 475, WBC 7 6   Serum iron 52, B12 457, folate 10      Interval history: He is doing well and feels good    He has no complaints or concerns today  He continues to use oxygen at night only and is mostly in a wheelchair  He denies any changes in his stool or urine  Colonoscopy and EGD was completed on 10/10/2022 which did not find a source of blood loss  Kidney function also appears to be improving  Review of Systems   Constitutional: Negative for activity change, appetite change, diaphoresis, fatigue, fever and unexpected weight change  HENT: Negative for trouble swallowing and voice change  Eyes: Negative for photophobia and visual disturbance  Respiratory: Positive for shortness of breath (chronic COPD on supplemental O2)  Negative for cough and chest tightness  Cardiovascular: Positive for leg swelling  Negative for chest pain and palpitations  Gastrointestinal: Negative for abdominal distention, abdominal pain, anal bleeding, blood in stool, constipation, diarrhea, nausea and vomiting  Endocrine: Negative for cold intolerance and heat intolerance  Genitourinary: Negative for dysuria, hematuria and urgency  Musculoskeletal: Positive for gait problem (in a wheelchair)  Negative for arthralgias, back pain, joint swelling and myalgias  Skin: Negative for color change, pallor and rash  Neurological: Positive for weakness  Negative for dizziness, tremors, light-headedness and headaches  Hematological: Negative for adenopathy  Does not bruise/bleed easily  Psychiatric/Behavioral: Negative for confusion and sleep disturbance         Patient Active Problem List   Diagnosis   • Arteriosclerosis of coronary artery bypass graft   • Atrial fibrillation, chronic (HCC)   • Benign essential hypertension   • Chronic obstructive lung disease (HCC)   • Chronic systolic heart failure (HCC)   • Depressive disorder   • 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   • Diverticular disease of colon   • Gastroesophageal reflux disease without esophagitis   • Glaucoma   • Gout   • Mixed hyperlipidemia   • Persistent insomnia   • Type 2 diabetes mellitus with diabetic polyneuropathy, with long-term current use of insulin ([facility])   • Altered mental state   • Chest wall pain, chronic   • Chronic respiratory failure ([facility])   • Delirium   • Encephalopathy   • Essential hypertension   • SENTHIL (obstructive sleep apnea)   • Peripheral sensory neuropathy   • Tracheal stenosis   • Type 2 diabetes mellitus with stage 4 chronic kidney disease, without long-term current use of insulin ([facility])   • Insulin dependent type 2 diabetes mellitus ([facility] )   • Atherosclerosis of native artery of extremity ([facility])   • Obesity   • Uncontrolled type 2 diabetes mellitus with hyperglycemia ([facility])   • Stage 3 chronic kidney disease ([facility])   • Peripheral vascular disease ([facility])   • Current use of long term anticoagulation   • Urinary retention   • Tinea pedis of both feet   • Tobacco abuse   • 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 ([facility])   • 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[facility])   • Contusion of right eyeball   • Dry eye syndrome of bilateral lacrimal glands   • Hyphema, right eye   • Lens replaced by other means   • Localized edema   • Age-related nuclear cataract of left eye   • Open angle with borderline findings and high glaucoma risk in both eyes   • Other secondary cataract, right eye   • Pain of left heel   • Panlobular emphysema ([facility])   • Primary open-angle glaucoma, right eye, severe stage   • Pterygium   • Tear film insufficiency   • Anemia   • Weight loss   • Nose dryness   • Foreign body (FB) in soft tissue   • Paranoia ([facility] )   • Mild cognitive impairment       Past Medical History:   Diagnosis Date   • Acute on chronic respiratory failure ([facility] ) 1/30/2017   • Anemia 3/29/2020   • Colonic polyp 2012   • COPD exacerbation ([facility] ) 4/20/2019   • Disc displacement, cervical    • Peripheral vascular disease ([facility] ) 2011    Femoral- popliteal bypass       Past Surgical History:   Procedure Laterality Date   • BYPASS FEMORAL-POPLITEAL  2011   • COLONOSCOPY     With polypectomy   • CORONARY ARTERY BYPASS GRAFT     • LAMINECTOMY         Family History   Problem Relation Age of Onset   • Heart attack Mother    • Heart attack Father        Social History     Socioeconomic History   • Marital status: Single     Spouse name: None   • Number of children: 1   • Years of education:     • Highest education level: None   Occupational History   • Occupation: retired   Tobacco Use   • Smoking status: Former     Packs/day: 1 00     Years: 37 00     Pack years: 37 00     Types: Cigarettes     Quit date: 2014     Years since quittin 9   • Smokeless tobacco: Never   • Tobacco comments:     Pt states he restarted smoking a few weeks ago   Vaping Use   • Vaping Use: Never used   Substance and Sexual Activity   • Alcohol use: No   • Drug use: No   • Sexual activity: Not Currently     Partners: Female     Birth control/protection: None   Other Topics Concern   • None   Social History Narrative    Pt states he drinks soda daily     Social Determinants of Health     Financial Resource Strain: Not on file   Food Insecurity: Not on file   Transportation Needs: Not on file   Physical Activity: Not on file   Stress: Not on file   Social Connections: Not on file   Intimate Partner Violence: Not on file   Housing Stability: Not on file         Current Outpatient Medications:   •  acetaminophen (TYLENOL) 650 mg CR tablet, 3 (three) times a day, Disp: , Rfl:   •  albuterol (2 5 mg/3 mL) 0 083 % nebulizer solution, Take 1 vial (2 5 mg total) by nebulization every 6 (six) hours as needed for wheezing or shortness of breath, Disp: 100 vial, Rfl: 6  •  albuterol (VENTOLIN HFA) 90 mcg/act inhaler, Inhale 1 puff every 4 (four) hours as needed for wheezing, Disp: 1 Inhaler, Rfl: 3  •  ARIPiprazole (ABILIFY) 5 mg tablet, Take 5 mg by mouth daily, Disp: , Rfl:   •  bisacodyl (DULCOLAX) 5 mg EC tablet, Take 10 mg by mouth daily as needed, Disp: , Rfl:   •  budesonide (PULMICORT) 0 5 mg/2 mL nebulizer solution, Take 1 vial (0 5 mg total) by nebulization 2 (two) times a day Rinse mouth after use , Disp: 120 vial, Rfl: 6  •  Cholecalciferol (VITAMIN D3) 07369 units CAPS, Take 1 capsule by mouth once a week, Disp: , Rfl: 0  •  clopidogrel (PLAVIX) 75 mg tablet, , Disp: , Rfl:   •  famotidine (PEPCID) 20 mg tablet, Take 20 mg by mouth 2 (two) times a day, Disp: , Rfl:   •  glucose blood test strip, TEST UP TO 3 TIMES PER DAY, Disp: , Rfl:   •  Insulin Pen Needle 32G X 6 MM MISC, Inject as directed 3 (three) times a day Poorly controlled insulin dependent DM2, Disp: 100 each, Rfl: 11  •  Lancets (ONETOUCH ULTRASOFT) lancets, , Disp: , Rfl: 3  •  Lantus SoloStar 100 units/mL SOPN, 16 Units, Disp: , Rfl:   •  LORazepam (ATIVAN) 0 5 mg tablet, Take 0 5 mg by mouth every 8 (eight) hours as needed for anxiety, Disp: , Rfl:   •  memantine (NAMENDA) 10 mg tablet, , Disp: , Rfl:   •  metoprolol tartrate (LOPRESSOR) 50 mg tablet, Take 1 tablet (50 mg total) by mouth every 12 (twelve) hours, Disp: 180 tablet, Rfl: 1  •  multivitamin (THERAGRAN) TABS, Take 1 tablet by mouth daily, Disp: 30 tablet, Rfl: 6  •  PARoxetine (PAXIL) 10 mg tablet, Take 2 tablets (20 mg total) by mouth daily, Disp: , Rfl:   •  potassium chloride (KLOR-CON) 20 mEq packet, Take 20 mEq by mouth 2 (two) times a day, Disp: , Rfl:   •  rosuvastatin (CRESTOR) 10 MG tablet, Take 1 tablet (10 mg total) by mouth daily, Disp: 90 tablet, Rfl: 1  •  SPIRIVA HANDIHALER 18 MCG inhalation capsule, INHALE 1 CAPSULE (18 MCG TOTAL) DAILY  , Disp: , Rfl: 3  •  torsemide (DEMADEX) 20 mg tablet, Take 20 mg by mouth daily, Disp: , Rfl:   •  traZODone (DESYREL) 50 mg tablet, Take 100 mg by mouth daily at bedtime, Disp: , Rfl:   •  Trelegy Ellipta 100-62 5-25 MCG/INH inhaler, , Disp: , Rfl:   •  atropine (ISOPTO ATROPINE) 1 % ophthalmic solution, PUT 1 DROP INTO RIGHT EYE 3 TIMES A DAY (Patient not taking: Reported on 12/21/2022), Disp: , Rfl: 3  •  dorzolamide (TRUSOPT) 2 % ophthalmic solution, INSTILL 1 DROP INTO AFFECTED EYE 3 TIMES A DAY (Patient not taking: Reported on 12/21/2022), Disp: , Rfl: 3  •  insulin aspart (NovoLOG) 100 Units/mL injection pen, Inject under the skin 3 (three) times a day with meals (Patient not taking: Reported on 12/21/2022), Disp: , Rfl:   •  insulin detemir (LEVEMIR FLEXTOUCH) 100 Units/mL injection pen, Inject 40 Units under the skin daily (Patient not taking: Reported on 12/21/2022), Disp: 5 pen, Rfl: 11  •  insulin lispro (HumaLOG) 100 units/mL injection pen, , Disp: , Rfl:     Allergies   Allergen Reactions   • Vancomycin Other (See Comments)   • No Active Allergies        Objective:  /78 (BP Location: Left arm, Patient Position: Sitting, Cuff Size: Adult)   Pulse (!) 51   Temp 97 7 °F (36 5 °C) (Temporal)   Resp 14   Ht 6' (1 829 m)   Wt 96 2 kg (212 lb) Comment: wheelchair- verbal, taken on 12/20  SpO2 97%   BMI 28 75 kg/m²    Physical Exam  Constitutional:       General: He is not in acute distress  Appearance: Normal appearance  He is not ill-appearing  HENT:      Head: Atraumatic  Eyes:      Extraocular Movements: Extraocular movements intact  Conjunctiva/sclera: Conjunctivae normal    Cardiovascular:      Rate and Rhythm: Normal rate and regular rhythm  Pulses: Normal pulses  Heart sounds: Normal heart sounds  Pulmonary:      Effort: Pulmonary effort is normal  No respiratory distress  Breath sounds: Normal breath sounds  Comments: On supplemental oxygen  Abdominal:      General: Bowel sounds are normal  There is no distension  Palpations: Abdomen is soft  Tenderness: There is no abdominal tenderness  Musculoskeletal:      Cervical back: Normal range of motion and neck supple  Right lower leg: Edema present  Left lower leg: Edema present  Lymphadenopathy:      Cervical: No cervical adenopathy  Skin:     General: Skin is warm and dry        Capillary Refill: Capillary refill takes less than 2 seconds  Coloration: Skin is pale  Findings: No bruising or lesion  Neurological:      General: No focal deficit present  Mental Status: He is alert  Mental status is at baseline  He is disoriented  Motor: Weakness present  Gait: Gait abnormal (in a wheelchair)  Psychiatric:         Behavior: Behavior normal          Result Review  Labs:  No visits with results within 1 Month(s) from this visit  Latest known visit with results is:   Hospital Outpatient Visit on 10/10/2022   Component Date Value Ref Range Status   • POC Glucose 10/10/2022 114  65 - 140 mg/dl Final   • Case Report 10/10/2022    Final                    Value:Surgical Pathology Report                         Case: [id number]                                   Authorizing Provider:  Steve Piña MD          Collected:           10/10/2022 0946              Ordering Location:     [facility]        Received:            10/10/2022 5 [facility]                                                           Pathologist:           Karen Trevino MD                                                                    Specimens:   A) - Large Intestine, Right/Ascending Colon, Cold Snare Ascending polyps x3                         B) - Large Intestine, Transverse Colon, Cold Bx Transverse Colon polyp                              C) - Large Intestine, Sigmoid Colon, Hot Snare Sigmoid Colon polyp                                  D) - Large Intestine, Sigmoid Colon, Hot Snare Distal Sigmoid polyp                                 E) - Large Intestine, Sigmoid Colon, Hot Snare mid Sigmoid polyp                          • Final Diagnosis 10/10/2022    Final                    Value: This result contains rich text formatting which cannot be displayed here  • Additional Information 10/10/2022    Final                    Value: This result contains rich text formatting which cannot be displayed here  • Synoptic Checklist 10/10/2022    Final                    Value:                            COLON/RECTUM POLYP FORM - GI - All Specimens                                                                                     :    Adenoma(s)     • Gross Description 10/10/2022    Final                    Value: This result contains rich text formatting which cannot be displayed here  Imaging:   No relevant imaging to review     Please note: This report has been generated by a voice recognition software system  Therefore there may be syntax, spelling, and/or grammatical errors  Please call if you have any questions

## 2023-02-15 ENCOUNTER — TELEPHONE (OUTPATIENT)
Dept: HEMATOLOGY ONCOLOGY | Facility: CLINIC | Age: 73
End: 2023-02-15

## 2023-06-20 ENCOUNTER — TELEPHONE (OUTPATIENT)
Dept: HEMATOLOGY ONCOLOGY | Facility: CLINIC | Age: 73
End: 2023-06-20

## 2023-06-20 NOTE — TELEPHONE ENCOUNTER
Called and spoke with Angel Harris from [facility]  Mentioned patient has an appointment tomorrow 6/21 and there are labs that need to be collected prior to the appointment  She requested I sent lab scripts which were faxed to the number she provided for me  Proof that fax was sent & went through in patient's chart in media section

## 2023-06-21 ENCOUNTER — TELEPHONE (OUTPATIENT)
Dept: HEMATOLOGY ONCOLOGY | Facility: CLINIC | Age: 73
End: 2023-06-21

## 2023-06-21 NOTE — TELEPHONE ENCOUNTER
Appointment Change  Cancel, Reschedule, Change to Virtual      Who are you speaking with? [facility]   If it is not the patient, are they listed on an active communication consent form? N/A   Which provider is the appointment scheduled with? JACINTA Elizondo   When is the appointment scheduled? Please list date and time 6/21/23 10am   At which location is the appointment scheduled to take place? [place]   Was the appointment rescheduled or changed from an in person visit to a virtual visit? If so, please list the details of the change  8/9/23 330    What is the reason for the appointment change? bloodwork results not back yet; per Julius Tolbert ok to reschedule   Was [facility] scheduled for this visit? N/A   Does [facility] need to be scheduled for the new visit (if applicable) N/A   Does the patient need an infusion appointment rescheduled? N/A   Does the patient have an infusion appointment scheduled? If so, when? No   Is the patient undergoing chemotherapy? N/A   Was the no-show policy reviewed for appointments being changed with less then 24 hours of notice?  N/A

## 2023-08-08 ENCOUNTER — TELEPHONE (OUTPATIENT)
Dept: HEMATOLOGY ONCOLOGY | Facility: CLINIC | Age: 73
End: 2023-08-08

## 2023-08-08 NOTE — TELEPHONE ENCOUNTER
CALLED THE FACILITY WHERE PATIENT CURRENTLY RESIDES. LOOKS LIKE LAB ORDERS WERE FAXED OVER 6/20. PATIENT HAS AN UPCOMING APPOINTMENT WITH JACINTA HERZOG & WE WILL NEED THE LABS RESULTS PRIOR TO HIS APPOINTMENT TOMORROW 8/9. SPOKE WITH A NURSE ON THE FLOOR PATIENT IS ON AND SHE MENTIONED SHE WOULD HAVE THE LABS FAXED OVER AS SOON AS SHE COULD.

## 2023-08-09 ENCOUNTER — OFFICE VISIT (OUTPATIENT)
Dept: HEMATOLOGY ONCOLOGY | Facility: CLINIC | Age: 73
End: 2023-08-09
Payer: COMMERCIAL

## 2023-08-09 VITALS
SYSTOLIC BLOOD PRESSURE: 130 MMHG | HEIGHT: 72 IN | HEART RATE: 54 BPM | DIASTOLIC BLOOD PRESSURE: 80 MMHG | BODY MASS INDEX: 27.09 KG/M2 | WEIGHT: 200 LBS | OXYGEN SATURATION: 91 % | TEMPERATURE: 97.5 F | RESPIRATION RATE: 16 BRPM

## 2023-08-09 DIAGNOSIS — D50.9 MICROCYTIC ANEMIA: Primary | ICD-10-CM

## 2023-08-09 DIAGNOSIS — N18.30 STAGE 3 CHRONIC KIDNEY DISEASE, UNSPECIFIED WHETHER STAGE 3A OR 3B CKD (HCC): ICD-10-CM

## 2023-08-09 DIAGNOSIS — D50.0 IRON DEFICIENCY ANEMIA DUE TO CHRONIC BLOOD LOSS: ICD-10-CM

## 2023-08-09 DIAGNOSIS — E53.8 B12 DEFICIENCY: ICD-10-CM

## 2023-08-09 DIAGNOSIS — E53.8 FOLATE DEFICIENCY: ICD-10-CM

## 2023-08-09 PROCEDURE — 99214 OFFICE O/P EST MOD 30 MIN: CPT

## 2023-08-09 RX ORDER — LANOLIN ALCOHOL/MO/W.PET/CERES
1000 CREAM (GRAM) TOPICAL DAILY
Qty: 30 TABLET | Refills: 6 | Status: SHIPPED | OUTPATIENT
Start: 2023-08-09

## 2023-08-09 RX ORDER — FOLIC ACID 1 MG/1
1 TABLET ORAL DAILY
Qty: 30 TABLET | Refills: 6 | Status: SHIPPED | OUTPATIENT
Start: 2023-08-09

## 2023-08-09 NOTE — PROGRESS NOTES
[place] [id number]  Hematology Ambulatory Follow-Up  Renee William, 1950, [id number]  8/9/2023    Assessment/Plan:  1. Microcytic anemia  2. Iron deficiency anemia due to chronic blood loss  3. Stage 3 chronic kidney disease, unspecified whether stage 3a or 3b CKD ([address] )  4. B12 deficiency  5. Folate deficiency  Mr. Rosemary Badillo is a 67-year-old male seen in follow-up for microcytic anemia. His anemia is multifactorial due to chronic blood loss from diverticula of the colon as well as anticoagulation. He also has underlying CKD not on dialysis which could also be contributing to his anemia. Hemoglobin has been stable within normal limits with no evidence of anemia. Most recently B12 and folate were slightly low with a folate of 221 and folate of 6.1. He had previously been started on multivitamin which has not been adequately supplementing therefore I have prescribed oral X64 and folic acid supplement. Prescription was provided with his paperwork to send back to the facility today. Since he has no symptoms or evidence of anemia we will continue to monitor his labs every 6 months prior to follow-up. He is having some conversational dyspnea likely due to underlying COPD. He does only wear his oxygen as needed mostly at nighttime. Would suggest closer evaluation of his oxygen levels and encourage more frequent supplemental oxygen use. - vitamin B-12 (VITAMIN B-12) 1,000 mcg tablet; Take 1 tablet (1,000 mcg total) by mouth daily  Dispense: 30 tablet; Refill: 6  - folic acid (FOLVITE) 1 mg tablet; Take 1 tablet (1 mg total) by mouth daily  Dispense: 30 tablet; Refill: 6  - CBC and differential; Future  - Comprehensive metabolic panel; Future  - Folate; Future  - Vitamin B12; Future  - Ferritin; Future  - Iron; Future  - Iron Saturation %; Future  - TIBC;  Future      The patient is scheduled for follow-up in approximately 6 months. Patient voiced agreement and understanding to the above. Patient knows to call the Hematology/Oncology office with any questions and concerns regarding the above. Barrier(s) to care: None  The patient is able to self care.  ------------------------------------------------------------------------------------------------------    Chief Complaint   Patient presents with   • Follow-up       History of present illness:   Gogo Davis is a 70-year-old male from a long-term care nursing facility originally seen in consultation in July 2022 for anemia. He has past medical history of diverticular disease of the colon, GERD, type 2 diabetes, COPD, emphysema, A. fib on Xarelto, heart failure, CKD stage III not on dialysis, mood disorder, and mild cognitive impairment. He has been in a wheelchair since 2020 due to neuropathy and muscle weakness.   He has had decreasing hemoglobin's with his baseline ranging from 10-11 due to worsening kidney function and chronic blood loss from the colon from long-term anticoagulation and diverticular disease.     08/30/2018:  Hemoglobin 14.8, MCV 88, platelets 876, WBC 7.9  01/09/2019:  Hemoglobin 14.5, MCV 86.4, platelets 701, WBC 7.9  11/12/2019:  Hemoglobin 12.3, MCV 83, platelets 237, WBC 6.8  03/26/2020:  Hemoglobin 11.3, MCV 85, platelets 349, WBC 7.5  08/12/2020:  Hemoglobin 10.7, MCV 76, platelets 672, WBC 5.1  01/18/2021:  Hemoglobin 11.2, MCV 82, platelets 220, WBC 4.7  04/19/2022:  Hemoglobin 10.9, MCV 72, platelets 112, WBC 8.8  05/27/2022:  B12 388, folate 8.5, ferritin 26, serum iron 19, TIBC 380, iron saturation 5%  06/29/2022:  Hemoglobin 10.7, MCV 79, platelets 609, WBC 6.12  07/25/2022:  Hemoglobin 11.5, MCV 74, platelets 621, WBC 8.3  08/09/2022:  Hemoglobin 12.4, MCV 73, platelets 552, WBC 7.4  8/11/2022: Ferritin 69, iron saturation 8%, TIBC 300, serum iron 24  9/19/2022: Hemoglobin 10.8, MCV 73, platelets 502,, WBC 5.7  12/20/2022: Hemoglobin 13, MCV 80, platelets 381, WBC 7.6              Serum iron 52, B12 457, folate 10  6/21/2023: Hemoglobin 14.9, MCV 85, platelets 738, WBC 7   Ferritin 43, iron saturation 15%, serum iron 51, TIBC 332   B12 221, Folate 6.1    Interval history: He is doing well and feels good. He has no complaints or concerns today. He continues to use oxygen at night only and is mostly in a wheelchair. He denies any changes in his stool or urine. His kidney function remains stable. Review of Systems   Constitutional: Negative for activity change, appetite change, diaphoresis, fatigue, fever and unexpected weight change. HENT: Negative for trouble swallowing and voice change. Eyes: Negative for photophobia and visual disturbance. Respiratory: Positive for shortness of breath (chronic COPD on supplemental O2). Negative for cough and chest tightness. Cardiovascular: Positive for leg swelling. Negative for chest pain and palpitations. Gastrointestinal: Negative for abdominal distention, abdominal pain, anal bleeding, blood in stool, constipation, diarrhea, nausea and vomiting. Endocrine: Negative for cold intolerance and heat intolerance. Genitourinary: Negative for dysuria, hematuria and urgency. Musculoskeletal: Positive for gait problem (in a wheelchair). Negative for arthralgias, back pain, joint swelling and myalgias. Skin: Negative for color change, pallor and rash. Neurological: Positive for weakness. Negative for dizziness, tremors, light-headedness and headaches. Hematological: Negative for adenopathy. Does not bruise/bleed easily. Psychiatric/Behavioral: Negative for confusion and sleep disturbance.        Patient Active Problem List   Diagnosis   • Arteriosclerosis of coronary artery bypass graft   • Atrial fibrillation, chronic (HCC)   • Benign essential hypertension   • Chronic obstructive lung disease (HCC)   • Chronic systolic heart failure (HCC)   • Depressive disorder   • 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   • Diverticular disease of colon   • Gastroesophageal reflux disease without esophagitis   • Glaucoma   • Gout   • Mixed hyperlipidemia   • Persistent insomnia   • Type 2 diabetes mellitus with diabetic polyneuropathy, with long-term current use of insulin ([facility])   • Altered mental state   • Chest wall pain, chronic   • Chronic respiratory failure ([facility])   • Delirium   • Encephalopathy   • Essential hypertension   • SENTHIL (obstructive sleep apnea)   • Peripheral sensory neuropathy   • Tracheal stenosis   • Type 2 diabetes mellitus with stage 4 chronic kidney disease, without long-term current use of insulin ([facility])   • Insulin dependent type 2 diabetes mellitus ([facility])   • Atherosclerosis of native artery of extremity ([facility])   • Obesity   • Uncontrolled type 2 diabetes mellitus with hyperglycemia ([facility])   • Stage 3 chronic kidney disease ([facility])   • Peripheral vascular disease ([facility])   • Current use of long term anticoagulation   • Urinary retention   • Tinea pedis of both feet   • Tobacco abuse   • 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 ([facility])   • 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[facility])   • Contusion of right eyeball   • Dry eye syndrome of bilateral lacrimal glands   • Hyphema, right eye   • Lens replaced by other means   • Localized edema   • Age-related nuclear cataract of left eye   • Open angle with borderline findings and high glaucoma risk in both eyes   • Other secondary cataract, right eye   • Pain of left heel   • Panlobular emphysema ([facility])   • Primary open-angle glaucoma, right eye, severe stage   • Pterygium   • Tear film insufficiency   • Anemia   • Weight loss   • Nose dryness   • Foreign body (FB) in soft tissue   • Paranoia ([address])   • Mild cognitive impairment       Past Medical History:   Diagnosis Date   • Acute on chronic respiratory failure ([address]) 1/30/2017   • Anemia 3/29/2020   • Colonic polyp 2012   • COPD exacerbation ([address]) 4/20/2019   • Disc displacement, cervical    • Peripheral vascular disease ([address] )     Femoral- popliteal bypass       Past Surgical History:   Procedure Laterality Date   • BYPASS FEMORAL-POPLITEAL     • COLONOSCOPY      With polypectomy   • CORONARY ARTERY BYPASS GRAFT     • LAMINECTOMY         Family History   Problem Relation Age of Onset   • Heart attack Mother    • Heart attack Father        Social History     Socioeconomic History   • Marital status: Single     Spouse name: None   • Number of children: 1   • Years of education:     • Highest education level: None   Occupational History   • Occupation: retired   Tobacco Use   • Smoking status: Former     Packs/day: 1.00     Years: 37.00     Total pack years: 37.00     Types: Cigarettes     Quit date: 2014     Years since quittin.6   • Smokeless tobacco: Never   • Tobacco comments:     Pt states he restarted smoking a few weeks ago   Vaping Use   • Vaping Use: Never used   Substance and Sexual Activity   • Alcohol use: No   • Drug use: No   • Sexual activity: Not Currently     Partners: Female     Birth control/protection: None   Other Topics Concern   • None   Social History Narrative    Pt states he drinks soda daily     Social Determinants of Health     Financial Resource Strain: Not on file   Food Insecurity: Not on file   Transportation Needs: Not on file   Physical Activity: Not on file   Stress: Not on file   Social Connections: Not on file   Intimate Partner Violence: Not on file   Housing Stability: Not on file         Current Outpatient Medications:   •  acetaminophen (TYLENOL) 650 mg CR tablet, 3 (three) times a day, Disp: , Rfl:   •  albuterol (2.5 mg/3 mL) 0.083 % nebulizer solution, Take 1 vial (2.5 mg total) by nebulization every 6 (six) hours as needed for wheezing or shortness of breath, Disp: 100 vial, Rfl: 6  •  albuterol (VENTOLIN HFA) 90 mcg/act inhaler, Inhale 1 puff every 4 (four) hours as needed for wheezing, Disp: 1 Inhaler, Rfl: 3  •  ARIPiprazole (ABILIFY) 5 mg tablet, Take 5 mg by mouth daily, Disp: , Rfl:   •  bisacodyl (DULCOLAX) 5 mg EC tablet, Take 10 mg by mouth daily as needed, Disp: , Rfl:   •  budesonide (PULMICORT) 0.5 mg/2 mL nebulizer solution, Take 1 vial (0.5 mg total) by nebulization 2 (two) times a day Rinse mouth after use., Disp: 120 vial, Rfl: 6  •  Cholecalciferol (VITAMIN D3) 73725 units CAPS, Take 1 capsule by mouth once a week, Disp: , Rfl: 0  •  clopidogrel (PLAVIX) 75 mg tablet, , Disp: , Rfl:   •  famotidine (PEPCID) 20 mg tablet, Take 20 mg by mouth 2 (two) times a day, Disp: , Rfl:   •  folic acid (FOLVITE) 1 mg tablet, Take 1 tablet (1 mg total) by mouth daily, Disp: 30 tablet, Rfl: 6  •  glucose blood test strip, TEST UP TO 3 TIMES PER DAY, Disp: , Rfl:   •  insulin aspart (NovoLOG) 100 Units/mL injection pen, Inject under the skin 3 (three) times a day with meals, Disp: , Rfl:   •  insulin detemir (LEVEMIR FLEXTOUCH) 100 Units/mL injection pen, Inject 40 Units under the skin daily, Disp: 5 pen, Rfl: 11  •  insulin lispro (HumaLOG) 100 units/mL injection pen, , Disp: , Rfl:   •  Insulin Pen Needle 32G X 6 MM MISC, Inject as directed 3 (three) times a day Poorly controlled insulin dependent DM2, Disp: 100 each, Rfl: 11  •  Lancets (ONETOUCH ULTRASOFT) lancets, , Disp: , Rfl: 3  •  Lantus SoloStar 100 units/mL SOPN, 16 Units, Disp: , Rfl:   •  LORazepam (ATIVAN) 0.5 mg tablet, Take 0.5 mg by mouth every 8 (eight) hours as needed for anxiety, Disp: , Rfl:   •  memantine (NAMENDA) 10 mg tablet, , Disp: , Rfl:   •  metoprolol tartrate (LOPRESSOR) 50 mg tablet, Take 1 tablet (50 mg total) by mouth every 12 (twelve) hours, Disp: 180 tablet, Rfl: 1  •  multivitamin (THERAGRAN) TABS, Take 1 tablet by mouth daily, Disp: 30 tablet, Rfl: 6  •  PARoxetine (PAXIL) 10 mg tablet, Take 2 tablets (20 mg total) by mouth daily, Disp: , Rfl:   •  potassium chloride (KLOR-CON) 20 mEq packet, Take 20 mEq by mouth 2 (two) times a day, Disp: , Rfl:   •  rosuvastatin (CRESTOR) 10 MG tablet, Take 1 tablet (10 mg total) by mouth daily, Disp: 90 tablet, Rfl: 1  •  SPIRIVA HANDIHALER 18 MCG inhalation capsule, INHALE 1 CAPSULE (18 MCG TOTAL) DAILY. , Disp: , Rfl: 3  •  torsemide (DEMADEX) 20 mg tablet, Take 20 mg by mouth daily, Disp: , Rfl:   •  traZODone (DESYREL) 50 mg tablet, Take 100 mg by mouth daily at bedtime, Disp: , Rfl:   •  Trelegy Ellipta 100-62.5-25 MCG/INH inhaler, , Disp: , Rfl:   •  vitamin B-12 (VITAMIN B-12) 1,000 mcg tablet, Take 1 tablet (1,000 mcg total) by mouth daily, Disp: 30 tablet, Rfl: 6  •  atropine (ISOPTO ATROPINE) 1 % ophthalmic solution, PUT 1 DROP INTO RIGHT EYE 3 TIMES A DAY (Patient not taking: Reported on 12/21/2022), Disp: , Rfl: 3  •  dorzolamide (TRUSOPT) 2 % ophthalmic solution, INSTILL 1 DROP INTO AFFECTED EYE 3 TIMES A DAY (Patient not taking: Reported on 12/21/2022), Disp: , Rfl: 3    Allergies   Allergen Reactions   • Vancomycin Other (See Comments)   • No Active Allergies        Objective:  /80 (BP Location: Right arm, Patient Position: Sitting, Cuff Size: Large)   Pulse (!) 54   Temp 97.5 °F (36.4 °C) (Temporal)   Resp 16   Ht 6' (1.829 m)   Wt 90.7 kg (200 lb)   SpO2 91%   BMI 27.12 kg/m²    Physical Exam  Constitutional:       General: He is not in acute distress. Appearance: Normal appearance. He is not ill-appearing. HENT:      Head: Atraumatic. Eyes:      Extraocular Movements: Extraocular movements intact. Conjunctiva/sclera: Conjunctivae normal.   Cardiovascular:      Rate and Rhythm: Normal rate and regular rhythm. Pulses: Normal pulses. Heart sounds: Normal heart sounds. Pulmonary:      Effort: Pulmonary effort is normal. No respiratory distress. Breath sounds: Normal breath sounds. Comments: On supplemental oxygen  Abdominal:      General: Bowel sounds are normal. There is no distension. Palpations: Abdomen is soft. Tenderness:  There is no abdominal tenderness. Musculoskeletal:      Cervical back: Normal range of motion and neck supple. Right lower leg: Edema present. Left lower leg: Edema present. Lymphadenopathy:      Cervical: No cervical adenopathy. Skin:     General: Skin is warm and dry. Capillary Refill: Capillary refill takes less than 2 seconds. Coloration: Skin is pale. Findings: No bruising or lesion. Neurological:      General: No focal deficit present. Mental Status: He is alert. Mental status is at baseline. He is disoriented. Motor: Weakness present. Gait: Gait abnormal (in a wheelchair). Psychiatric:         Behavior: Behavior normal.         Result Review  Labs:  Labs reviewed in Media/Care Everywhere  Imaging:   No relevant imaging to review     Please note: This report has been generated by a voice recognition software system. Therefore there may be syntax, spelling, and/or grammatical errors. Please call if you have any questions.

## 2024-02-06 ENCOUNTER — TELEPHONE (OUTPATIENT)
Dept: HEMATOLOGY ONCOLOGY | Facility: CLINIC | Age: 74
End: 2024-02-06

## 2024-02-06 NOTE — TELEPHONE ENCOUNTER
SPOKE WITH NURSING STAFF REGARDING RESCHEDULING APPT, RECEIVED FAX NUMBER TO FAX SCRIPT OVER FOR LABS IN ADDITION.

## 2024-04-01 ENCOUNTER — TELEPHONE (OUTPATIENT)
Dept: HEMATOLOGY ONCOLOGY | Facility: CLINIC | Age: 74
End: 2024-04-01

## 2024-04-01 NOTE — TELEPHONE ENCOUNTER
GREG  DR leslye DAMICO   Who are you speaking with? Patient   If it is not the patient, are they listed on an active communication consent form? Yes   Is this a GREG or DR leslye DAMICO GREG   Which provider is patient currently scheduled or established with? JACINTA Barry   What is the original appointment date and time? 4/3/24   At which location is the appointment scheduled to take place? [facility]   Which provider is the patient transitioning care to? Amaya Varghese PA-C   What is the new appointment date and time? 4/18/24   At which location is the new appointment scheduled to take place? [facility]   What is the reason for this change? provider

## 2024-04-17 ENCOUNTER — TELEPHONE (OUTPATIENT)
Dept: HEMATOLOGY ONCOLOGY | Facility: MEDICAL CENTER | Age: 74
End: 2024-04-17

## 2024-04-17 ENCOUNTER — TELEPHONE (OUTPATIENT)
Dept: HEMATOLOGY ONCOLOGY | Facility: CLINIC | Age: 74
End: 2024-04-17

## 2024-04-17 DIAGNOSIS — E53.8 FOLATE DEFICIENCY: ICD-10-CM

## 2024-04-17 DIAGNOSIS — D50.9 MICROCYTIC ANEMIA: Primary | ICD-10-CM

## 2024-04-17 DIAGNOSIS — D50.0 IRON DEFICIENCY ANEMIA DUE TO CHRONIC BLOOD LOSS: ICD-10-CM

## 2024-04-17 DIAGNOSIS — N18.30 STAGE 3 CHRONIC KIDNEY DISEASE, UNSPECIFIED WHETHER STAGE 3A OR 3B CKD (HCC): ICD-10-CM

## 2024-04-17 DIAGNOSIS — E53.8 B12 DEFICIENCY: ICD-10-CM

## 2024-04-17 NOTE — TELEPHONE ENCOUNTER
Appointment Confirmation   Who are you speaking with? Rekha Valles at [facility]   If it is not the patient, are they listed on an active communication consent form? N/A   Which provider is the appointment scheduled with?  Amaya Varghese PA-C   When is the appointment scheduled?  Please list date and time 4/18/24 10:00am   At which location is the appointment scheduled to take place? [facility]   Did caller verbalize understanding of appointment details? Yes

## 2024-04-17 NOTE — TELEPHONE ENCOUNTER
Spoke with nurse, Naseem, regarding updated lab work and need for rescheduling.  Informed her scripts would be faxed over, and appointment was rescheduled to 5/02 @ 2pm, same place, [facility].  Naseem verbalized understanding.

## 2024-04-30 ENCOUNTER — OFFICE VISIT (OUTPATIENT)
Dept: GASTROENTEROLOGY | Facility: AMBULARY SURGERY CENTER | Age: 74
End: 2024-04-30
Payer: COMMERCIAL

## 2024-04-30 ENCOUNTER — TELEPHONE (OUTPATIENT)
Dept: GASTROENTEROLOGY | Facility: AMBULARY SURGERY CENTER | Age: 74
End: 2024-04-30

## 2024-04-30 ENCOUNTER — TELEPHONE (OUTPATIENT)
Dept: HEMATOLOGY ONCOLOGY | Facility: CLINIC | Age: 74
End: 2024-04-30

## 2024-04-30 VITALS
HEIGHT: 72 IN | OXYGEN SATURATION: 94 % | BODY MASS INDEX: 27.12 KG/M2 | HEART RATE: 49 BPM | SYSTOLIC BLOOD PRESSURE: 142 MMHG | DIASTOLIC BLOOD PRESSURE: 82 MMHG

## 2024-04-30 DIAGNOSIS — Z79.02 ANTIPLATELET OR ANTITHROMBOTIC LONG-TERM USE: ICD-10-CM

## 2024-04-30 DIAGNOSIS — Z86.010 HISTORY OF COLON POLYPS: Primary | ICD-10-CM

## 2024-04-30 DIAGNOSIS — Z79.01 ANTICOAGULANT LONG-TERM USE: ICD-10-CM

## 2024-04-30 PROBLEM — Z86.0100 HISTORY OF COLON POLYPS: Status: ACTIVE | Noted: 2024-04-30

## 2024-04-30 PROCEDURE — 99214 OFFICE O/P EST MOD 30 MIN: CPT | Performed by: INTERNAL MEDICINE

## 2024-04-30 RX ORDER — APIXABAN 5 MG/1
TABLET, FILM COATED ORAL
COMMUNITY
Start: 2024-04-12

## 2024-04-30 RX ORDER — BISACODYL 5 MG/1
10 TABLET, DELAYED RELEASE ORAL ONCE
Qty: 2 TABLET | Refills: 0 | Status: SHIPPED | OUTPATIENT
Start: 2024-04-30 | End: 2024-04-30

## 2024-04-30 NOTE — PROGRESS NOTES
Follow-up Note -  [facility]  Prince Andrews 1950 male         ASSESSMENT & PLAN:    History of colon polyps  Personal history of colon polyps- patient is at increased risk for colon cancer screening.  Rule out colorectal lesions including polyps or malignancy.    -Schedule for colonoscopy.    -High-fiber diet.    -Patient was given instructions about the colonoscopy prep.    -Patient was explained about the risks and benefits of the procedure. Risks including but not limited to bleeding, infection, perforation were explained in detail. Also explained about less than 100% sensitivity with the exam and other alternatives.    Anticoagulant long-term use  Patient is at increased risks because of anticoagulation and antiplatelet therapy.  We need to get the clearance from his PCP about holding these prior to the procedures.    Antiplatelet or antithrombotic long-term use    -As above      Reason: For colonoscopy    HPI:  Mr. Morrison was brought in from the nursing home because of history of colon polyps.  He had colonoscopy with multiple polypectomy in 2022 when he was advised to come back in 1 year.  Patient has regular bowel movements and denies any blood or mucus in the stool.  Appetite is good and denies any recent weight loss.  Denies any abdominal pain, nausea, or vomiting.  Has no heartburn or acid reflux.  Denies any difficulty swallowing.    Chaperon: Ms. Dunaway    REVIEW OF SYSTEMS: Review of Systems   Constitutional:  Negative for activity change, appetite change, chills, diaphoresis, fatigue, fever and unexpected weight change.   HENT:  Negative for ear discharge, ear pain, facial swelling, hearing loss, nosebleeds, sore throat, tinnitus and voice change.    Eyes:  Negative for pain, discharge, redness, itching and visual disturbance.   Respiratory:  Negative for apnea, cough, chest tightness, shortness of breath and wheezing.    Cardiovascular:  Negative for chest pain and  palpitations.   Gastrointestinal:         As noted in HPI   Endocrine: Negative for cold intolerance, heat intolerance and polyuria.   Genitourinary:  Negative for difficulty urinating, dysuria, flank pain, hematuria and urgency.   Musculoskeletal:  Positive for gait problem. Negative for arthralgias, back pain, joint swelling and myalgias.   Skin:  Negative for rash and wound.   Neurological:  Negative for dizziness, tremors, seizures, speech difficulty, light-headedness, numbness and headaches.   Hematological:  Negative for adenopathy. Does not bruise/bleed easily.   Psychiatric/Behavioral:  Negative for agitation, behavioral problems and confusion. The patient is not nervous/anxious.         Past Medical History:   Diagnosis Date    Acute on chronic respiratory failure ([facility]) 1/30/2017    Anemia 3/29/2020    Colonic polyp 2012    COPD exacerbation ([facility]) 4/20/2019    Disc displacement, cervical     Peripheral vascular disease ([facility]) 2011    Femoral- popliteal bypass      Past Surgical History:   Procedure Laterality Date    BYPASS FEMORAL-POPLITEAL  2011    COLONOSCOPY  2012    With polypectomy    CORONARY ARTERY BYPASS GRAFT  2012    LAMINECTOMY  2010     Social History     Socioeconomic History    Marital status: Single     Spouse name: Not on file    Number of children: 1    Years of education:      Highest education level: Not on file   Occupational History    Occupation: retired   Tobacco Use    Smoking status: Former     Current packs/day: 0.00     Average packs/day: 1 pack/day for 37.0 years (37.0 ttl pk-yrs)     Types: Cigarettes     Start date: 1/1/1977     Quit date: 1/1/2014     Years since quitting: 10.3    Smokeless tobacco: Never    Tobacco comments:     Pt states he restarted smoking a few weeks ago   Vaping Use    Vaping status: Never Used   Substance and Sexual Activity    Alcohol use: No    Drug use: No    Sexual activity: Not Currently     Partners: Female     Birth control/protection: None    Other Topics Concern    Not on file   Social History Narrative    Pt states he drinks soda daily     Social Determinants of Health     Financial Resource Strain: Not on file   Food Insecurity: Not on file   Transportation Needs: Not on file   Physical Activity: Not on file   Stress: Not on file   Social Connections: Not on file   Intimate Partner Violence: Not on file   Housing Stability: Not on file     Family History   Problem Relation Age of Onset    Heart attack Mother     Heart attack Father      Vancomycin and No active allergies  Current Outpatient Medications   Medication Sig Dispense Refill    acetaminophen (TYLENOL) 650 mg CR tablet 3 (three) times a day      albuterol (2.5 mg/3 mL) 0.083 % nebulizer solution Take 1 vial (2.5 mg total) by nebulization every 6 (six) hours as needed for wheezing or shortness of breath 100 vial 6    albuterol (VENTOLIN HFA) 90 mcg/act inhaler Inhale 1 puff every 4 (four) hours as needed for wheezing 1 Inhaler 3    bisacodyl (DULCOLAX) 5 mg EC tablet Take 2 tablets (10 mg total) by mouth once for 1 dose 2 tablet 0    Cholecalciferol (VITAMIN D3) 11224 units CAPS Take 1 capsule by mouth once a week  0    clopidogrel (PLAVIX) 75 mg tablet       Eliquis 5 MG       folic acid (FOLVITE) 1 mg tablet Take 1 tablet (1 mg total) by mouth daily 30 tablet 6    glucose blood test strip TEST UP TO 3 TIMES PER DAY      insulin aspart (NovoLOG) 100 Units/mL injection pen Inject under the skin 3 (three) times a day with meals      insulin detemir (LEVEMIR FLEXTOUCH) 100 Units/mL injection pen Inject 40 Units under the skin daily 5 pen 11    insulin lispro (HumaLOG) 100 units/mL injection pen       Insulin Pen Needle 32G X 6 MM MISC Inject as directed 3 (three) times a day Poorly controlled insulin dependent DM2 100 each 11    Lancets (ONETOUCH ULTRASOFT) lancets   3    memantine (NAMENDA) 10 mg tablet       metoprolol tartrate (LOPRESSOR) 50 mg tablet Take 1 tablet (50 mg total) by mouth  every 12 (twelve) hours 180 tablet 1    multivitamin (THERAGRAN) TABS Take 1 tablet by mouth daily 30 tablet 6    PARoxetine (PAXIL) 10 mg tablet Take 2 tablets (20 mg total) by mouth daily      polyethylene glycol (GOLYTELY) 4000 mL solution Take 4,000 mL by mouth once for 1 dose 4000 mL 0    potassium chloride (KLOR-CON) 20 mEq packet Take 20 mEq by mouth 2 (two) times a day      rosuvastatin (CRESTOR) 10 MG tablet Take 1 tablet (10 mg total) by mouth daily 90 tablet 1    torsemide (DEMADEX) 20 mg tablet Take 20 mg by mouth daily      traZODone (DESYREL) 50 mg tablet Take 100 mg by mouth daily at bedtime      vitamin B-12 (VITAMIN B-12) 1,000 mcg tablet Take 1 tablet (1,000 mcg total) by mouth daily 30 tablet 6    ARIPiprazole (ABILIFY) 5 mg tablet Take 5 mg by mouth daily (Patient not taking: Reported on 4/30/2024)      atropine (ISOPTO ATROPINE) 1 % ophthalmic solution PUT 1 DROP INTO RIGHT EYE 3 TIMES A DAY (Patient not taking: Reported on 12/21/2022)  3    bisacodyl (DULCOLAX) 5 mg EC tablet Take 10 mg by mouth daily as needed (Patient not taking: Reported on 4/30/2024)      budesonide (PULMICORT) 0.5 mg/2 mL nebulizer solution Take 1 vial (0.5 mg total) by nebulization 2 (two) times a day Rinse mouth after use. (Patient not taking: Reported on 4/30/2024) 120 vial 6    dorzolamide (TRUSOPT) 2 % ophthalmic solution INSTILL 1 DROP INTO AFFECTED EYE 3 TIMES A DAY (Patient not taking: Reported on 12/21/2022)  3    famotidine (PEPCID) 20 mg tablet Take 20 mg by mouth 2 (two) times a day (Patient not taking: Reported on 4/30/2024)      Lantus SoloStar 100 units/mL SOPN 16 Units (Patient not taking: Reported on 4/30/2024)      LORazepam (ATIVAN) 0.5 mg tablet Take 0.5 mg by mouth every 8 (eight) hours as needed for anxiety (Patient not taking: Reported on 4/30/2024)      SPIRIVA HANDIHALER 18 MCG inhalation capsule INHALE 1 CAPSULE (18 MCG TOTAL) DAILY. (Patient not taking: Reported on 4/30/2024)  3    Trelegy  Ellipta 100-62.5-25 MCG/INH inhaler  (Patient not taking: Reported on 4/30/2024)       No current facility-administered medications for this visit.       Blood pressure 142/82, pulse (!) 49, height 6' (1.829 m), SpO2 94%.    PHYSICAL EXAM: Physical Exam  Constitutional:       Appearance: He is well-developed.   HENT:      Head: Normocephalic and atraumatic.   Eyes:      General: No scleral icterus.        Right eye: No discharge.         Left eye: No discharge.      Conjunctiva/sclera: Conjunctivae normal.      Pupils: Pupils are equal, round, and reactive to light.   Neck:      Thyroid: No thyromegaly.      Vascular: No JVD.      Trachea: No tracheal deviation.   Cardiovascular:      Rate and Rhythm: Normal rate and regular rhythm.      Heart sounds: Normal heart sounds. No murmur heard.     No friction rub. No gallop.   Pulmonary:      Effort: Pulmonary effort is normal. No respiratory distress.      Breath sounds: Normal breath sounds. No wheezing or rales.   Chest:      Chest wall: No tenderness.   Abdominal:      General: Bowel sounds are normal. There is no distension.      Palpations: Abdomen is soft. There is no mass.      Tenderness: There is no abdominal tenderness. There is no guarding or rebound.      Hernia: No hernia is present.   Musculoskeletal:      Cervical back: Neck supple.   Lymphadenopathy:      Cervical: No cervical adenopathy.   Skin:     General: Skin is warm and dry.      Findings: No erythema or rash.   Neurological:      Mental Status: He is alert and oriented to person, place, and time.   Psychiatric:         Behavior: Behavior normal.         Thought Content: Thought content normal.          Lab Results   Component Value Date    WBC 7.41 06/29/2022    HGB 10.7 (L) 06/29/2022    HCT 39.2 06/29/2022    MCV 79 (L) 06/29/2022     06/29/2022     Lab Results   Component Value Date    CALCIUM 9.2 02/19/2024    K 4.3 02/19/2024    CO2 28 02/19/2024     02/19/2024    BUN 22  02/19/2024    CREATININE 1.35 (H) 02/19/2024     Lab Results   Component Value Date    ALT 11 06/21/2023    AST 14 06/21/2023    ALKPHOS 71 06/21/2023     Lab Results   Component Value Date    INR 1.8 12/08/2020    INR 1.2 03/25/2020    INR 1.4 03/03/2020    PROTIME 14.4 (H) 01/23/2019    PROTIME 58.5 (H) 01/09/2019       Colonoscopy    Result Date: 10/10/2022  Impression: The cecum appeared normal. Three polyps measuring 5-9 mm in the ascending colon; performed cold forceps biopsy; removed by cold snare One polyp measuring 5-9 mm in the transverse colon; removed by cold snare Three polyps in the sigmoid colon; removed by hot snare In the distal rectum at the dentate line noted about 1 cm pedunculated smooth lesion questionable prolapsed hemorrhoid vs polypoid tissue vs squamous papilloma Bowel Preparation-some liquid stool was washed off and suctioned RECOMMENDATION: Repeat colonoscopy in 1 year due to a personal history of colon polyps Refer to Colorectal surgery  No Staff Documented     EGD    Result Date: 10/10/2022  Impression: The esophagus appeared normal. About 1 cm sliding hiatal hernia was noted The stomach and duodenum appeared normal. RECOMMENDATION: There is no recommended follow-up for this procedure.   No Staff Documented

## 2024-04-30 NOTE — TELEPHONE ENCOUNTER
Scheduled date of colonoscopy (as of today):  08/22/24  Physician performing colonoscopy:  Dr Saba  Location of colonoscopy:  [facility]   Bowel prep reviewed with patient: Luis Felipe/dulcolax  Instructions reviewed with patient by: Leda FALLON   Clearances: Plavix and eliquis hold clearance sent to [facility] for request    Faxing prep instructions along with clearance holds and diabetic medication instructions to [phone]    Calling home to discuss with them at [phone]

## 2024-04-30 NOTE — ASSESSMENT & PLAN NOTE
Patient is at increased risks because of anticoagulation and antiplatelet therapy.  We need to get the clearance from his PCP about holding these prior to the procedures.

## 2024-04-30 NOTE — TELEPHONE ENCOUNTER
Spoke with nurse, Radha, about faxing over results of lab work prior to coming in for appointment on Thursday.  Radha verbalized understanding, and scripts were re-faxed if never completed.

## 2024-05-02 ENCOUNTER — OFFICE VISIT (OUTPATIENT)
Dept: HEMATOLOGY ONCOLOGY | Facility: CLINIC | Age: 74
End: 2024-05-02
Payer: COMMERCIAL

## 2024-05-02 VITALS
DIASTOLIC BLOOD PRESSURE: 66 MMHG | WEIGHT: 195 LBS | HEART RATE: 41 BPM | SYSTOLIC BLOOD PRESSURE: 110 MMHG | BODY MASS INDEX: 25.03 KG/M2 | TEMPERATURE: 97.4 F | OXYGEN SATURATION: 95 % | HEIGHT: 74 IN

## 2024-05-02 DIAGNOSIS — D50.0 IRON DEFICIENCY ANEMIA DUE TO CHRONIC BLOOD LOSS: ICD-10-CM

## 2024-05-02 DIAGNOSIS — D50.9 MICROCYTIC ANEMIA: Primary | ICD-10-CM

## 2024-05-02 DIAGNOSIS — N18.30 STAGE 3 CHRONIC KIDNEY DISEASE, UNSPECIFIED WHETHER STAGE 3A OR 3B CKD (HCC): ICD-10-CM

## 2024-05-02 PROCEDURE — 99213 OFFICE O/P EST LOW 20 MIN: CPT | Performed by: PHYSICIAN ASSISTANT

## 2024-05-02 RX ORDER — FLUTICASONE FUROATE, UMECLIDINIUM BROMIDE AND VILANTEROL TRIFENATATE 200; 62.5; 25 UG/1; UG/1; UG/1
POWDER RESPIRATORY (INHALATION)
COMMUNITY
Start: 2024-02-15

## 2024-05-02 RX ORDER — PAROXETINE HYDROCHLORIDE 20 MG/1
TABLET, FILM COATED ORAL
COMMUNITY
Start: 2024-04-25

## 2024-05-02 RX ORDER — POTASSIUM CHLORIDE 20 MEQ/1
TABLET, EXTENDED RELEASE ORAL
COMMUNITY
Start: 2024-04-23

## 2024-05-02 NOTE — PROGRESS NOTES
[facility]  [facility]  [place] [address]  Hematology Ambulatory Follow-Up  Prince Andrews, 1950, [id number]  5/2/2024      Assessment and Plan   1. Microcytic anemia  2. Iron deficiency anemia due to chronic blood loss  3. Stage 3 chronic kidney disease, unspecified whether stage 3a or 3b CKD (HCC)  Mr. Andrews is a 74-year-old male seen in follow-up for microcytic anemia.  His anemia is multifactorial due to chronic blood loss from diverticula of the colon as well as anticoagulation.  He also has underlying CKD not on dialysis which could also be contributing to his anemia.      Hemoglobin has been stable within normal limits for several years now. He has no evidence of anemia.  He is on oral B12 and folic acid supplement.  Last vitamin B12 level was 543.  Last folic acid level was greater than 22.3. Iron studies are also normal.    He also has low heart rate today (41). I made his facility aware. May need to decrease dose of BB, or f/u with cardiology to make sure etiology is not more serious.    The patient will be made PRN. His PCP should continue to follow anemia labs once or twice yearly.    Patient voiced agreement and understanding to the above. Patient knows to call the Hematology/Oncology office with any questions and concerns regarding the above.     Barrier(s) to care: None  The patient is able to self care.    Subjective     Chief Complaint   Patient presents with    Follow-up     Anemia       History of present illness:   Prince Andrews is a 74-year-old male from a long-term care nursing facility originally seen in consultation in July 2022 for anemia.  He has past medical history of diverticular disease of the colon, GERD, type 2 diabetes, COPD, emphysema, A. fib on Xarelto, heart failure, CKD stage III not on dialysis, mood disorder, and mild cognitive impairment.  He has been in a wheelchair since 2020 due to neuropathy and  muscle weakness.  He has had decreasing hemoglobin's with his baseline ranging from 10-11 due to worsening kidney function and chronic blood loss from the colon from long-term anticoagulation and diverticular disease.     08/30/2018:  Hemoglobin 14.8, MCV 88, platelets 189, WBC 7.9  01/09/2019:  Hemoglobin 14.5, MCV 86.4, platelets 225, WBC 7.9  11/12/2019:  Hemoglobin 12.3, MCV 83, platelets 213, WBC 6.8  03/26/2020:  Hemoglobin 11.3, MCV 85, platelets 154, WBC 7.5  08/12/2020:  Hemoglobin 10.7, MCV 76, platelets 178, WBC 5.1  01/18/2021:  Hemoglobin 11.2, MCV 82, platelets 179, WBC 4.7  04/19/2022:  Hemoglobin 10.9, MCV 72, platelets 228, WBC 8.8  05/27/2022:  B12 388, folate 8.5, ferritin 26, serum iron 19, TIBC 380, iron saturation 5%  06/29/2022:  Hemoglobin 10.7, MCV 79, platelets 191, WBC 7.41  07/25/2022:  Hemoglobin 11.5, MCV 74, platelets 281, WBC 8.3  08/09/2022:  Hemoglobin 12.4, MCV 73, platelets 340, WBC 7.4  8/11/2022: Ferritin 69, iron saturation 8%, TIBC 300, serum iron 24  9/19/2022: Hemoglobin 10.8, MCV 73, platelets 254,, WBC 5.7  12/20/2022: Hemoglobin 13, MCV 80, platelets 230, WBC 7.6              Serum iron 52, B12 457, folate 10  6/21/2023: Hemoglobin 14.9, MCV 85, platelets 163, WBC 7              Ferritin 43, iron saturation 15%, serum iron 51, TIBC 332              B12 221, Folate 6.1  2/21/2024: ferritin 52  5/2/2024: hemoglobin 14.3, MCV 90, platelets 178, WBC 6.5 BUN 22, creatinine 1.51, GFR 48, iron saturation 20     Interval history: Patient presents today in follow-up. He is doing well and feels good.  He has no complaints or concerns today.  He continues to use oxygen at night only and is mostly in a wheelchair.  He denies any changes in his stool or urine. His kidney function remains stable. No obvious bleeding.    Review of Systems   Constitutional:  Positive for fatigue. Negative for activity change, appetite change and fever.   HENT:  Negative for nosebleeds.    Respiratory:   Negative for cough, choking and shortness of breath.    Cardiovascular:  Negative for chest pain, palpitations and leg swelling.   Gastrointestinal:  Negative for abdominal distention, abdominal pain, anal bleeding, blood in stool, constipation, diarrhea, nausea and vomiting.   Endocrine: Negative for cold intolerance.   Genitourinary:  Negative for hematuria.   Musculoskeletal:  Positive for arthralgias. Negative for myalgias.   Skin:  Negative for color change, pallor and rash.   Allergic/Immunologic: Negative for immunocompromised state.   Neurological:  Negative for headaches.   Hematological:  Negative for adenopathy. Does not bruise/bleed easily.   All other systems reviewed and are negative.      Patient Active Problem List   Diagnosis    Arteriosclerosis of coronary artery bypass graft    Atrial fibrillation, chronic (HCC)    Benign essential hypertension    Chronic obstructive lung disease (HCC)    Chronic systolic heart failure (HCC)    Depressive disorder    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    Diverticular disease of colon    Gastroesophageal reflux disease without esophagitis    Glaucoma    Gout    Mixed hyperlipidemia    Persistent insomnia    Type 2 diabetes mellitus with diabetic polyneuropathy, with long-term current use of insulin (HCC)    Altered mental state    Chest wall pain, chronic    Chronic respiratory failure (HCC)    Delirium    Encephalopathy    Essential hypertension    SENTHIL (obstructive sleep apnea)    Peripheral sensory neuropathy    Tracheal stenosis    Type 2 diabetes mellitus with stage 4 chronic kidney disease, without long-term current use of insulin (HCC)    Insulin dependent type 2 diabetes mellitus (HCC)    Atherosclerosis of native artery of extremity (HCC)    Obesity    Uncontrolled type 2 diabetes mellitus with hyperglycemia (HCC)    Stage 3 chronic kidney disease (HCC)    Peripheral vascular disease (HCC)    Current use of long term anticoagulation    Urinary  retention    Tinea pedis of both feet    Tobacco abuse    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 ([facility])    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[facility])    Contusion of right eyeball    Dry eye syndrome of bilateral lacrimal glands    Hyphema, right eye    Lens replaced by other means    Localized edema    Age-related nuclear cataract of left eye    Open angle with borderline findings and high glaucoma risk in both eyes    Other secondary cataract, right eye    Pain of left heel    Panlobular emphysema ([facility])    Primary open-angle glaucoma, right eye, severe stage    Pterygium    Tear film insufficiency    Anemia    Weight loss    Nose dryness    Foreign body (FB) in soft tissue    Paranoia ([facility])    Mild cognitive impairment    History of colon polyps    Anticoagulant long-term use    Antiplatelet or antithrombotic long-term use     Past Medical History:   Diagnosis Date    Acute on chronic respiratory failure ([facility]) 1/30/2017    Anemia 3/29/2020    Colonic polyp 2012    COPD exacerbation ([facility]) 4/20/2019    Disc displacement, cervical     Peripheral vascular disease ([facility]) 2011    Femoral- popliteal bypass     Past Surgical History:   Procedure Laterality Date    BYPASS FEMORAL-POPLITEAL  2011    COLONOSCOPY  2012    With polypectomy    CORONARY ARTERY BYPASS GRAFT  2012    LAMINECTOMY  2010     Family History   Problem Relation Age of Onset    Heart attack Mother     Heart attack Father      Social History     Socioeconomic History    Marital status: Single     Spouse name: Not on file    Number of children: 1    Years of education:      Highest education level: Not on file   Occupational History    Occupation: retired   Tobacco Use    Smoking status: Former     Current packs/day: 0.00     Average packs/day: 1 pack/day for 37.0 years (37.0 ttl pk-yrs)     Types: Cigarettes     Start date: 1/1/1977     Quit date: 1/1/2014     Years since quitting: 10.3    Smokeless tobacco: Never    Tobacco comments:     Pt states  he restarted smoking a few weeks ago   Vaping Use    Vaping status: Never Used   Substance and Sexual Activity    Alcohol use: No    Drug use: No    Sexual activity: Not Currently     Partners: Female     Birth control/protection: None   Other Topics Concern    Not on file   Social History Narrative    Pt states he drinks soda daily     Social Determinants of Health     Financial Resource Strain: Not on file   Food Insecurity: Not on file   Transportation Needs: Not on file   Physical Activity: Not on file   Stress: Not on file   Social Connections: Not on file   Intimate Partner Violence: Not on file   Housing Stability: Not on file       Current Outpatient Medications:     acetaminophen (TYLENOL) 650 mg CR tablet, 3 (three) times a day, Disp: , Rfl:     albuterol (2.5 mg/3 mL) 0.083 % nebulizer solution, Take 1 vial (2.5 mg total) by nebulization every 6 (six) hours as needed for wheezing or shortness of breath, Disp: 100 vial, Rfl: 6    albuterol (VENTOLIN HFA) 90 mcg/act inhaler, Inhale 1 puff every 4 (four) hours as needed for wheezing, Disp: 1 Inhaler, Rfl: 3    ARIPiprazole (ABILIFY) 5 mg tablet, Take 5 mg by mouth daily (Patient not taking: Reported on 4/30/2024), Disp: , Rfl:     atropine (ISOPTO ATROPINE) 1 % ophthalmic solution, PUT 1 DROP INTO RIGHT EYE 3 TIMES A DAY (Patient not taking: Reported on 12/21/2022), Disp: , Rfl: 3    bisacodyl (DULCOLAX) 5 mg EC tablet, Take 10 mg by mouth daily as needed (Patient not taking: Reported on 4/30/2024), Disp: , Rfl:     bisacodyl (DULCOLAX) 5 mg EC tablet, Take 2 tablets (10 mg total) by mouth once for 1 dose, Disp: 2 tablet, Rfl: 0    budesonide (PULMICORT) 0.5 mg/2 mL nebulizer solution, Take 1 vial (0.5 mg total) by nebulization 2 (two) times a day Rinse mouth after use. (Patient not taking: Reported on 4/30/2024), Disp: 120 vial, Rfl: 6    Cholecalciferol (VITAMIN D3) 19587 units CAPS, Take 1 capsule by mouth once a week, Disp: , Rfl: 0    clopidogrel  (PLAVIX) 75 mg tablet, , Disp: , Rfl:     dorzolamide (TRUSOPT) 2 % ophthalmic solution, INSTILL 1 DROP INTO AFFECTED EYE 3 TIMES A DAY (Patient not taking: Reported on 12/21/2022), Disp: , Rfl: 3    Eliquis 5 MG, , Disp: , Rfl:     famotidine (PEPCID) 20 mg tablet, Take 20 mg by mouth 2 (two) times a day (Patient not taking: Reported on 4/30/2024), Disp: , Rfl:     folic acid (FOLVITE) 1 mg tablet, Take 1 tablet (1 mg total) by mouth daily, Disp: 30 tablet, Rfl: 6    glucose blood test strip, TEST UP TO 3 TIMES PER DAY, Disp: , Rfl:     insulin aspart (NovoLOG) 100 Units/mL injection pen, Inject under the skin 3 (three) times a day with meals, Disp: , Rfl:     insulin detemir (LEVEMIR FLEXTOUCH) 100 Units/mL injection pen, Inject 40 Units under the skin daily, Disp: 5 pen, Rfl: 11    insulin lispro (HumaLOG) 100 units/mL injection pen, , Disp: , Rfl:     Insulin Pen Needle 32G X 6 MM MISC, Inject as directed 3 (three) times a day Poorly controlled insulin dependent DM2, Disp: 100 each, Rfl: 11    Lancets (ONETOUCH ULTRASOFT) lancets, , Disp: , Rfl: 3    Lantus SoloStar 100 units/mL SOPN, 16 Units (Patient not taking: Reported on 4/30/2024), Disp: , Rfl:     LORazepam (ATIVAN) 0.5 mg tablet, Take 0.5 mg by mouth every 8 (eight) hours as needed for anxiety (Patient not taking: Reported on 4/30/2024), Disp: , Rfl:     memantine (NAMENDA) 10 mg tablet, , Disp: , Rfl:     metoprolol tartrate (LOPRESSOR) 50 mg tablet, Take 1 tablet (50 mg total) by mouth every 12 (twelve) hours, Disp: 180 tablet, Rfl: 1    multivitamin (THERAGRAN) TABS, Take 1 tablet by mouth daily, Disp: 30 tablet, Rfl: 6    PARoxetine (PAXIL) 10 mg tablet, Take 2 tablets (20 mg total) by mouth daily, Disp: , Rfl:     polyethylene glycol (GOLYTELY) 4000 mL solution, Take 4,000 mL by mouth once for 1 dose, Disp: 4000 mL, Rfl: 0    potassium chloride (KLOR-CON) 20 mEq packet, Take 20 mEq by mouth 2 (two) times a day, Disp: , Rfl:     rosuvastatin  (CRESTOR) 10 MG tablet, Take 1 tablet (10 mg total) by mouth daily, Disp: 90 tablet, Rfl: 1    SPIRIVA HANDIHALER 18 MCG inhalation capsule, INHALE 1 CAPSULE (18 MCG TOTAL) DAILY. (Patient not taking: Reported on 4/30/2024), Disp: , Rfl: 3    torsemide (DEMADEX) 20 mg tablet, Take 20 mg by mouth daily, Disp: , Rfl:     traZODone (DESYREL) 50 mg tablet, Take 100 mg by mouth daily at bedtime, Disp: , Rfl:     Trelegy Ellipta 100-62.5-25 MCG/INH inhaler, , Disp: , Rfl:     vitamin B-12 (VITAMIN B-12) 1,000 mcg tablet, Take 1 tablet (1,000 mcg total) by mouth daily, Disp: 30 tablet, Rfl: 6  Allergies   Allergen Reactions    Vancomycin Other (See Comments)    No Active Allergies        Objective     Vitals:    05/02/24 1359   BP: 110/66   Pulse: (!) 41   Temp: (!) 97.4 °F (36.3 °C)   SpO2: 95%     Physical Exam  Constitutional:       General: He is not in acute distress.     Appearance: Normal appearance. He is well-developed.   HENT:      Head: Normocephalic and atraumatic.      Right Ear: External ear normal.      Left Ear: External ear normal.      Nose: Nose normal.   Eyes:      General: No scleral icterus.     Conjunctiva/sclera: Conjunctivae normal.   Cardiovascular:      Rate and Rhythm: Normal rate and regular rhythm.   Pulmonary:      Effort: Pulmonary effort is normal. No respiratory distress.      Breath sounds: Normal breath sounds.   Abdominal:      General: Abdomen is flat. There is no distension.      Palpations: Abdomen is soft.      Tenderness: There is no abdominal tenderness.   Musculoskeletal:      Comments: In wheelchair.   Skin:     Findings: No rash (on exposed skin.).   Neurological:      Mental Status: He is alert and oriented to person, place, and time.   Psychiatric:         Mood and Affect: Mood normal.         Thought Content: Thought content normal.         Judgment: Judgment normal.     Extremities: No lower extremity edema bilaterally.    Result Review  Labs:  08/30/2018:  Hemoglobin 14.8,  MCV 88, platelets 189, WBC 7.9  01/09/2019:  Hemoglobin 14.5, MCV 86.4, platelets 225, WBC 7.9  11/12/2019:  Hemoglobin 12.3, MCV 83, platelets 213, WBC 6.8  03/26/2020:  Hemoglobin 11.3, MCV 85, platelets 154, WBC 7.5  08/12/2020:  Hemoglobin 10.7, MCV 76, platelets 178, WBC 5.1  01/18/2021:  Hemoglobin 11.2, MCV 82, platelets 179, WBC 4.7  04/19/2022:  Hemoglobin 10.9, MCV 72, platelets 228, WBC 8.8  05/27/2022:  B12 388, folate 8.5, ferritin 26, serum iron 19, TIBC 380, iron saturation 5%  06/29/2022:  Hemoglobin 10.7, MCV 79, platelets 191, WBC 7.41  07/25/2022:  Hemoglobin 11.5, MCV 74, platelets 281, WBC 8.3  08/09/2022:  Hemoglobin 12.4, MCV 73, platelets 340, WBC 7.4  8/11/2022: Ferritin 69, iron saturation 8%, TIBC 300, serum iron 24  9/19/2022: Hemoglobin 10.8, MCV 73, platelets 254,, WBC 5.7  12/20/2022: Hemoglobin 13, MCV 80, platelets 230, WBC 7.6              Serum iron 52, B12 457, folate 10  6/21/2023: Hemoglobin 14.9, MCV 85, platelets 163, WBC 7              Ferritin 43, iron saturation 15%, serum iron 51, TIBC 332              B12 221, Folate 6.1  2/21/2024: ferritin 52  5/2/2024: hemoglobin 14.3, MCV 90, platelets 178, WBC 6.5 BUN 22, creatinine 1.51, GFR 48, iron saturation 20    Please note:  This report has been generated by a voice recognition software system. Therefore there may be syntax, spelling, and/or grammatical errors. Please call if you have any questions.

## 2024-10-07 ENCOUNTER — HOSPITAL ENCOUNTER (OUTPATIENT)
Dept: CT IMAGING | Facility: HOSPITAL | Age: 74
Discharge: HOME | End: 2024-10-07
Payer: COMMERCIAL

## 2024-10-07 DIAGNOSIS — J44.9 CHRONIC OBSTRUCTIVE PULMONARY DISEASE, UNSPECIFIED COPD TYPE (HCC): ICD-10-CM

## 2024-10-07 PROCEDURE — 71250 CT THORAX DX C-: CPT

## 2024-11-27 ENCOUNTER — TELEPHONE (OUTPATIENT)
Dept: GASTROENTEROLOGY | Facility: CLINIC | Age: 74
End: 2024-11-27

## 2024-11-27 NOTE — TELEPHONE ENCOUNTER
Spoke to nursing  home confirming pt's colonoscopy scheduled on 12/9/24 at [facility] with Dr Saba.  Informed [facility] would be calling the day prior with the arrival time.  Informed pt would need to hold Plavix 5 days prior and the Eliquis 2 days prior to procedure. They believe they have instructions, however, asked me to refax to [phone].

## 2024-12-08 RX ORDER — SODIUM CHLORIDE, SODIUM LACTATE, POTASSIUM CHLORIDE, CALCIUM CHLORIDE 600; 310; 30; 20 MG/100ML; MG/100ML; MG/100ML; MG/100ML
125 INJECTION, SOLUTION INTRAVENOUS CONTINUOUS
Status: CANCELLED | OUTPATIENT
Start: 2024-12-08

## 2024-12-09 ENCOUNTER — HOSPITAL ENCOUNTER (OUTPATIENT)
Dept: GASTROENTEROLOGY | Facility: HOSPITAL | Age: 74
Setting detail: OUTPATIENT SURGERY
Discharge: HOME | End: 2024-12-09
Attending: INTERNAL MEDICINE
Payer: COMMERCIAL

## 2024-12-09 ENCOUNTER — ANESTHESIA (OUTPATIENT)
Dept: GASTROENTEROLOGY | Facility: HOSPITAL | Age: 74
End: 2024-12-09
Payer: COMMERCIAL

## 2024-12-09 ENCOUNTER — ANESTHESIA EVENT (OUTPATIENT)
Dept: GASTROENTEROLOGY | Facility: HOSPITAL | Age: 74
End: 2024-12-09
Payer: COMMERCIAL

## 2024-12-09 VITALS
HEART RATE: 57 BPM | DIASTOLIC BLOOD PRESSURE: 66 MMHG | WEIGHT: 195 LBS | RESPIRATION RATE: 18 BRPM | BODY MASS INDEX: 25.84 KG/M2 | OXYGEN SATURATION: 98 % | HEIGHT: 73 IN | SYSTOLIC BLOOD PRESSURE: 118 MMHG | TEMPERATURE: 96.1 F

## 2024-12-09 DIAGNOSIS — Z86.0100 HISTORY OF COLON POLYPS: ICD-10-CM

## 2024-12-09 LAB — GLUCOSE SERPL-MCNC: 120 MG/DL (ref 65–140)

## 2024-12-09 PROCEDURE — 82948 REAGENT STRIP/BLOOD GLUCOSE: CPT

## 2024-12-09 PROCEDURE — 88305 TISSUE EXAM BY PATHOLOGIST: CPT | Performed by: PATHOLOGY

## 2024-12-09 PROCEDURE — 45385 COLONOSCOPY W/LESION REMOVAL: CPT | Performed by: INTERNAL MEDICINE

## 2024-12-09 RX ORDER — SODIUM CHLORIDE, SODIUM LACTATE, POTASSIUM CHLORIDE, CALCIUM CHLORIDE 600; 310; 30; 20 MG/100ML; MG/100ML; MG/100ML; MG/100ML
INJECTION, SOLUTION INTRAVENOUS CONTINUOUS PRN
Status: DISCONTINUED | OUTPATIENT
Start: 2024-12-09 | End: 2024-12-09

## 2024-12-09 RX ORDER — PROPOFOL 10 MG/ML
INJECTION, EMULSION INTRAVENOUS AS NEEDED
Status: DISCONTINUED | OUTPATIENT
Start: 2024-12-09 | End: 2024-12-09

## 2024-12-09 RX ORDER — PHENYLEPHRINE HCL IN 0.9% NACL 1 MG/10 ML
SYRINGE (ML) INTRAVENOUS AS NEEDED
Status: DISCONTINUED | OUTPATIENT
Start: 2024-12-09 | End: 2024-12-09

## 2024-12-09 RX ORDER — LIDOCAINE HYDROCHLORIDE 10 MG/ML
INJECTION, SOLUTION EPIDURAL; INFILTRATION; INTRACAUDAL; PERINEURAL AS NEEDED
Status: DISCONTINUED | OUTPATIENT
Start: 2024-12-09 | End: 2024-12-09

## 2024-12-09 RX ADMIN — Medication 100 MCG: at 09:44

## 2024-12-09 RX ADMIN — Medication 40 MG: at 09:44

## 2024-12-09 RX ADMIN — PROPOFOL 30 MG: 10 INJECTION, EMULSION INTRAVENOUS at 09:46

## 2024-12-09 RX ADMIN — PROPOFOL 60 MG: 10 INJECTION, EMULSION INTRAVENOUS at 09:37

## 2024-12-09 RX ADMIN — Medication 100 MCG: at 09:46

## 2024-12-09 RX ADMIN — LIDOCAINE HYDROCHLORIDE 50 MG: 10 INJECTION, SOLUTION EPIDURAL; INFILTRATION; INTRACAUDAL at 09:37

## 2024-12-09 RX ADMIN — PROPOFOL 50 MG: 10 INJECTION, EMULSION INTRAVENOUS at 09:43

## 2024-12-09 RX ADMIN — SODIUM CHLORIDE, SODIUM LACTATE, POTASSIUM CHLORIDE, AND CALCIUM CHLORIDE: .6; .31; .03; .02 INJECTION, SOLUTION INTRAVENOUS at 09:36

## 2024-12-09 RX ADMIN — PROPOFOL 20 MG: 10 INJECTION, EMULSION INTRAVENOUS at 09:39

## 2024-12-09 RX ADMIN — PROPOFOL 20 MG: 10 INJECTION, EMULSION INTRAVENOUS at 09:41

## 2024-12-09 NOTE — ANESTHESIA PREPROCEDURE EVALUATION
Procedure:  COLONOSCOPY    Relevant Problems   CARDIO   (+) Arteriosclerosis of coronary artery bypass graft   (+) 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 (HCC)   (+) Atherosclerosis of native artery of extremity (HCC)   (+) Atrial fibrillation, chronic (HCC)   (+) Benign essential hypertension   (+) 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HCC)   (+) Chest wall pain, chronic   (+) Essential hypertension   (+) Mixed hyperlipidemia   (+) Peripheral vascular disease (HCC)      ENDO   (+) Insulin dependent type 2 diabetes mellitus (HCC)   (+) Type 2 diabetes mellitus with diabetic polyneuropathy, with long-term current use of insulin (HCC)   (+) Type 2 diabetes mellitus with stage 4 chronic kidney disease, without long-term current use of insulin (HCC)   (+) Uncontrolled type 2 diabetes mellitus with hyperglycemia (HCC)      GI/HEPATIC   (+) Gastroesophageal reflux disease without esophagitis      /RENAL   (+) Stage 3 chronic kidney disease (HCC)      HEMATOLOGY   (+) Anemia      MUSCULOSKELETAL   (+) Gout      NEURO/PSYCH   (+) Depressive disorder   (+) Type 2 diabetes mellitus with diabetic polyneuropathy, with long-term current use of insulin (HCC)      PULMONARY   (+) Chronic obstructive lung disease (HCC)   (+) Chronic respiratory failure (HCC)   (+) SENTHIL (obstructive sleep apnea)   (+) Panlobular emphysema (HCC)     TTE 4/2019: LVEF 45-50%.       Uses intermittent O2. Currently on 2 LPM.    Physical Exam    Airway    Mallampati score: II  TM Distance: >3 FB  Neck ROM: full     Dental       Cardiovascular      Pulmonary      Other Findings        Anesthesia Plan  ASA Score- 4     Anesthesia Type- IV sedation with anesthesia with ASA Monitors.         Additional Monitors:     Airway Plan:            Plan Factors-    Chart reviewed.    Patient summary reviewed.    Patient is a current smoker.  Patient did not smoke on day of surgery.    Obstructive sleep apnea risk education given  perioperatively.        Induction- intravenous.    Postoperative Plan-     Perioperative Resuscitation Plan - Level 1 - Full Code.       Informed Consent- Anesthetic plan and risks discussed with patient.  I personally reviewed this patient with the CRNA. Discussed and agreed on the Anesthesia Plan with the CRNA..

## 2024-12-09 NOTE — ANESTHESIA POSTPROCEDURE EVALUATION
Post-Op Assessment Note    CV Status:  Stable  Pain Score: 0    Pain management: adequate       Mental Status:  Alert and awake   Hydration Status:  Euvolemic   PONV Controlled:  Controlled   Airway Patency:  Patent     Post Op Vitals Reviewed: Yes    No anethesia notable event occurred.    Staff: CRNA           Last Filed PACU Vitals:  Vitals Value Taken Time   Temp     Pulse 78    /72    Resp 17    SpO2 100        Modified Ant:  Activity: 2 (12/9/2024  9:02 AM)  Respiration: 2 (12/9/2024  9:02 AM)  Circulation: 2 (12/9/2024  9:02 AM)  Consciousness: 2 (12/9/2024  9:02 AM)  Oxygen Saturation: 1 (12/9/2024  9:02 AM)  Modified Ant Score: 9 (12/9/2024  9:02 AM)

## 2024-12-09 NOTE — H&P
"History and Physical -  [facility]  Prince HASSAN Justinlucita 74 y.o. male MRN: [id number]        HPI: 74-year-old male with history of colon polyps.  Regular bowel movements.    Historical Information   Past Medical History:   Diagnosis Date    Acute on chronic respiratory failure (HCC) 1/30/2017    Anemia 3/29/2020    Colonic polyp 2012    COPD exacerbation (HCC) 4/20/2019    Disc displacement, cervical     Peripheral vascular disease (HCC) 2011    Femoral- popliteal bypass     Past Surgical History:   Procedure Laterality Date    BYPASS FEMORAL-POPLITEAL  2011    COLONOSCOPY  2012    With polypectomy    CORONARY ARTERY BYPASS GRAFT  2012    LAMINECTOMY  2010     Social History   Social History     Substance and Sexual Activity   Alcohol Use No     Social History     Substance and Sexual Activity   Drug Use No     Social History     Tobacco Use   Smoking Status Former    Current packs/day: 0.00    Average packs/day: 1 pack/day for 37.0 years (37.0 ttl pk-yrs)    Types: Cigarettes    Start date: 1/1/1977    Quit date: 1/1/2014    Years since quitting: 10.9   Smokeless Tobacco Never   Tobacco Comments    Pt states he restarted smoking a few weeks ago     Family History   Problem Relation Age of Onset    Heart attack Mother     Heart attack Father        Meds/Allergies     Not in a hospital admission.    Allergies   Allergen Reactions    Vancomycin Other (See Comments)    No Active Allergies        Objective     Blood pressure 122/70, pulse 70, temperature (!) 96.8 °F (36 °C), temperature source Temporal, resp. rate 18, height 6' 1\" (1.854 m), weight 88.5 kg (195 lb), SpO2 94%.    Physical Exam:    Chest- CTA  Heart- RRR  Abdomen- NT/ND  Extremities- No edema    ASSESSMENT:     History of colon polyps    PLAN:    Colonoscopy              "

## 2024-12-11 PROCEDURE — 88305 TISSUE EXAM BY PATHOLOGIST: CPT | Performed by: PATHOLOGY

## 2024-12-12 ENCOUNTER — RESULTS FOLLOW-UP (OUTPATIENT)
Dept: GASTROENTEROLOGY | Facility: AMBULARY SURGERY CENTER | Age: 74
End: 2024-12-12

## 2025-06-05 ENCOUNTER — APPOINTMENT (EMERGENCY)
Dept: CT IMAGING | Facility: HOSPITAL | Age: 75
DRG: 516 | End: 2025-06-05
Payer: COMMERCIAL

## 2025-06-05 ENCOUNTER — APPOINTMENT (EMERGENCY)
Dept: RADIOLOGY | Facility: HOSPITAL | Age: 75
DRG: 516 | End: 2025-06-05
Payer: COMMERCIAL

## 2025-06-05 ENCOUNTER — HOSPITAL ENCOUNTER (INPATIENT)
Facility: HOSPITAL | Age: 75
LOS: 6 days | Discharge: NURSING, REHAB OR LONG-TERM CARE | DRG: 516 | End: 2025-06-11
Attending: EMERGENCY MEDICINE | Admitting: STUDENT IN AN ORGANIZED HEALTH CARE EDUCATION/TRAINING PROGRAM
Payer: COMMERCIAL

## 2025-06-05 DIAGNOSIS — S32.401A CLOSED NONDISPLACED FRACTURE OF RIGHT ACETABULUM, UNSPECIFIED PORTION OF ACETABULUM, INITIAL ENCOUNTER (HCC): ICD-10-CM

## 2025-06-05 DIAGNOSIS — G89.11 ACUTE PAIN DUE TO TRAUMA: ICD-10-CM
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
## 2025-06-05 DIAGNOSIS — S32.401A CLOSED DISPLACED FRACTURE OF RIGHT ACETABULUM, UNSPECIFIED PORTION OF ACETABULUM, INITIAL ENCOUNTER (HCC): Primary | ICD-10-CM

## 2025-06-05 DIAGNOSIS — J44.9 CHRONIC OBSTRUCTIVE PULMONARY DISEASE, UNSPECIFIED COPD TYPE (HCC): ICD-10-CM

## 2025-06-05 DIAGNOSIS — I48.91 ATRIAL FIBRILLATION, UNSPECIFIED TYPE (HCC): ICD-10-CM

## 2025-06-05 DIAGNOSIS — F32.A DEPRESSIVE DISORDER: ICD-10-CM

## 2025-06-05 DIAGNOSIS — E11.9 TYPE 2 DIABETES MELLITUS WITHOUT COMPLICATION, UNSPECIFIED WHETHER LONG TERM INSULIN USE (HCC): ICD-10-CM

## 2025-06-05 DIAGNOSIS — I10 ESSENTIAL HYPERTENSION: ICD-10-CM

## 2025-06-05 LAB
ABO GROUP BLD: NORMAL
ABO GROUP BLD: NORMAL
ALBUMIN SERPL BCG-MCNC: 4 G/DL (ref 3.5–5)
ALP SERPL-CCNC: 65 U/L (ref 34–104)
ALT SERPL W P-5'-P-CCNC: 15 U/L (ref 7–52)
ANION GAP SERPL CALCULATED.3IONS-SCNC: 12 MMOL/L (ref 4–13)
APTT PPP: 36 SECONDS (ref 23–34)
AST SERPL W P-5'-P-CCNC: 18 U/L (ref 13–39)
BASOPHILS # BLD AUTO: 0.08 THOUSANDS/ÂΜL (ref 0–0.1)
BASOPHILS NFR BLD AUTO: 1 % (ref 0–1)
BILIRUB SERPL-MCNC: 1.05 MG/DL (ref 0.2–1)
BLD GP AB SCN SERPL QL: NEGATIVE
BUN SERPL-MCNC: 25 MG/DL (ref 5–25)
CALCIUM SERPL-MCNC: 9.6 MG/DL (ref 8.4–10.2)
CHLORIDE SERPL-SCNC: 98 MMOL/L (ref 96–108)
CO2 SERPL-SCNC: 29 MMOL/L (ref 21–32)
CREAT SERPL-MCNC: 1.47 MG/DL (ref 0.6–1.3)
EOSINOPHIL # BLD AUTO: 0.1 THOUSAND/ÂΜL (ref 0–0.61)
EOSINOPHIL NFR BLD AUTO: 1 % (ref 0–6)
ERYTHROCYTE [DISTWIDTH] IN BLOOD BY AUTOMATED COUNT: 15.4 % (ref 11.6–15.1)
GFR SERPL CREATININE-BSD FRML MDRD: 45 ML/MIN/1.73SQ M
GLUCOSE SERPL-MCNC: 163 MG/DL (ref 65–140)
GLUCOSE SERPL-MCNC: 186 MG/DL (ref 65–140)
HCT VFR BLD AUTO: 48.7 % (ref 36.5–49.3)
HCT VFR BLD AUTO: 53.1 % (ref 36.5–49.3)
HGB BLD-MCNC: 15.1 G/DL (ref 12–17)
HGB BLD-MCNC: 16.7 G/DL (ref 12–17)
IMM GRANULOCYTES # BLD AUTO: 0.08 THOUSAND/UL (ref 0–0.2)
IMM GRANULOCYTES NFR BLD AUTO: 1 % (ref 0–2)
INR PPP: 1.55 (ref 0.85–1.19)
LYMPHOCYTES # BLD AUTO: 0.82 THOUSANDS/ÂΜL (ref 0.6–4.47)
LYMPHOCYTES NFR BLD AUTO: 6 % (ref 14–44)
MCH RBC QN AUTO: 29.1 PG (ref 26.8–34.3)
MCHC RBC AUTO-ENTMCNC: 31.5 G/DL (ref 31.4–37.4)
MCV RBC AUTO: 93 FL (ref 82–98)
MONOCYTES # BLD AUTO: 1.11 THOUSAND/ÂΜL (ref 0.17–1.22)
MONOCYTES NFR BLD AUTO: 7 % (ref 4–12)
NEUTROPHILS # BLD AUTO: 12.79 THOUSANDS/ÂΜL (ref 1.85–7.62)
NEUTS SEG NFR BLD AUTO: 84 % (ref 43–75)
NRBC BLD AUTO-RTO: 0 /100 WBCS
PLATELET # BLD AUTO: 197 THOUSANDS/UL (ref 149–390)
PMV BLD AUTO: 9.9 FL (ref 8.9–12.7)
POTASSIUM SERPL-SCNC: 5.1 MMOL/L (ref 3.5–5.3)
PROT SERPL-MCNC: 7.7 G/DL (ref 6.4–8.4)
PROTHROMBIN TIME: 19.4 SECONDS (ref 12.3–15)
RBC # BLD AUTO: 5.74 MILLION/UL (ref 3.88–5.62)
RH BLD: POSITIVE
RH BLD: POSITIVE
SODIUM SERPL-SCNC: 139 MMOL/L (ref 135–147)
SPECIMEN EXPIRATION DATE: NORMAL
WBC # BLD AUTO: 14.98 THOUSAND/UL (ref 4.31–10.16)

## 2025-06-05 PROCEDURE — 71045 X-RAY EXAM CHEST 1 VIEW: CPT

## 2025-06-05 PROCEDURE — 74177 CT ABD & PELVIS W/CONTRAST: CPT

## 2025-06-05 PROCEDURE — 85730 THROMBOPLASTIN TIME PARTIAL: CPT | Performed by: EMERGENCY MEDICINE

## 2025-06-05 PROCEDURE — 85018 HEMOGLOBIN: CPT

## 2025-06-05 PROCEDURE — 96375 TX/PRO/DX INJ NEW DRUG ADDON: CPT

## 2025-06-05 PROCEDURE — 99285 EMERGENCY DEPT VISIT HI MDM: CPT

## 2025-06-05 PROCEDURE — 70450 CT HEAD/BRAIN W/O DYE: CPT

## 2025-06-05 PROCEDURE — 99285 EMERGENCY DEPT VISIT HI MDM: CPT | Performed by: EMERGENCY MEDICINE

## 2025-06-05 PROCEDURE — 85610 PROTHROMBIN TIME: CPT | Performed by: EMERGENCY MEDICINE

## 2025-06-05 PROCEDURE — 86850 RBC ANTIBODY SCREEN: CPT | Performed by: EMERGENCY MEDICINE

## 2025-06-05 PROCEDURE — 93005 ELECTROCARDIOGRAM TRACING: CPT

## 2025-06-05 PROCEDURE — 73700 CT LOWER EXTREMITY W/O DYE: CPT

## 2025-06-05 PROCEDURE — 86901 BLOOD TYPING SEROLOGIC RH(D): CPT | Performed by: EMERGENCY MEDICINE

## 2025-06-05 PROCEDURE — 36415 COLL VENOUS BLD VENIPUNCTURE: CPT | Performed by: EMERGENCY MEDICINE

## 2025-06-05 PROCEDURE — 72190 X-RAY EXAM OF PELVIS: CPT

## 2025-06-05 PROCEDURE — 96365 THER/PROPH/DIAG IV INF INIT: CPT

## 2025-06-05 PROCEDURE — 86900 BLOOD TYPING SEROLOGIC ABO: CPT | Performed by: EMERGENCY MEDICINE

## 2025-06-05 PROCEDURE — 99223 1ST HOSP IP/OBS HIGH 75: CPT | Performed by: STUDENT IN AN ORGANIZED HEALTH CARE EDUCATION/TRAINING PROGRAM

## 2025-06-05 PROCEDURE — 86923 COMPATIBILITY TEST ELECTRIC: CPT

## 2025-06-05 PROCEDURE — 73552 X-RAY EXAM OF FEMUR 2/>: CPT

## 2025-06-05 PROCEDURE — 85014 HEMATOCRIT: CPT

## 2025-06-05 PROCEDURE — 71260 CT THORAX DX C+: CPT

## 2025-06-05 PROCEDURE — 72125 CT NECK SPINE W/O DYE: CPT

## 2025-06-05 PROCEDURE — 82948 REAGENT STRIP/BLOOD GLUCOSE: CPT

## 2025-06-05 PROCEDURE — 80053 COMPREHEN METABOLIC PANEL: CPT | Performed by: EMERGENCY MEDICINE

## 2025-06-05 PROCEDURE — 99285 EMERGENCY DEPT VISIT HI MDM: CPT | Performed by: STUDENT IN AN ORGANIZED HEALTH CARE EDUCATION/TRAINING PROGRAM

## 2025-06-05 PROCEDURE — 85025 COMPLETE CBC W/AUTO DIFF WBC: CPT | Performed by: EMERGENCY MEDICINE

## 2025-06-05 RX ORDER — ENOXAPARIN SODIUM 100 MG/ML
30 INJECTION SUBCUTANEOUS EVERY 12 HOURS SCHEDULED
Status: DISCONTINUED | OUTPATIENT
Start: 2025-06-05 | End: 2025-06-11

## 2025-06-05 RX ORDER — HYDROMORPHONE HCL IN WATER/PF 6 MG/30 ML
0.2 PATIENT CONTROLLED ANALGESIA SYRINGE INTRAVENOUS EVERY 2 HOUR PRN
Refills: 0 | Status: DISCONTINUED | OUTPATIENT
Start: 2025-06-05 | End: 2025-06-11

## 2025-06-05 RX ORDER — CLOPIDOGREL BISULFATE 75 MG/1
75 TABLET ORAL ONCE
Status: DISCONTINUED | OUTPATIENT
Start: 2025-06-05 | End: 2025-06-11 | Stop reason: HOSPADM

## 2025-06-05 RX ORDER — ACETAMINOPHEN 325 MG/1
650 TABLET ORAL ONCE
Status: COMPLETED | OUTPATIENT
Start: 2025-06-05 | End: 2025-06-05

## 2025-06-05 RX ORDER — ACETAMINOPHEN 325 MG/1
975 TABLET ORAL EVERY 8 HOURS SCHEDULED
Status: DISCONTINUED | OUTPATIENT
Start: 2025-06-05 | End: 2025-06-11 | Stop reason: HOSPADM

## 2025-06-05 RX ORDER — POLYETHYLENE GLYCOL 3350 17 G/17G
17 POWDER, FOR SOLUTION ORAL DAILY
Status: DISCONTINUED | OUTPATIENT
Start: 2025-06-05 | End: 2025-06-11 | Stop reason: HOSPADM

## 2025-06-05 RX ORDER — OXYCODONE HYDROCHLORIDE 5 MG/1
5 TABLET ORAL EVERY 4 HOURS PRN
Refills: 0 | Status: DISCONTINUED | OUTPATIENT
Start: 2025-06-05 | End: 2025-06-11 | Stop reason: HOSPADM

## 2025-06-05 RX ORDER — AMOXICILLIN 250 MG
1 CAPSULE ORAL
Status: DISCONTINUED | OUTPATIENT
Start: 2025-06-05 | End: 2025-06-11 | Stop reason: HOSPADM

## 2025-06-05 RX ORDER — ALBUTEROL SULFATE 90 UG/1
1 INHALANT RESPIRATORY (INHALATION) EVERY 4 HOURS PRN
Status: DISCONTINUED | OUTPATIENT
Start: 2025-06-05 | End: 2025-06-11 | Stop reason: HOSPADM

## 2025-06-05 RX ORDER — IPRATROPIUM BROMIDE AND ALBUTEROL SULFATE 2.5; .5 MG/3ML; MG/3ML
3 SOLUTION RESPIRATORY (INHALATION) EVERY 20 MIN
Status: DISPENSED | OUTPATIENT
Start: 2025-06-05 | End: 2025-06-05

## 2025-06-05 RX ORDER — ALBUTEROL SULFATE 0.83 MG/ML
2.5 SOLUTION RESPIRATORY (INHALATION) EVERY 6 HOURS PRN
Status: DISCONTINUED | OUTPATIENT
Start: 2025-06-05 | End: 2025-06-11 | Stop reason: HOSPADM

## 2025-06-05 RX ORDER — INSULIN LISPRO 100 [IU]/ML
1-5 INJECTION, SOLUTION INTRAVENOUS; SUBCUTANEOUS
Status: DISCONTINUED | OUTPATIENT
Start: 2025-06-05 | End: 2025-06-11 | Stop reason: HOSPADM

## 2025-06-05 RX ORDER — SODIUM CHLORIDE, SODIUM GLUCONATE, SODIUM ACETATE, POTASSIUM CHLORIDE, MAGNESIUM CHLORIDE, SODIUM PHOSPHATE, DIBASIC, AND POTASSIUM PHOSPHATE .53; .5; .37; .037; .03; .012; .00082 G/100ML; G/100ML; G/100ML; G/100ML; G/100ML; G/100ML; G/100ML
500 INJECTION, SOLUTION INTRAVENOUS ONCE
Status: COMPLETED | OUTPATIENT
Start: 2025-06-05 | End: 2025-06-05

## 2025-06-05 RX ORDER — FAMOTIDINE 20 MG/1
20 TABLET, FILM COATED ORAL 2 TIMES DAILY
Status: DISCONTINUED | OUTPATIENT
Start: 2025-06-05 | End: 2025-06-09

## 2025-06-05 RX ADMIN — OXYCODONE HYDROCHLORIDE 5 MG: 5 TABLET ORAL at 17:06

## 2025-06-05 RX ADMIN — ACETAMINOPHEN 975 MG: 325 TABLET, FILM COATED ORAL at 19:23

## 2025-06-05 RX ADMIN — Medication 6 MG: at 23:51

## 2025-06-05 RX ADMIN — ACETAMINOPHEN 650 MG: 325 TABLET ORAL at 11:09

## 2025-06-05 RX ADMIN — ENOXAPARIN SODIUM 30 MG: 30 INJECTION SUBCUTANEOUS at 21:41

## 2025-06-05 RX ADMIN — SENNOSIDES AND DOCUSATE SODIUM 1 TABLET: 50; 8.6 TABLET ORAL at 21:41

## 2025-06-05 RX ADMIN — DESMOPRESSIN ACETATE 32 MCG: 4 INJECTION, SOLUTION INTRAVENOUS; SUBCUTANEOUS at 11:36

## 2025-06-05 RX ADMIN — FAMOTIDINE 20 MG: 20 TABLET, FILM COATED ORAL at 17:08

## 2025-06-05 RX ADMIN — IOHEXOL 100 ML: 350 INJECTION, SOLUTION INTRAVENOUS at 12:36

## 2025-06-05 RX ADMIN — Medication 2000 UNITS: at 11:36

## 2025-06-05 RX ADMIN — SODIUM CHLORIDE, SODIUM GLUCONATE, SODIUM ACETATE, POTASSIUM CHLORIDE, MAGNESIUM CHLORIDE, SODIUM PHOSPHATE, DIBASIC, AND POTASSIUM PHOSPHATE 500 ML: .53; .5; .37; .037; .03; .012; .00082 INJECTION, SOLUTION INTRAVENOUS at 11:21

## 2025-06-05 RX ADMIN — INSULIN LISPRO 1 UNITS: 100 INJECTION, SOLUTION INTRAVENOUS; SUBCUTANEOUS at 19:24

## 2025-06-05 RX ADMIN — POLYETHYLENE GLYCOL 3350 17 G: 17 POWDER, FOR SOLUTION ORAL at 17:07

## 2025-06-05 RX ADMIN — IPRATROPIUM BROMIDE AND ALBUTEROL SULFATE 3 ML: .5; 3 SOLUTION RESPIRATORY (INHALATION) at 15:10

## 2025-06-05 NOTE — H&P
H&P - Trauma   Name: Prince Andrews 75 y.o. male I MRN: [id number]  Unit/Bed#: ED-31 I Date of Admission: 6/5/2025   Date of Service: 6/5/2025 I Hospital Day: 0     Assessment & Plan  Closed displaced fracture of right acetabulum, unspecified portion of acetabulum, initial encounter (HCC)  - Closed fx of R acetabulum, present on admission.  - Appreciate Orthopedic surgery evaluation, recommendations and interventions as noted.  - Maintain NWB weightbearing status on the R lower extremity in 10 lb morales's traction per ortho.  - Monitor right lower extremity neurovascular exam.  - Continue multimodal analgesic regimen.  - PT and OT evaluation and treatment as indicated.  - Outpatient follow up with Orthopedic surgery for re-evaluation.    Fall, initial encounter  - Status post fall with the below noted injuries.  - Fall precautions.  - Geriatric Medicine consultation for evaluation, medication review and recommendations.  - PT and OT evaluation and treatment as indicated.  - Case Management consultation for disposition planning.    Atrial fibrillation, unspecified type ([facility])  - On Metoprolol and Eliquis  - Hold Eliquis until cleared by ortho  - Continue Metoprolol  Chronic obstructive pulmonary disease, unspecified COPD type ([facility])  - Reports using albuterol  - Albuterol inhaler and nebulizer PRN  Type 2 diabetes mellitus without complication, unspecified whether long term insulin use ([facility])  Lab Results   Component Value Date    HGBA1C 6.1 (H) 04/25/2025       Recent Labs     06/05/25  1914   POCGLU 186*       Blood Sugar Average: Last 72 hrs:  (P) 186    Denies insulin use at facility  Start insulin sliding scale while in hospital    Acute pain due to trauma  - Acute pain secondary to traumatic injuries.  - Continue multimodal analgesic regimen.  - Bowel regimen as long as using opioids.  - Continue to monitor pain and adjust regimen as indicated.      Trauma Alert: Evaluation; trauma team notified at 1200 via text    Model of Arrival: Ambulance    Trauma Team: Attending Dr. Ullrich and Residents Dr. Samayoa  Consultants:     Orthopedics:   - STAT consult; notified at 1257 via text;     History of Present Illness   Chief Complaint: fall  Mechanism:Fall     Prince Andrews is a 75 y.o. male who presents after fall yesterday. Pt reports a mechanical fall, causing him to land on his R hip. Reports needing help getting up by facility staff, presented to the ER today for worsening pain. Denies head strike, LOC. Takes Eliquis and Plavix. Denies lightheadedness/dizziness, CP, SOB, weakness, or any other symptoms at this time.    Review of Systems   Constitutional:  Negative for activity change, fever and unexpected weight change.   Respiratory:  Negative for cough, chest tightness and shortness of breath.    Cardiovascular:  Negative for chest pain and palpitations.   Gastrointestinal:  Negative for abdominal pain, diarrhea, nausea and vomiting.   Genitourinary:  Negative for dysuria and hematuria.   Musculoskeletal:  Positive for arthralgias (R hip pain).   Skin:  Negative for wound.   Allergic/Immunologic: Negative for immunocompromised state.   Neurological:  Negative for syncope.   All other systems reviewed and are negative.    Medical History Review: I have reviewed the patient's PMH, PSH, Social History, Family History, Meds, and Allergies   Historical Information   Past Medical History[1]  Past Surgical History[2]  Social History[3]  E-Cigarette/Vaping    E-Cigarette Use Never User      E-Cigarette/Vaping Substances     Family history non-contributory  Social History[4]    Current Facility-Administered Medications:     acetaminophen (TYLENOL) tablet 975 mg, Q8H TIMOTHY    albuterol (PROVENTIL HFA,VENTOLIN HFA) inhaler 1 puff, Q4H PRN    albuterol inhalation solution 2.5 mg, Q6H PRN    [Held by provider] apixaban (ELIQUIS) tablet 5 mg, BID    [Held by provider] clopidogrel (PLAVIX) tablet 75 mg, Once    enoxaparin (LOVENOX)  subcutaneous injection 30 mg, Q12H TIMOTHY    famotidine (PEPCID) tablet 20 mg, BID    HYDROmorphone HCl (DILAUDID) injection 0.2 mg, Q2H PRN    insulin lispro (HumALOG/ADMELOG) 100 units/mL subcutaneous injection 1-5 Units, TID AC **AND** Fingerstick Glucose (POCT), TID AC    naloxone (NARCAN) 0.04 mg/mL syringe 0.04 mg, Q1MIN PRN    oxyCODONE (ROXICODONE) split tablet 2.5 mg, Q4H PRN **OR** oxyCODONE (ROXICODONE) IR tablet 5 mg, Q4H PRN    polyethylene glycol (MIRALAX) packet 17 g, Daily    senna-docusate sodium (SENOKOT S) 8.6-50 mg per tablet 1 tablet, HS  Prior to Admission Medications   Prescriptions Last Dose Informant Patient Reported? Taking?   ARIPiprazole (ABILIFY) 5 mg tablet  Outside Facility (Specify) Yes No   Sig: Take 5 mg by mouth daily   Patient not taking: Reported on 2024   Cholecalciferol (VITAMIN D3) 95430 units CAPS  Outside Facility (Specify) Yes No   Sig: Take 1 capsule by mouth once a week   Eliquis 5 MG   Yes No   Insulin Pen Needle 32G X 6 MM MISC  Outside Facility (Specify) No No   Sig: Inject as directed 3 (three) times a day Poorly controlled insulin dependent DM2   LORazepam (ATIVAN) 0.5 mg tablet  Outside Facility (Specify) Yes No   Sig: Take 0.5 mg by mouth every 8 (eight) hours as needed for anxiety   Patient not taking: Reported on 2024   Lancets (ONETOUCH ULTRASOFT) lancets  Outside Facility (Specify) Yes No   Lantus SoloStar 100 units/mL SOPN  Outside Facility (Specify) Yes No   Si Units   Patient not taking: Reported on 2024   PARoxetine (PAXIL) 10 mg tablet  Outside Facility (Specify) No No   Sig: Take 2 tablets (20 mg total) by mouth daily   PARoxetine (PAXIL) 20 mg tablet   Yes No   SPIRIVA HANDIHALER 18 MCG inhalation capsule  Outside Facility (Specify) Yes No   Trelegy Ellipta 100-62.5-25 MCG/INH inhaler  Outside Facility (Specify) Yes No   Trelegy Ellipta 200-62.5-25 MCG/ACT AEPB inhaler   Yes No   acetaminophen (TYLENOL) 650 mg CR tablet  Outside Facility  (Specify) Yes No   Sig: 3 (three) times a day   albuterol (2.5 mg/3 mL) 0.083 % nebulizer solution  Outside Facility (Specify) No No   Sig: Take 1 vial (2.5 mg total) by nebulization every 6 (six) hours as needed for wheezing or shortness of breath   albuterol (VENTOLIN HFA) 90 mcg/act inhaler  Outside Facility (Specify) No No   Sig: Inhale 1 puff every 4 (four) hours as needed for wheezing   atropine (ISOPTO ATROPINE) 1 % ophthalmic solution  Outside Facility (Specify) Yes No   Sig: PUT 1 DROP INTO RIGHT EYE 3 TIMES A DAY   Patient not taking: Reported on 12/21/2022   bisacodyl (DULCOLAX) 5 mg EC tablet  Outside Facility (Specify) Yes No   Sig: Take 10 mg by mouth daily as needed   Patient not taking: Reported on 4/30/2024   budesonide (PULMICORT) 0.5 mg/2 mL nebulizer solution  Outside Facility (Specify) No No   Sig: Take 1 vial (0.5 mg total) by nebulization 2 (two) times a day Rinse mouth after use.   Patient not taking: Reported on 4/30/2024   clopidogrel (PLAVIX) 75 mg tablet   Yes No   dorzolamide (TRUSOPT) 2 % ophthalmic solution  Outside Facility (Specify) Yes No   Sig: INSTILL 1 DROP INTO AFFECTED EYE 3 TIMES A DAY   Patient not taking: Reported on 12/21/2022   famotidine (PEPCID) 20 mg tablet  Outside Facility (Specify) Yes No   Sig: Take 20 mg by mouth 2 (two) times a day   folic acid (FOLVITE) 1 mg tablet   No No   Sig: Take 1 tablet (1 mg total) by mouth daily   glucose blood test strip  Outside Facility (Specify) Yes No   Sig: TEST UP TO 3 TIMES PER DAY   insulin aspart (NovoLOG) 100 Units/mL injection pen  Outside Facility (Specify) Yes No   Sig: Inject under the skin 3 (three) times a day with meals   insulin detemir (LEVEMIR FLEXTOUCH) 100 Units/mL injection pen  Outside Facility (Specify) No No   Sig: Inject 40 Units under the skin daily   insulin lispro (HumaLOG) 100 units/mL injection pen  Outside Facility (Specify) Yes No   memantine (NAMENDA) 10 mg tablet  Outside Facility (Specify) Yes No    metoprolol tartrate (LOPRESSOR) 25 mg tablet   Yes No   metoprolol tartrate (LOPRESSOR) 50 mg tablet  Outside Facility (Specify) No No   Sig: Take 1 tablet (50 mg total) by mouth every 12 (twelve) hours   multivitamin (THERAGRAN) TABS   No No   Sig: Take 1 tablet by mouth daily   polyethylene glycol (GOLYTELY) 4000 mL solution   No No   Sig: Take 4,000 mL by mouth once for 1 dose   potassium chloride (KLOR-CON) 20 mEq packet  Outside Facility (Specify) Yes No   Sig: Take 20 mEq by mouth 2 (two) times a day   potassium chloride (Klor-Con M20) 20 mEq tablet   Yes No   rosuvastatin (CRESTOR) 10 MG tablet  Outside Facility (Specify) No No   Sig: Take 1 tablet (10 mg total) by mouth daily   torsemide (DEMADEX) 20 mg tablet  Outside Facility (Specify) Yes No   Sig: Take 20 mg by mouth daily   traZODone (DESYREL) 50 mg tablet  Outside Facility (Specify) Yes No   Sig: Take 100 mg by mouth daily at bedtime   vitamin B-12 (VITAMIN B-12) 1,000 mcg tablet   No No   Sig: Take 1 tablet (1,000 mcg total) by mouth daily      Facility-Administered Medications: None     Vancomycin and No active allergies  Immunization History   Administered Date(s) Administered    INFLUENZA 02/12/2013, 12/23/2013, 01/28/2015, 10/14/2015, 11/14/2016, 12/19/2017, 11/30/2018    Influenza Split 10/09/2013    Influenza Split High Dose Preservative Free IM 11/25/2014, 10/14/2015, 10/12/2016, 11/14/2016, 12/19/2017, 11/30/2018    Influenza, high dose seasonal 0.7 mL 09/20/2019    Influenza, seasonal, injectable 09/25/2012    Influenza, seasonal, injectable, preservative free 10/17/2012    Pneumococcal Conjugate 13-Valent 10/14/2015, 12/03/2018    Pneumococcal Polysaccharide PPV23 10/01/2011, 12/19/2016     Last Tetanus: 1949      ISAR Score: Did you order a geriatric consult if the score was 2 or greater?: yes (ISAR) Identification of Seniors at Risk  Before the illness or injury that brought you to the Emergency, did you need someone to help you on a  regular basis?: 1  In the last 24 hours, have you needed more help than usual?: 0  Have you been hospitalized for one or more nights during the past 6 months?: 0  In general, do you see well?: 0  In general, do you have serious problems with your memory?: 0  Do you take more than three different medications every day?: 1  ISAR Score: 2           Objective :  Temp:  [98.1 °F (36.7 °C)] 98.1 °F (36.7 °C)  HR:  [70-85] 70  BP: (118-167)/(59-87) 118/64  Resp:  [20-24] 20  SpO2:  [94 %-100 %] 99 %  O2 Device: Nasal cannula    Initial Vitals:   Temperature: 98.1 °F (36.7 °C) (06/05/25 0947)  Pulse: 85 (06/05/25 0947)  Respirations: (!) 24 (06/05/25 0947)  Blood Pressure: 165/87 (06/05/25 0947)    Primary Survey:   Airway:        Status: patent;        Pre-hospital Interventions: none        Hospital Interventions: none  Breathing:        Pre-hospital Interventions: none       Effort: normal       Right breath sounds: normal       Left breath sounds: normal  Circulation:        Rhythm: regular       Rate: regular   Right Pulses Left Pulses    R radial: 2+    R pedal: 2+     L radial: 2+    L pedal: 2+       Disability:        GCS: Eye: 4; Verbal: 5 Motor: 6 Total: 15       Right Pupil: round;  reactive         Left Pupil:  round;  reactive      R Motor Strength L Motor Strength    R : 5/5  R dorsiflex: 5/5  R plantarflex: 5/5 L : 5/5  L dorsiflex: 5/5  L plantarflex: 5/5        Sensory:  No sensory deficit  Exposure:       Completed: Yes      Secondary Survey:  Physical Exam  Vitals and nursing note reviewed.   Constitutional:       General: He is not in acute distress.     Appearance: Normal appearance. He is obese. He is not ill-appearing, toxic-appearing or diaphoretic.   HENT:      Head: Normocephalic and atraumatic.      Right Ear: External ear normal.      Left Ear: External ear normal.      Nose: Nose normal. No congestion or rhinorrhea.      Mouth/Throat:      Mouth: Mucous membranes are moist.     Eyes:       General:         Right eye: No discharge.         Left eye: No discharge.      Extraocular Movements: Extraocular movements intact.      Pupils: Pupils are equal, round, and reactive to light.       Cardiovascular:      Rate and Rhythm: Normal rate. Rhythm irregular.      Pulses: Normal pulses.   Pulmonary:      Effort: Pulmonary effort is normal. No respiratory distress.   Abdominal:      Palpations: Abdomen is soft.      Tenderness: There is no abdominal tenderness. There is no guarding.     Musculoskeletal:      Cervical back: Normal range of motion. No rigidity or tenderness.      Comments: Tenderness to R hip, limb is externally rotated. Neurovascularly intact to BLE.     Skin:     General: Skin is warm and dry.      Capillary Refill: Capillary refill takes less than 2 seconds.     Neurological:      General: No focal deficit present.      Mental Status: He is alert. Mental status is at baseline.             Lab Results: I have reviewed the following results:  Recent Labs     06/05/25  0957 06/05/25  1527   WBC 14.98*  --    HGB 16.7 15.1   HCT 53.1* 48.7     --    SODIUM 139  --    K 5.1  --    CL 98  --    CO2 29  --    BUN 25  --    CREATININE 1.47*  --    GLUC 163*  --    AST 18  --    ALT 15  --    ALB 4.0  --    TBILI 1.05*  --    ALKPHOS 65  --    PTT 36*  --    INR 1.55*  --        Imaging Results: I have personally reviewed pertinent images saved in PACS. CT scan findings (and other pertinent positive findings on images) were discussed with radiology. My interpretation of the images/reports are as follows:  Chest Xray(s): negative for acute findings   FAST exam(s): N/A   CT Scan(s): positive for acute findings: R acetabular fx   Additional Xray(s): negative for acute findings     Other Studies: Other Study Results Review: EKG was reviewed.        [1]   Past Medical History:  Diagnosis Date    Acute on chronic respiratory failure (HCC) 1/30/2017    Anemia 3/29/2020    Colonic polyp 2012    COPD  exacerbation (HCC) 2019    Disc displacement, cervical     Peripheral vascular disease (HCC)     Femoral- popliteal bypass   [2]   Past Surgical History:  Procedure Laterality Date    BYPASS FEMORAL-POPLITEAL      COLONOSCOPY      With polypectomy    CORONARY ARTERY BYPASS GRAFT  2012    LAMINECTOMY     [3]   Social History  Tobacco Use    Smoking status: Former     Current packs/day: 0.00     Average packs/day: 1 pack/day for 37.0 years (37.0 ttl pk-yrs)     Types: Cigarettes     Start date: 1977     Quit date: 2014     Years since quittin.4    Smokeless tobacco: Never    Tobacco comments:     Pt states he restarted smoking a few weeks ago   Vaping Use    Vaping status: Never Used   Substance and Sexual Activity    Alcohol use: No    Drug use: No    Sexual activity: Not Currently     Partners: Female     Birth control/protection: None   [4]   Social History  Tobacco Use    Smoking status: Former     Current packs/day: 0.00     Average packs/day: 1 pack/day for 37.0 years (37.0 ttl pk-yrs)     Types: Cigarettes     Start date: 1977     Quit date: 2014     Years since quittin.4    Smokeless tobacco: Never    Tobacco comments:     Pt states he restarted smoking a few weeks ago   Vaping Use    Vaping status: Never Used   Substance and Sexual Activity    Alcohol use: No    Drug use: No    Sexual activity: Not Currently     Partners: Female     Birth control/protection: None

## 2025-06-05 NOTE — ED PROVIDER NOTES
Time reflects when diagnosis was documented in both MDM as applicable and the Disposition within this note       Time User Action Codes Description Comment    6/5/2025 12:59 PM Brittni Samayoa Add [S32.401A] Closed displaced fracture of right acetabulum, unspecified portion of acetabulum, initial encounter ([facility])     6/5/2025  1:00 PM Brittni Samayoa Add [S32.401A] Closed nondisplaced fracture of right acetabulum, unspecified portion of acetabulum, initial encounter ([facility])     6/5/2025  3:22 PM Raimundo Navarro Add [W19.XXXA] Fall, initial encounter           ED Disposition       ED Disposition   Admit    Condition   Stable    Date/Time   u Jun 5, 2025  3:22 PM    Comment   Case was discussed with Dr. Ullrich and the patient's admission status was agreed to be Admission Status: inpatient status to the service of Dr. Ullrich .               Assessment & Plan       Medical Decision Making  Amount and/or Complexity of Data Reviewed  Labs: ordered.  Radiology: ordered.    Risk  OTC drugs.  Prescription drug management.  Decision regarding hospitalization.      A: in tact  B: CTAB (2L baseline)  C: pulses equal bilaterally  D: GCS 15  E: done      ED Course as of 06/05/25 1720   Thu Jun 05, 2025   1114 Reviewed CT head, CT right lower extremity and right femur x-ray results.  Discussed with trauma Dr. Ullrich, patient is on Eliquis and clopidogrel, trauma requesting Kcentra and DDAVP which was ordered.  They will evaluate patient at bedside.     Trauma obtain CT chest abdomen pelvis with IV contrast.  Results as below.  No obvious active bleed noted.  Trauma admitted to their service.    Medications   ipratropium-albuterol (DUO-NEB) 0.5-2.5 mg/3 mL inhalation solution 3 mL (3 mL Nebulization Not Given 6/5/25 1608)   acetaminophen (TYLENOL) tablet 975 mg (has no administration in time range)   HYDROmorphone HCl (DILAUDID) injection 0.2 mg (has no administration in time range)   oxyCODONE (ROXICODONE) split tablet 2.5 mg ( Oral See  Alternative 6/5/25 1706)     Or   oxyCODONE (ROXICODONE) IR tablet 5 mg (5 mg Oral Given 6/5/25 1706)   naloxone (NARCAN) 0.04 mg/mL syringe 0.04 mg (has no administration in time range)   senna-docusate sodium (SENOKOT S) 8.6-50 mg per tablet 1 tablet (has no administration in time range)   polyethylene glycol (MIRALAX) packet 17 g (17 g Oral Given 6/5/25 1707)   albuterol inhalation solution 2.5 mg (has no administration in time range)   albuterol (PROVENTIL HFA,VENTOLIN HFA) inhaler 1 puff (has no administration in time range)   clopidogrel (PLAVIX) tablet 75 mg ( Oral Held Dose 6/5/25 1615)   apixaban (ELIQUIS) tablet 5 mg ( Oral Held Dose 6/8/25 0900)   famotidine (PEPCID) tablet 20 mg (20 mg Oral Given 6/5/25 1708)   insulin lispro (HumALOG/ADMELOG) 100 units/mL subcutaneous injection 1-5 Units (has no administration in time range)   acetaminophen (TYLENOL) tablet 650 mg (650 mg Oral Given 6/5/25 1109)   prothrombin complex concentrate (human) (Kcentra) 2,000 Units (2,000 Units Intravenous Given 6/5/25 1136)   desmopressin (DDAVP) 32 mcg in sodium chloride 0.9 % 50 mL IVPB (0 mcg Intravenous Stopped 6/5/25 1206)   multi-electrolyte (Plasmalyte-A/Isolyte-S PH 7.4/Normosol-R) IV bolus 500 mL (0 mL Intravenous Stopped 6/5/25 1221)   iohexol (OMNIPAQUE) 350 MG/ML injection (MULTI-DOSE) 100 mL (100 mL Intravenous Given 6/5/25 1236)       ED Risk Strat Scores                    No data recorded                            History of Present Illness       Chief Complaint   Patient presents with    Fall     Pt fell yesterday, had xray at facility. Dx right hip fx. Unknown head strike. +plavix and eliquis       Past Medical History[1]   Past Surgical History[2]   Family History[3]   Social History[4]   E-Cigarette/Vaping    E-Cigarette Use Never User       E-Cigarette/Vaping Substances      I have reviewed and agree with the history as documented.     75-year-old male presenting to the emergency department from nursing  facility after a fall yesterday, uncertain what time.  He states the floor was wet and he fell onto his right hip.  He has been able to ambulate however states he has been hopping, using the wheelchair more.  Does not use walker or cane.  Wears 2 L of oxygen at baseline.  Had an x-ray done at the facility that was concerning for a right acetabular fracture.  He is on aspirin and Eliquis.  No nausea or vomiting.  No visual changes.  No numbness or tingling.  Complains of right hip pain only.        Review of Systems   Constitutional:  Negative for chills and fever.   Respiratory:  Negative for cough and shortness of breath.    Cardiovascular:  Negative for chest pain.   Gastrointestinal:  Negative for nausea and vomiting.   Musculoskeletal:         Right hip pain   Neurological:  Negative for light-headedness.           Objective       ED Triage Vitals   Temperature Pulse Blood Pressure Respirations SpO2 Patient Position - Orthostatic VS   06/05/25 0947 06/05/25 09 06/05/25 09 06/05/25 09 06/05/25 09 06/05/25 1002   98.1 °F (36.7 °C) 85 165/87 (!) 24 96 % Sitting      Temp Source Heart Rate Source BP Location FiO2 (%) Pain Score    06/05/25 0947 06/05/25 0947 -- -- 06/05/25 1109    Oral Monitor   4      Vitals      Date and Time Temp Pulse SpO2 Resp BP Pain Score FACES Pain Rating User   06/05/25 1704 -- -- -- -- -- 10 - Worst Possible Pain -- CARLY   06/05/25 1400 -- 74 100 % 22 131/61 -- -- TB   06/05/25 1300 -- 81 95 % 24 122/67 -- -- TB   06/05/25 1216 -- 82 95 % 24 126/59 -- -- TB   06/05/25 1130 -- 79 95 % 24 151/77 -- -- TB   06/05/25 1109 -- -- -- -- -- 4 -- TB   06/05/25 1100 -- 81 94 % 24 167/78 -- -- TB 06/05/25 1045 -- 81 96 % 24 158/74 -- -- TB   06/05/25 1030 -- 73 98 % 24 147/84 -- -- TB   06/05/25 1015 -- 79 96 % 24 134/69 -- -- TB   06/05/25 1002 -- 77 97 % 24 127/68 -- -- TB   06/05/25 0947 98.1 °F (36.7 °C) 85 96 % 24 165/87 -- -- TB            Physical Exam  Constitutional:       General:  He is not in acute distress.  HENT:      Head: Normocephalic and atraumatic.      Nose: Nose normal.      Mouth/Throat:      Mouth: Mucous membranes are moist.     Eyes:      Conjunctiva/sclera: Conjunctivae normal.      Pupils: Pupils are equal, round, and reactive to light.       Cardiovascular:      Rate and Rhythm: Normal rate.      Comments: DP pulses in tact b/l  Pulmonary:      Effort: Pulmonary effort is normal. No respiratory distress.   Abdominal:      General: There is no distension.      Palpations: Abdomen is soft.      Tenderness: There is no abdominal tenderness.     Musculoskeletal:      Cervical back: Normal range of motion and neck supple. No tenderness.      Comments: RLE externally rotated and shortened     Skin:     General: Skin is warm.      Findings: No erythema.     Neurological:      Mental Status: He is alert and oriented to person, place, and time. Mental status is at baseline.      Sensory: No sensory deficit.         Results Reviewed       Procedure Component Value Units Date/Time    Hemoglobin A1c w/EAG Estimation (Prechecked if no A1C within 90 days) [[id number]]     Lab Status: No result Specimen: Blood     Hemoglobin and hematocrit, blood [[id number]]  (Normal) Collected: 06/05/25 1527    Lab Status: Final result Specimen: Blood from Arm, Right Updated: 06/05/25 1533     Hemoglobin 15.1 g/dL      Hematocrit 48.7 %     Protime-INR [[id number]]  (Abnormal) Collected: 06/05/25 0957    Lab Status: Final result Specimen: Blood from Arm, Right Updated: 06/05/25 1033     Protime 19.4 seconds      INR 1.55    Narrative:      INR Therapeutic Range    Indication                                             INR Range      Atrial Fibrillation                                               2.0-3.0  Hypercoagulable State                                    2.0.2.3  Left Ventricular Asist Device                            2.0-3.0  Mechanical Heart Valve                                  -    Aortic(with  afib, MI, embolism, HF, LA enlargement,    and/or coagulopathy)                                     2.0-3.0 (2.5-3.5)     Mitral                                                             2.5-3.5  Prosthetic/Bioprosthetic Heart Valve               2.0-3.0  Venous thromboembolism (VTE: VT, PE        2.0-3.0    APTT [[id number]]  (Abnormal) Collected: 06/05/25 0957    Lab Status: Final result Specimen: Blood from Arm, Right Updated: 06/05/25 1033     PTT 36 seconds     Comprehensive metabolic panel [[id number]]  (Abnormal) Collected: 06/05/25 0957    Lab Status: Final result Specimen: Blood from Arm, Right Updated: 06/05/25 1032     Sodium 139 mmol/L      Potassium 5.1 mmol/L      Chloride 98 mmol/L      CO2 29 mmol/L      ANION GAP 12 mmol/L      BUN 25 mg/dL      Creatinine 1.47 mg/dL      Glucose 163 mg/dL      Calcium 9.6 mg/dL      AST 18 U/L      ALT 15 U/L      Alkaline Phosphatase 65 U/L      Total Protein 7.7 g/dL      Albumin 4.0 g/dL      Total Bilirubin 1.05 mg/dL      eGFR 45 ml/min/1.73sq m     Narrative:      National Kid[employer] guidelines for Chronic Kidney Disease (CKD):     Stage 1 with normal or high GFR (GFR > 90 mL/min/1.73 square meters)    Stage 2 Mild CKD (GFR = 60-89 mL/min/1.73 square meters)    Stage 3A Moderate CKD (GFR = 45-59 mL/min/1.73 square meters)    Stage 3B Moderate CKD (GFR = 30-44 mL/min/1.73 square meters)    Stage 4 Severe CKD (GFR = 15-29 mL/min/1.73 square meters)    Stage 5 End Stage CKD (GFR <15 mL/min/1.73 square meters)  Note: GFR calculation is accurate only with a steady state creatinine    CBC and differential [[id number]]  (Abnormal) Collected: 06/05/25 0957    Lab Status: Final result Specimen: Blood from Arm, Right Updated: 06/05/25 1018     WBC 14.98 Thousand/uL      RBC 5.74 Million/uL      Hemoglobin 16.7 g/dL      Hematocrit 53.1 %      MCV 93 fL      MCH 29.1 pg      MCHC 31.5 g/dL      RDW 15.4 %      MPV 9.9 fL      Platelets 197 Thousands/uL       nRBC 0 /100 WBCs      Segmented % 84 %      Immature Grans % 1 %      Lymphocytes % 6 %      Monocytes % 7 %      Eosinophils Relative 1 %      Basophils Relative 1 %      Absolute Neutrophils 12.79 Thousands/µL      Absolute Immature Grans 0.08 Thousand/uL      Absolute Lymphocytes 0.82 Thousands/µL      Absolute Monocytes 1.11 Thousand/µL      Eosinophils Absolute 0.10 Thousand/µL      Basophils Absolute 0.08 Thousands/µL             CT chest abdomen pelvis w contrast   Final Interpretation by Mick Jeffers MD (06/05 1440)      1.  Comminuted right acetabular fracture involving the anterior and posterior columns as well as the anterior and posterior acetabular walls. Associated small amount of extraperitoneal hemorrhage along the anterior right pelvis is not significantly    changed in extent as compared to the dedicated right hip CT earlier the same day. No clear evidence of any active hemorrhage on this single phase examination. No other traumatic injury identified within the chest, abdomen, or pelvis.   2.  Emphysema and diffuse bronchial wall thickening suggesting chronic bronchitis, similar to prior.   3.  Chronic findings, detailed above.      The study was marked in EPIC for immediate notification.      Computerized Assisted Algorithm (CAA) may have aided analysis of applicable images.      Workstation performed: [id number]         XR chest 1 view portable   Final Interpretation by Denis Day MD (06/05 1117)      1.  Emphysema.   2.  Right basilar scarring.            Workstation performed: [id number]         XR femur 2 views RIGHT   Final Interpretation by Denis Day MD (06/05 1101)      Comminuted right acetabular fracture, better characterized on the CT.         Computerized Assisted Algorithm (CAA) may have been used to analyze all applicable images.         Workstation performed: [id number]         XR pelvis complete 3+ vw   Final Interpretation by Denis Day MD (06/05  1138)      Comminuted right acetabular fracture better characterized on the concurrent CT.         Computerized Assisted Algorithm (CAA) may have been used to analyze all applicable images.         Workstation performed: [id number]         CT head without contrast   Final Interpretation by Murray Herrmann MD ( 1036)      No acute intracranial abnormality.                  Workstation performed: [id number]         CT cervical spine without contrast   Final Interpretation by Murray Herrmann MD ( 1124)      No cervical spine fracture or traumatic malalignment.      Multilevel cervical spondylosis, as described above                  Workstation performed: [id number]         CT lower extremity wo contrast right   Final Interpretation by Amor Swanson MD ( 1047)      1.  Acetabular fracture as described, classification is consistent with a transverse fracture plus posterior wall   2.  Pelvic hemorrhage         Workstation performed: [id number]             Procedures    ED Medication and Procedure Management   Prior to Admission Medications   Prescriptions Last Dose Informant Patient Reported? Taking?   ARIPiprazole (ABILIFY) 5 mg tablet  Outside Facility (Specify) Yes No   Sig: Take 5 mg by mouth daily   Patient not taking: Reported on 2024   Cholecalciferol (VITAMIN D3) 23930 units CAPS  Outside Facility (Specify) Yes No   Sig: Take 1 capsule by mouth once a week   Eliquis 5 MG   Yes No   Insulin Pen Needle 32G X 6 MM MISC  Outside Facility (Specify) No No   Sig: Inject as directed 3 (three) times a day Poorly controlled insulin dependent DM2   LORazepam (ATIVAN) 0.5 mg tablet  Outside Facility (Specify) Yes No   Sig: Take 0.5 mg by mouth every 8 (eight) hours as needed for anxiety   Patient not taking: Reported on 2024   Lancets (ONETOUCH ULTRASOFT) lancets  Outside Facility (Specify) Yes No   Lantus SoloStar 100 units/mL SOPN  Outside Facility (Specify) Yes No   Si Units   Patient not taking:  Reported on 4/30/2024   PARoxetine (PAXIL) 10 mg tablet  Outside Facility (Specify) No No   Sig: Take 2 tablets (20 mg total) by mouth daily   PARoxetine (PAXIL) 20 mg tablet   Yes No   SPIRIVA HANDIHALER 18 MCG inhalation capsule  Outside Facility (Specify) Yes No   Trelegy Ellipta 100-62.5-25 MCG/INH inhaler  Outside Facility (Specify) Yes No   Trelegy Ellipta 200-62.5-25 MCG/ACT AEPB inhaler   Yes No   acetaminophen (TYLENOL) 650 mg CR tablet  Outside Facility (Specify) Yes No   Sig: 3 (three) times a day   albuterol (2.5 mg/3 mL) 0.083 % nebulizer solution  Outside Facility (Specify) No No   Sig: Take 1 vial (2.5 mg total) by nebulization every 6 (six) hours as needed for wheezing or shortness of breath   albuterol (VENTOLIN HFA) 90 mcg/act inhaler  Outside Facility (Specify) No No   Sig: Inhale 1 puff every 4 (four) hours as needed for wheezing   atropine (ISOPTO ATROPINE) 1 % ophthalmic solution  Outside Facility (Specify) Yes No   Sig: PUT 1 DROP INTO RIGHT EYE 3 TIMES A DAY   Patient not taking: Reported on 12/21/2022   bisacodyl (DULCOLAX) 5 mg EC tablet  Outside Facility (Specify) Yes No   Sig: Take 10 mg by mouth daily as needed   Patient not taking: Reported on 4/30/2024   budesonide (PULMICORT) 0.5 mg/2 mL nebulizer solution  Outside Facility (Specify) No No   Sig: Take 1 vial (0.5 mg total) by nebulization 2 (two) times a day Rinse mouth after use.   Patient not taking: Reported on 4/30/2024   clopidogrel (PLAVIX) 75 mg tablet   Yes No   dorzolamide (TRUSOPT) 2 % ophthalmic solution  Outside Facility (Specify) Yes No   Sig: INSTILL 1 DROP INTO AFFECTED EYE 3 TIMES A DAY   Patient not taking: Reported on 12/21/2022   famotidine (PEPCID) 20 mg tablet  Outside Facility (Specify) Yes No   Sig: Take 20 mg by mouth 2 (two) times a day   folic acid (FOLVITE) 1 mg tablet   No No   Sig: Take 1 tablet (1 mg total) by mouth daily   glucose blood test strip  Outside Facility (Specify) Yes No   Sig: TEST UP TO 3  TIMES PER DAY   insulin aspart (NovoLOG) 100 Units/mL injection pen  Outside Facility (Specify) Yes No   Sig: Inject under the skin 3 (three) times a day with meals   insulin detemir (LEVEMIR FLEXTOUCH) 100 Units/mL injection pen  Outside Facility (Specify) No No   Sig: Inject 40 Units under the skin daily   insulin lispro (HumaLOG) 100 units/mL injection pen  Outside Facility (Specify) Yes No   memantine (NAMENDA) 10 mg tablet  Outside Facility (Specify) Yes No   metoprolol tartrate (LOPRESSOR) 25 mg tablet   Yes No   metoprolol tartrate (LOPRESSOR) 50 mg tablet  Outside Facility (Specify) No No   Sig: Take 1 tablet (50 mg total) by mouth every 12 (twelve) hours   multivitamin (THERAGRAN) TABS   No No   Sig: Take 1 tablet by mouth daily   polyethylene glycol (GOLYTELY) 4000 mL solution   No No   Sig: Take 4,000 mL by mouth once for 1 dose   potassium chloride (KLOR-CON) 20 mEq packet  Outside Facility (Specify) Yes No   Sig: Take 20 mEq by mouth 2 (two) times a day   potassium chloride (Klor-Con M20) 20 mEq tablet   Yes No   rosuvastatin (CRESTOR) 10 MG tablet  Outside Facility (Specify) No No   Sig: Take 1 tablet (10 mg total) by mouth daily   torsemide (DEMADEX) 20 mg tablet  Outside Facility (Specify) Yes No   Sig: Take 20 mg by mouth daily   traZODone (DESYREL) 50 mg tablet  Outside Facility (Specify) Yes No   Sig: Take 100 mg by mouth daily at bedtime   vitamin B-12 (VITAMIN B-12) 1,000 mcg tablet   No No   Sig: Take 1 tablet (1,000 mcg total) by mouth daily      Facility-Administered Medications: None     Patient's Medications   Discharge Prescriptions    No medications on file     No discharge procedures on file.  ED SEPSIS DOCUMENTATION   Time reflects when diagnosis was documented in both MDM as applicable and the Disposition within this note       Time User Action Codes Description Comment    6/5/2025 12:59 PM Brittni Samayoa Add [S32.401A] Closed displaced fracture of right acetabulum, unspecified portion  of acetabulum, initial encounter ([facility])     2025  1:00 PM Brittni Samayoa Add [S32.401A] Closed nondisplaced fracture of right acetabulum, unspecified portion of acetabulum, initial encounter ([facility])     2025  3:22 PM Ramiundo Navarro Add [W19.XXXA] Fall, initial encounter                    [1]   Past Medical History:  Diagnosis Date    Acute on chronic respiratory failure ([facility]) 2017    Anemia 3/29/2020    Colonic polyp     COPD exacerbation ([facility]) 2019    Disc displacement, cervical     Peripheral vascular disease ([facility])     Femoral- popliteal bypass   [2]   Past Surgical History:  Procedure Laterality Date    BYPASS FEMORAL-POPLITEAL      COLONOSCOPY      With polypectomy    CORONARY ARTERY BYPASS GRAFT  2012    LAMINECTOMY     [3]   Family History  Problem Relation Name Age of Onset    Heart attack Mother      Heart attack Father     [4]   Social History  Tobacco Use    Smoking status: Former     Current packs/day: 0.00     Average packs/day: 1 pack/day for 37.0 years (37.0 ttl pk-yrs)     Types: Cigarettes     Start date: 1977     Quit date: 2014     Years since quittin.4    Smokeless tobacco: Never    Tobacco comments:     Pt states he restarted smoking a few weeks ago   Vaping Use    Vaping status: Never Used   Substance Use Topics    Alcohol use: No    Drug use: No        Raimundo Navarro DO  25 [id number]

## 2025-06-05 NOTE — CONSULTS
Consultation - Orthopedics   Name: Prince Andrews 75 y.o. male I MRN: [id number]  Unit/Bed#: ED-31 I Date of Admission: 6/5/2025   Date of Service: 6/5/2025 I Hospital Day: 0   Inpatient consult to Orthopedic Surgery  Consult performed by: Epi Roman PA-C  Consult ordered by: Brittni Samayoa MD        Physician Requesting Evaluation: Raimundo Navarro DO   Reason for Evaluation / Principal Problem: Right comminuted acetabular fracture    Assessment & Plan  Closed displaced fracture of right acetabulum, unspecified portion of acetabulum, initial encounter (HCC)  NWB to right lower extremity in 10 pounds Abbott's traction  Will hold off on orthopedic surgical intervention at this time due to patient's recent anticoagulant use, after speaking with orthopedic traumatologist he recommends at least 48 hours of anticoagulants to be held before proceeding with operative intervention.  Will plan for OR on 6/11/2025 for open reduction with internal fixation right acetabulum.  Will need consent prior to procedure  Will monitor for ABLA and administer IVF/prbc as indicated for Greater than 2 gram drop or Hgb < 7.  Patient hemoglobin currently at 16.7  Incentive spirometry postoperatively  Pain control per primary team  DVT ppx  : Per primary team  All other medical comorbidities to be managed per primary team  Dispo: Ortho will follow  See above for additional details   Case reviewed and discussed with Dr. Orta        Orthopedics service will follow.  Please contact the SecureChat role for the Orthopedics service with any questions/concerns.    History of Present Illness   HPI: Prince Andrews is a 75 y.o. year old male with a significant past medical history of mild cognitive impairment, A-fib on Eliquis and Plavix, COPD on 2 L O2 at baseline, severe PAD who presents 1 day status post mechanical fall.  Patient states he resides at [facility] and does not ambulate with any assistive devices.  He states he was in  the bathroom when he slipped on a wet floor landing onto the right hip.  He states he was down throughout the night and was found the next morning.  He complains currently of pain in the right hip and right groin region denies any radiation down the leg.  He states pain is exacerbated with any range of motion or weightbearing attempt on the right lower extremity.  He states he has not had any previous surgeries or injuries to the hip in the past.  Rates his pain currently at a 6 out of 10.  He denies any new numbness or paresthesias of the right lower extremity.  Denies any acute fevers or chills.    Review of Systems   Constitutional:  Negative for activity change, appetite change, chills and diaphoresis.   HENT:  Negative for congestion, dental problem, ear discharge and ear pain.    Respiratory:  Positive for shortness of breath and wheezing. Negative for choking and chest tightness.    Cardiovascular:  Negative for chest pain and palpitations.   Gastrointestinal:  Negative for abdominal distention, abdominal pain and nausea.   Genitourinary:  Negative for difficulty urinating, dysuria, enuresis and flank pain.   Musculoskeletal:  Positive for arthralgias and gait problem.   Neurological:  Negative for dizziness, facial asymmetry, light-headedness and headaches.   Psychiatric/Behavioral:  Negative for agitation, behavioral problems and confusion.     significant for findings described in the HPI.  Historical Information   Past Medical History[1]  Past Surgical History[2]  Social History[3]  E-Cigarette/Vaping    E-Cigarette Use Never User      E-Cigarette/Vaping Substances     Family history non-contributory    Objective :  Temp:  [98.1 °F (36.7 °C)] 98.1 °F (36.7 °C)  HR:  [73-85] 82  BP: (126-167)/(59-87) 126/59  Resp:  [24] 24  SpO2:  [94 %-98 %] 95 %  O2 Device: Nasal cannula  Physical Exam  Vitals and nursing note reviewed.   Constitutional:       Appearance: Normal appearance.   HENT:      Head:  Normocephalic and atraumatic.      Nose: Nose normal.      Mouth/Throat:      Mouth: Mucous membranes are dry.      Pharynx: Oropharynx is clear.     Eyes:      Extraocular Movements: Extraocular movements intact.      Pupils: Pupils are equal, round, and reactive to light.       Cardiovascular:      Rate and Rhythm: Normal rate and regular rhythm.      Pulses: Normal pulses.   Pulmonary:      Effort: Pulmonary effort is normal.   Abdominal:      General: Abdomen is flat.      Palpations: Abdomen is soft.     Musculoskeletal:         General: Tenderness and signs of injury present.      Cervical back: Normal range of motion and neck supple.      Comments: Musculoskeletal: bilateral upper extremity  Small superficial abrasion noted over right olecranon, this does not probe into the joint.  All other skin clean, dry, intact, no erythema or ecchymosis. No other open wounds noted.  No significant tenderness to palpation on the bilateral upper extremity  Patient is able to actively flex/extend,abduct/abduct, pronate/supinate bilateral shoulders, elbows, wrists without pain.  Motor intact to radial, ulnar, median, musculocutaneous, and axillary nerve distributions  SILT m/r/u.   Motor intact ain/pin/m/r/u  2+ radial and ulnar pulse comparable to contralateral side, brisk capillary refill  Musculature is soft and compressible, no pain with passive stretch    Musculoskeletal: right lower extremity  Skin dry and intact, no erythema or ecchymosis. No open wounds noted.  TTP diffusely over right hip and throughout right groin region  SILT s/s/sp/dp/t.   ROM not assessed at this time due to known fracture  Motor intact 5/5 strength with, knee flexion/extension, ankle dorsi/plantar flexion, EHL/FHL  1+ DP/PT pulse comparable contralateral side, brisk capillary refill  Musculature is soft and compressible, no pain with passive stretch      Tertiary: all other noninjured extremities were palpated and ranged looking for secondary  "injuries. Patient had no pain with range of motion of her other major joints. No tenderness over all other joints/long bones as except already stated.    Bilateral clavicles, shoulders, upper arms, elbows, forearms, wrist, hands, left hip, mid femurs, knees, tibias, ankles, feet, toes all palpated range without significant tenderness to palpation or obvious osseous formerly noted.  No acute bony step-offs appreciated.        Neurological:      Mental Status: He is alert.           Lab Results: I have reviewed the following results:   Recent Labs     25  0645 25  0957   WBC  --  14.98*   HGB  --  16.7   HCT  --  53.1*   PLT  --  197   BUN 21 25   CREATININE 1.27 1.47*   PTT  --  36*   INR  --  1.55*     Blood Culture: No results found for: \"BLOODCX\"  Wound Culture: No results found for: \"WOUNDCULT\"    Imaging Results Review: I personally reviewed the following image studies/reports in PACS and discussed pertinent findings with Radiology: CT right lower extremity and xray(s). My interpretation of the radiology images/reports is: Right comminuted anterior/posterior wall acetabular fracture.  Other Study Results Review: No additional pertinent studies reviewed.         [1]   Past Medical History:  Diagnosis Date    Acute on chronic respiratory failure (HCC) 2017    Anemia 3/29/2020    Colonic polyp     COPD exacerbation (HCC) 2019    Disc displacement, cervical     Peripheral vascular disease (HCC)     Femoral- popliteal bypass   [2]   Past Surgical History:  Procedure Laterality Date    BYPASS FEMORAL-POPLITEAL      COLONOSCOPY      With polypectomy    CORONARY ARTERY BYPASS GRAFT  2012    LAMINECTOMY     [3]   Social History  Tobacco Use    Smoking status: Former     Current packs/day: 0.00     Average packs/day: 1 pack/day for 37.0 years (37.0 ttl pk-yrs)     Types: Cigarettes     Start date: 1977     Quit date: 2014     Years since quittin.4    Smokeless " tobacco: Never    Tobacco comments:     Pt states he restarted smoking a few weeks ago   Vaping Use    Vaping status: Never Used   Substance and Sexual Activity    Alcohol use: No    Drug use: No    Sexual activity: Not Currently     Partners: Female     Birth control/protection: None

## 2025-06-06 ENCOUNTER — APPOINTMENT (INPATIENT)
Dept: RADIOLOGY | Facility: HOSPITAL | Age: 75
DRG: 516 | End: 2025-06-06
Payer: COMMERCIAL

## 2025-06-06 PROBLEM — F03.90 DEMENTIA (HCC): Status: ACTIVE | Noted: 2021-04-08

## 2025-06-06 PROBLEM — S32.401A RIGHT ACETABULAR FRACTURE (HCC): Status: ACTIVE | Noted: 2025-06-06

## 2025-06-06 PROBLEM — Z91.89 AT RISK FOR DELIRIUM: Status: ACTIVE | Noted: 2025-06-06

## 2025-06-06 LAB
25(OH)D3 SERPL-MCNC: 29.9 NG/ML (ref 30–100)
2HR DELTA HS TROPONIN: 1 NG/L
4HR DELTA HS TROPONIN: -1 NG/L
ALBUMIN SERPL BCG-MCNC: 3.6 G/DL (ref 3.5–5)
ALBUMIN SERPL BCG-MCNC: 3.7 G/DL (ref 3.5–5)
ALP SERPL-CCNC: 56 U/L (ref 34–104)
ALP SERPL-CCNC: 59 U/L (ref 34–104)
ALT SERPL W P-5'-P-CCNC: 11 U/L (ref 7–52)
ALT SERPL W P-5'-P-CCNC: 8 U/L (ref 7–52)
ANION GAP SERPL CALCULATED.3IONS-SCNC: 9 MMOL/L (ref 4–13)
ANION GAP SERPL CALCULATED.3IONS-SCNC: 9 MMOL/L (ref 4–13)
AST SERPL W P-5'-P-CCNC: 14 U/L (ref 13–39)
AST SERPL W P-5'-P-CCNC: 22 U/L (ref 13–39)
ATRIAL RATE: 234 BPM
BASE EX.OXY STD BLDV CALC-SCNC: 96 % (ref 60–80)
BASE EXCESS BLDV CALC-SCNC: 1.5 MMOL/L
BASOPHILS # BLD AUTO: 0.08 THOUSANDS/ÂΜL (ref 0–0.1)
BASOPHILS NFR BLD AUTO: 1 % (ref 0–1)
BILIRUB SERPL-MCNC: 0.64 MG/DL (ref 0.2–1)
BILIRUB SERPL-MCNC: 0.7 MG/DL (ref 0.2–1)
BUN SERPL-MCNC: 30 MG/DL (ref 5–25)
BUN SERPL-MCNC: 33 MG/DL (ref 5–25)
CALCIUM SERPL-MCNC: 9 MG/DL (ref 8.4–10.2)
CALCIUM SERPL-MCNC: 9.2 MG/DL (ref 8.4–10.2)
CARDIAC TROPONIN I PNL SERPL HS: 20 NG/L (ref ?–50)
CARDIAC TROPONIN I PNL SERPL HS: 21 NG/L (ref ?–50)
CARDIAC TROPONIN I PNL SERPL HS: 22 NG/L (ref ?–50)
CHLORIDE SERPL-SCNC: 100 MMOL/L (ref 96–108)
CHLORIDE SERPL-SCNC: 98 MMOL/L (ref 96–108)
CO2 SERPL-SCNC: 26 MMOL/L (ref 21–32)
CO2 SERPL-SCNC: 28 MMOL/L (ref 21–32)
CREAT SERPL-MCNC: 1.25 MG/DL (ref 0.6–1.3)
CREAT SERPL-MCNC: 1.39 MG/DL (ref 0.6–1.3)
EOSINOPHIL # BLD AUTO: 0.88 THOUSAND/ÂΜL (ref 0–0.61)
EOSINOPHIL NFR BLD AUTO: 8 % (ref 0–6)
ERYTHROCYTE [DISTWIDTH] IN BLOOD BY AUTOMATED COUNT: 15.2 % (ref 11.6–15.1)
EST. AVERAGE GLUCOSE BLD GHB EST-MCNC: 120 MG/DL
GFR SERPL CREATININE-BSD FRML MDRD: 48 ML/MIN/1.73SQ M
GFR SERPL CREATININE-BSD FRML MDRD: 55 ML/MIN/1.73SQ M
GLUCOSE SERPL-MCNC: 113 MG/DL (ref 65–140)
GLUCOSE SERPL-MCNC: 115 MG/DL (ref 65–140)
GLUCOSE SERPL-MCNC: 147 MG/DL (ref 65–140)
GLUCOSE SERPL-MCNC: 148 MG/DL (ref 65–140)
GLUCOSE SERPL-MCNC: 161 MG/DL (ref 65–140)
GLUCOSE SERPL-MCNC: 166 MG/DL (ref 65–140)
HBA1C MFR BLD: 5.8 %
HCO3 BLDV-SCNC: 26.9 MMOL/L (ref 24–30)
HCT VFR BLD AUTO: 44 % (ref 36.5–49.3)
HCT VFR BLD AUTO: 45.1 % (ref 36.5–49.3)
HGB BLD-MCNC: 14 G/DL (ref 12–17)
HGB BLD-MCNC: 14 G/DL (ref 12–17)
IMM GRANULOCYTES # BLD AUTO: 0.04 THOUSAND/UL (ref 0–0.2)
IMM GRANULOCYTES NFR BLD AUTO: 0 % (ref 0–2)
LACTATE SERPL-SCNC: 1.6 MMOL/L (ref 0.5–2)
LYMPHOCYTES # BLD AUTO: 0.82 THOUSANDS/ÂΜL (ref 0.6–4.47)
LYMPHOCYTES NFR BLD AUTO: 8 % (ref 14–44)
MCH RBC QN AUTO: 29 PG (ref 26.8–34.3)
MCHC RBC AUTO-ENTMCNC: 31 G/DL (ref 31.4–37.4)
MCV RBC AUTO: 93 FL (ref 82–98)
MONOCYTES # BLD AUTO: 1.51 THOUSAND/ÂΜL (ref 0.17–1.22)
MONOCYTES NFR BLD AUTO: 14 % (ref 4–12)
NEUTROPHILS # BLD AUTO: 7.31 THOUSANDS/ÂΜL (ref 1.85–7.62)
NEUTS SEG NFR BLD AUTO: 69 % (ref 43–75)
NRBC BLD AUTO-RTO: 0 /100 WBCS
O2 CT BLDV-SCNC: 20.4 ML/DL
PCO2 BLDV: 45.2 MM HG (ref 42–50)
PH BLDV: 7.39 [PH] (ref 7.3–7.4)
PLATELET # BLD AUTO: 159 THOUSANDS/UL (ref 149–390)
PMV BLD AUTO: 10.5 FL (ref 8.9–12.7)
PO2 BLDV: 107 MM HG (ref 35–45)
POTASSIUM SERPL-SCNC: 4 MMOL/L (ref 3.5–5.3)
POTASSIUM SERPL-SCNC: 4.7 MMOL/L (ref 3.5–5.3)
PROT SERPL-MCNC: 6.7 G/DL (ref 6.4–8.4)
PROT SERPL-MCNC: 7.1 G/DL (ref 6.4–8.4)
QRS AXIS: 209 DEGREES
QRSD INTERVAL: 142 MS
QT INTERVAL: 416 MS
QTC INTERVAL: 497 MS
RBC # BLD AUTO: 4.83 MILLION/UL (ref 3.88–5.62)
SODIUM SERPL-SCNC: 135 MMOL/L (ref 135–147)
SODIUM SERPL-SCNC: 135 MMOL/L (ref 135–147)
T WAVE AXIS: 28 DEGREES
T4 FREE SERPL-MCNC: 1.04 NG/DL (ref 0.61–1.12)
TSH SERPL DL<=0.05 MIU/L-ACNC: 5.43 UIU/ML (ref 0.45–4.5)
VENTRICULAR RATE: 86 BPM
VIT B12 SERPL-MCNC: 527 PG/ML (ref 180–914)
WBC # BLD AUTO: 10.64 THOUSAND/UL (ref 4.31–10.16)

## 2025-06-06 PROCEDURE — 82306 VITAMIN D 25 HYDROXY: CPT | Performed by: FAMILY MEDICINE

## 2025-06-06 PROCEDURE — 85025 COMPLETE CBC W/AUTO DIFF WBC: CPT

## 2025-06-06 PROCEDURE — 84443 ASSAY THYROID STIM HORMONE: CPT | Performed by: FAMILY MEDICINE

## 2025-06-06 PROCEDURE — 84484 ASSAY OF TROPONIN QUANT: CPT

## 2025-06-06 PROCEDURE — 94760 N-INVAS EAR/PLS OXIMETRY 1: CPT

## 2025-06-06 PROCEDURE — 71045 X-RAY EXAM CHEST 1 VIEW: CPT

## 2025-06-06 PROCEDURE — 94640 AIRWAY INHALATION TREATMENT: CPT

## 2025-06-06 PROCEDURE — 87081 CULTURE SCREEN ONLY: CPT | Performed by: STUDENT IN AN ORGANIZED HEALTH CARE EDUCATION/TRAINING PROGRAM

## 2025-06-06 PROCEDURE — 93005 ELECTROCARDIOGRAM TRACING: CPT

## 2025-06-06 PROCEDURE — 85014 HEMATOCRIT: CPT

## 2025-06-06 PROCEDURE — 84439 ASSAY OF FREE THYROXINE: CPT | Performed by: FAMILY MEDICINE

## 2025-06-06 PROCEDURE — NC001 PR NO CHARGE: Performed by: STUDENT IN AN ORGANIZED HEALTH CARE EDUCATION/TRAINING PROGRAM

## 2025-06-06 PROCEDURE — 93010 ELECTROCARDIOGRAM REPORT: CPT | Performed by: INTERNAL MEDICINE

## 2025-06-06 PROCEDURE — 80053 COMPREHEN METABOLIC PANEL: CPT | Performed by: STUDENT IN AN ORGANIZED HEALTH CARE EDUCATION/TRAINING PROGRAM

## 2025-06-06 PROCEDURE — 82805 BLOOD GASES W/O2 SATURATION: CPT

## 2025-06-06 PROCEDURE — 99232 SBSQ HOSP IP/OBS MODERATE 35: CPT | Performed by: SURGERY

## 2025-06-06 PROCEDURE — 99223 1ST HOSP IP/OBS HIGH 75: CPT | Performed by: FAMILY MEDICINE

## 2025-06-06 PROCEDURE — 83605 ASSAY OF LACTIC ACID: CPT

## 2025-06-06 PROCEDURE — 85018 HEMOGLOBIN: CPT

## 2025-06-06 PROCEDURE — 82607 VITAMIN B-12: CPT | Performed by: FAMILY MEDICINE

## 2025-06-06 PROCEDURE — 83036 HEMOGLOBIN GLYCOSYLATED A1C: CPT | Performed by: STUDENT IN AN ORGANIZED HEALTH CARE EDUCATION/TRAINING PROGRAM

## 2025-06-06 PROCEDURE — 80053 COMPREHEN METABOLIC PANEL: CPT

## 2025-06-06 PROCEDURE — 82948 REAGENT STRIP/BLOOD GLUCOSE: CPT

## 2025-06-06 RX ORDER — PRAVASTATIN SODIUM 80 MG/1
80 TABLET ORAL
Status: DISCONTINUED | OUTPATIENT
Start: 2025-06-06 | End: 2025-06-09

## 2025-06-06 RX ORDER — DIVALPROEX SODIUM 125 MG/1
125 TABLET, DELAYED RELEASE ORAL
Status: DISCONTINUED | OUTPATIENT
Start: 2025-06-06 | End: 2025-06-11 | Stop reason: HOSPADM

## 2025-06-06 RX ORDER — MEMANTINE HYDROCHLORIDE 10 MG/1
10 TABLET ORAL 2 TIMES DAILY
Status: DISCONTINUED | OUTPATIENT
Start: 2025-06-06 | End: 2025-06-09

## 2025-06-06 RX ORDER — FOLIC ACID 1 MG/1
1 TABLET ORAL DAILY
Status: DISCONTINUED | OUTPATIENT
Start: 2025-06-06 | End: 2025-06-11 | Stop reason: HOSPADM

## 2025-06-06 RX ADMIN — Medication 12.5 MG: at 08:25

## 2025-06-06 RX ADMIN — PRAVASTATIN SODIUM 80 MG: 80 TABLET ORAL at 16:09

## 2025-06-06 RX ADMIN — ENOXAPARIN SODIUM 30 MG: 30 INJECTION SUBCUTANEOUS at 08:25

## 2025-06-06 RX ADMIN — FAMOTIDINE 20 MG: 20 TABLET, FILM COATED ORAL at 18:36

## 2025-06-06 RX ADMIN — INSULIN LISPRO 1 UNITS: 100 INJECTION, SOLUTION INTRAVENOUS; SUBCUTANEOUS at 16:11

## 2025-06-06 RX ADMIN — ACETAMINOPHEN 975 MG: 325 TABLET, FILM COATED ORAL at 12:01

## 2025-06-06 RX ADMIN — FAMOTIDINE 20 MG: 20 TABLET, FILM COATED ORAL at 08:25

## 2025-06-06 RX ADMIN — ALBUTEROL SULFATE 1 PUFF: 90 AEROSOL, METERED RESPIRATORY (INHALATION) at 12:00

## 2025-06-06 RX ADMIN — POLYETHYLENE GLYCOL 3350 17 G: 17 POWDER, FOR SOLUTION ORAL at 08:25

## 2025-06-06 RX ADMIN — PAROXETINE HYDROCHLORIDE 35 MG: 20 TABLET, FILM COATED ORAL at 09:26

## 2025-06-06 RX ADMIN — ACETAMINOPHEN 975 MG: 325 TABLET, FILM COATED ORAL at 18:36

## 2025-06-06 RX ADMIN — OXYCODONE HYDROCHLORIDE 5 MG: 5 TABLET ORAL at 16:09

## 2025-06-06 RX ADMIN — ENOXAPARIN SODIUM 30 MG: 30 INJECTION SUBCUTANEOUS at 21:00

## 2025-06-06 RX ADMIN — ACETAMINOPHEN 975 MG: 325 TABLET, FILM COATED ORAL at 02:51

## 2025-06-06 RX ADMIN — MEMANTINE 10 MG: 10 TABLET ORAL at 18:36

## 2025-06-06 RX ADMIN — SENNOSIDES AND DOCUSATE SODIUM 1 TABLET: 50; 8.6 TABLET ORAL at 21:00

## 2025-06-06 RX ADMIN — FOLIC ACID 1 MG: 1 TABLET ORAL at 08:25

## 2025-06-06 RX ADMIN — ALBUTEROL SULFATE 2.5 MG: 2.5 SOLUTION RESPIRATORY (INHALATION) at 02:55

## 2025-06-06 RX ADMIN — Medication 12.5 MG: at 21:00

## 2025-06-06 RX ADMIN — INSULIN LISPRO 1 UNITS: 100 INJECTION, SOLUTION INTRAVENOUS; SUBCUTANEOUS at 12:01

## 2025-06-06 RX ADMIN — MEMANTINE 10 MG: 10 TABLET ORAL at 08:25

## 2025-06-06 RX ADMIN — Medication 2.5 MG: at 19:20

## 2025-06-06 RX ADMIN — Medication 6 MG: at 21:00

## 2025-06-06 RX ADMIN — DIVALPROEX SODIUM 125 MG: 125 TABLET, DELAYED RELEASE ORAL at 21:00

## 2025-06-06 NOTE — ASSESSMENT & PLAN NOTE
Lab Results   Component Value Date    HGBA1C 5.8 (H) 06/06/2025       Recent Labs     06/08/25  1655 06/09/25  0646 06/09/25  1108 06/09/25  1526   POCGLU 151* 135 150* 133       Blood Sugar Average: Last 72 hrs:  (P) [id number]    Avoid hypoglycemia  Most recent B12 level 527, TSH 5.4  Goal Hb A1c for  patient age and comorbidities would be around 8.0

## 2025-06-06 NOTE — ASSESSMENT & PLAN NOTE
Currently rate controlled  Eliquis on hold due to upcoming surgery  Monitor electrolytes  Continue metoprolol with holding parameters

## 2025-06-06 NOTE — ASSESSMENT & PLAN NOTE
Currently stable  Continue home medications  Encourage out of bed as tolerated and incentive spirometry

## 2025-06-06 NOTE — PROGRESS NOTES
Progress Note - Orthopedics   Name: Prince Andrews 75 y.o. male I MRN: [id number]  Unit/Bed#: W -01 I Date of Admission: 6/5/2025   Date of Service: 6/6/2025 I Hospital Day: 1    Assessment & Plan  Right acetabular fracture (HCC)  NWB to right lower extremity in 10 pounds Abbott's traction  Will discuss further intervention with orthopedics attg.   This morning patient states that he is wheelchair bound at baseline, and only stands to transfer.  Does not take any steps or walk any significant distance.   Will monitor for ABLA and administer IVF/prbc as indicated for Greater than 2 gram drop or Hgb < 7.  Patient hemoglobin currently at 16.7  Pain control per primary team  DVT ppx  : Per primary team  All other medical comorbidities to be managed per primary team  Case reviewed and discussed with Dr. Orta      Dementia ([facility])      I have discussed the above management plan in detail with the primary service.   Orthopedics service will follow.  Please contact the SecureChat role for the Orthopedics service with any questions/concerns.    Subjective   75 y.o.male seen and examined at bedside with orthopedics attg. Patient resting comfortably in bucks traction. He does have some pelvic pain.     Objective :  Temp:  [97 °F (36.1 °C)-97.9 °F (36.6 °C)] 97.4 °F (36.3 °C)  HR:  [] 114  BP: (118-151)/(59-83) 149/83  Resp:  [17-24] 18  SpO2:  [92 %-100 %] 94 %  O2 Device: Nasal cannula  Nasal Cannula O2 Flow Rate (L/min):  [2 L/min] 2 L/min    Physical Exam  Musculoskeletal: Right lower extremity  Skin intact.   [place] traction in place  Some ttp about the right hip/pelvis.   Motor intact to +FHL/EHL, +ankle dorsi/plantar flexion  Sensation intact to saphenous, sural, tibial, superficial peroneal nerve, and deep peroneal  2+ DP pulse  No calf swelling or tenderness to palpation      Lab Results: I have reviewed the following results:  Recent Labs     06/05/25  0957 06/05/25  1527 06/06/25  0329   WBC 14.98*  --   --   "  HGB 16.7 15.1  --    HCT 53.1* 48.7  --      --   --    BUN 25  --  33*   CREATININE 1.47*  --  1.39*   PTT 36*  --   --    INR 1.55*  --   --      Blood Culture:  No results found for: \"BLOODCX\"  Wound Culture: No results found for: \"WOUNDCULT\"        "

## 2025-06-06 NOTE — CASE MANAGEMENT
Case Management Assessment & Discharge Planning Note    Patient name Prince Andrews  Location W /W -01 MRN [id number]  : 1950 Date 2025       Current Admission Date: 2025  Current Admission Diagnosis:Atrial fibrillation, chronic ([facility])   Patient Active Problem List    Diagnosis Date Noted    Right acetabular fracture ([facility]) 2025    History of colon polyps 2024    Anticoagulant long-term use 2024    Antiplatelet or antithrombotic long-term use 2024    Paranoia ([facility]) 2021    Dementia ([facility]) 2021    Foreign body (FB) in soft tissue 2020    Nose dryness 2020    Weight loss 2020    Contusion of right eyeball 2020    Dry eye syndrome of bilateral lacrimal glands 2020    Hyphema, right eye 2020    Other secondary cataract, right eye 2020    Anemia 2020    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 ([facility]) 2020    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[facility]) 2020    Pain of left heel 2020    Localized edema 2019    Tobacco abuse 2019    Tinea pedis of both feet 2019    Panlobular emphysema ([facility]) 2019    Urinary retention 2019    Stage 3 chronic kidney disease ([facility]) 2019    Current use of long term anticoagulation 2019    Type 2 diabetes mellitus with stage 4 chronic kidney disease, without long-term current use of insulin ([facility]) 2018    Insulin dependent type 2 diabetes mellitus ([facility]) 2018    Encephalopathy 2018    Glaucoma 2018    Altered mental state 2018    Delirium 2018    Peripheral sensory neuropathy 2018    Chronic systolic heart failure ([facility]) 2017    Gastroesophageal reflux disease without esophagitis 2017    SENTHIL (obstructive sleep apnea) 2017    Chronic respiratory failure ([facility]) 2017    Essential hypertension 2017    Uncontrolled type 2 diabetes mellitus with hyperglycemia  ([facility]) 01/25/2017    Lens replaced by other means 11/10/2016    Age-related nuclear cataract of left eye 11/10/2016    Chest wall pain, chronic 04/05/2016    Open angle with borderline findings and high glaucoma risk in both eyes 03/30/2016    Tear film insufficiency 03/30/2016    Primary open-angle glaucoma, right eye, severe stage 09/08/2015    Pterygium 08/31/2015    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    Atrial fibrillation, chronic ([facility]) 07/03/2014    Benign essential hypertension 07/03/2014    Chronic obstructive lung disease ([facility]) 07/03/2014    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 07/03/2014    Diverticular disease of colon 07/03/2014    Gout 07/03/2014    Mixed hyperlipidemia 07/03/2014    Persistent insomnia 07/03/2014    Type 2 diabetes mellitus with diabetic polyneuropathy, with long-term current use of insulin ([facility]) 07/03/2014    Atherosclerosis of native artery of extremity ([facility]) 07/03/2014    Obesity 07/03/2014    Arteriosclerosis of coronary artery bypass graft 08/05/2013    Depressive disorder 08/05/2013    Peripheral vascular disease ([facility]) 01/01/2011      LOS (days): 1  Geometric Mean LOS (GMLOS) (days): 2.8  Days to GMLOS:2     OBJECTIVE:    Risk of Unplanned Readmission Score: 23.6         Current admission status: Inpatient       Preferred Pharmacy:   [address] [address]  Phone: [phone] Fax: [phone]    Primary Care Provider: Filomena Ragland (Inactive)    Primary Insurance: [facility]  Secondary Insurance: [facility]    ASSESSMENT:  Active Health Care Proxies    There are no active Health Care Proxies on file.                 Readmission Root Cause  30 Day Readmission: No    Patient Information  Admitted from:: Facility ([facility])  Mental Status: Alert  During Assessment patient was accompanied by: Not accompanied during assessment  Assessment information provided by::  Patient  Primary Caregiver: Other (Comment)  Caregiver's Name:: [facility]  Caregiver's Relationship to Patient:: Facility Staff  Support Systems: Self, Other (Comment) (facilty staff)  County of Residence: [place]  What city do you live in?: [place]  Home entry access options. Select all that apply.: No steps to enter home  Type of Current Residence: Facility  Upon entering residence, is there a bedroom on the main floor (no further steps)?: Yes  Upon entering residence, is there a bathroom on the main floor (no further steps)?: Yes  Living Arrangements: Lives in Facility    Activities of Daily Living Prior to Admission  Functional Status: Assistance  Does the patient have a history of Short-Term Rehab?: Yes ([facility] [place])  Does patient have a history of HHC?: No  Does patient currently have HHC?: No     Patient Information Continued  Income Source: SSI/SSD  Does patient have prescription coverage?: Yes  Can the patient afford their medications and any related supplies (such as glucometers or test strips)?: Yes  Does patient receive dialysis treatments?: No  Does patient have a history of substance abuse?: No  Does patient have a history of Mental Health Diagnosis?: Yes (Depressive D/O)  Is patient receiving treatment for mental health?: Yes  Has patient received inpatient treatment related to mental health in the last 2 years?: No     Means of Transportation  [facility] ([facility]    CM reviewed the availability of treatment team to discuss questions or concerns patient and/or family may have regarding  understanding medications and recognizing signs and symptoms at discharge.  CM also encouraged patient to follow up with all recommended appointments after discharge. CM reviewed the information that will be provided to pt/family on the discharge instructions.  Patient advised of importance for patient and family to participate in managing patient's  medical well being.        DISCHARGE DETAILS:    Discharge planning discussed with:: pt/facility  [place] of Choice: Yes  Comments - Freedom of Choice: Pt is from [facility] where he is a LTC pt.  He will need auth in order to return.  Pt will be in need of transportation upon DC.  Pt does not have emergency contacts.     Requested Home Health Care         Is the patient interested in HHC at discharge?: No    DME Referral Provided  Referral made for DME?: No    Other Referral/Resources/Interventions Provided:  Interventions: SNF  Referral Comments: Referral has been made to [facility] where pt is a LT resident.  They will accept pt back with auth as pt will have skilled needs with PT OT.  [facility] have been reserved in [place]    Would you like to participate in our [facility] service program?  : No - Declined    Treatment Team Recommendation: Short Term Rehab, SNF  Discharge Destination Plan:: Short Term Rehab, SNF                                              Accepting Facility Name, City & State : [facility]  Receiving Facility/Agency Phone Number: [phone]  Facility/Agency Fax Number: [phone]

## 2025-06-06 NOTE — ASSESSMENT & PLAN NOTE
- AAOx1, somnolent, more confused than prior  -Patient is high risk of delirium due to dementia at baseline, pain, insomnia, metabolic imbalance  -Initiate delirium precautions  -maintain normal sleep/wake cycle  -minimize overnight interruptions, group overnight vitals/labs/nursing checks as possible  -dim lights, close blinds and turn off tv to minimize stimulation and encourage sleep environment in evenings  -ensure that pain is well controlled   -monitor for fecal and urinary retention which may precipitate delirium  -encourage early mobilization and ambulation  -provide frequent reorientation and redirection  -encourage family and friends at the bedside to help calm patient if anxious  -avoid medications which may precipitate or worsen delirium such as tramadol, benzodiazepine, anticholinergics, and antihistaminics  -encourage hydration and nutrition , assist with feeding if needed  -redirect unwanted behaviors as first line, avoid physical restraints.   -QTc interval 509, avoid medications that could prolong Qtc  -If patient becomes agitated may increase dose of Depakote

## 2025-06-06 NOTE — PROGRESS NOTES
Patient:  NIEVES JANE    MRN:  [id number]    [place] Request ID:  [id number]    Level of care reserved:  Skilled Nursing Facility    Partner Reserved:  [facility] [id number] [phone]    Clinical needs requested:    Geography searched:  10 miles around [address]    Start of Service:    Request sent:  10:52am EDT on 6/6/2025 by Ben Mata    Partner reserved:  11:04am EDT on 6/6/2025 by Ben Mata    Choice list shared:  11:04am EDT on 6/6/2025 by Ben Mata

## 2025-06-06 NOTE — PLAN OF CARE
Problem: PAIN - ADULT  Goal: Verbalizes/displays adequate comfort level or baseline comfort level  Description: Interventions:  - Encourage patient to monitor pain and request assistance  - Assess pain using appropriate pain scale  - Administer analgesics as ordered based on type and severity of pain and evaluate response  - Implement non-pharmacological measures as appropriate and evaluate response  - Consider cultural and social influences on pain and pain management  - Notify physician/advanced practitioner if interventions unsuccessful or patient reports new pain  - Educate patient/family on pain management process including their role and importance of  reporting pain   - Provide non-pharmacologic/complimentary pain relief interventions  Outcome: Progressing     Problem: INFECTION - ADULT  Goal: Absence or prevention of progression during hospitalization  Description: INTERVENTIONS:  - Assess and monitor for signs and symptoms of infection  - Monitor lab/diagnostic results  - Monitor all insertion sites, i.e. indwelling lines, tubes, and drains  - Monitor endotracheal if appropriate and nasal secretions for changes in amount and color  - [place] appropriate cooling/warming therapies per order  - Administer medications as ordered  - Instruct and encourage patient and family to use good hand hygiene technique  - Identify and instruct in appropriate isolation precautions for identified infection/condition  Outcome: Progressing  Goal: Absence of fever/infection during neutropenic period  Description: INTERVENTIONS:  - Monitor WBC  - Perform strict hand hygiene  - Limit to healthy visitors only  - No plants, dried, fresh or silk flowers with coleman in patient room  Outcome: Progressing     Problem: SAFETY ADULT  Goal: Patient will remain free of falls  Description: INTERVENTIONS:  - Educate patient/family on patient safety including physical limitations  - Instruct patient to call for assistance with activity   -  Consider consulting OT/PT to assist with strengthening/mobility based on AM PAC & JH-HLM score  - Consult OT/PT to assist with strengthening/mobility   - Keep Call bell within reach  - Keep bed low and locked with side rails adjusted as appropriate  - Keep care items and personal belongings within reach  - Initiate and maintain comfort rounds  - Make Fall Risk Sign visible to staff  - Offer Toileting every 2 Hours, in advance of need  - Initiate/Maintain bed/chair alarm  - Obtain necessary fall risk management equipment: bed/chair  - Apply yellow socks and bracelet for high fall risk patients  - Consider moving patient to room near nurses station  Outcome: Progressing  Goal: Maintain or return to baseline ADL function  Description: INTERVENTIONS:  -  Assess patient's ability to carry out ADLs; assess patient's baseline for ADL function and identify physical deficits which impact ability to perform ADLs (bathing, care of mouth/teeth, toileting, grooming, dressing, etc.)  - Assess/evaluate cause of self-care deficits   - Assess range of motion  - Assess patient's mobility; develop plan if impaired  - Assess patient's need for assistive devices and provide as appropriate  - Encourage maximum independence but intervene and supervise when necessary  - Involve family in performance of ADLs  - Assess for home care needs following discharge   - Consider OT consult to assist with ADL evaluation and planning for discharge  - Provide patient education as appropriate  - Monitor functional capacity and physical performance, use of AM PAC & JH-HLM   - Monitor gait, balance and fatigue with ambulation    Outcome: Progressing  Goal: Maintains/Returns to pre admission functional level  Description: INTERVENTIONS:  - Perform AM-PAC 6 Click Basic Mobility/ Daily Activity assessment daily.  - Set and communicate daily mobility goal to care team and patient/family/caregiver.   - Collaborate with rehabilitation services on mobility goals  if consulted  - Perform Range of Motion 3 times a day.  - Reposition patient every 2 hours.  - Dangle patient 3 times a day  - Stand patient 3 times a day  - Ambulate patient 3 times a day  - Out of bed to chair 3 times a day   - Out of bed for meals 3 times a day  - Out of bed for toileting  - Record patient progress and toleration of activity level   Outcome: Progressing     Problem: DISCHARGE PLANNING  Goal: Discharge to home or other facility with appropriate resources  Description: INTERVENTIONS:  - Identify barriers to discharge w/patient and caregiver  - Arrange for needed discharge resources and transportation as appropriate  - Identify discharge learning needs (meds, wound care, etc.)  - Arrange for interpretive services to assist at discharge as needed  - Refer to Case Management Department for coordinating discharge planning if the patient needs post-hospital services based on physician/advanced practitioner order or complex needs related to functional status, cognitive ability, or social support system  Outcome: Progressing

## 2025-06-06 NOTE — PROGRESS NOTES
"Progress Note - Trauma   Name: Prince Andrews 75 y.o. male I MRN: [id number]  Unit/Bed#: W -01 I Date of Admission: 6/5/2025   Date of Service: 6/6/2025 I Hospital Day: 1     Assessment & Plan  Atrial fibrillation, chronic (HCC)  Eliqous and Plavix on hold  Lovenox til surgery  Benign essential hypertension  Home meds  Chronic obstructive lung disease (HCC)  - Reports using albuterol  - Albuterol inhaler and nebulizer PRN  Pstient using 2 liters nasal O2 for comfort  Uncontrolled type 2 diabetes mellitus with hyperglycemia (HCC)  Lab Results   Component Value Date    HGBA1C 5.8 (H) 06/06/2025       Recent Labs     06/05/25  1914 06/06/25  0734   POCGLU 186* 113       Blood Sugar Average: Last 72 hrs:  (P) 149.5    Stage 3 chronic kidney disease (HCC)  Lab Results   Component Value Date    EGFR 48 06/06/2025    EGFR 45 06/05/2025    EGFR 58 (L) 06/03/2025    CREATININE 1.39 (H) 06/06/2025    CREATININE 1.47 (H) 06/05/2025    CREATININE 1.27 06/03/2025     Dementia (HCC)  Oriented to person and place  Right acetabular fracture (HCC)      24 Hour Events : Placed in Abbott's traction  Subjective : \"Good Morning\"     Objective :  Temp:  [97 °F (36.1 °C)-97.9 °F (36.6 °C)] 97.4 °F (36.3 °C)  HR:  [] 114  BP: (118-149)/(59-83) 149/83  Resp:  [17-24] 18  SpO2:  [92 %-100 %] 94 %  O2 Device: Nasal cannula  Nasal Cannula O2 Flow Rate (L/min):  [2 L/min] 2 L/min    Physical Exam  Constitutional:       Appearance: Normal appearance.   HENT:      Head: Normocephalic and atraumatic.      Right Ear: External ear normal.      Left Ear: External ear normal.      Nose: Nose normal.      Mouth/Throat:      Pharynx: Oropharynx is clear.     Eyes:      Extraocular Movements: Extraocular movements intact.      Pupils: Pupils are equal, round, and reactive to light.       Cardiovascular:      Rate and Rhythm: Normal rate and regular rhythm.      Pulses: Normal pulses.      Heart sounds: Normal heart sounds.   Pulmonary:      " Effort: Pulmonary effort is normal.      Breath sounds: Normal breath sounds.   Abdominal:      Palpations: Abdomen is soft.   Genitourinary:     Comments: voiding    Musculoskeletal:      Cervical back: Normal range of motion.      Comments: Abbott's traction     Skin:     General: Skin is warm and dry.     Neurological:      General: No focal deficit present.      Mental Status: He is alert and oriented to person, place, and time. Mental status is at baseline.     Psychiatric:         Mood and Affect: Mood normal.         Behavior: Behavior normal.         Judgment: Judgment normal.         Lab Results: I have reviewed the following results:          VTE Pharmacologic Prophylaxis: Lovenox  VTE Mechanical Prophylaxis: sequential compression device

## 2025-06-06 NOTE — ASSESSMENT & PLAN NOTE
He needs assistance with IADLs at baseline.  CT head shows microangiopathic changes  Mental status currently A/Ox1 at the time of encounter  Currently stable, no behaviors noted.  Will continue to provide supportive care, reorient as needed.  Patient is at high risk for delirium, will monitor closely and place on delirium precautions.  Maintain sleep/wake cycle.  Optimize pain regimen.  Monitor for constipation and urinary retention and manage as needed.   B12 level  527and TSH 5.4  Encourage family to visit.  Encourage to wear glasses and hearing aids while awake.  Encourage po intake, assist with feeding if needed.   Continue memantine, Depakote  Consider switching paroxetine to Lexapro as it is better tolerated by elderly, can be done as outpatient

## 2025-06-06 NOTE — ASSESSMENT & PLAN NOTE
NWB to right lower extremity in 10 pounds Abbott's traction  Will hold off on orthopedic surgical intervention at this time due to patient's recent anticoagulant use, after speaking with orthopedic traumatologist he recommends at least 48 hours of anticoagulants to be held before proceeding with operative intervention.  Will plan for OR on 6/11/2025 for open reduction with internal fixation right acetabulum.  Will need consent prior to procedure  Will monitor for ABLA and administer IVF/prbc as indicated for Greater than 2 gram drop or Hgb < 7.  Patient hemoglobin currently at 16.7  Incentive spirometry postoperatively  Pain control per primary team  DVT ppx  : Per primary team  All other medical comorbidities to be managed per primary team  Dispo: Ortho will follow  See above for additional details   Case reviewed and discussed with Dr. Orta

## 2025-06-06 NOTE — PLAN OF CARE
Problem: PAIN - ADULT  Goal: Verbalizes/displays adequate comfort level or baseline comfort level  Description: Interventions:  - Encourage patient to monitor pain and request assistance  - Assess pain using appropriate pain scale  - Administer analgesics as ordered based on type and severity of pain and evaluate response  - Implement non-pharmacological measures as appropriate and evaluate response  - Consider cultural and social influences on pain and pain management  - Notify physician/advanced practitioner if interventions unsuccessful or patient reports new pain  - Educate patient/family on pain management process including their role and importance of  reporting pain   - Provide non-pharmacologic/complimentary pain relief interventions  Outcome: Progressing     Problem: INFECTION - ADULT  Goal: Absence or prevention of progression during hospitalization  Description: INTERVENTIONS:  - Assess and monitor for signs and symptoms of infection  - Monitor lab/diagnostic results  - Monitor all insertion sites, i.e. indwelling lines, tubes, and drains  - Monitor endotracheal if appropriate and nasal secretions for changes in amount and color  - [place] appropriate cooling/warming therapies per order  - Administer medications as ordered  - Instruct and encourage patient and family to use good hand hygiene technique  - Identify and instruct in appropriate isolation precautions for identified infection/condition  Outcome: Progressing  Goal: Absence of fever/infection during neutropenic period  Description: INTERVENTIONS:  - Monitor WBC  - Perform strict hand hygiene  - Limit to healthy visitors only  - No plants, dried, fresh or silk flowers with coleman in patient room  Outcome: Progressing     Problem: SAFETY ADULT  Goal: Patient will remain free of falls  Description: INTERVENTIONS:  - Educate patient/family on patient safety including physical limitations  - Instruct patient to call for assistance with activity   -  Consider consulting OT/PT to assist with strengthening/mobility based on AM PAC & JH-HLM score  - Consult OT/PT to assist with strengthening/mobility   - Keep Call bell within reach  - Keep bed low and locked with side rails adjusted as appropriate  - Keep care items and personal belongings within reach  - Initiate and maintain comfort rounds  - Make Fall Risk Sign visible to staff  - Offer Toileting every  Hours, in advance of need  - Initiate/Maintain alarm  - Obtain necessary fall risk management equipment:   - Apply yellow socks and bracelet for high fall risk patients  - Consider moving patient to room near nurses station  Outcome: Progressing  Goal: Maintain or return to baseline ADL function  Description: INTERVENTIONS:  -  Assess patient's ability to carry out ADLs; assess patient's baseline for ADL function and identify physical deficits which impact ability to perform ADLs (bathing, care of mouth/teeth, toileting, grooming, dressing, etc.)  - Assess/evaluate cause of self-care deficits   - Assess range of motion  - Assess patient's mobility; develop plan if impaired  - Assess patient's need for assistive devices and provide as appropriate  - Encourage maximum independence but intervene and supervise when necessary  - Involve family in performance of ADLs  - Assess for home care needs following discharge   - Consider OT consult to assist with ADL evaluation and planning for discharge  - Provide patient education as appropriate  - Monitor functional capacity and physical performance, use of AM PAC & JH-HLM   - Monitor gait, balance and fatigue with ambulation    Outcome: Progressing  Goal: Maintains/Returns to pre admission functional level  Description: INTERVENTIONS:  - Perform AM-PAC 6 Click Basic Mobility/ Daily Activity assessment daily.  - Set and communicate daily mobility goal to care team and patient/family/caregiver.   - Collaborate with rehabilitation services on mobility goals if consulted  -  Perform Range of Motion  times a day.  - Reposition patient every  hours.  - Dangle patient  times a day  - Stand patient  times a day  - Ambulate patient  times a day  - Out of bed to chair  times a day   - Out of bed for meals  times a day  - Out of bed for toileting  - Record patient progress and toleration of activity level   Outcome: Progressing     Problem: DISCHARGE PLANNING  Goal: Discharge to home or other facility with appropriate resources  Description: INTERVENTIONS:  - Identify barriers to discharge w/patient and caregiver  - Arrange for needed discharge resources and transportation as appropriate  - Identify discharge learning needs (meds, wound care, etc.)  - Arrange for interpretive services to assist at discharge as needed  - Refer to Case Management Department for coordinating discharge planning if the patient needs post-hospital services based on physician/advanced practitioner order or complex needs related to functional status, cognitive ability, or social support system  Outcome: Progressing     Problem: Knowledge Deficit  Goal: Patient/family/caregiver demonstrates understanding of disease process, treatment plan, medications, and discharge instructions  Description: Complete learning assessment and assess knowledge base.  Interventions:  - Provide teaching at level of understanding  - Provide teaching via preferred learning methods  Outcome: Progressing     Problem: Prexisting or High Potential for Compromised Skin Integrity  Goal: Skin integrity is maintained or improved  Description: INTERVENTIONS:  - Identify patients at risk for skin breakdown  - Assess and monitor skin integrity including under and around medical devices   - Assess and monitor nutrition and hydration status  - Monitor labs  - Assess for incontinence   - Turn and reposition patient  - Assist with mobility/ambulation  - Relieve pressure over karli prominences   - Avoid friction and shearing  - Provide appropriate hygiene  as needed including keeping skin clean and dry  - Evaluate need for skin moisturizer/barrier cream  - Collaborate with interdisciplinary team  - Patient/family teaching  - Consider wound care consult    Assess:  - Review James scale daily  - Clean and moisturize skin every   - Inspect skin when repositioning, toileting, and assisting with ADLS  - Assess under medical devices such as  every   - Assess extremities for adequate circulation and sensation     Bed Management:  - Have minimal linens on bed & keep smooth, unwrinkled  - Change linens as needed when moist or perspiring  - Avoid sitting or lying in one position for more than  hours while in bed?Keep HOB at degrees   - Toileting:  - Offer bedside commode  - Assess for incontinence every   - Use incontinent care products after each incontinent episode such as     Activity:  - Mobilize patient  times a day  - Encourage activity and walks on unit  - Encourage or provide ROM exercises   - Turn and reposition patient every  Hours  - Use appropriate equipment to lift or move patient in bed  - Instruct/ Assist with weight shifting every  when out of bed in chair  - Consider limitation of chair time hour intervals    Skin Care:  - Avoid use of baby powder, tape, friction and shearing, hot water or constrictive clothing  - Relieve pressure over bony prominences using   - Do not massage red bony areas    Next Steps:  - Teach patient strategies to minimize risks such as   - Consider consults to  interdisciplinary teams such as   Outcome: Progressing

## 2025-06-06 NOTE — ASSESSMENT & PLAN NOTE
NWB to right lower extremity in 10 pounds Abbott's traction  Will discuss further intervention with orthopedics attg.   This morning patient states that he is wheelchair bound at baseline, and only stands to transfer.  Does not take any steps or walk any significant distance.   Will monitor for ABLA and administer IVF/prbc as indicated for Greater than 2 gram drop or Hgb < 7.  Patient hemoglobin currently at 16.7  Pain control per primary team  DVT ppx  : Per primary team  All other medical comorbidities to be managed per primary team  Case reviewed and discussed with Dr. Orta

## 2025-06-06 NOTE — ASSESSMENT & PLAN NOTE
Lab Results   Component Value Date    EGFR 48 06/06/2025    EGFR 45 06/05/2025    EGFR 58 (L) 06/03/2025    CREATININE 1.39 (H) 06/06/2025    CREATININE 1.47 (H) 06/05/2025    CREATININE 1.27 06/03/2025

## 2025-06-06 NOTE — ASSESSMENT & PLAN NOTE
Lab Results   Component Value Date    HGBA1C 5.8 (H) 06/06/2025       Recent Labs     06/05/25  1914 06/06/25  0734   POCGLU 186* 113       Blood Sugar Average: Last 72 hrs:  (P) 149.5

## 2025-06-06 NOTE — QUICK NOTE
Quick orthopedics note    Patient may come out of bucks traction.   May have bucks traction re-applied for comfort.   Over the weekend, should trial toe-touch weight bearing to the right lower extremity.   Pending course over the weekend, will re-evaluate patient on Monday and make final decision regarding operative intervention/course.     This was discussed with Dr. Orta and Dr. Velasquez of the trauma service.     Please contact orthopedics for further questions or concerns.     Keyana Cortez PA-C

## 2025-06-06 NOTE — ASSESSMENT & PLAN NOTE
CT lower extremities notes right acetabular fracture  Stated his hip pain kept him awake last night  Patient was seen by Ortho and had ORIF surgery on Saturday 6/7/25.  Patient was on Eliquis currently on hold.  continue Tylenol scheduled consider adding gabapentin 100 mg q.h.s..  Apply ice right hip 3 times a day.  Continue oxycodone p.r.n..  Avoid muscle relaxants   Monitor bowel movement and adjust regimen as needed.  Monitor for urinary retention  Continue physical therapy.   Encourage OOB with meals and incentive spirometry.  Monitor CBC CMP.  Follow-up with orthopedics.  Lovenox for DVT prophylaxis

## 2025-06-06 NOTE — ASSESSMENT & PLAN NOTE
- Reports using albuterol  - Albuterol inhaler and nebulizer PRN  Pstient using 2 liters nasal O2 for comfort

## 2025-06-06 NOTE — QUICK NOTE
Text from nurse regarding the patient  Not feeling well, diaphoretic, no chest pain  In morales's traction to RLE  Labs drawn, CXR ordered, 12 lead EKG obtained, in A-fib.  Pulses palpable.  Trauma attending made aware.

## 2025-06-06 NOTE — UTILIZATION REVIEW
Initial Clinical Review    Admission: Date/Time/Statement:   Admission Orders (From admission, onward)       Ordered        06/05/25 1506  Inpatient Admission  Once                          Orders Placed This Encounter   Procedures    Inpatient Admission     Standing Status:   Standing     Number of Occurrences:   1     Level of Care:   Med Surg [16]     Estimated length of stay:   More than 2 Midnights     Certification:   I certify that inpatient services are medically necessary for this patient for a duration of greater than two midnights. See H&P and MD Progress Notes for additional information about the patient's course of treatment.     ED Arrival Information       Expected   -    Arrival   6/5/2025 09:42    Acuity   Emergent              Means of arrival   Ambulance    Escorted by   [facility]    Service   Trauma    Admission type   Emergency              Arrival complaint   EMS             Chief Complaint   Patient presents with    Fall     Pt fell yesterday, had xray at facility. Dx right hip fx. Unknown head strike. +plavix and eliquis       Initial Presentation: 75 y.o. male with hx DM2, A fib on Eliquis, COPD on supplemental O2, PAD  who presents to ED via EMS from facility with fall yesterday, mechanical landing on R hip and today with worsening pain  Pt required help to get up . Denies head strike or LOC. Pt on Eliquis and plavix. On exam, GCS 15, 2 + distal pulses bilat . Tenderness to R hip, limb is externally rotated. Neurovascularly intact to BLE. Irreg rhythm . Labs WBC 14.98, creat 1.47. INR 1.55 . CT shows R acetabular fx . Pt given Kcentra and DDAVP , IVF in ED. Admitted as Inpatient by trauma servuice with closed R acteeabular fx, s/p fall. PLan- Ortho consult . NWB RLE. RLE in 10 lbs Abbott's traction . RLE neurovasc checks . Multimodal pain control. PT/OT . Hold Eliquis, Plavix.   Ortho consult - CT evaluation the patient has an anterior column posterior hemitransverse acetabulum  fracture. The anterior column component is nondisplaced however the posterior column component has some displacement with an apex exiting directly at the greater sciatic notch adjacent to the superior gluteal artery which is heavily calcified. The acetabulum is well-contained without dislocation. Nondisplaced fracture line involving the posterior wall. patient normally ambulates without an assistive device. Limited endurance given his COPD. The patient had isolated pain to his right hip joint. No numbness or paresthesias On exam, superficial abrasion noted over right olecranon  TP diffusely over right hip and throughout right groin region . Motor intact 5/5 strength with, knee flexion/extension, ankle dorsi/plantar flexion, EHL/FHL . 1+ DP/PT pulse comparable contralateral side, brisk capillary refill . Musculature is soft and compressible, no pain with passive stretch  . Plan for operative intervention, ORIF . Bridge with Lovenox for the next several days at least 48 hours prior to performing definitive surgical fixation on his right acetabulum to try to avoid bleeding complications.     Date: 6/6    Day 2:  right anterior column posterior hemitransverse acetabulum fracture   Pt is  conversational does have waxing and waning cognitive function. This is his baseline reportedly according to the chart. Oriented to person and place . Yesterday, pt told orthopedics team that he normally ambulates without an assistive device. Today the patient stated that at his nursing facility he uses a wheelchair full-time but occasionally will get out of bed on his own with a walker and walk to the bedside bathroom. Complains of isolated right hip pain. His pain is well-controlled and is stable manner but does have significant pain with shifts in position. denies any numbness or paresthesias in the right lower extremity , RLE neurovasc intact.  . Patient is in Abbott's traction to the right lower extremity for comfort. The patient can  come out of the Buck's traction as needed for repositioning and for bathroom use. Last eliquis dose 6/5/25. PLan for operative intervention after off Eliquis for 48hrs . Pt receiving SQ Lovenox .     ED Treatment-Medication Administration from 06/05/2025 0942 to 06/05/2025 2127         Date/Time Order Dose Route Action     06/05/2025 1109 acetaminophen (TYLENOL) tablet 650 mg 650 mg Oral Given     06/05/2025 1136 prothrombin complex concentrate (human) (Kcentra) 2,000 Units 2,000 Units Intravenous Given     06/05/2025 1136 desmopressin (DDAVP) 32 mcg in sodium chloride 0.9 % 50 mL IVPB 32 mcg Intravenous New Bag     06/05/2025 1121 multi-electrolyte (Plasmalyte-A/Isolyte-S PH 7.4/Normosol-R) IV bolus 500 mL 500 mL Intravenous New Bag     06/05/2025 1236 iohexol (OMNIPAQUE) 350 MG/ML injection (MULTI-DOSE) 100 mL 100 mL Intravenous Given     06/05/2025 1510 ipratropium-albuterol (DUO-NEB) 0.5-2.5 mg/3 mL inhalation solution 3 mL 3 mL Nebulization Given     06/05/2025 1923 acetaminophen (TYLENOL) tablet 975 mg 975 mg Oral Given     06/05/2025 1706 oxyCODONE (ROXICODONE) split tablet 2.5 mg -- Oral See Alternative     06/05/2025 1706 oxyCODONE (ROXICODONE) IR tablet 5 mg 5 mg Oral Given     06/05/2025 1707 polyethylene glycol (MIRALAX) packet 17 g 17 g Oral Given     06/05/2025 1615 clopidogrel (PLAVIX) tablet 75 mg -- Oral Held Dose     06/05/2025 1615 apixaban (ELIQUIS) tablet 5 mg -- Oral Held Dose     06/05/2025 1708 famotidine (PEPCID) tablet 20 mg 20 mg Oral Given     06/05/2025 1924 insulin lispro (HumALOG/ADMELOG) 100 units/mL subcutaneous injection 1-5 Units 1 Units Subcutaneous Given            Scheduled Medications:  acetaminophen, 975 mg, Oral, Q8H TIMOTHY  [Held by provider] apixaban, 5 mg, Oral, BID  [Held by provider] clopidogrel, 75 mg, Oral, Once  divalproex sodium, 125 mg, Oral, HS  enoxaparin, 30 mg, Subcutaneous, Q12H TIMOTHY  famotidine, 20 mg, Oral, BID  folic acid, 1 mg, Oral, Daily  insulin lispro, 1-5  Units, Subcutaneous, TID AC  melatonin, 6 mg, Oral, HS  memantine, 10 mg, Oral, BID  metoprolol tartrate, 12.5 mg, Oral, Q12H TIMOTHY  PARoxetine, 35 mg, Oral, Daily  polyethylene glycol, 17 g, Oral, Daily  pravastatin, 80 mg, Oral, Daily With Dinner  senna-docusate sodium, 1 tablet, Oral, HS    ipratropium-albuterol (DUO-NEB) 0.5-2.5 mg/3 mL inhalation solution 3 mL  Dose: 3 mL  Freq: Every 20 min Route: NEBULIZATION  Start: 06/05/25 1500 End: 06/05/25 1559  Continuous IV Infusions:     PRN Meds:  albuterol, 1 puff, Inhalation, Q4H PRN  albuterol, 2.5 mg, Nebulization, Q6H PRN x1 6/6   HYDROmorphone, 0.2 mg, Intravenous, Q2H PRN  naloxone, 0.04 mg, Intravenous, Q1MIN PRN  oxyCODONE, 2.5 mg, Oral, Q4H PRN   Or  oxyCODONE, 5 mg, Oral, Q4H PRN      ED Triage Vitals   Temperature Pulse Respirations Blood Pressure SpO2 Pain Score   06/05/25 0947 06/05/25 0947 06/05/25 0947 06/05/25 0947 06/05/25 0947 06/05/25 1109   98.1 °F (36.7 °C) 85 (!) 24 165/87 96 % 4     Weight (last 2 days)       Date/Time Weight    06/05/25 09:47:52 79.5 (175.27)            Vital Signs (last 3 days)       Date/Time Temp Pulse Resp BP MAP (mmHg) SpO2 Calculated FIO2 (%) - Nasal Cannula Nasal Cannula O2 Flow Rate (L/min) O2 Device Patient Position - Orthostatic VS [place] Coma Scale Score Pain    06/06/25 08:33:44 97.4 °F (36.3 °C) 114 -- -- -- 94 % -- -- -- -- -- --    06/06/25 0825 -- 107 -- 149/83 -- -- -- -- -- -- -- --    06/06/25 0817 -- -- -- -- -- -- 28 2 L/min Nasal cannula -- 15 No Pain    06/06/25 0800 97.4 °F (36.3 °C) 114 18 149/83 -- 94 % -- -- -- -- -- --    06/06/25 07:37:07 -- 107 18 149/83 105 92 % -- -- Nasal cannula Lying -- --    06/06/25 0400 97 °F (36.1 °C) 109 17 139/75 -- 94 % -- -- -- -- -- --    06/06/25 03:29:25 -- 109 17 139/75 96 94 % -- -- -- -- -- --    06/06/25 0256 -- -- -- -- -- 95 % -- -- -- -- -- --    06/06/25 0251 -- -- -- -- -- -- -- -- -- -- -- No Pain    06/06/25 0012 97 °F (36.1 °C) 104 17 139/71 -- 97 % --  -- -- -- -- --    06/06/25 00:10:13 -- 104 -- 139/71 94 97 % -- -- -- -- -- --    06/05/25 23:23:07 -- 60 -- 136/66 89 94 % -- -- -- -- -- --    06/05/25 2312 97.9 °F (36.6 °C) 60 17 136/66 -- 94 % -- -- -- -- -- --    06/05/25 21:31:12 97.5 °F (36.4 °C) 89 17 127/73 91 94 % -- -- -- -- -- --    06/05/25 2131 -- -- -- -- -- -- 28 2 L/min -- -- 15 --    06/05/25 2100 -- 77 20 126/67 88 99 % 28 2 L/min Nasal cannula Lying -- 3    06/05/25 2000 97 °F (36.1 °C) 70 20 118/64 86 99 % -- -- Nasal cannula Lying 15 4    06/05/25 1704 -- -- -- -- -- -- -- -- -- -- -- 10 - Worst Possible Pain    06/05/25 1556 -- -- -- -- -- -- -- -- -- -- 15 --    06/05/25 1400 -- 74 22 131/61 -- 100 % -- -- Nasal cannula -- 15 --    06/05/25 1300 -- 81 24 122/67 -- 95 % -- -- Nasal cannula Lying 15 --    06/05/25 12:16:53 -- 82 24 126/59 -- 95 % -- -- Nasal cannula Lying -- --    06/05/25 1200 -- -- -- -- -- -- -- -- -- -- 15 --    06/05/25 1130 -- 79 24 151/77 -- 95 % -- -- Nasal cannula Sitting 15 --    06/05/25 1109 -- -- -- -- -- -- -- -- -- -- -- 4    06/05/25 1100 -- 81 24 167/78 -- 94 % -- -- None (Room air) -- 15 --    06/05/25 1045 -- 81 24 158/74 -- 96 % -- -- Nasal cannula -- 15 --    06/05/25 1030 -- 73 24 147/84 -- 98 % -- -- -- -- 15 --    06/05/25 1015 -- 79 24 134/69 -- 96 % -- -- -- -- 15 --    06/05/25 10:02:36 -- 77 24 127/68 -- 97 % -- -- -- Sitting -- --    06/05/25 1000 -- -- -- -- -- -- -- -- -- -- 15 --    06/05/25 09:47:52 98.1 °F (36.7 °C) 85 24 165/87 -- 96 % -- -- Nasal cannula  -- 15 --            Date and Time R Radial Pulse L Radial Pulse R Pedal Pulse L Pedal Pulse R Posterior Tibial Pulse L Posterior Tibial Pulse   06/06/25 0817 +2 +2 Unable to assess Unable to assess Doppler Doppler   06/05/25 2131 +2 +2 Unable to assess Unable to assess Doppler Doppler   06/05/25 2000 -- -- Unable to assess -- Doppler Doppler   06/05/25 1555 -- -- +2 +2 -- --   06/05/25 1418 -- -- +2 +2 -- --   06/05/25 0947 +2 +2 +1 +1 --  --       Pertinent Labs/Diagnostic Test Results:   Radiology:  CT chest abdomen pelvis w contrast   Final Interpretation by Mick Jeffers MD (06/05 1440)      1.  Comminuted right acetabular fracture involving the anterior and posterior columns as well as the anterior and posterior acetabular walls. Associated small amount of extraperitoneal hemorrhage along the anterior right pelvis is not significantly    changed in extent as compared to the dedicated right hip CT earlier the same day. No clear evidence of any active hemorrhage on this single phase examination. No other traumatic injury identified within the chest, abdomen, or pelvis.   2.  Emphysema and diffuse bronchial wall thickening suggesting chronic bronchitis, similar to prior.   3.  Chronic findings, detailed above.      The study was marked in EPIC for immediate notification.      Computerized Assisted Algorithm (CAA) may have aided analysis of applicable images.      Workstation performed: [id number]         XR chest 1 view portable   Final Interpretation by Denis Day MD (06/05 1117)      1.  Emphysema.   2.  Right basilar scarring.            Workstation performed: [id number]         XR femur 2 views RIGHT   Final Interpretation by Denis Day MD (06/05 1101)      Comminuted right acetabular fracture, better characterized on the CT.         Computerized Assisted Algorithm (CAA) may have been used to analyze all applicable images.         Workstation performed: [id number]         XR pelvis complete 3+ vw   Final Interpretation by Denis Day MD (06/05 1138)      Comminuted right acetabular fracture better characterized on the concurrent CT.         Computerized Assisted Algorithm (CAA) may have been used to analyze all applicable images.         Workstation performed: [id number]         CT head without contrast   Final Interpretation by Murray Herrmann MD (06/05 1036)      No acute intracranial abnormality.                   Workstation performed: [id number]         CT cervical spine without contrast   Final Interpretation by Murray Herrmann MD (06/05 1124)      No cervical spine fracture or traumatic malalignment.      Multilevel cervical spondylosis, as described above                  Workstation performed: [id number]         CT lower extremity wo contrast right   Final Interpretation by Amor Swanson MD (06/05 1047)      1.  Acetabular fracture as described, classification is consistent with a transverse fracture plus posterior wall   2.  Pelvic hemorrhage         Workstation performed: [id number]           Cardiology:  ECG 12 lead    by Interface, Ris Results In (06/05 1004)            Results from last 7 days   Lab Units 06/05/25  1527 06/05/25  0957   WBC Thousand/uL  --  14.98*   HEMOGLOBIN g/dL 15.1 16.7   HEMATOCRIT % 48.7 53.1*   PLATELETS Thousands/uL  --  197   TOTAL NEUT ABS Thousands/µL  --  12.79*         Results from last 7 days   Lab Units 06/06/25  0329 06/05/25  0957 06/03/25  0645   SODIUM mmol/L 135 139 141   POTASSIUM mmol/L 4.0 5.1 4.8   CHLORIDE mmol/L 98 98 103   CO2 mmol/L 28 29 30   ANION GAP mmol/L 9 12 8   BUN mg/dL 33* 25 21   CREATININE mg/dL 1.39* 1.47* 1.27   EGFR ml/min/1.73sq m 48 45 58*   CALCIUM mg/dL 9.0 9.6 9.1     Results from last 7 days   Lab Units 06/06/25  0329 06/05/25  0957   AST U/L 14 18   ALT U/L 11 15   ALK PHOS U/L 59 65   TOTAL PROTEIN g/dL 6.7 7.7   ALBUMIN g/dL 3.6 4.0   TOTAL BILIRUBIN mg/dL 0.70 1.05*     Results from last 7 days   Lab Units 06/06/25  1145 06/06/25  0734 06/05/25  1914   POC GLUCOSE mg/dl 166* 113 186*     Results from last 7 days   Lab Units 06/06/25  0329 06/05/25  0957 06/03/25  0645   GLUCOSE RANDOM mg/dL 147* 163* 91         Results from last 7 days   Lab Units 06/06/25  0329   HEMOGLOBIN A1C % 5.8*   EAG mg/dl 120                   Results from last 7 days   Lab Units 06/05/25  0957   PROTIME seconds 19.4*   INR  1.55*   PTT seconds 36*     Results from last 7  days   Lab Units 06/06/25  0329   TSH 3RD GENERATON uIU/mL 5.426*                   Past Medical History[1]  Present on Admission:   Atrial fibrillation, chronic (HCC)   Benign essential hypertension   Chronic obstructive lung disease (HCC)   Dementia (HCC)   Uncontrolled type 2 diabetes mellitus with hyperglycemia ([facility])   Stage 3 chronic kidney disease ([facility])      Admitting Diagnosis: Acute pain due to trauma [G89.11]  Closed displaced fracture of right acetabulum, unspecified portion of acetabulum, initial encounter ([facility]) [S32.401A]  Chronic obstructive pulmonary disease, unspecified COPD type (HCC) [J44.9]  Closed nondisplaced fracture of right acetabulum, unspecified portion of acetabulum, initial encounter ([facility]) [S32.401A]  Atrial fibrillation, unspecified type ([facility]) [I48.91]  Unspecified multiple injuries, initial encounter [T07.XXXA]  Type 2 diabetes mellitus without complication, unspecified whether long term insulin use ([facility]) [E11.9]  Age/Sex: 75 y.o. male    Network Utilization Review Department  ATTENTION: Please call with any questions or concerns to [phone] and carefully listen to the prompts so that you are directed to the right person. All voicemails are confidential.   For Discharge needs, contact Care Management DC Support Team at [phone] opt. 2  Send all requests for admission clinical reviews, approved or denied determinations and any other requests to dedicated fax number below belonging to the campus where the patient is receiving treatment. List of dedicated fax numbers for the Facilities:  FACILITY NAME UR FAX NUMBER   ADMISSION DENIALS (Administrative/Medical Necessity) [phone]   DISCHARGE SUPPORT TEAM (NETWORK) [phone]   PARENT CHILD HEALTH (Maternity/NICU/Pediatrics) [phone]   [facility] [phone]   [facility] [phone]   [facility] [phone]   [facility]  [employer] [phone]   [facility] [phone]   [facility] [phone]   [facility] [phone]   [facility] [phone]   [facility] [phone]   [facility] [phone]   [facility] [phone]   [facility] [phone]              [1]   Past Medical History:  Diagnosis Date    Acute on chronic respiratory failure (HCC) 1/30/2017    Anemia 3/29/2020    Colonic polyp 2012    COPD exacerbation (HCC) 4/20/2019    Disc displacement, cervical     Peripheral vascular disease (HCC) 2011    Femoral- popliteal bypass

## 2025-06-06 NOTE — CONSULTS
Consultation - Geriatric Medicine   Prince MO Andrews 75 y.o. male MRN: [id number]  Unit/Bed#: W -01 Encounter: [id number]      Assessment & Plan     At risk for delirium  Assessment & Plan    -Patient is high risk of delirium due to dementia at baseline, pain, insomnia  -Initiate delirium precautions  -maintain normal sleep/wake cycle  -minimize overnight interruptions, group overnight vitals/labs/nursing checks as possible  -dim lights, close blinds and turn off tv to minimize stimulation and encourage sleep environment in evenings  -ensure that pain is well controlled   -monitor for fecal and urinary retention which may precipitate delirium  -encourage early mobilization and ambulation  -provide frequent reorientation and redirection  -encourage family and friends at the bedside to help calm patient if anxious  -avoid medications which may precipitate or worsen delirium such as tramadol, benzodiazepine, anticholinergics, and antihistaminics  -encourage hydration and nutrition , assist with feeding if needed  -redirect unwanted behaviors as first line, avoid physical restraints.   -QTc interval 509, avoid medications that could prolong Qtc  -If patient becomes agitated may increase dose of Depakote    Right acetabular fracture (HCC)  Assessment & Plan  CT lower extremities notes right acetabular fracture  Stated his hip pain kept him awake last night  Patient was seen by Ortho and will need surgical repair.  Patient was on Eliquis currently on hold.  Continue Buck's traction  continue Tylenol scheduled consider adding gabapentin 100 mg q.h.s..  Apply ice right hip 3 times a day.  Continue oxycodone p.r.n..  Avoid muscle relaxants   Monitor bowel movement and adjust regimen as needed.  Monitor for urinary retention  Continue physical therapy.   Encourage OOB with meals and incentive spirometry.  Monitor CBC CMP.  Follow-up with orthopedics.  Lovenox for DVT prophylaxis     Dementia (HCC)  Assessment & Plan  He  needs assistance with IADLs at baseline.  CT head shows microangiopathic changes  Mental status currently A/Ox1 at the time of encounter  Currently stable, no behaviors noted.  Will continue to provide supportive care, reorient as needed.  Patient is at high risk for delirium, will monitor closely and place on delirium precautions.  Maintain sleep/wake cycle.  Optimize pain regimen.  Monitor for constipation and urinary retention and manage as needed.  Check B12 level and TSH  Encourage family to visit.  Encourage to wear glasses and hearing aids while awake.  Encourage po intake, assist with feeding if needed.   Continue memantine, Depakote  Consider switching paroxetine to Lexapro as it is better tolerated by elderly, can be done as outpatient    Stage 3 chronic kidney disease (HCC)  Assessment & Plan  Lab Results   Component Value Date    EGFR 48 06/06/2025    EGFR 45 06/05/2025    EGFR 58 (L) 06/03/2025    CREATININE 1.39 (H) 06/06/2025    CREATININE 1.47 (H) 06/05/2025    CREATININE 1.27 06/03/2025     Creatinine stable.  Will avoid nephrotoxic medication.  Encourage po hydration.  Will monitor BMP.     Uncontrolled type 2 diabetes mellitus with hyperglycemia ([facility])  Assessment & Plan  Lab Results   Component Value Date    HGBA1C 6.1 (H) 04/25/2025       Recent Labs     06/05/25  1914 06/06/25  0734   POCGLU 186* 113       Blood Sugar Average: Last 72 hrs:  (P) 149.5    Avoid hypoglycemia  Check TSH and B12  Goal Hb A1c for  patient age and comorbidities would be around 8.0    Chronic obstructive lung disease (HCC)  Assessment & Plan  Currently stable  Continue home medications  Encourage out of bed as tolerated and incentive spirometry    Atrial fibrillation, chronic (HCC)  Assessment & Plan  Currently rate controlled  Eliquis on hold due to upcoming surgery  Monitor electrolytes  Continue metoprolol with holding parameters              History of Present Illness   Physician Requesting Consult: Lauryn Ullrich,  DO  Reason for Consult / Principal Problem: Fall  Hx and PE limited by: Dementia  Additional history obtained from: [facility] facility staff      HPI: Prince nAdrews is a 75 y.o. year old male with PMH consisting of but not limited to A-fib, COPD, anemia, and dementia who presents with right acetabular fracture s/p fall in bathroom.     Prince lives at [facility] where he lives with 3 roommates. He does not use any ambulatory devices nor hearing aids and has handles in his bathroom to assist in hygiene. He wears reading glasses and does not drive. Facility staff are responsible for assisting Prince with IADLs, driving him from place to place, and managing his medications. He retains control over his own finances. He reports having no living will but has appointed his brother Momo as power of  At home he reports having smoked about 3 cigarettes a day until quitting 1 week ago. Per nursing home staff Prince is A/Ox3 at baseline with occasional bouts of pleasant confusion and has no notable behavior issues. At home he is on O2 2L/min via nasal cannula.    On examination Prince was laying calmly in bed without apparent discomfort but had occasional fits of coughing. He is oriented to person and place but not to time. His HR was 72 and a murmur was noted. He is very tired and wishes to go to sleep. He otherwise had no complaints of pain or discomfort.    Inpatient consult to Gerontology  Consult performed by: Rocio Angel MD  Consult ordered by: Brittni Samayoa MD          Review of Systems   Constitutional:  Positive for fatigue. Negative for fever.   HENT:  Negative for ear pain, rhinorrhea, sinus pain and tinnitus.    Eyes:  Negative for visual disturbance.   Respiratory:  Positive for cough and shortness of breath. Negative for wheezing.    Cardiovascular:  Negative for chest pain and palpitations.   Gastrointestinal:  Negative for abdominal pain and nausea.   Genitourinary:   Negative for difficulty urinating and dysuria.   Musculoskeletal:  Positive for arthralgias (right hip at site of fx).   Neurological:  Negative for dizziness, light-headedness and headaches.           Historical Information   Past Medical History[1]  Past Surgical History[2]  Social History   Social History     Substance and Sexual Activity   Alcohol Use Not Currently     Social History     Substance and Sexual Activity   Drug Use No     Tobacco Use History[3]  Family History: Family History[4]    Meds/Allergies   all current active meds have been reviewed and current meds:   Current Facility-Administered Medications:     acetaminophen (TYLENOL) tablet 975 mg, Q8H TIMOTHY    albuterol (PROVENTIL HFA,VENTOLIN HFA) inhaler 1 puff, Q4H PRN    albuterol inhalation solution 2.5 mg, Q6H PRN    [Held by provider] apixaban (ELIQUIS) tablet 5 mg, BID    [Held by provider] clopidogrel (PLAVIX) tablet 75 mg, Once    divalproex sodium (DEPAKOTE) DR tablet 125 mg, HS    enoxaparin (LOVENOX) subcutaneous injection 30 mg, Q12H TIMOTHY    famotidine (PEPCID) tablet 20 mg, BID    folic acid (FOLVITE) tablet 1 mg, Daily    HYDROmorphone HCl (DILAUDID) injection 0.2 mg, Q2H PRN    insulin lispro (HumALOG/ADMELOG) 100 units/mL subcutaneous injection 1-5 Units, TID AC **AND** Fingerstick Glucose (POCT), TID AC    melatonin tablet 6 mg, HS    memantine (NAMENDA) tablet 10 mg, BID    metoprolol tartrate (LOPRESSOR) partial tablet 12.5 mg, Q12H TIMOTHY    naloxone (NARCAN) 0.04 mg/mL syringe 0.04 mg, Q1MIN PRN    oxyCODONE (ROXICODONE) split tablet 2.5 mg, Q4H PRN **OR** oxyCODONE (ROXICODONE) IR tablet 5 mg, Q4H PRN    PARoxetine (PAXIL) tablet 35 mg, Daily    polyethylene glycol (MIRALAX) packet 17 g, Daily    pravastatin (PRAVACHOL) tablet 80 mg, Daily With Dinner    senna-docusate sodium (SENOKOT S) 8.6-50 mg per tablet 1 tablet, HS    Home meds:  Acetaminophen 325 mg Q 4 hr prn   Ammonium Lactate External Lotion 5% BID  Artificial Tear Solution  1.4% 1 in each eye QQ 6 hr prn  B-12 1000 mcg daily  Depakote 125 mg at bedtime  Eliquis 5 mg BID  Fluticasone-Umeclidin-Vilant Aerosol Powder Breath 1 puff daily  Folic acid 1 mg daily  Jardiance 10 mg daily  Metoprolol Tartrate 12.5 mg BID  Namenda 10 mg BID  Paxil 35 mg daily  Plavix 75 mg daily  Potassium Chloride ER 20 MEQ BID  Rosuvastatin Calcium 10 mg daily  Torsemide 10 mg daily    Allergies[5]    Objective     Intake/Output Summary (Last 24 hours) at 6/6/2025 1250  Last data filed at 6/6/2025 1120  Gross per 24 hour   Intake 240 ml   Output 1460 ml   Net -1220 ml     Invasive Devices       Peripheral Intravenous Line  Duration             Peripheral IV 06/05/25 Proximal;Right;Ventral (anterior) Forearm 1 day    Peripheral IV 06/05/25 Right;Ventral (anterior) Forearm 1 day                    Physical Exam  Vitals and nursing note reviewed.   Constitutional:       Appearance: He is well-developed.      Comments: Frail looking   HENT:      Head: Normocephalic and atraumatic.      Ears:      Comments: Hard of hearing     Mouth/Throat:      Mouth: Mucous membranes are dry.     Cardiovascular:      Rate and Rhythm: Normal rate and regular rhythm.      Heart sounds: Heart sounds are distant. No murmur heard.     No friction rub. No gallop.   Pulmonary:      Effort: No respiratory distress.      Breath sounds: Wheezing present. No rales.   Chest:      Chest wall: No tenderness.   Abdominal:      General: Bowel sounds are normal. There is no distension.      Palpations: Abdomen is soft.      Tenderness: There is no abdominal tenderness. There is no rebound.     Musculoskeletal:         General: No tenderness. Normal range of motion.      Cervical back: Normal range of motion and neck supple.      Right lower leg: No edema.      Left lower leg: No edema.      Comments: Abbott's traction right lower extremity     Skin:     General: Skin is warm and dry.      Findings: No rash.     Neurological:      Mental Status: He  is alert.      Comments: A/Ox1       Lab Results:   I have personally reviewed pertinent lab results including the following:  - CBC, CMP    I have personally reviewed the following imaging study reports in PACS:  - CT head, x-ray right femur      Therapies:   PT: Evaluation pending    VTE Prophylaxis: VTE covered by:  [Held by provider] apixaban, Oral  enoxaparin, Subcutaneous, 30 mg at 25 0825        Code Status: Level 1 - Full Code  Advance Directive and Living Will:      Power of :    POLST:      Family and Social Support:   Living Arrangements: Lives in Facility  Support Systems: Self; Other (Comment) (facilty staff)  Assistance Needed: none  Type of Current Residence: Facility  Current Home Care Services: No  Discharge planning discussed with:: pt/facility  [place] of Choice: Yes      Thank you for allowing me to participate in your patients' care. Please do not hesitate to call with any additional questions.  Rocio Angel MD          [1]   Past Medical History:  Diagnosis Date    Acute on chronic respiratory failure (HCC) 2017    Anemia 3/29/2020    Colonic polyp     COPD exacerbation (HCC) 2019    Disc displacement, cervical     Peripheral vascular disease (HCC)     Femoral- popliteal bypass   [2]   Past Surgical History:  Procedure Laterality Date    BYPASS FEMORAL-POPLITEAL      COLONOSCOPY      With polypectomy    CORONARY ARTERY BYPASS GRAFT  2012    LAMINECTOMY     [3]   Social History  Tobacco Use   Smoking Status Former    Current packs/day: 0.00    Average packs/day: 1 pack/day for 37.0 years (37.0 ttl pk-yrs)    Types: Cigarettes    Start date: 1977    Quit date: 2014    Years since quittin.4   Smokeless Tobacco Never   Tobacco Comments    Pt states he restarted smoking a few weeks ago   [4]   Family History  Problem Relation Name Age of Onset    Heart attack Mother      Heart attack Father     [5]   Allergies  Allergen Reactions     Vancomycin Other (See Comments)    No Active Allergies

## 2025-06-06 NOTE — ASSESSMENT & PLAN NOTE
Lab Results   Component Value Date    EGFR 29 06/09/2025    EGFR 45 06/08/2025    EGFR 57 06/07/2025    CREATININE 2.13 (H) 06/09/2025    CREATININE 1.48 (H) 06/08/2025    CREATININE 1.21 06/07/2025     MEÑO/CKD  Creatinine 2.13 trending up  Will avoid nephrotoxic medication.  Encourage po hydration.  Will monitor BMP.

## 2025-06-07 ENCOUNTER — ANESTHESIA (INPATIENT)
Dept: PERIOP | Facility: HOSPITAL | Age: 75
DRG: 516 | End: 2025-06-07
Payer: COMMERCIAL

## 2025-06-07 ENCOUNTER — ANESTHESIA EVENT (INPATIENT)
Dept: PERIOP | Facility: HOSPITAL | Age: 75
DRG: 516 | End: 2025-06-07
Payer: COMMERCIAL

## 2025-06-07 ENCOUNTER — APPOINTMENT (INPATIENT)
Dept: RADIOLOGY | Facility: HOSPITAL | Age: 75
DRG: 516 | End: 2025-06-07
Payer: COMMERCIAL

## 2025-06-07 LAB
ABO GROUP BLD BPU: NORMAL
ABO GROUP BLD BPU: NORMAL
ANION GAP SERPL CALCULATED.3IONS-SCNC: 8 MMOL/L (ref 4–13)
BASE EXCESS BLDA CALC-SCNC: -1 MMOL/L (ref -2–3)
BASE EXCESS BLDA CALC-SCNC: -1 MMOL/L (ref -2–3)
BASOPHILS # BLD AUTO: 0.06 THOUSANDS/ÂΜL (ref 0–0.1)
BASOPHILS NFR BLD AUTO: 1 % (ref 0–1)
BPU ID: NORMAL
BPU ID: NORMAL
BUN SERPL-MCNC: 25 MG/DL (ref 5–25)
CA-I BLD-SCNC: 1.11 MMOL/L (ref 1.12–1.32)
CA-I BLD-SCNC: 1.16 MMOL/L (ref 1.12–1.32)
CALCIUM SERPL-MCNC: 9.2 MG/DL (ref 8.4–10.2)
CHLORIDE SERPL-SCNC: 99 MMOL/L (ref 96–108)
CO2 SERPL-SCNC: 27 MMOL/L (ref 21–32)
CREAT SERPL-MCNC: 1.21 MG/DL (ref 0.6–1.3)
CROSSMATCH: NORMAL
CROSSMATCH: NORMAL
EOSINOPHIL # BLD AUTO: 0.92 THOUSAND/ÂΜL (ref 0–0.61)
EOSINOPHIL NFR BLD AUTO: 9 % (ref 0–6)
ERYTHROCYTE [DISTWIDTH] IN BLOOD BY AUTOMATED COUNT: 15.2 % (ref 11.6–15.1)
GFR SERPL CREATININE-BSD FRML MDRD: 57 ML/MIN/1.73SQ M
GLUCOSE SERPL-MCNC: 102 MG/DL (ref 65–140)
GLUCOSE SERPL-MCNC: 113 MG/DL (ref 65–140)
GLUCOSE SERPL-MCNC: 131 MG/DL (ref 65–140)
GLUCOSE SERPL-MCNC: 132 MG/DL (ref 65–140)
GLUCOSE SERPL-MCNC: 145 MG/DL (ref 65–140)
GLUCOSE SERPL-MCNC: 155 MG/DL (ref 65–140)
GLUCOSE SERPL-MCNC: 172 MG/DL (ref 65–140)
HCO3 BLDA-SCNC: 26.2 MMOL/L (ref 24–30)
HCO3 BLDA-SCNC: 27.8 MMOL/L (ref 24–30)
HCT VFR BLD AUTO: 45 % (ref 36.5–49.3)
HCT VFR BLD CALC: 35 % (ref 36.5–49.3)
HCT VFR BLD CALC: 36 % (ref 36.5–49.3)
HGB BLD-MCNC: 14.2 G/DL (ref 12–17)
HGB BLDA-MCNC: 11.9 G/DL (ref 12–17)
HGB BLDA-MCNC: 12.2 G/DL (ref 12–17)
IMM GRANULOCYTES # BLD AUTO: 0.04 THOUSAND/UL (ref 0–0.2)
IMM GRANULOCYTES NFR BLD AUTO: 0 % (ref 0–2)
LYMPHOCYTES # BLD AUTO: 0.99 THOUSANDS/ÂΜL (ref 0.6–4.47)
LYMPHOCYTES NFR BLD AUTO: 9 % (ref 14–44)
MCH RBC QN AUTO: 28.6 PG (ref 26.8–34.3)
MCHC RBC AUTO-ENTMCNC: 31.6 G/DL (ref 31.4–37.4)
MCV RBC AUTO: 91 FL (ref 82–98)
MONOCYTES # BLD AUTO: 1.34 THOUSAND/ÂΜL (ref 0.17–1.22)
MONOCYTES NFR BLD AUTO: 13 % (ref 4–12)
MRSA NOSE QL CULT: NORMAL
NEUTROPHILS # BLD AUTO: 7.13 THOUSANDS/ÂΜL (ref 1.85–7.62)
NEUTS SEG NFR BLD AUTO: 68 % (ref 43–75)
NRBC BLD AUTO-RTO: 0 /100 WBCS
PCO2 BLD: 28 MMOL/L (ref 21–32)
PCO2 BLD: 30 MMOL/L (ref 21–32)
PCO2 BLD: 54.1 MM HG (ref 42–50)
PCO2 BLD: 66.1 MM HG (ref 42–50)
PH BLD: 7.23 [PH] (ref 7.3–7.4)
PH BLD: 7.29 [PH] (ref 7.3–7.4)
PLATELET # BLD AUTO: 172 THOUSANDS/UL (ref 149–390)
PMV BLD AUTO: 9.9 FL (ref 8.9–12.7)
PO2 BLD: 37 MM HG (ref 35–45)
PO2 BLD: 38 MM HG (ref 35–45)
POTASSIUM BLD-SCNC: 4.4 MMOL/L (ref 3.5–5.3)
POTASSIUM BLD-SCNC: 4.5 MMOL/L (ref 3.5–5.3)
POTASSIUM SERPL-SCNC: 4.3 MMOL/L (ref 3.5–5.3)
RBC # BLD AUTO: 4.97 MILLION/UL (ref 3.88–5.62)
SAO2 % BLD FROM PO2: 60 % (ref 60–85)
SAO2 % BLD FROM PO2: 63 % (ref 60–85)
SODIUM BLD-SCNC: 138 MMOL/L (ref 136–145)
SODIUM BLD-SCNC: 138 MMOL/L (ref 136–145)
SODIUM SERPL-SCNC: 134 MMOL/L (ref 135–147)
SPECIMEN SOURCE: ABNORMAL
SPECIMEN SOURCE: ABNORMAL
UNIT DISPENSE STATUS: NORMAL
UNIT DISPENSE STATUS: NORMAL
UNIT PRODUCT CODE: NORMAL
UNIT PRODUCT CODE: NORMAL
UNIT PRODUCT VOLUME: 350 ML
UNIT PRODUCT VOLUME: 350 ML
UNIT RH: NORMAL
UNIT RH: NORMAL
WBC # BLD AUTO: 10.48 THOUSAND/UL (ref 4.31–10.16)

## 2025-06-07 PROCEDURE — NC001 PR NO CHARGE

## 2025-06-07 PROCEDURE — 85025 COMPLETE CBC W/AUTO DIFF WBC: CPT | Performed by: STUDENT IN AN ORGANIZED HEALTH CARE EDUCATION/TRAINING PROGRAM

## 2025-06-07 PROCEDURE — 82948 REAGENT STRIP/BLOOD GLUCOSE: CPT

## 2025-06-07 PROCEDURE — C1713 ANCHOR/SCREW BN/BN,TIS/BN: HCPCS | Performed by: ORTHOPAEDIC SURGERY

## 2025-06-07 PROCEDURE — 0QS404Z REPOSITION RIGHT ACETABULUM WITH INTERNAL FIXATION DEVICE, OPEN APPROACH: ICD-10-PCS | Performed by: ORTHOPAEDIC SURGERY

## 2025-06-07 PROCEDURE — 80048 BASIC METABOLIC PNL TOTAL CA: CPT | Performed by: STUDENT IN AN ORGANIZED HEALTH CARE EDUCATION/TRAINING PROGRAM

## 2025-06-07 PROCEDURE — 30233N1 TRANSFUSION OF NONAUTOLOGOUS RED BLOOD CELLS INTO PERIPHERAL VEIN, PERCUTANEOUS APPROACH: ICD-10-PCS | Performed by: EMERGENCY MEDICINE

## 2025-06-07 PROCEDURE — 72190 X-RAY EXAM OF PELVIS: CPT

## 2025-06-07 PROCEDURE — 84295 ASSAY OF SERUM SODIUM: CPT

## 2025-06-07 PROCEDURE — 84132 ASSAY OF SERUM POTASSIUM: CPT

## 2025-06-07 PROCEDURE — 82330 ASSAY OF CALCIUM: CPT

## 2025-06-07 PROCEDURE — 85014 HEMATOCRIT: CPT

## 2025-06-07 PROCEDURE — 82947 ASSAY GLUCOSE BLOOD QUANT: CPT

## 2025-06-07 PROCEDURE — 27228 TREAT HIP FRACTURE(S): CPT | Performed by: ORTHOPAEDIC SURGERY

## 2025-06-07 PROCEDURE — 82803 BLOOD GASES ANY COMBINATION: CPT

## 2025-06-07 PROCEDURE — 99232 SBSQ HOSP IP/OBS MODERATE 35: CPT | Performed by: SURGERY

## 2025-06-07 DEVICE — 3.5MM CORTEX SCREW SELF-TAPPING 38MM: Type: IMPLANTABLE DEVICE | Site: PELVIS | Status: FUNCTIONAL

## 2025-06-07 DEVICE — 3.5MM CORTEX SCREW SELF-TAPPING 24MM: Type: IMPLANTABLE DEVICE | Site: PELVIS | Status: FUNCTIONAL

## 2025-06-07 DEVICE — 3.5MM WIDE ANGLE LOW PROFILE RECON PLATE 6 HOLES: Type: IMPLANTABLE DEVICE | Site: PELVIS | Status: FUNCTIONAL

## 2025-06-07 DEVICE — 3.5MM CORTEX SCREW SELF-TAPPING 40MM: Type: IMPLANTABLE DEVICE | Site: PELVIS | Status: FUNCTIONAL

## 2025-06-07 DEVICE — 5.0MM SCHANZ SCREW BLUNTED TROCAR POINT 200MM: Type: IMPLANTABLE DEVICE | Site: PELVIS | Status: FUNCTIONAL

## 2025-06-07 DEVICE — 3.5MM CORTEX SCREW SELF-TAPPING 50MM: Type: IMPLANTABLE DEVICE | Site: PELVIS | Status: FUNCTIONAL

## 2025-06-07 DEVICE — 3.5MM CORTEX SCREW SELF-TAPPING 28MM: Type: IMPLANTABLE DEVICE | Site: PELVIS | Status: FUNCTIONAL

## 2025-06-07 DEVICE — 3.5MM CORTEX SCREW SELF-TAPPING 34MM: Type: IMPLANTABLE DEVICE | Site: PELVIS | Status: FUNCTIONAL

## 2025-06-07 DEVICE — 3.5MM CORTEX SCREW SELF-TAPPING 26MM: Type: IMPLANTABLE DEVICE | Site: PELVIS | Status: FUNCTIONAL

## 2025-06-07 DEVICE — 3.5MM CORTEX SCREW SELF-TAPPING 48MM: Type: IMPLANTABLE DEVICE | Site: PELVIS | Status: FUNCTIONAL

## 2025-06-07 DEVICE — 3.5MM WIDE ANGLE LOW PROFILE RECON PLATE 8 HOLES: Type: IMPLANTABLE DEVICE | Site: PELVIS | Status: FUNCTIONAL

## 2025-06-07 RX ORDER — SODIUM CHLORIDE, SODIUM LACTATE, POTASSIUM CHLORIDE, CALCIUM CHLORIDE 600; 310; 30; 20 MG/100ML; MG/100ML; MG/100ML; MG/100ML
50 INJECTION, SOLUTION INTRAVENOUS CONTINUOUS
Status: DISCONTINUED | OUTPATIENT
Start: 2025-06-07 | End: 2025-06-08

## 2025-06-07 RX ORDER — HYDROMORPHONE HCL/PF 1 MG/ML
SYRINGE (ML) INJECTION AS NEEDED
Status: DISCONTINUED | OUTPATIENT
Start: 2025-06-07 | End: 2025-06-07

## 2025-06-07 RX ORDER — FENTANYL CITRATE/PF 50 MCG/ML
25 SYRINGE (ML) INJECTION
Status: DISCONTINUED | OUTPATIENT
Start: 2025-06-07 | End: 2025-06-07 | Stop reason: HOSPADM

## 2025-06-07 RX ORDER — FENTANYL CITRATE 50 UG/ML
INJECTION, SOLUTION INTRAMUSCULAR; INTRAVENOUS AS NEEDED
Status: DISCONTINUED | OUTPATIENT
Start: 2025-06-07 | End: 2025-06-07

## 2025-06-07 RX ORDER — CHLORHEXIDINE GLUCONATE ORAL RINSE 1.2 MG/ML
15 SOLUTION DENTAL ONCE
Status: COMPLETED | OUTPATIENT
Start: 2025-06-07 | End: 2025-06-07

## 2025-06-07 RX ORDER — CEFAZOLIN SODIUM 2 G/50ML
2000 SOLUTION INTRAVENOUS
Status: DISCONTINUED | OUTPATIENT
Start: 2025-06-07 | End: 2025-06-07

## 2025-06-07 RX ORDER — TRANEXAMIC ACID 10 MG/ML
INJECTION, SOLUTION INTRAVENOUS AS NEEDED
Status: DISCONTINUED | OUTPATIENT
Start: 2025-06-07 | End: 2025-06-07

## 2025-06-07 RX ORDER — ALBUMIN HUMAN 50 G/1000ML
SOLUTION INTRAVENOUS CONTINUOUS PRN
Status: DISCONTINUED | OUTPATIENT
Start: 2025-06-07 | End: 2025-06-07

## 2025-06-07 RX ORDER — ROCURONIUM BROMIDE 10 MG/ML
INJECTION, SOLUTION INTRAVENOUS AS NEEDED
Status: DISCONTINUED | OUTPATIENT
Start: 2025-06-07 | End: 2025-06-07

## 2025-06-07 RX ORDER — CEFAZOLIN SODIUM 2 G/50ML
2000 SOLUTION INTRAVENOUS EVERY 8 HOURS
Status: COMPLETED | OUTPATIENT
Start: 2025-06-08 | End: 2025-06-09

## 2025-06-07 RX ORDER — ONDANSETRON 2 MG/ML
4 INJECTION INTRAMUSCULAR; INTRAVENOUS ONCE AS NEEDED
Status: DISCONTINUED | OUTPATIENT
Start: 2025-06-07 | End: 2025-06-07 | Stop reason: HOSPADM

## 2025-06-07 RX ORDER — HYDROMORPHONE HCL IN WATER/PF 6 MG/30 ML
0.2 PATIENT CONTROLLED ANALGESIA SYRINGE INTRAVENOUS
Status: DISCONTINUED | OUTPATIENT
Start: 2025-06-07 | End: 2025-06-07 | Stop reason: HOSPADM

## 2025-06-07 RX ORDER — ONDANSETRON 2 MG/ML
INJECTION INTRAMUSCULAR; INTRAVENOUS AS NEEDED
Status: DISCONTINUED | OUTPATIENT
Start: 2025-06-07 | End: 2025-06-07

## 2025-06-07 RX ORDER — SODIUM CHLORIDE, SODIUM LACTATE, POTASSIUM CHLORIDE, CALCIUM CHLORIDE 600; 310; 30; 20 MG/100ML; MG/100ML; MG/100ML; MG/100ML
INJECTION, SOLUTION INTRAVENOUS CONTINUOUS PRN
Status: DISCONTINUED | OUTPATIENT
Start: 2025-06-07 | End: 2025-06-07

## 2025-06-07 RX ORDER — VASOPRESSIN 20 [USP'U]/ML
INJECTION, SOLUTION INTRAVENOUS AS NEEDED
Status: DISCONTINUED | OUTPATIENT
Start: 2025-06-07 | End: 2025-06-07

## 2025-06-07 RX ORDER — LIDOCAINE HYDROCHLORIDE 10 MG/ML
INJECTION, SOLUTION EPIDURAL; INFILTRATION; INTRACAUDAL; PERINEURAL AS NEEDED
Status: DISCONTINUED | OUTPATIENT
Start: 2025-06-07 | End: 2025-06-07

## 2025-06-07 RX ORDER — ALBUTEROL SULFATE 0.83 MG/ML
2.5 SOLUTION RESPIRATORY (INHALATION) ONCE AS NEEDED
Status: DISCONTINUED | OUTPATIENT
Start: 2025-06-07 | End: 2025-06-07 | Stop reason: HOSPADM

## 2025-06-07 RX ORDER — MAGNESIUM HYDROXIDE 1200 MG/15ML
LIQUID ORAL AS NEEDED
Status: DISCONTINUED | OUTPATIENT
Start: 2025-06-07 | End: 2025-06-07 | Stop reason: HOSPADM

## 2025-06-07 RX ORDER — POLYETHYLENE GLYCOL 3350 17 G/17G
17 POWDER, FOR SOLUTION ORAL DAILY PRN
Status: DISCONTINUED | OUTPATIENT
Start: 2025-06-07 | End: 2025-06-11 | Stop reason: HOSPADM

## 2025-06-07 RX ORDER — CEFAZOLIN SODIUM 2 G/50ML
SOLUTION INTRAVENOUS AS NEEDED
Status: DISCONTINUED | OUTPATIENT
Start: 2025-06-07 | End: 2025-06-07

## 2025-06-07 RX ORDER — SODIUM CHLORIDE 9 MG/ML
INJECTION, SOLUTION INTRAVENOUS CONTINUOUS PRN
Status: DISCONTINUED | OUTPATIENT
Start: 2025-06-07 | End: 2025-06-07

## 2025-06-07 RX ORDER — PROPOFOL 10 MG/ML
INJECTION, EMULSION INTRAVENOUS AS NEEDED
Status: DISCONTINUED | OUTPATIENT
Start: 2025-06-07 | End: 2025-06-07

## 2025-06-07 RX ORDER — IPRATROPIUM BROMIDE AND ALBUTEROL SULFATE 2.5; .5 MG/3ML; MG/3ML
SOLUTION RESPIRATORY (INHALATION) AS NEEDED
Status: DISCONTINUED | OUTPATIENT
Start: 2025-06-07 | End: 2025-06-07

## 2025-06-07 RX ADMIN — VASOPRESSIN 2 UNITS: 20 INJECTION INTRAVENOUS at 18:15

## 2025-06-07 RX ADMIN — SODIUM CHLORIDE: 0.9 INJECTION, SOLUTION INTRAVENOUS at 18:10

## 2025-06-07 RX ADMIN — Medication 12.5 MG: at 22:43

## 2025-06-07 RX ADMIN — CEFAZOLIN SODIUM 2000 MG: 2 SOLUTION INTRAVENOUS at 16:46

## 2025-06-07 RX ADMIN — FOLIC ACID 1 MG: 1 TABLET ORAL at 09:16

## 2025-06-07 RX ADMIN — ALBUMIN (HUMAN): 12.5 INJECTION, SOLUTION INTRAVENOUS at 18:00

## 2025-06-07 RX ADMIN — ROCURONIUM 30 MG: 50 INJECTION, SOLUTION INTRAVENOUS at 17:09

## 2025-06-07 RX ADMIN — PROPOFOL 140 MG: 10 INJECTION, EMULSION INTRAVENOUS at 16:39

## 2025-06-07 RX ADMIN — ROCURONIUM 10 MG: 50 INJECTION, SOLUTION INTRAVENOUS at 18:24

## 2025-06-07 RX ADMIN — PAROXETINE HYDROCHLORIDE 35 MG: 20 TABLET, FILM COATED ORAL at 09:18

## 2025-06-07 RX ADMIN — FAMOTIDINE 20 MG: 20 TABLET, FILM COATED ORAL at 09:16

## 2025-06-07 RX ADMIN — SENNOSIDES AND DOCUSATE SODIUM 1 TABLET: 50; 8.6 TABLET ORAL at 22:39

## 2025-06-07 RX ADMIN — MEMANTINE 10 MG: 10 TABLET ORAL at 09:16

## 2025-06-07 RX ADMIN — OXYCODONE HYDROCHLORIDE 5 MG: 5 TABLET ORAL at 22:38

## 2025-06-07 RX ADMIN — DIVALPROEX SODIUM 125 MG: 125 TABLET, DELAYED RELEASE ORAL at 22:38

## 2025-06-07 RX ADMIN — POLYETHYLENE GLYCOL 3350 17 G: 17 POWDER, FOR SOLUTION ORAL at 09:15

## 2025-06-07 RX ADMIN — Medication 6 MG: at 22:38

## 2025-06-07 RX ADMIN — ALBUTEROL SULFATE 6 PUFF: 90 AEROSOL, METERED RESPIRATORY (INHALATION) at 19:26

## 2025-06-07 RX ADMIN — ONDANSETRON 4 MG: 2 INJECTION INTRAMUSCULAR; INTRAVENOUS at 18:44

## 2025-06-07 RX ADMIN — CHLORHEXIDINE GLUCONATE 15 ML: 1.2 SOLUTION ORAL at 16:04

## 2025-06-07 RX ADMIN — IPRATROPIUM BROMIDE AND ALBUTEROL SULFATE 3 ML: 2.5; .5 SOLUTION RESPIRATORY (INHALATION) at 16:34

## 2025-06-07 RX ADMIN — SODIUM CHLORIDE, SODIUM LACTATE, POTASSIUM CHLORIDE, AND CALCIUM CHLORIDE: .6; .31; .03; .02 INJECTION, SOLUTION INTRAVENOUS at 16:35

## 2025-06-07 RX ADMIN — PHENYLEPHRINE HYDROCHLORIDE 50 MCG/MIN: 50 INJECTION INTRAVENOUS at 16:43

## 2025-06-07 RX ADMIN — Medication 12.5 MG: at 09:17

## 2025-06-07 RX ADMIN — ALBUMIN (HUMAN): 12.5 INJECTION, SOLUTION INTRAVENOUS at 18:28

## 2025-06-07 RX ADMIN — FENTANYL CITRATE 50 MCG: 50 INJECTION INTRAMUSCULAR; INTRAVENOUS at 17:26

## 2025-06-07 RX ADMIN — LIDOCAINE HYDROCHLORIDE 50 MG: 10 INJECTION, SOLUTION EPIDURAL; INFILTRATION; INTRACAUDAL at 16:39

## 2025-06-07 RX ADMIN — ACETAMINOPHEN 975 MG: 325 TABLET, FILM COATED ORAL at 03:01

## 2025-06-07 RX ADMIN — ENOXAPARIN SODIUM 30 MG: 30 INJECTION SUBCUTANEOUS at 22:43

## 2025-06-07 RX ADMIN — SUGAMMADEX 400 MG: 100 INJECTION, SOLUTION INTRAVENOUS at 19:14

## 2025-06-07 RX ADMIN — SODIUM CHLORIDE, SODIUM LACTATE, POTASSIUM CHLORIDE, AND CALCIUM CHLORIDE 50 ML/HR: .6; .31; .03; .02 INJECTION, SOLUTION INTRAVENOUS at 22:43

## 2025-06-07 RX ADMIN — FENTANYL CITRATE 50 MCG: 50 INJECTION INTRAMUSCULAR; INTRAVENOUS at 16:39

## 2025-06-07 RX ADMIN — TRANEXAMIC ACID 1000 MG: 10 INJECTION, SOLUTION INTRAVENOUS at 17:03

## 2025-06-07 RX ADMIN — NOREPINEPHRINE BITARTRATE 8 MCG: 1 INJECTION, SOLUTION, CONCENTRATE INTRAVENOUS at 18:18

## 2025-06-07 RX ADMIN — ENOXAPARIN SODIUM 30 MG: 30 INJECTION SUBCUTANEOUS at 09:15

## 2025-06-07 RX ADMIN — HYDROMORPHONE HYDROCHLORIDE 0.5 MG: 1 INJECTION, SOLUTION INTRAMUSCULAR; INTRAVENOUS; SUBCUTANEOUS at 17:29

## 2025-06-07 RX ADMIN — ROCURONIUM 50 MG: 50 INJECTION, SOLUTION INTRAVENOUS at 16:39

## 2025-06-07 NOTE — PROGRESS NOTES
Progress Note - Orthopedics   Name: Prince Andrews 75 y.o. male I MRN: [id number]  Unit/Bed#: W -01 I Date of Admission: 6/5/2025   Date of Service: 6/7/2025 I Hospital Day: 2     Assessment & Plan  Right acetabular fracture (HCC)  NWB to right lower extremity in 10 pounds Buck's traction as needed for comfort  Will discuss further intervention with orthopedics attg.   This morning patient states that he is wheelchair bound at baseline, and only stands to transfer.  Does not take any steps or walk any significant distance.   Plan for PT/OT trial of TTWB RLE  Will monitor for ABLA and administer IVF/prbc as indicated for Greater than 2 gram drop or Hgb < 7.  Patient hemoglobin currently at 14.2  Pain control per primary team  DVT ppx  : Per primary team  All other medical comorbidities to be managed per primary team  Case reviewed and discussed with Dr. Orta      Dementia ([facility])    At risk for delirium      Orthopedics service will follow.  Please contact the SecureChat role for the Orthopedics service with any questions/concerns.    Subjective   75 y.o.male seen and examined at bedside.  Patient agitated, does not wish to use bucks traction and was reminded it was only for comfort. Mild pelvic pain, can flex the hip with little pain.     Objective :  Temp:  [97.3 °F (36.3 °C)-98.2 °F (36.8 °C)] 97.4 °F (36.3 °C)  HR:  [] 58  BP: (121-156)/(70-90) 131/75  Resp:  [16-20] 20  SpO2:  [91 %-99 %] 92 %    Physical Exam  Musculoskeletal: Right lower extremity  Skin intact.   [place] traction in place  Some ttp about the right hip/pelvis.   Motor intact to +FHL/EHL, +ankle dorsi/plantar flexion  Sensation intact to saphenous, sural, tibial, superficial peroneal nerve, and deep peroneal  1+ DP pulse  No calf swelling or tenderness to palpation         Lab Results: I have reviewed the following results:  Recent Labs     06/05/25  0957 06/05/25  1527 06/06/25  0329 06/06/25  1233 06/06/25  1728 06/07/25  0306   WBC  "14.98*  --   --  10.64*  --  10.48*   HGB 16.7   < >  --  14.0 14.0 14.2   HCT 53.1*   < >  --  45.1 44.0 45.0     --   --  159  --  172   BUN 25  --  33* 30*  --  25   CREATININE 1.47*  --  1.39* 1.25  --  1.21   PTT 36*  --   --   --   --   --    INR 1.55*  --   --   --   --   --     < > = values in this interval not displayed.     Blood Culture:  No results found for: \"BLOODCX\"  Wound Culture: No results found for: \"WOUNDCULT\"    "

## 2025-06-07 NOTE — ANESTHESIA POSTPROCEDURE EVALUATION
Post-Op Assessment Note    CV Status:  Stable    Pain management: adequate       Mental Status:  Sleepy   Hydration Status:  Euvolemic   PONV Controlled:  Controlled   Airway Patency:  Patent     Post Op Vitals Reviewed: Yes    No anethesia notable event occurred.    Staff: Anesthesiologist, CRNA           Last Filed PACU Vitals:  Vitals Value Taken Time   Temp 96.3    Pulse 86 06/07/25 19:34   /68 06/07/25 19:34   Resp 18 06/07/25 19:34   SpO2 100 % 06/07/25 19:34       Modified Ant:     Vitals Value Taken Time   Activity 2 06/07/25 19:34   Respiration 2 06/07/25 19:34   Circulation 2 06/07/25 19:34   Consciousness 1 06/07/25 19:34   Oxygen Saturation 1 06/07/25 19:34     Modified Ant Score: 8

## 2025-06-07 NOTE — PLAN OF CARE
Problem: PAIN - ADULT  Goal: Verbalizes/displays adequate comfort level or baseline comfort level  Description: Interventions:  - Encourage patient to monitor pain and request assistance  - Assess pain using appropriate pain scale  - Administer analgesics as ordered based on type and severity of pain and evaluate response  - Implement non-pharmacological measures as appropriate and evaluate response  - Consider cultural and social influences on pain and pain management  - Notify physician/advanced practitioner if interventions unsuccessful or patient reports new pain  - Educate patient/family on pain management process including their role and importance of  reporting pain   - Provide non-pharmacologic/complimentary pain relief interventions  Outcome: Progressing     Problem: INFECTION - ADULT  Goal: Absence or prevention of progression during hospitalization  Description: INTERVENTIONS:  - Assess and monitor for signs and symptoms of infection  - Monitor lab/diagnostic results  - Monitor all insertion sites, i.e. indwelling lines, tubes, and drains  - Monitor endotracheal if appropriate and nasal secretions for changes in amount and color  - [place] appropriate cooling/warming therapies per order  - Administer medications as ordered  - Instruct and encourage patient and family to use good hand hygiene technique  - Identify and instruct in appropriate isolation precautions for identified infection/condition  Outcome: Progressing  Goal: Absence of fever/infection during neutropenic period  Description: INTERVENTIONS:  - Monitor WBC  - Perform strict hand hygiene  - Limit to healthy visitors only  - No plants, dried, fresh or silk flowers with coleman in patient room  Outcome: Progressing     Problem: SAFETY ADULT  Goal: Patient will remain free of falls  Description: INTERVENTIONS:  - Educate patient/family on patient safety including physical limitations  - Instruct patient to call for assistance with activity   -  Consider consulting OT/PT to assist with strengthening/mobility based on AM PAC & JH-HLM score  - Consult OT/PT to assist with strengthening/mobility   - Keep Call bell within reach  - Keep bed low and locked with side rails adjusted as appropriate  - Keep care items and personal belongings within reach  - Initiate and maintain comfort rounds  - Make Fall Risk Sign visible to staff  - Offer Toileting every  Hours, in advance of need  - Initiate/Maintain alarm  - Obtain necessary fall risk management equipment:   - Apply yellow socks and bracelet for high fall risk patients  - Consider moving patient to room near nurses station  Outcome: Progressing  Goal: Maintain or return to baseline ADL function  Description: INTERVENTIONS:  -  Assess patient's ability to carry out ADLs; assess patient's baseline for ADL function and identify physical deficits which impact ability to perform ADLs (bathing, care of mouth/teeth, toileting, grooming, dressing, etc.)  - Assess/evaluate cause of self-care deficits   - Assess range of motion  - Assess patient's mobility; develop plan if impaired  - Assess patient's need for assistive devices and provide as appropriate  - Encourage maximum independence but intervene and supervise when necessary  - Involve family in performance of ADLs  - Assess for home care needs following discharge   - Consider OT consult to assist with ADL evaluation and planning for discharge  - Provide patient education as appropriate  - Monitor functional capacity and physical performance, use of AM PAC & JH-HLM   - Monitor gait, balance and fatigue with ambulation    Outcome: Progressing  Goal: Maintains/Returns to pre admission functional level  Description: INTERVENTIONS:  - Perform AM-PAC 6 Click Basic Mobility/ Daily Activity assessment daily.  - Set and communicate daily mobility goal to care team and patient/family/caregiver.   - Collaborate with rehabilitation services on mobility goals if consulted  -  Perform Range of Motion  times a day.  - Reposition patient every  hours.  - Dangle patient  times a day  - Stand patient  times a day  - Ambulate patient  times a day  - Out of bed to chair  times a day   - Out of bed for meals  times a day  - Out of bed for toileting  - Record patient progress and toleration of activity level   Outcome: Progressing     Problem: DISCHARGE PLANNING  Goal: Discharge to home or other facility with appropriate resources  Description: INTERVENTIONS:  - Identify barriers to discharge w/patient and caregiver  - Arrange for needed discharge resources and transportation as appropriate  - Identify discharge learning needs (meds, wound care, etc.)  - Arrange for interpretive services to assist at discharge as needed  - Refer to Case Management Department for coordinating discharge planning if the patient needs post-hospital services based on physician/advanced practitioner order or complex needs related to functional status, cognitive ability, or social support system  Outcome: Progressing     Problem: Knowledge Deficit  Goal: Patient/family/caregiver demonstrates understanding of disease process, treatment plan, medications, and discharge instructions  Description: Complete learning assessment and assess knowledge base.  Interventions:  - Provide teaching at level of understanding  - Provide teaching via preferred learning methods  Outcome: Progressing     Problem: Prexisting or High Potential for Compromised Skin Integrity  Goal: Skin integrity is maintained or improved  Description: INTERVENTIONS:  - Identify patients at risk for skin breakdown  - Assess and monitor skin integrity including under and around medical devices   - Assess and monitor nutrition and hydration status  - Monitor labs  - Assess for incontinence   - Turn and reposition patient  - Assist with mobility/ambulation  - Relieve pressure over karli prominences   - Avoid friction and shearing  - Provide appropriate hygiene  as needed including keeping skin clean and dry  - Evaluate need for skin moisturizer/barrier cream  - Collaborate with interdisciplinary team  - Patient/family teaching  - Consider wound care consult    Assess:  - Review James scale daily  - Clean and moisturize skin every   - Inspect skin when repositioning, toileting, and assisting with ADLS  - Assess under medical devices such as  every   - Assess extremities for adequate circulation and sensation     Bed Management:  - Have minimal linens on bed & keep smooth, unwrinkled  - Change linens as needed when moist or perspiring  - Avoid sitting or lying in one position for more than  hours while in bed?Keep HOB at degrees   - Toileting:  - Offer bedside commode  - Assess for incontinence every   - Use incontinent care products after each incontinent episode such as     Activity:  - Mobilize patient  times a day  - Encourage activity and walks on unit  - Encourage or provide ROM exercises   - Turn and reposition patient every  Hours  - Use appropriate equipment to lift or move patient in bed  - Instruct/ Assist with weight shifting every  when out of bed in chair  - Consider limitation of chair time  hour intervals    Skin Care:  - Avoid use of baby powder, tape, friction and shearing, hot water or constrictive clothing  - Relieve pressure over bony prominences using   - Do not massage red bony areas    Next Steps:  - Teach patient strategies to minimize risks such as   - Consider consults to  interdisciplinary teams such as   Outcome: Progressing     Problem: Nutrition/Hydration-ADULT  Goal: Nutrient/Hydration intake appropriate for improving, restoring or maintaining nutritional needs  Description: Monitor and assess patient's nutrition/hydration status for malnutrition. Collaborate with interdisciplinary team and initiate plan and interventions as ordered.  Monitor patient's weight and dietary intake as ordered or per policy. Utilize nutrition screening tool and  intervene as necessary. Determine patient's food preferences and provide high-protein, high-caloric foods as appropriate.     INTERVENTIONS:  - Monitor oral intake, urinary output, labs, and treatment plans  - Assess nutrition and hydration status and recommend course of action  - Evaluate amount of meals eaten  - Assist patient with eating if necessary   - Allow adequate time for meals  - Recommend/ encourage appropriate diets, oral nutritional supplements, and vitamin/mineral supplements  - Order, calculate, and assess calorie counts as needed  - Recommend, monitor, and adjust tube feedings and TPN/PPN based on assessed needs  - Assess need for intravenous fluids  - Provide specific nutrition/hydration education as appropriate  - Include patient/family/caregiver in decisions related to nutrition  Outcome: Progressing

## 2025-06-07 NOTE — ASSESSMENT & PLAN NOTE
Lab Results   Component Value Date    EGFR 57 06/07/2025    EGFR 55 06/06/2025    EGFR 48 06/06/2025    CREATININE 1.21 06/07/2025    CREATININE 1.25 06/06/2025    CREATININE 1.39 (H) 06/06/2025

## 2025-06-07 NOTE — DISCHARGE INSTR - AVS FIRST PAGE
Resume home Eliquis.  May resume home Plavix in 1 week.  Patient had some urinary retention throughout his hospitalization-he may undergo voiding trial as he becomes more mobile--follow-up with urology as needed.    Discharge Instructions - Orthopedics  Prince Andrews 75 y.o. male MRN: [id number]  Unit/Bed#: PACU 2    Weight Bearing Status:                                           You may be toe-touch weight-bearing on the right lower extremity.     DVT prophylaxis:  Please resume your Eliquis.     Pain:  Continue analgesics as directed    Dressing Instructions:   Please keep clean, dry and intact until follow up     Appt Instructions:   If you do not have your appointment, please call the clinic at [phone]  Otherwise follow up as scheduled.    Contact the office sooner if you experience any increased numbness/tingling in the extremities.

## 2025-06-07 NOTE — PROGRESS NOTES
"Progress Note - Trauma   Name: Prince Andrews 75 y.o. male I MRN: [id number]  Unit/Bed#: W -01 I Date of Admission: 6/5/2025   Date of Service: 6/7/2025 I Hospital Day: 2    Assessment & Plan  Atrial fibrillation, chronic (HCC)  Eliqous and Plavix on hold  Lovenox til surgery  Benign essential hypertension  Home meds  Chronic obstructive lung disease (HCC)  - Reports using albuterol  - Albuterol inhaler and nebulizer PRN  Pstient using 2 liters nasal O2 for comfort  Uncontrolled type 2 diabetes mellitus with hyperglycemia (HCC)  Lab Results   Component Value Date    HGBA1C 5.8 (H) 06/06/2025       Recent Labs     06/06/25  1145 06/06/25  1603 06/06/25  2040 06/07/25  0728   POCGLU 166* 161* 115 131       Blood Sugar Average: Last 72 hrs:  (P) [id number]    Stage 3 chronic kidney disease (HCC)  Lab Results   Component Value Date    EGFR 57 06/07/2025    EGFR 55 06/06/2025    EGFR 48 06/06/2025    CREATININE 1.21 06/07/2025    CREATININE 1.25 06/06/2025    CREATININE 1.39 (H) 06/06/2025     Dementia (HCC)  Oriented to person and place  Right acetabular fracture (HCC)    At risk for delirium      Bowel Regimen: Senna and Miralax  VTE Prophylaxis:Enoxaparin (Lovenox)     Disposition: Rehab after surgery    24 Hour Events : uneventful  Subjective : \"  Good morning\"    Objective :  Temp:  [97.3 °F (36.3 °C)-98.2 °F (36.8 °C)] 97.4 °F (36.3 °C)  HR:  [] 58  BP: (121-156)/(70-90) 131/75  Resp:  [16-20] 20  SpO2:  [91 %-99 %] 92 %    I/O         06/05 0701  06/06 0700 06/06 0701 06/07 0700 06/07 0701 06/08 0700    P.O.  720     Total Intake(mL/kg)  720 (9.1)     Urine (mL/kg/hr) 950 1110 (0.6) 200 (1.2)    Stool  0     Total Output 950 1110 200    Net -950 -390 -200           Unmeasured Urine Occurrence  2 x     Unmeasured Stool Occurrence  0 x             Physical Exam  Vitals reviewed.   Constitutional:       Appearance: Normal appearance.   HENT:      Head: Normocephalic and atraumatic.      Right " Ear: External ear normal.      Left Ear: External ear normal.      Nose: Nose normal.      Mouth/Throat:      Pharynx: Oropharynx is clear.     Eyes:      Extraocular Movements: Extraocular movements intact.      Pupils: Pupils are equal, round, and reactive to light.       Cardiovascular:      Rate and Rhythm: Normal rate and regular rhythm.      Pulses: Normal pulses.      Heart sounds: Normal heart sounds.   Pulmonary:      Effort: Pulmonary effort is normal.      Breath sounds: Normal breath sounds.   Abdominal:      Palpations: Abdomen is soft.   Genitourinary:     Comments: voiding    Musculoskeletal:      Cervical back: Normal range of motion and neck supple. No tenderness.      Comments: RLE remains in Abbott's traction.  Awaiting Ortho plan.  Pedal pulses strong and palpable     Skin:     General: Skin is warm and dry.     Neurological:      General: No focal deficit present.      Mental Status: He is alert and oriented to person, place, and time.     Psychiatric:         Mood and Affect: Mood normal.         Behavior: Behavior normal.         Thought Content: Thought content normal.               Lab Results: I have reviewed the following results:  Recent Labs     06/05/25  0957 06/05/25  1527 06/06/25  1233 06/06/25  1413 06/06/25  1728 06/07/25  0306   WBC 14.98*  --  10.64*  --   --  10.48*   HGB 16.7   < > 14.0  --    < > 14.2   HCT 53.1*   < > 45.1  --    < > 45.0     --  159  --   --  172   SODIUM 139   < > 135  --   --  134*   K 5.1   < > 4.7  --   --  4.3   CL 98   < > 100  --   --  99   CO2 29   < > 26  --   --  27   BUN 25   < > 30*  --   --  25   CREATININE 1.47*   < > 1.25  --   --  1.21   GLUC 163*   < > 148*  --   --  145*   AST 18   < > 22  --   --   --    ALT 15   < > 8  --   --   --    ALB 4.0   < > 3.7  --   --   --    TBILI 1.05*   < > 0.64  --   --   --    ALKPHOS 65   < > 56  --   --   --    PTT 36*  --   --   --   --   --    INR 1.55*  --   --   --   --   --    HSTNI0  --   --   21  --   --   --    HSTNI2  --   --   --  22  --   --    LACTICACID  --   --  1.6  --   --   --     < > = values in this interval not displayed.

## 2025-06-07 NOTE — QUICK NOTE
Trauma Surgery   Post-Op Check Progress Note   Prince Andrews 75 y.o. male MRN: [id number]  Unit/Bed#: OR POOL Encounter: [id number]    Assessment and Plan:    75-year-old male with right displaced acetabular fracture status post Right ORIF pelvis.   - P.R.N. Analgesia.   - Encouraged incentive spirometry use.   - NWB RLE until otherwise noted by orthopedic surgery   - Monitor neurovascular status   - DVT ppx with lovenox   - PT/OT evaluation and treatment as indicated      Subjective/Objective     Subjective: Patient endorses ongoing right hip pain but states it is tolerable. He otherwise denies complaints at this time. He is resting comfortably in bed.     Objective:     Blood pressure 155/67, pulse 80, temperature 98.2 °F (36.8 °C), temperature source Temporal, resp. rate 18, weight 79.5 kg (175 lb 4.3 oz), SpO2 100%.,Body mass index is 23.12 kg/m².      Intake/Output Summary (Last 24 hours) at 6/7/2025 1734  Last data filed at 6/7/2025 1725  Gross per 24 hour   Intake 677 ml   Output 900 ml   Net -223 ml       Invasive Devices       Peripheral Intravenous Line  Duration             Peripheral IV 06/05/25 Proximal;Right;Ventral (anterior) Forearm 2 days    Peripheral IV 06/05/25 Right;Ventral (anterior) Forearm 2 days    Peripheral IV 06/07/25 Left Hand <1 day              Airway  Duration             ETT  Oral;Cuffed 7.5 mm <1 day                    Physical Exam:    GENERAL APPEARANCE: Patient in no acute distress. Lying comfortably in bed.   HEENT: NCAT; PERRL, EOMs intact  CV: Regular rate and rhythm  LUNGS: Clear to auscultation  ABD: NABS; soft; non-distended; non-tender.  EXT: RLE surgical dressing CDI, NVI; +2 pulses bilaterally upper & lower extremities  NEURO: GCS 15; no focal neurologic deficits; neurovascularly intact.  SKIN: Warm, dry and well perfused                Susan Vega PA-C  6/7/2025

## 2025-06-07 NOTE — PLAN OF CARE
Problem: PAIN - ADULT  Goal: Verbalizes/displays adequate comfort level or baseline comfort level  Description: Interventions:  - Encourage patient to monitor pain and request assistance  - Assess pain using appropriate pain scale  - Administer analgesics as ordered based on type and severity of pain and evaluate response  - Implement non-pharmacological measures as appropriate and evaluate response  - Consider cultural and social influences on pain and pain management  - Notify physician/advanced practitioner if interventions unsuccessful or patient reports new pain  - Educate patient/family on pain management process including their role and importance of  reporting pain   - Provide non-pharmacologic/complimentary pain relief interventions  Outcome: Progressing     Problem: INFECTION - ADULT  Goal: Absence or prevention of progression during hospitalization  Description: INTERVENTIONS:  - Assess and monitor for signs and symptoms of infection  - Monitor lab/diagnostic results  - Monitor all insertion sites, i.e. indwelling lines, tubes, and drains  - Monitor endotracheal if appropriate and nasal secretions for changes in amount and color  - [place] appropriate cooling/warming therapies per order  - Administer medications as ordered  - Instruct and encourage patient and family to use good hand hygiene technique  - Identify and instruct in appropriate isolation precautions for identified infection/condition  Outcome: Progressing  Goal: Absence of fever/infection during neutropenic period  Description: INTERVENTIONS:  - Monitor WBC  - Perform strict hand hygiene  - Limit to healthy visitors only  - No plants, dried, fresh or silk flowers with coleman in patient room  Outcome: Progressing     Problem: SAFETY ADULT  Goal: Patient will remain free of falls  Description: INTERVENTIONS:  - Educate patient/family on patient safety including physical limitations  - Instruct patient to call for assistance with activity   -  Consider consulting OT/PT to assist with strengthening/mobility based on AM PAC & JH-HLM score  - Consult OT/PT to assist with strengthening/mobility   - Keep Call bell within reach  - Keep bed low and locked with side rails adjusted as appropriate  - Keep care items and personal belongings within reach  - Initiate and maintain comfort rounds  - Make Fall Risk Sign visible to staff  - Offer Toileting every  Hours, in advance of need  - Initiate/Maintain alarm  - Obtain necessary fall risk management equipment:   - Apply yellow socks and bracelet for high fall risk patients  - Consider moving patient to room near nurses station  Outcome: Progressing  Goal: Maintain or return to baseline ADL function  Description: INTERVENTIONS:  -  Assess patient's ability to carry out ADLs; assess patient's baseline for ADL function and identify physical deficits which impact ability to perform ADLs (bathing, care of mouth/teeth, toileting, grooming, dressing, etc.)  - Assess/evaluate cause of self-care deficits   - Assess range of motion  - Assess patient's mobility; develop plan if impaired  - Assess patient's need for assistive devices and provide as appropriate  - Encourage maximum independence but intervene and supervise when necessary  - Involve family in performance of ADLs  - Assess for home care needs following discharge   - Consider OT consult to assist with ADL evaluation and planning for discharge  - Provide patient education as appropriate  - Monitor functional capacity and physical performance, use of AM PAC & JH-HLM   - Monitor gait, balance and fatigue with ambulation    Outcome: Progressing  Goal: Maintains/Returns to pre admission functional level  Description: INTERVENTIONS:  - Perform AM-PAC 6 Click Basic Mobility/ Daily Activity assessment daily.  - Set and communicate daily mobility goal to care team and patient/family/caregiver.   - Collaborate with rehabilitation services on mobility goals if consulted  -  Perform Range of Motion  times a day.  - Reposition patient every  hours.  - Dangle patient  times a day  - Stand patient  times a day  - Ambulate patient  times a day  - Out of bed to chair  times a day   - Out of bed for meals  times a day  - Out of bed for toileting  - Record patient progress and toleration of activity level   Outcome: Progressing     Problem: DISCHARGE PLANNING  Goal: Discharge to home or other facility with appropriate resources  Description: INTERVENTIONS:  - Identify barriers to discharge w/patient and caregiver  - Arrange for needed discharge resources and transportation as appropriate  - Identify discharge learning needs (meds, wound care, etc.)  - Arrange for interpretive services to assist at discharge as needed  - Refer to Case Management Department for coordinating discharge planning if the patient needs post-hospital services based on physician/advanced practitioner order or complex needs related to functional status, cognitive ability, or social support system  Outcome: Progressing     Problem: Knowledge Deficit  Goal: Patient/family/caregiver demonstrates understanding of disease proces  Problem: Prexisting or High Potential for Compromised Skin Integrity  Goal: Skin integrity is maintained or improved  Description: INTERVENTIONS:  - Identify patients at risk for skin breakdown  - Assess and monitor skin integrity including under and around medical devices   - Assess and monitor nutrition and hydration status  - Monitor labs  - Assess for incontinence   - Turn and reposition patient  - Assist with mobility/ambulation  - Relieve pressure over karli prominences   - Avoid friction and shearing  - Provide appropriate hygiene as needed including keeping skin clean and dry  - Evaluate need for skin moisturizer/barrier cream  - Collaborate with interdisciplinary team  - Patient/family teaching  - Consider wound care consult    Assess:  - Review James scale daily  - Clean and moisturize  skin every   - Inspect skin when repositioning, toileting, and assisting with ADLS  - Assess under medical devices such as every  - Assess extremities for adequate circulation and sensation     Bed Management:  - Have minimal linens on bed & keep smooth, unwrinkled  - Change linens as needed when moist or perspiring  - Avoid sitting or lying in one position for more than  hours while in bed?Keep HOB at degrees   - Toileting:  - Offer bedside commode  - Assess for incontinence every   - Use incontinent care products after each incontinent episode such as     Activity:  - Mobilize patient  times a day  - Encourage activity and walks on unit  - Encourage or provide ROM exercises   - Turn and reposition patient every  Hours  - Use appropriate equipment to lift or move patient in bed  - Instruct/ Assist with weight shifting every  when out of bed in chair  - Consider limitation of chair time  hour intervals    Skin Care:  - Avoid use of baby powder, tape, friction and shearing, hot water or constrictive clothing  - Relieve pressure over bony prominences using   - Do not massage red bony areas    Next Steps:  - Teach patient strategies to minimize risks such as   - Consider consults to  interdisciplinary teams such as   Outcome: Progressing   s, treatment plan, medications, and discharge instructions  Description: Complete learning assessment and assess knowledge base.  Interventions:  - Provide teaching at level of understanding  - Provide teaching via preferred learning methods  Outcome: Progressing

## 2025-06-07 NOTE — UTILIZATION REVIEW
SEE INITIAL REVIEW AT BOTTOM  Date: 6/7/2025  Day 3: Has surpassed a 2nd midnight with active treatments and services.  agitated, does not wish to use [place] traction and was reminded it was only for comfort. Afebrile, Mild pelvic pain, can flex the hip with little pain.   Right acetabular fracture  NWB to right lower extremity in 10 pounds Abbott's traction PRN for comfort   This morning patient states that he is wheelchair bound at baseline, and only stands to transfer.  Does not take any steps or walk any significant distance.   Plan to OR for ORIF today, IV antibx pre OP, Eliquis and Plavix on hold, Lovenox until surgery  Monitor for ABLA and administer IVF/prbc as indicated for Greater than 2 gram drop or Hgb < 7.    HGB currently at 14.2.   Cont Pain control       Medications:   Scheduled Medications:  acetaminophen, 975 mg, Oral, Q8H TIMOTHY  [Held by provider] apixaban, 5 mg, Oral, BID  cefazolin, 2,000 mg, Intravenous, 30 min pre-procedure  [Held by provider] clopidogrel, 75 mg, Oral, Once  divalproex sodium, 125 mg, Oral, HS  enoxaparin, 30 mg, Subcutaneous, Q12H TIMOTHY  famotidine, 20 mg, Oral, BID  folic acid, 1 mg, Oral, Daily  insulin lispro, 1-5 Units, Subcutaneous, TID AC  melatonin, 6 mg, Oral, HS  memantine, 10 mg, Oral, BID  metoprolol tartrate, 12.5 mg, Oral, Q12H TIMOTHY  PARoxetine, 35 mg, Oral, Daily  polyethylene glycol, 17 g, Oral, Daily  pravastatin, 80 mg, Oral, Daily With Dinner  senna-docusate sodium, 1 tablet, Oral, HS      Continuous IV Infusions:     PRN Meds:  albuterol, 1 puff, Inhalation, Q4H PRN  albuterol, 2.5 mg, Nebulization, Q6H PRN  HYDROmorphone, 0.2 mg, Intravenous, Q2H PRN  naloxone, 0.04 mg, Intravenous, Q1MIN PRN  oxyCODONE, 2.5 mg, Oral, Q4H PRN   Or  oxyCODONE, 5 mg, Oral, Q4H PRN  polyethylene glycol, 17 g, Oral, Daily PRN      Discharge Plan: TBD    Vital Signs (last 3 days)       Date/Time Temp Pulse Resp BP MAP (mmHg) SpO2 Calculated FIO2 (%) - Nasal Cannula Nasal Cannula O2  Flow Rate (L/min) O2 Device Patient Position - Orthostatic VS Z[place]ima Coma Scale Score Pain    06/07/25 0800 97.4 °F (36.3 °C) 58 20 131/75 -- 92 % -- -- -- -- -- No Pain    06/07/25 07:33:48 97.4 °F (36.3 °C) 58 20 131/75 94 92 % -- -- -- -- -- --    06/07/25 03:05:39 97.8 °F (36.6 °C) 108 -- 152/77 102 95 % -- -- -- -- -- --    06/07/25 03:05:13 -- 88 -- 152/77 102 91 % -- -- -- -- -- --    06/07/25 0301 -- -- -- -- -- -- -- -- -- -- -- 8 06/06/25 22:07:06 97.7 °F (36.5 °C) 71 18 141/86 104 93 % -- -- -- -- -- --    06/06/25 20:59:31 98.2 °F (36.8 °C) 67 -- 156/84 108 92 % -- -- -- -- -- --    06/06/25 2000 -- -- -- -- -- -- -- -- -- -- 15 5    06/06/25 19:43:42 97.8 °F (36.6 °C) 100 16 153/77 102 93 % -- -- -- -- -- --    06/06/25 1920 -- -- -- -- -- -- -- -- -- -- -- 5 06/06/25 1836 -- -- -- -- -- -- -- -- -- -- -- No Pain    06/06/25 1611 -- -- -- -- -- -- -- -- -- -- 15 --    06/06/25 1609 -- -- -- -- -- -- -- -- -- -- -- 8 06/06/25 16:05:49 97.3 °F (36.3 °C) 89 16 125/90 102 92 % -- -- -- -- -- --    06/06/25 1600 97.3 °F (36.3 °C) 89 16 125/90 -- 97 % -- -- -- -- -- --    06/06/25 12:46:03 -- 62 -- 121/70 87 92 % -- -- -- -- -- --    06/06/25 12:13:38 98 °F (36.7 °C) 56 -- -- -- 99 % -- -- -- -- -- --    06/06/25 1200 98 °F (36.7 °C) 84 20 140/74 -- 96 % -- -- -- -- 15 --    06/06/25 11:48:50 97.5 °F (36.4 °C) 76 -- 140/74 96 94 % -- -- -- -- -- --    06/06/25 08:33:44 97.4 °F (36.3 °C) 114 -- -- -- 94 % -- -- -- -- -- --    06/06/25 0825 -- 107 -- 149/83 -- -- -- -- -- -- -- --    06/06/25 0817 -- -- -- -- -- -- 28 2 L/min Nasal cannula -- 15 No Pain    06/06/25 0800 97.4 °F (36.3 °C) 114 18 149/83 -- 94 % -- -- -- -- -- --    06/06/25 07:37:07 -- 107 18 149/83 105 92 % -- -- Nasal cannula Lying -- --    06/06/25 0400 97 °F (36.1 °C) 109 17 139/75 -- 94 % -- -- -- -- -- --    06/06/25 03:29:25 -- 109 17 139/75 96 94 % -- -- -- -- -- --    06/06/25 0256 -- -- -- -- -- 95 % -- -- -- -- -- --     06/06/25 0251 -- -- -- -- -- -- -- -- -- -- -- No Pain    06/06/25 0012 97 °F (36.1 °C) 104 17 139/71 -- 97 % -- -- -- -- -- --    06/06/25 00:10:13 -- 104 -- 139/71 94 97 % -- -- -- -- -- --    06/05/25 23:23:07 -- 60 -- 136/66 89 94 % -- -- -- -- -- --    06/05/25 2312 97.9 °F (36.6 °C) 60 17 136/66 -- 94 % -- -- -- -- -- --    06/05/25 21:31:12 97.5 °F (36.4 °C) 89 17 127/73 91 94 % -- -- -- -- -- --    06/05/25 2131 -- -- -- -- -- -- 28 2 L/min -- -- 15 --    06/05/25 2100 -- 77 20 126/67 88 99 % 28 2 L/min Nasal cannula Lying -- 3    06/05/25 2000 97 °F (36.1 °C) 70 20 118/64 86 99 % -- -- Nasal cannula Lying 15 4    06/05/25 1704 -- -- -- -- -- -- -- -- -- -- -- 10 - Worst Possible Pain    06/05/25 1556 -- -- -- -- -- -- -- -- -- -- 15 --    06/05/25 1400 -- 74 22 131/61 -- 100 % -- -- Nasal cannula -- 15 --    06/05/25 1300 -- 81 24 122/67 -- 95 % -- -- Nasal cannula Lying 15 --    06/05/25 12:16:53 -- 82 24 126/59 -- 95 % -- -- Nasal cannula Lying -- --    06/05/25 1200 -- -- -- -- -- -- -- -- -- -- 15 --    06/05/25 1130 -- 79 24 151/77 -- 95 % -- -- Nasal cannula Sitting 15 --    06/05/25 1109 -- -- -- -- -- -- -- -- -- -- -- 4    06/05/25 1100 -- 81 24 167/78 -- 94 % -- -- None (Room air) -- 15 --    06/05/25 1045 -- 81 24 158/74 -- 96 % -- -- Nasal cannula -- 15 --    06/05/25 1030 -- 73 24 147/84 -- 98 % -- -- -- -- 15 --    06/05/25 1015 -- 79 24 134/69 -- 96 % -- -- -- -- 15 --    06/05/25 10:02:36 -- 77 24 127/68 -- 97 % -- -- -- Sitting -- --    06/05/25 1000 -- -- -- -- -- -- -- -- -- -- 15 --    06/05/25 09:47:52 98.1 °F (36.7 °C) 85 24 165/87 -- 96 % -- -- Nasal cannula  -- 15 --          Weight (last 2 days)       Date/Time Weight    06/05/25 09:47:52 79.5 (175.27)            Pertinent Labs/Diagnostic Results:   Radiology:  XR chest portable   Final Interpretation by Jes Edgar MD (06/07 0724)      Scar in the right lung with no definite acute disease.            Workstation performed:  [id number]         CT chest abdomen pelvis w contrast   Final Interpretation by Mick Jeffers MD (06/05 1440)      1.  Comminuted right acetabular fracture involving the anterior and posterior columns as well as the anterior and posterior acetabular walls. Associated small amount of extraperitoneal hemorrhage along the anterior right pelvis is not significantly    changed in extent as compared to the dedicated right hip CT earlier the same day. No clear evidence of any active hemorrhage on this single phase examination. No other traumatic injury identified within the chest, abdomen, or pelvis.   2.  Emphysema and diffuse bronchial wall thickening suggesting chronic bronchitis, similar to prior.   3.  Chronic findings, detailed above.      The study was marked in EPIC for immediate notification.      Computerized Assisted Algorithm (CAA) may have aided analysis of applicable images.      Workstation performed: [id number]         XR chest 1 view portable   Final Interpretation by Denis Day MD (06/05 1117)      1.  Emphysema.   2.  Right basilar scarring.            Workstation performed: [id number]         XR femur 2 views RIGHT   Final Interpretation by Denis Day MD (06/05 1101)      Comminuted right acetabular fracture, better characterized on the CT.         Computerized Assisted Algorithm (CAA) may have been used to analyze all applicable images.         Workstation performed: [id number]         XR pelvis complete 3+ vw   Final Interpretation by Denis Day MD (06/05 1138)      Comminuted right acetabular fracture better characterized on the concurrent CT.         Computerized Assisted Algorithm (CAA) may have been used to analyze all applicable images.         Workstation performed: [id number]         CT head without contrast   Final Interpretation by Murray Herrmann MD (06/05 1036)      No acute intracranial abnormality.                  Workstation performed: [id number]         CT  cervical spine without contrast   Final Interpretation by Murray Herrmann MD (06/05 1124)      No cervical spine fracture or traumatic malalignment.      Multilevel cervical spondylosis, as described above                  Workstation performed: [id number]         CT lower extremity wo contrast right   Final Interpretation by Amor Swanson MD (06/05 1047)      1.  Acetabular fracture as described, classification is consistent with a transverse fracture plus posterior wall   2.  Pelvic hemorrhage         Workstation performed: [id number]           Cardiology:  ECG 12 lead    by Jc Ross Results In (06/06 1244)      ECG 12 lead   Final Result by Juan Terrell MD (06/06 1218)   Atrial fibrillation   Right bundle branch block , plus right ventricular hypertrophy   Abnormal ECG   When compared with ECG of 29-Jun-2022 23:02,   MANUAL COMPARISON REQUIRED PREVIOUS ECG IS INCOMPATIBLE   Confirmed by Juan Terrell ([id number]) on 6/6/2025 12:18:57 PM        GI:  No orders to display           Results from last 7 days   Lab Units 06/07/25  0306 06/06/25  1728 06/06/25  1233 06/05/25  1527 06/05/25  0957   WBC Thousand/uL 10.48*  --  10.64*  --  14.98*   HEMOGLOBIN g/dL 14.2 14.0 14.0 15.1 16.7   HEMATOCRIT % 45.0 44.0 45.1 48.7 53.1*   PLATELETS Thousands/uL 172  --  159  --  197   TOTAL NEUT ABS Thousands/µL 7.13  --  7.31  --  12.79*         Results from last 7 days   Lab Units 06/07/25  0306 06/06/25  1233 06/06/25  0329 06/05/25  0957 06/03/25  0645   SODIUM mmol/L 134* 135 135 139 141   POTASSIUM mmol/L 4.3 4.7 4.0 5.1 4.8   CHLORIDE mmol/L 99 100 98 98 103   CO2 mmol/L 27 26 28 29 30   ANION GAP mmol/L 8 9 9 12 8   BUN mg/dL 25 30* 33* 25 21   CREATININE mg/dL 1.21 1.25 1.39* 1.47* 1.27   EGFR ml/min/1.73sq m 57 55 48 45 58*   CALCIUM mg/dL 9.2 9.2 9.0 9.6 9.1     Results from last 7 days   Lab Units 06/06/25  1233 06/06/25  0329 06/05/25  0957   AST U/L 22 14 18   ALT U/L 8 11 15   ALK PHOS U/L 56 59 65   TOTAL PROTEIN  "g/dL 7.1 6.7 7.7   ALBUMIN g/dL 3.7 3.6 4.0   TOTAL BILIRUBIN mg/dL 0.64 0.70 1.05*     Results from last 7 days   Lab Units 06/07/25  0728 06/06/25  2040 06/06/25  1603 06/06/25  1145 06/06/25  0734 06/05/25  1914   POC GLUCOSE mg/dl 131 115 161* 166* 113 186*     Results from last 7 days   Lab Units 06/07/25  0306 06/06/25  1233 06/06/25  0329 06/05/25  0957 06/03/25  0645   GLUCOSE RANDOM mg/dL 145* 148* 147* 163* 91         Results from last 7 days   Lab Units 06/06/25  0329   HEMOGLOBIN A1C % 5.8*   EAG mg/dl 120     No results found for: \"BETA-HYDROXYBUTYRATE\"       Results from last 7 days   Lab Units 06/06/25  1337   PH CHARISMA  7.393   PCO2 CHARISMA mm Hg 45.2   PO2 CHARISMA mm Hg 107.0*   HCO3 CHARISMA mmol/L 26.9   BASE EXC CHARISMA mmol/L 1.5   O2 CONTENT CHARISMA ml/dL 20.4   O2 HGB, VENOUS % 96.0*             Results from last 7 days   Lab Units 06/06/25  1728 06/06/25  1413 06/06/25  1233   HS TNI 0HR ng/L  --   --  21   HS TNI 2HR ng/L  --  22  --    HSTNI D2 ng/L  --  1  --    HS TNI 4HR ng/L 20  --   --    HSTNI D4 ng/L -1  --   --          Results from last 7 days   Lab Units 06/05/25  0957   PROTIME seconds 19.4*   INR  1.55*   PTT seconds 36*     Results from last 7 days   Lab Units 06/06/25  0329   TSH 3RD GENERATON uIU/mL 5.426*         Results from last 7 days   Lab Units 06/06/25  1233   LACTIC ACID mmol/L 1.6                     Network Utilization Review Department  ATTENTION: Please call with any questions or concerns to [phone] and carefully listen to the prompts so that you are directed to the right person. All voicemails are confidential.   For Discharge needs, contact Care Management DC Support Team at [phone] opt. [phone]  Send all requests for admission clinical reviews, approved or denied determinations and any other requests to dedicated fax number below belonging to the campus where the patient is receiving treatment. List of dedicated fax numbers for the Facilities:  FACILITY NAME UR FAX NUMBER "   ADMISSION DENIALS (Administrative/Medical Necessity) [phone]   DISCHARGE SUPPORT TEAM (NETWORK) [phone]   PARENT CHILD HEALTH (Maternity/NICU/Pediatrics) [phone]   [facility] [phone]   [facility] [phone]   Count includes the [facility] [phone]   [facility] [phone]   [facility], [facility] [phone]   [facility] [phone]   [facility] [phone]   [facility] [phone]   [facility] [phone]   [facility] [phone]   [facility] [phone]   [facility] [phone]

## 2025-06-07 NOTE — OP NOTE
OPERATIVE REPORT  PATIENT NAME: Prince Andrews    :  1950  MRN: [id number]  Pt Location: AN OR ROOM 01    SURGERY DATE: 2025    Surgeons and Role:     * North Vázquez MD - Primary     * Lev Soto MD - Assisting     * Abel Artis MD - Assisting      * Jeremy Jaffe MD - Assisting     * Kera Nesbitt MD - Assisting    Preop Diagnosis:  Closed displaced fracture of right acetabulum, unspecified portion of acetabulum, initial encounter ([facility]) [S32.401A]    Post-Op Diagnosis Codes:     * Closed displaced fracture of right acetabulum, unspecified portion of acetabulum, initial encounter ([facility]) [S32.401A]    Procedure(s):  Right - OPEN REDUCTION W/ INTERNAL FIXATION (ORIF) PELVIS POSTERIOR APPROACH    Specimen(s):  * No specimens in log *    Estimated Blood Loss:   1000 mL    Drains:  * No LDAs found *    Anesthesia Type:   General    Operative Indications:  Closed displaced fracture of right acetabulum, unspecified portion of acetabulum, initial encounter ([facility]) [S32.401A]    Operative Findings:  Anterior column posterior hemitransverse right acetabular fracture with displaced posterior column component     Complications:   None    Procedure and Technique:  Open reduction internal fixation of anterior column posterior hemitransverse right acetabular fracture through Kocher Langenbeck approach    After informed surgical consent been obtained patient was brought to the operating room and administered general anesthesia.  Was placed in the prone position on the operating room table and all cushions and restraints were placed.  Right lower extremity was prepped and draped normal sterile fashion.  Right hip was kept extended and the knee was flexed throughout the entire procedure to protect the sciatic nerve.    Using a standard Kocher Langenbeck approach dissection was taken down through skin and subcutaneous tissues to the level of the fascia.  Dissection was then taken down through the gluteal TS  maciej and down to the level of the gluteus minimus and medius, and the retractors were placed.    The piriformis was incised and the short external rotators were retracted and we were able to get down to the level of the posterior column fracture.    Using open reduction techniques the fracture was reduced to his anatomic position as possible.  This was then secured with 2 separate small fragment pelvic plates with bicortical 3.5 millimeter screws.    Final fluoroscopic views were obtained showing everything to be in good reduction    Wounds were then closed #1 Vicryl 2-0 Vicryl and staples.  Sterile dressings were applied.  Patient was then transferred to recovery stable condition having tolerated procedure well. I was present for the entire procedure.    Patient Disposition:  PACU     SIGNATURE: North Vázquez MD  DATE: June 7, 2025  TIME: 6:55 PM

## 2025-06-07 NOTE — ASSESSMENT & PLAN NOTE
Lab Results   Component Value Date    HGBA1C 5.8 (H) 06/06/2025       Recent Labs     06/06/25  1145 06/06/25  1603 06/06/25 2040 06/07/25  0728   POCGLU 166* 161* 115 131       Blood Sugar Average: Last 72 hrs:  (P) [id number]

## 2025-06-07 NOTE — ASSESSMENT & PLAN NOTE
NWB to right lower extremity in 10 pounds Buck's traction as needed for comfort  Will discuss further intervention with orthopedics attg.   This morning patient states that he is wheelchair bound at baseline, and only stands to transfer.  Does not take any steps or walk any significant distance.   Plan for PT/OT trial of TTWB RLE  Will monitor for ABLA and administer IVF/prbc as indicated for Greater than 2 gram drop or Hgb < 7.  Patient hemoglobin currently at 14.2  Pain control per primary team  DVT ppx  : Per primary team  All other medical comorbidities to be managed per primary team  Case reviewed and discussed with Dr. Orta

## 2025-06-07 NOTE — PHYSICAL THERAPY NOTE
PHYSICAL THERAPY CANCELLATION NOTE          Patient Name: Prince Andrews  Today's Date: 6/7/2025 06/07/25 1250   Note Type   Note type Cancelled Session   Cancel Reasons Patient to operating room   Additional Comments PT eval orders received, chart review performed. Pt on OR schedule w/ Ortho later today for pelvis ORIF. Will hold PT eval at this time. Please re-consult PT post-op w/ updated activity orders and weight-bearing status. PT will continue to follow as appropriate and as schedule allows.           Emely Mata, PT, DPT  06/07/25

## 2025-06-07 NOTE — ANESTHESIA PREPROCEDURE EVALUATION
Procedure:  OPEN REDUCTION W/ INTERNAL FIXATION (ORIF) PELVIS POSTERIOR APPROACH (Right: Pelvis)    Relevant Problems   CARDIO   (+) Arteriosclerosis of coronary artery bypass graft   (+) 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 (HCC)   (+) Atherosclerosis of native artery of extremity (HCC)   (+) Atrial fibrillation, chronic (HCC)   (+) Benign essential hypertension   (+) 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HCC)   (+) Chest wall pain, chronic   (+) Essential hypertension   (+) Mixed hyperlipidemia   (+) Peripheral vascular disease (HCC)      ENDO   (+) Insulin dependent type 2 diabetes mellitus (HCC)   (+) Type 2 diabetes mellitus with diabetic polyneuropathy, with long-term current use of insulin (HCC)   (+) Type 2 diabetes mellitus with stage 4 chronic kidney disease, without long-term current use of insulin (HCC)   (+) Uncontrolled type 2 diabetes mellitus with hyperglycemia (HCC)      GI/HEPATIC   (+) Gastroesophageal reflux disease without esophagitis      /RENAL   (+) Stage 3 chronic kidney disease (HCC)      HEMATOLOGY   (+) Anemia      MUSCULOSKELETAL   (+) Gout      NEURO/PSYCH   (+) Dementia (HCC)   (+) Depressive disorder   (+) Type 2 diabetes mellitus with diabetic polyneuropathy, with long-term current use of insulin (HCC)      PULMONARY   (+) Chronic obstructive lung disease (HCC)   (+) Chronic respiratory failure (HCC)   (+) SENTHIL (obstructive sleep apnea)   (+) Panlobular emphysema (HCC)        Physical Exam    Airway     Mallampati score: III  TM Distance: >3 FB  Neck ROM: full      Cardiovascular      Dental    edentulous,     Pulmonary      Neurological    He appears awake, alert and oriented x3.      Other Findings  post-pubertal.      Anesthesia Plan  ASA Score- 4 Emergent    Anesthesia Type- general with ASA Monitors.         Additional Monitors:     Airway Plan: Oral ETT.    Comment: Patricia PECK.       Plan Factors-Exercise tolerance (METS): >4 METS.    Chart reviewed. EKG reviewed.  Imaging results reviewed. Existing labs reviewed. Patient summary reviewed.    Patient is not a current smoker.              Induction- intravenous.    Postoperative Plan- .   Monitoring Plan - Monitoring plan - standard ASA monitoring  Post Operative Pain Plan - multimodal analgesia    Perioperative Resuscitation Plan - Level 1 - Full Code.       Informed Consent- Anesthetic plan and risks discussed with patient.  I personally reviewed this patient with the CRNA. Discussed and agreed on the Anesthesia Plan with the CRNA..      NPO Status:  No vitals data found for the desired time range.

## 2025-06-08 LAB
ANION GAP SERPL CALCULATED.3IONS-SCNC: 8 MMOL/L (ref 4–13)
BASOPHILS # BLD AUTO: 0.06 THOUSANDS/ÂΜL (ref 0–0.1)
BASOPHILS NFR BLD AUTO: 1 % (ref 0–1)
BUN SERPL-MCNC: 28 MG/DL (ref 5–25)
CALCIUM SERPL-MCNC: 8.3 MG/DL (ref 8.4–10.2)
CHLORIDE SERPL-SCNC: 101 MMOL/L (ref 96–108)
CO2 SERPL-SCNC: 26 MMOL/L (ref 21–32)
CREAT SERPL-MCNC: 1.48 MG/DL (ref 0.6–1.3)
EOSINOPHIL # BLD AUTO: 0.13 THOUSAND/ÂΜL (ref 0–0.61)
EOSINOPHIL NFR BLD AUTO: 1 % (ref 0–6)
ERYTHROCYTE [DISTWIDTH] IN BLOOD BY AUTOMATED COUNT: 14.9 % (ref 11.6–15.1)
GFR SERPL CREATININE-BSD FRML MDRD: 45 ML/MIN/1.73SQ M
GLUCOSE SERPL-MCNC: 128 MG/DL (ref 65–140)
GLUCOSE SERPL-MCNC: 137 MG/DL (ref 65–140)
GLUCOSE SERPL-MCNC: 151 MG/DL (ref 65–140)
GLUCOSE SERPL-MCNC: 179 MG/DL (ref 65–140)
HCT VFR BLD AUTO: 30.6 % (ref 36.5–49.3)
HCT VFR BLD AUTO: 31 % (ref 36.5–49.3)
HGB BLD-MCNC: 9.6 G/DL (ref 12–17)
HGB BLD-MCNC: 9.7 G/DL (ref 12–17)
IMM GRANULOCYTES # BLD AUTO: 0.06 THOUSAND/UL (ref 0–0.2)
IMM GRANULOCYTES NFR BLD AUTO: 1 % (ref 0–2)
LYMPHOCYTES # BLD AUTO: 0.95 THOUSANDS/ÂΜL (ref 0.6–4.47)
LYMPHOCYTES NFR BLD AUTO: 10 % (ref 14–44)
MCH RBC QN AUTO: 29.4 PG (ref 26.8–34.3)
MCHC RBC AUTO-ENTMCNC: 31.4 G/DL (ref 31.4–37.4)
MCV RBC AUTO: 94 FL (ref 82–98)
MONOCYTES # BLD AUTO: 1.21 THOUSAND/ÂΜL (ref 0.17–1.22)
MONOCYTES NFR BLD AUTO: 13 % (ref 4–12)
NEUTROPHILS # BLD AUTO: 6.91 THOUSANDS/ÂΜL (ref 1.85–7.62)
NEUTS SEG NFR BLD AUTO: 74 % (ref 43–75)
NRBC BLD AUTO-RTO: 0 /100 WBCS
PLATELET # BLD AUTO: 186 THOUSANDS/UL (ref 149–390)
PMV BLD AUTO: 9.9 FL (ref 8.9–12.7)
POTASSIUM SERPL-SCNC: 5.1 MMOL/L (ref 3.5–5.3)
QRS AXIS: 253 DEGREES
QRSD INTERVAL: 132 MS
QT INTERVAL: 428 MS
QTC INTERVAL: 509 MS
RBC # BLD AUTO: 3.27 MILLION/UL (ref 3.88–5.62)
SODIUM SERPL-SCNC: 135 MMOL/L (ref 135–147)
T WAVE AXIS: 29 DEGREES
VENTRICULAR RATE: 85 BPM
WBC # BLD AUTO: 9.32 THOUSAND/UL (ref 4.31–10.16)

## 2025-06-08 PROCEDURE — 99024 POSTOP FOLLOW-UP VISIT: CPT

## 2025-06-08 PROCEDURE — 93010 ELECTROCARDIOGRAM REPORT: CPT | Performed by: INTERNAL MEDICINE

## 2025-06-08 PROCEDURE — 80048 BASIC METABOLIC PNL TOTAL CA: CPT

## 2025-06-08 PROCEDURE — 97167 OT EVAL HIGH COMPLEX 60 MIN: CPT

## 2025-06-08 PROCEDURE — 85025 COMPLETE CBC W/AUTO DIFF WBC: CPT

## 2025-06-08 PROCEDURE — 82948 REAGENT STRIP/BLOOD GLUCOSE: CPT

## 2025-06-08 PROCEDURE — 85018 HEMOGLOBIN: CPT

## 2025-06-08 PROCEDURE — 85014 HEMATOCRIT: CPT

## 2025-06-08 PROCEDURE — 99232 SBSQ HOSP IP/OBS MODERATE 35: CPT | Performed by: SURGERY

## 2025-06-08 PROCEDURE — 97163 PT EVAL HIGH COMPLEX 45 MIN: CPT

## 2025-06-08 RX ORDER — ONDANSETRON 2 MG/ML
4 INJECTION INTRAMUSCULAR; INTRAVENOUS ONCE
Status: COMPLETED | OUTPATIENT
Start: 2025-06-08 | End: 2025-06-08

## 2025-06-08 RX ADMIN — ACETAMINOPHEN 975 MG: 325 TABLET, FILM COATED ORAL at 10:22

## 2025-06-08 RX ADMIN — FAMOTIDINE 20 MG: 20 TABLET, FILM COATED ORAL at 17:59

## 2025-06-08 RX ADMIN — ONDANSETRON 4 MG: 2 INJECTION INTRAMUSCULAR; INTRAVENOUS at 23:11

## 2025-06-08 RX ADMIN — ACETAMINOPHEN 975 MG: 325 TABLET, FILM COATED ORAL at 02:47

## 2025-06-08 RX ADMIN — FOLIC ACID 1 MG: 1 TABLET ORAL at 09:13

## 2025-06-08 RX ADMIN — INSULIN LISPRO 1 UNITS: 100 INJECTION, SOLUTION INTRAVENOUS; SUBCUTANEOUS at 12:12

## 2025-06-08 RX ADMIN — SENNOSIDES AND DOCUSATE SODIUM 1 TABLET: 50; 8.6 TABLET ORAL at 22:07

## 2025-06-08 RX ADMIN — OXYCODONE HYDROCHLORIDE 5 MG: 5 TABLET ORAL at 22:13

## 2025-06-08 RX ADMIN — DIVALPROEX SODIUM 125 MG: 125 TABLET, DELAYED RELEASE ORAL at 22:07

## 2025-06-08 RX ADMIN — ENOXAPARIN SODIUM 30 MG: 30 INJECTION SUBCUTANEOUS at 09:13

## 2025-06-08 RX ADMIN — MEMANTINE 10 MG: 10 TABLET ORAL at 09:13

## 2025-06-08 RX ADMIN — PAROXETINE HYDROCHLORIDE 35 MG: 20 TABLET, FILM COATED ORAL at 09:17

## 2025-06-08 RX ADMIN — MEMANTINE 10 MG: 10 TABLET ORAL at 17:59

## 2025-06-08 RX ADMIN — CEFAZOLIN SODIUM 2000 MG: 2 SOLUTION INTRAVENOUS at 08:57

## 2025-06-08 RX ADMIN — PRAVASTATIN SODIUM 80 MG: 80 TABLET ORAL at 17:59

## 2025-06-08 RX ADMIN — ENOXAPARIN SODIUM 30 MG: 30 INJECTION SUBCUTANEOUS at 20:06

## 2025-06-08 RX ADMIN — Medication 6 MG: at 22:07

## 2025-06-08 RX ADMIN — POLYETHYLENE GLYCOL 3350 17 G: 17 POWDER, FOR SOLUTION ORAL at 09:13

## 2025-06-08 RX ADMIN — Medication 12.5 MG: at 09:13

## 2025-06-08 RX ADMIN — ACETAMINOPHEN 975 MG: 325 TABLET, FILM COATED ORAL at 20:06

## 2025-06-08 RX ADMIN — HYDROMORPHONE HYDROCHLORIDE 0.2 MG: 0.2 INJECTION, SOLUTION INTRAMUSCULAR; INTRAVENOUS; SUBCUTANEOUS at 10:22

## 2025-06-08 RX ADMIN — OXYCODONE HYDROCHLORIDE 5 MG: 5 TABLET ORAL at 09:13

## 2025-06-08 RX ADMIN — CEFAZOLIN SODIUM 2000 MG: 2 SOLUTION INTRAVENOUS at 00:29

## 2025-06-08 RX ADMIN — FAMOTIDINE 20 MG: 20 TABLET, FILM COATED ORAL at 09:13

## 2025-06-08 RX ADMIN — OXYCODONE HYDROCHLORIDE 5 MG: 5 TABLET ORAL at 04:01

## 2025-06-08 RX ADMIN — HYDROMORPHONE HYDROCHLORIDE 0.2 MG: 0.2 INJECTION, SOLUTION INTRAMUSCULAR; INTRAVENOUS; SUBCUTANEOUS at 00:30

## 2025-06-08 RX ADMIN — OXYCODONE HYDROCHLORIDE 5 MG: 5 TABLET ORAL at 15:11

## 2025-06-08 NOTE — ASSESSMENT & PLAN NOTE
Lab Results   Component Value Date    EGFR 45 06/08/2025    EGFR 57 06/07/2025    EGFR 55 06/06/2025    CREATININE 1.48 (H) 06/08/2025    CREATININE 1.21 06/07/2025    CREATININE 1.25 06/06/2025

## 2025-06-08 NOTE — ANESTHESIA POSTPROCEDURE EVALUATION
Post-Op Assessment Note    CV Status:  Stable    Pain management: adequate       Mental Status:  Alert and awake   Hydration Status:  Euvolemic   PONV Controlled:  Controlled   Airway Patency:  Patent     Post Op Vitals Reviewed: Yes    No anethesia notable event occurred.    Staff: Anesthesiologist           Last Filed PACU Vitals:  Vitals Value Taken Time   Temp 98 °F (36.7 °C) 06/07/25 20:00   Pulse 90 06/07/25 20:00   /57 06/07/25 20:00   Resp 18 06/07/25 20:00   SpO2 98 % 06/07/25 20:00       Modified Ant:     Vitals Value Taken Time   Activity 2 06/07/25 20:00   Respiration 2 06/07/25 20:00   Circulation 2 06/07/25 20:00   Consciousness 1 06/07/25 20:00   Oxygen Saturation 2 06/07/25 20:00     Modified Ant Score: 9             Pt states that she has been having abd pain since Tuesday. Pt states that she has a hx of bowel obstructions so she went to her PCP and had an x ray performed. Pt states that she was advised by her MD to come to the emergency room due to suspected bowel obstruction.

## 2025-06-08 NOTE — PROGRESS NOTES
"Progress Note - Trauma   Name: Prince Andrews 75 y.o. male I MRN: [id number]  Unit/Bed#: W -01 I Date of Admission: 6/5/2025   Date of Service: 6/8/2025 I Hospital Day: 3    Assessment & Plan  Atrial fibrillation, chronic (HCC)  Eliqous and Plavix on hold  Lovenox til surgery  Benign essential hypertension  Home meds  Chronic obstructive lung disease (HCC)  - Reports using albuterol  - Albuterol inhaler and nebulizer PRN  Pstient using 2 liters nasal O2 for comfort  Uncontrolled type 2 diabetes mellitus with hyperglycemia (HCC)  Lab Results   Component Value Date    HGBA1C 5.8 (H) 06/06/2025       Recent Labs     06/07/25  1532 06/07/25  1937 06/08/25  0717 06/08/25  1032   POCGLU 102 172* 128 179*       Blood Sugar Average: Last 72 hrs:  (P) 144.2257144637975929    Stage 3 chronic kidney disease (HCC)  Lab Results   Component Value Date    EGFR 45 06/08/2025    EGFR 57 06/07/2025    EGFR 55 06/06/2025    CREATININE 1.48 (H) 06/08/2025    CREATININE 1.21 06/07/2025    CREATININE 1.25 06/06/2025     Dementia (HCC)  Oriented to person and place  Right acetabular fracture (HCC)    At risk for delirium      Bowel Regimen: Miralax  VTE Prophylaxis:Lovenox     Disposition: rehab    24 Hour Events : uneventful  Subjective : I am hungry\"    Objective :  Temp:  [97 °F (36.1 °C)-98.2 °F (36.8 °C)] 97.7 °F (36.5 °C)  HR:  [] 95  BP: ()/(56-93) 120/56  Resp:  [16-20] 16  SpO2:  [94 %-100 %] 99 %  O2 Device: None (Room air)  Nasal Cannula O2 Flow Rate (L/min):  [3 L/min-5 L/min] 3 L/min    I/O         06/06 0701 06/07 0700 06/07 0701  06/08 0700 06/08 0701  06/09 0700    P.O. 720 237 240    I.V. (mL/kg)  1980 (24.9)     IV Piggyback  550     Total Intake(mL/kg) 720 (9.1) 2767 (34.8) 240 (3)    Urine (mL/kg/hr) 1110 (0.6) 1050 (0.6)     Emesis/NG output  0     Stool 0 0     Blood  1000     Total Output 1110 2050     Net -390 +717 +240           Unmeasured Urine Occurrence 2 x      Unmeasured Stool Occurrence 0 x " 0 x           Lines/Drains/Airways       Active Status       Name Placement date Placement time Site Days    Urethral Catheter Latex 16 Fr. 06/07/25  1700  Latex  less than 1                  Physical Exam  Constitutional:       Appearance: Normal appearance.   HENT:      Head: Normocephalic and atraumatic.      Right Ear: External ear normal.      Left Ear: External ear normal.      Nose: Nose normal.      Mouth/Throat:      Pharynx: Oropharynx is clear.     Eyes:      Extraocular Movements: Extraocular movements intact.      Pupils: Pupils are equal, round, and reactive to light.      Comments:  A little sleepy today     Cardiovascular:      Rate and Rhythm: Normal rate and regular rhythm.      Pulses: Normal pulses.      Heart sounds: Normal heart sounds.   Pulmonary:      Effort: Pulmonary effort is normal.      Breath sounds: Normal breath sounds.   Abdominal:      Palpations: Abdomen is soft.     Musculoskeletal:      Comments: POD 1     Skin:     General: Skin is warm and dry.      Capillary Refill: Capillary refill takes less than 2 seconds.     Neurological:      General: No focal deficit present.      Mental Status: He is alert and oriented to person, place, and time.     Psychiatric:         Mood and Affect: Mood normal.         Behavior: Behavior normal.               Lab Results: I have reviewed the following results:  Recent Labs     06/06/25  1233 06/06/25  1413 06/06/25  1728 06/07/25  1855 06/08/25  0515   WBC 10.64*  --    < >  --  9.32   HGB 14.0  --    < > 12.2 9.6*   HCT 45.1  --    < > 36* 30.6*     --    < >  --  186   SODIUM 135  --    < >  --  135   K 4.7  --    < >  --  5.1     --    < >  --  101   CO2 26  --    < > 30 26   BUN 30*  --    < >  --  28*   CREATININE 1.25  --    < >  --  1.48*   GLUC 148*  --    < >  --  137   CAIONIZED  --   --    < > 1.16  --    AST 22  --   --   --   --    ALT 8  --   --   --   --    ALB 3.7  --   --   --   --    TBILI 0.64  --   --   --   --     ALKPHOS 56  --   --   --   --    HSTNI0 21  --   --   --   --    HSTNI2  --  22  --   --   --    LACTICACID 1.6  --   --   --   --     < > = values in this interval not displayed.

## 2025-06-08 NOTE — OCCUPATIONAL THERAPY NOTE
Occupational Therapy Evaluation     Patient Name: Prince Andrews  Today's Date: 6/8/2025  Problem List  Active Problems:    Atrial fibrillation, chronic (HCC)    Benign essential hypertension    Chronic obstructive lung disease (HCC)    Uncontrolled type 2 diabetes mellitus with hyperglycemia (HCC)    Stage 3 chronic kidney disease (HCC)    Dementia (HCC)    Right acetabular fracture (HCC)    At risk for delirium    Past Medical History  Past Medical History[1]  Past Surgical History  Past Surgical History[2]      06/08/25 1050   Note Type   Note type Evaluation   Pain Assessment   Pain Assessment Tool FLACC   Pain Location/Orientation Location: Leg   Pain Rating: FLACC (Rest) - Face 0   Pain Rating: FLACC (Rest) - Legs 0   Pain Rating: FLACC (Rest) - Activity 0   Pain Rating: FLACC (Rest) - Cry 0   Pain Rating: FLACC (Rest) - Consolability 0   Score: FLACC (Rest) 0   Pain Rating: FLACC (Activity) - Face 1   Pain Rating: FLACC (Activity) - Legs 0   Pain Rating: FLACC (Activity) - Activity 1   Pain Rating: FLACC (Activity) - Cry 1   Pain Rating: FLACC (Activity) - Consolability 1   Score: FLACC (Activity) 4   Restrictions/Precautions   Weight Bearing Precautions Per Order Yes   RLE Weight Bearing Per Order (S)  TTWB   Other Precautions Cognitive;Chair Alarm;Bed Alarm;Fall Risk;Pain;O2  (3 L NC)   Home Living   Type of Home SNF  ([facility])   Additional Comments Patient is a poor historian.Per notes patient receives assist with IADL and med management. Patient is WC bound and performs transfers.   Prior Function   Lives With Facility staff   Receives Help From Personal care attendant   IADLs Family/Friend/Other provides transportation;Family/Friend/Other provides meals;Family/Friend/Other provides medication management   Falls in the last 6 months 1 to 4  (This admission)   ADL   Eating Assistance 7  Independent   Grooming Assistance 5  Supervision/Setup   UB Bathing Assistance 5  Supervision/Setup   LB  Bathing Assistance 4  Minimal Assistance   UB Dressing Assistance 5  Supervision/Setup   LB Dressing Assistance 2  Maximal Assistance   Toileting Assistance  2  Maximal Assistance   Additional Comments Lb dressing/ bathing and toileting levels are based on clinical judegement and observation   Bed Mobility   Supine to Sit 2  Maximal assistance   Additional items Assist x 2;Increased time required;Verbal cues;LE management   Sit to Supine 2  Maximal assistance   Additional items Assist x 2;Increased time required;Verbal cues;LE management   Additional Comments Patient was to perform ADL at EOB and maintain sitting balance to perform tasks   Transfers   Sit to Stand 2  Maximal assistance   Additional items Assist x 2;Increased time required;Verbal cues   Stand to Sit 2  Maximal assistance   Additional items Assist x 2;Increased time required;Verbal cues   Additional Comments Patient performed 3 STS transfers at EOB was able to clear off the bed on 3 trial for less then 3 secs. Patient was unable to maintain NWB on third trial and return to bed.   Functional Mobility   Additional Comments unable to assess   Balance   Static Sitting Fair +   Dynamic Sitting Fair   Static Standing Zero   Activity Tolerance   Activity Tolerance Patient limited by pain   Nurse Made Aware RN   RUE Assessment   RUE Assessment WFL   LUE Assessment   LUE Assessment WFL   Cognition   Orientation Level Oriented to person;Disoriented to time;Oriented to situation   Following Commands Follows one step commands with increased time or repetition   Comments Pt ID by wristband name and    Assessment   Limitation Decreased ADL status;Decreased UE strength;Decreased Safe judgement during ADL;Decreased endurance;Decreased self-care trans;Decreased high-level ADLs   Prognosis Fair   Assessment Patient evaluated by Occupational Therapy.  Patient admitted with Rt acetabular FX and s/p ORIF pelvis on .  The patients occupational profile, medical and  "therapy history includes a expanded review of medical and/or therapy records and additional review of physical, cognitive, or psychosocial history related to current functional performance.  Comorbidities affecting functional mobility and ADLS include: Afib, COPD, dementia, diabetes, and hypertension.  Prior to admission, patient was requiring assist for IADLS and a resident of long term care.  See above for performance levels.The evaluation identifies the following performance deficits: weakness, impaired balance, decreased endurance, decreased coordination, increased fall risk, new onset of impairment of functional mobility, decreased ADLS, decreased IADLS, decreased activity tolerance, decreased safety awareness, impaired judgement, orthopedic restrictions, and decreased cognition, that result in activity limitations and/or participation restrictions. This evaluation requires clinical decision making of high complexity, because the patient presents with comorbidites that affect occupational performance and required significant modification of tasks or assistance with consideration of multiple treatment options.  The Barthel Index was used as a functional outcome tool presenting with a score of Barthel Index Score: 25, The patient's raw score on the AM-PAC Daily Activity Inpatient Short Form is 16. A raw score of less than 19 suggests the patient may benefit from discharge to post-acute rehabilitation services. Please refer to the recommendation of the Occupational Therapist for safe discharge planning. Patient will benefit from skilled Occupational Therapy services to address above deficits and facilitate a safe return to prior level of function.   Goals   Patient Goals \"unable to express due to cognition\"   LTG Time Frame   (8-14)   Long Term Goal #1 see below   Plan   Treatment Interventions ADL retraining;Functional transfer training;Endurance training;Patient/family training;Neuromuscular " reeducation;Compensatory technique education;Continued evaluation;Energy conservation;Activityengagement   Goal Expiration Date 06/22/25   OT Frequency 3-5x/wk   Discharge Recommendation   Rehab Resource Intensity Level, OT II (Moderate Resource Intensity)   AM-PAC Daily Activity Inpatient   Lower Body Dressing 2   Bathing 2   Toileting 2   Upper Body Dressing 3   Grooming 3   Eating 4   Daily Activity Raw Score 16   Daily Activity Standardized Score (Calc for Raw Score >=11) 35.96   AM-PAC Applied Cognition Inpatient   Following a Speech/Presentation 3   Understanding Ordinary Conversation 3   Taking Medications 1   Remembering Where Things Are Placed or Put Away 2   Remembering List of 4-5 Errands 2   Taking Care of Complicated Tasks 2   Applied Cognition Raw Score 13   Applied Cognition Standardized Score 30.46   Barthel Index   Feeding 10   Bathing 0   Grooming Score 0   Dressing Score 0   Bladder Score 0   Bowels Score 10   Toilet Use Score 0   Transfers (Bed/Chair) Score 5   Mobility (Level Surface) Score 0   Stairs Score 0   Barthel Index Score 25   End of Consult   Patient Position at End of Consult Bed/Chair alarm activated;All needs within reach   Nurse Communication Nurse aware of consult   GOALS    1) Pt will improve activity tolerance to G for 30 min txment sessions for increased engagement in functional tasks    2) Pt will complete UB dressing/self care w/ mod I using adaptive device and DME as needed    3) Pt will complete UB bathing w/ Mod I w/ use of AE and DME as needed    4) Pt will complete toileting w/ Supervision w/ G hygiene/thoroughness using DME as needed    5) Pt will improve functional transfers to Supervision on/off all surfaces using DME as needed w/ F+ balance/safety   6) Pt will improve bed mobility to Supervision  and sit EOB w/ G balance/trunk control as a prerequisite for further engagement in meaningful tasks       [1]   Past Medical History:  Diagnosis Date    Acute on chronic  respiratory failure (HCC) 1/30/2017    Anemia 3/29/2020    Colonic polyp 2012    COPD exacerbation (HCC) 4/20/2019    Disc displacement, cervical     Peripheral vascular disease (HCC) 2011    Femoral- popliteal bypass   [2]   Past Surgical History:  Procedure Laterality Date    BYPASS FEMORAL-POPLITEAL  2011    COLONOSCOPY  2012    With polypectomy    CORONARY ARTERY BYPASS GRAFT  2012    LAMINECTOMY  2010

## 2025-06-08 NOTE — PLAN OF CARE
Problem: PAIN - ADULT  Goal: Verbalizes/displays adequate comfort level or baseline comfort level  Description: Interventions:  - Encourage patient to monitor pain and request assistance  - Assess pain using appropriate pain scale  - Administer analgesics as ordered based on type and severity of pain and evaluate response  - Implement non-pharmacological measures as appropriate and evaluate response  - Consider cultural and social influences on pain and pain management  - Notify physician/advanced practitioner if interventions unsuccessful or patient reports new pain  - Educate patient/family on pain management process including their role and importance of  reporting pain   - Provide non-pharmacologic/complimentary pain relief interventions  Outcome: Progressing     Problem: INFECTION - ADULT  Goal: Absence or prevention of progression during hospitalization  Description: INTERVENTIONS:  - Assess and monitor for signs and symptoms of infection  - Monitor lab/diagnostic results  - Monitor all insertion sites, i.e. indwelling lines, tubes, and drains  - Monitor endotracheal if appropriate and nasal secretions for changes in amount and color  - [place] appropriate cooling/warming therapies per order  - Administer medications as ordered  - Instruct and encourage patient and family to use good hand hygiene technique  - Identify and instruct in appropriate isolation precautions for identified infection/condition  Outcome: Progressing  Goal: Absence of fever/infection during neutropenic period  Description: INTERVENTIONS:  - Monitor WBC  - Perform strict hand hygiene  - Limit to healthy visitors only  - No plants, dried, fresh or silk flowers with [place] in patient room  Outcome: Progressing     Problem: SAFETY ADULT  Goal: Patient will remain free of falls  Description: INTERVENTIONS:  - Educate patient/family on patient safety including physical limitations  - Instruct patient to call for assistance with activity   -  Consider consulting OT/PT to assist with strengthening/mobility based on AM PAC & JH-HLM score  - Consult OT/PT to assist with strengthening/mobility   - Keep Call bell within reach  - Keep bed low and locked with side rails adjusted as appropriate  - Keep care items and personal belongings within reach  - Initiate and maintain comfort rounds  - Make Fall Risk Sign visible to staff  - Offer Toileting every  Hours, in advance of need  - Initiate/Maintain alarm  - Obtain necessary fall risk management equipment:   - Apply yellow socks and bracelet for high fall risk patients  - Consider moving patient to room near nurses station  Outcome: Progressing  Goal: Maintain or return to baseline ADL function  Description: INTERVENTIONS:  -  Assess patient's ability to carry out ADLs; assess patient's baseline for ADL function and identify physical deficits which impact ability to perform ADLs (bathing, care of mouth/teeth, toileting, grooming, dressing, etc.)  - Assess/evaluate cause of self-care deficits   - Assess range of motion  - Assess patient's mobility; develop plan if impaired  - Assess patient's need for assistive devices and provide as appropriate  - Encourage maximum independence but intervene and supervise when necessary  - Involve family in performance of ADLs  - Assess for home care needs following discharge   - Consider OT consult to assist with ADL evaluation and planning for discharge  - Provide patient education as appropriate  - Monitor functional capacity and physical performance, use of AM PAC & JH-HLM   - Monitor gait, balance and fatigue with ambulation    Outcome: Progressing  Goal: Maintains/Returns to pre admission functional level  Description: INTERVENTIONS:  - Perform AM-PAC 6 Click Basic Mobility/ Daily Activity assessment daily.  - Set and communicate daily mobility goal to care team and patient/family/caregiver.   - Collaborate with rehabilitation services on mobility goals if consulted  -  Perform Range of Motion times a day.  - Reposition patient every  hours.  - Dangle patient  times a day  - Stand patient  times a day  - Ambulate patient  times a day  - Out of bed to chair  times a day   - Out of bed for meals  times a day  - Out of bed for toileting  - Record patient progress and toleration of activity level   Outcome: Progressing     Problem: DISCHARGE PLANNING  Goal: Discharge to home or other facility with appropriate resources  Description: INTERVENTIONS:  - Identify barriers to discharge w/patient and caregiver  - Arrange for needed discharge resources and transportation as appropriate  - Identify discharge learning needs (meds, wound care, etc.)  - Arrange for interpretive services to assist at discharge as needed  - Refer to Case Management Department for coordinating discharge planning if the patient needs post-hospital services based on physician/advanced practitioner order or complex needs related to functional status, cognitive ability, or social support system  Outcome: Progressing     Problem: Knowledge Deficit  Goal: Patient/family/caregiver demonstrates understanding of disease process, treatment plan, medications, and discharge instructions  Description: Complete learning assessment and assess knowledge base.  Interventions:  - Provide teaching at level of understanding  - Provide teaching via preferred learning methods  Outcome: Progressing     Problem: Prexisting or High Potential for Compromised Skin Integrity  Goal: Skin integrity is maintained or improved  Description: INTERVENTIONS:  - Identify patients at risk for skin breakdown  - Assess and monitor skin integrity including under and around medical devices   - Assess and monitor nutrition and hydration status  - Monitor labs  - Assess for incontinence   - Turn and reposition patient  - Assist with mobility/ambulation  - Relieve pressure over karli prominences   - Avoid friction and shearing  - Provide appropriate hygiene as  needed including keeping skin clean and dry  - Evaluate need for skin moisturizer/barrier cream  - Collaborate with interdisciplinary team  - Patient/family teaching  - Consider wound care consult    Assess:  - Review James scale daily  - Clean and moisturize skin every - Inspect skin when repositioning, toileting, and assisting with ADLS  - Assess under medical devices such as  every   - Assess extremities for adequate circulation and sensation     Bed Management:  - Have minimal linens on bed & keep smooth, unwrinkled  - Change linens as needed when moist or perspiring  - Avoid sitting or lying in one position for more than  hours while in bed?Keep HOB at egrees   - Toileting:  - Offer bedside commode  - Assess for incontinence every   - Use incontinent care products after each incontinent episode such as     Activity:  - Mobilize patient times a day  - Encourage activity and walks on unit  - Encourage or provide ROM exercises   - Turn and reposition patient every  Hours  - Use appropriate equipment to lift or move patient in bed  - Instruct/ Assist with weight shifting every  when out of bed in chair  - Consider limitation of chair time  hour intervals    Skin Care:  - Avoid use of baby powder, tape, friction and shearing, hot water or constrictive clothing  - Relieve pressure over bony prominences using   - Do not massage red bony areas    Next Steps:  - Teach patient strategies to minimize risks such as   - Consider consults to  interdisciplinary teams such as Outcome: Progressing     Problem: Nutrition/Hydration-ADULT  Goal: Nutrient/Hydration intake appropriate for improving, restoring or maintaining nutritional needs  Description: Monitor and assess patient's nutrition/hydration status for malnutrition. Collaborate with interdisciplinary team and initiate plan and interventions as ordered.  Monitor patient's weight and dietary intake as ordered or per policy. Utilize nutrition screening tool and intervene  as necessary. Determine patient's food preferences and provide high-protein, high-caloric foods as appropriate.     INTERVENTIONS:  - Monitor oral intake, urinary output, labs, and treatment plans  - Assess nutrition and hydration status and recommend course of action  - Evaluate amount of meals eaten  - Assist patient with eating if necessary   - Allow adequate time for meals  - Recommend/ encourage appropriate diets, oral nutritional supplements, and vitamin/mineral supplements  - Order, calculate, and assess calorie counts as needed  - Recommend, monitor, and adjust tube feedings and TPN/PPN based on assessed needs  - Assess need for intravenous fluids  - Provide specific nutrition/hydration education as appropriate  - Include patient/family/caregiver in decisions related to nutrition  Outcome: Progressing

## 2025-06-08 NOTE — PLAN OF CARE
Problem: OCCUPATIONAL THERAPY ADULT  Goal: Performs self-care activities at highest level of function for planned discharge setting.  See evaluation for individualized goals.  Description: Treatment Interventions: ADL retraining, Functional transfer training, Endurance training, Patient/family training, Neuromuscular reeducation, Compensatory technique education, Continued evaluation, Energy conservation, Activityengagement          See flowsheet documentation for full assessment, interventions and recommendations.   Note: Limitation: Decreased ADL status, Decreased UE strength, Decreased Safe judgement during ADL, Decreased endurance, Decreased self-care trans, Decreased high-level ADLs  Prognosis: Fair  Assessment: Patient evaluated by Occupational Therapy.  Patient admitted with Rt acetabular FX and s/p ORIF pelvis on 6/7.  The patients occupational profile, medical and therapy history includes a expanded review of medical and/or therapy records and additional review of physical, cognitive, or psychosocial history related to current functional performance.  Comorbidities affecting functional mobility and ADLS include: Afib, COPD, dementia, diabetes, and hypertension.  Prior to admission, patient was requiring assist for IADLS and a resident of long term care.  See above for performance levels.The evaluation identifies the following performance deficits: weakness, impaired balance, decreased endurance, decreased coordination, increased fall risk, new onset of impairment of functional mobility, decreased ADLS, decreased IADLS, decreased activity tolerance, decreased safety awareness, impaired judgement, orthopedic restrictions, and decreased cognition, that result in activity limitations and/or participation restrictions. This evaluation requires clinical decision making of high complexity, because the patient presents with comorbidites that affect occupational performance and required significant modification of  tasks or assistance with consideration of multiple treatment options.  The Barthel Index was used as a functional outcome tool presenting with a score of Barthel Index Score: 25, The patient's raw score on the -PAC Daily Activity Inpatient Short Form is 16. A raw score of less than 19 suggests the patient may benefit from discharge to post-acute rehabilitation services. Please refer to the recommendation of the Occupational Therapist for safe discharge planning. Patient will benefit from skilled Occupational Therapy services to address above deficits and facilitate a safe return to prior level of function.     Rehab Resource Intensity Level, OT: II (Moderate Resource Intensity)

## 2025-06-08 NOTE — PLAN OF CARE
Problem: PHYSICAL THERAPY ADULT  Goal: Performs mobility at highest level of function for planned discharge setting.  See evaluation for individualized goals.  Description: Treatment/Interventions: Functional transfer training, LE strengthening/ROM, Endurance training, Therapeutic exercise, Cognitive reorientation, Patient/family training, Bed mobility, Equipment eval/education (WC training)          See flowsheet documentation for full assessment, interventions and recommendations.  6/8/2025 1224 by Emely Mata PT  Note:    Problem List: Decreased strength, Decreased endurance, Impaired balance, Decreased mobility, Decreased cognition, Impaired judgement, Decreased safety awareness, Decreased skin integrity, Orthopedic restrictions, Pain  Assessment: Prince Andrews is a 75 y.o. Male who [facility] on [facility] due to fall. Orders for PT eval and treat received. Comorbidities affecting pt's functional mobility at time of evaluation include: COPD, dementia, DM, HTN, PVD, peripheral sensory neuropathy, anemia, R acetabular fx. Personal factors affecting DC include: inability to navigate level surfaces w/o external assistance, decreased cognition, and positive fall history. Upon evaluation, pt presents w/ the following deficits: impaired strength, impaired skin integrity, impaired balance, impaired cognition, decreased safety awareness, decreased endurance/activity tolerance, and pain affecting mobility. Pt currently requires max Ax2 for bed mobility, max Ax2 for transfer trials. Pt's clinical presentation is unstable/unpredictable due to abnormal lab values, pain affecting mobility tolerance, need for input for mobility technique, need for input for task focus, need to input for safety awareness, recent h/o falls, ongoing medical management. From a PT/mobility standpoint given the above findings, DC recommendation is level: II (Moderate Rehab Resource Intensity). During current admission, pt will benefit from  continued skilled inpatient PT in the acute care setting in order to address the above deficits and to maximize function and mobility prior to DC from acute care.        Rehab Resource Intensity Level, PT: II (Moderate Resource Intensity)    See flowsheet documentation for full assessment.

## 2025-06-08 NOTE — PLAN OF CARE
Problem: PAIN - ADULT  Goal: Verbalizes/displays adequate comfort level or baseline comfort level  Description: Interventions:  - Encourage patient to monitor pain and request assistance  - Assess pain using appropriate pain scale  - Administer analgesics as ordered based on type and severity of pain and evaluate response  - Implement non-pharmacological measures as appropriate and evaluate response  - Consider cultural and social influences on pain and pain management  - Notify physician/advanced practitioner if interventions unsuccessful or patient reports new pain  - Educate patient/family on pain management process including their role and importance of  reporting pain   - Provide non-pharmacologic/complimentary pain relief interventions  Outcome: Progressing     Problem: INFECTION - ADULT  Goal: Absence or prevention of progression during hospitalization  Description: INTERVENTIONS:  - Assess and monitor for signs and symptoms of infection  - Monitor lab/diagnostic results  - Monitor all insertion sites, i.e. indwelling lines, tubes, and drains  - Monitor endotracheal if appropriate and nasal secretions for changes in amount and color  - [place] appropriate cooling/warming therapies per order  - Administer medications as ordered  - Instruct and encourage patient and family to use good hand hygiene technique  - Identify and instruct in appropriate isolation precautions for identified infection/condition  Outcome: Progressing  Goal: Absence of fever/infection during neutropenic period  Description: INTERVENTIONS:  - Monitor WBC  - Perform strict hand hygiene  - Limit to healthy visitors only  - No plants, dried, fresh or silk flowers with [place] in patient room  Outcome: Progressing     Problem: SAFETY ADULT  Goal: Patient will remain free of falls  Description: INTERVENTIONS:  - Educate patient/family on patient safety including physical limitations  - Instruct patient to call for assistance with activity   -  Consider consulting OT/PT to assist with strengthening/mobility based on AM PAC & JH-HLM score  - Consult OT/PT to assist with strengthening/mobility   - Keep Call bell within reach  - Keep bed low and locked with side rails adjusted as appropriate  - Keep care items and personal belongings within reach  - Initiate and maintain comfort rounds  - Make Fall Risk Sign visible to staff  - Offer Toileting every  Hours, in advance of need  - Initiate/Maintain alarm  - Obtain necessary fall risk management equipment:  - Apply yellow socks and bracelet for high fall risk patients  - Consider moving patient to room near nurses station  Outcome: Progressing  Goal: Maintain or return to baseline ADL function  Description: INTERVENTIONS:  -  Assess patient's ability to carry out ADLs; assess patient's baseline for ADL function and identify physical deficits which impact ability to perform ADLs (bathing, care of mouth/teeth, toileting, grooming, dressing, etc.)  - Assess/evaluate cause of self-care deficits   - Assess range of motion  - Assess patient's mobility; develop plan if impaired  - Assess patient's need for assistive devices and provide as appropriate  - Encourage maximum independence but intervene and supervise when necessary  - Involve family in performance of ADLs  - Assess for home care needs following discharge   - Consider OT consult to assist with ADL evaluation and planning for discharge  - Provide patient education as appropriate  - Monitor functional capacity and physical performance, use of AM PAC & JH-HLM   - Monitor gait, balance and fatigue with ambulation    Outcome: Progressing  Goal: Maintains/Returns to pre admission functional level  Description: INTERVENTIONS:  - Perform AM-PAC 6 Click Basic Mobility/ Daily Activity assessment daily.  - Set and communicate daily mobility goal to care team and patient/family/caregiver.   - Collaborate with rehabilitation services on mobility goals if consulted  -  Perform Range of Motion  times a day.  - Reposition patient every  hours.  - Dangle patient  times a day  - Stand patient  times a day  - Ambulate patient  times a day  - Out of bed to chair  times a day   - Out of bed for meals times a day  - Out of bed for toileting  - Record patient progress and toleration of activity level   Outcome: Progressing     Problem: DISCHARGE PLANNING  Goal: Discharge to home or other facility with appropriate resources  Description: INTERVENTIONS:  - Identify barriers to discharge w/patient and caregiver  - Arrange for needed discharge resources and transportation as appropriate  - Identify discharge learning needs (meds, wound care, etc.)  - Arrange for interpretive services to assist at discharge as needed  - Refer to Case Management Department for coordinating discharge planning if the patient needs post-hospital services based on physician/advanced practitioner order or complex needs related to functional status, cognitive ability, or social support system  Outcome: Progressing     Problem: Knowledge Deficit  Goal: Patient/family/caregiver demonstrates understanding of disease process, treatment plan, medications, and discharge instructions  Description: Complete learning assessment and assess knowledge base.  Interventions:  - Provide teaching at level of understanding  - Provide teaching via preferred learning methods  Outcome: Progressing     Problem: Prexisting or High Potential for Compromised Skin Integrity  Goal: Skin integrity is maintained or improved  Description: INTERVENTIONS:  - Identify patients at risk for skin breakdown  - Assess and monitor skin integrity including under and around medical devices   - Assess and monitor nutrition and hydration status  - Monitor labs  - Assess for incontinence   - Turn and reposition patient  - Assist with mobility/ambulation  - Relieve pressure over karli prominences   - Avoid friction and shearing  - Provide appropriate hygiene as  needed including keeping skin clean and dry  - Evaluate need for skin moisturizer/barrier cream  - Collaborate with interdisciplinary team  - Patient/family teaching  - Consider wound care consult    Assess:  - Review James scale daily  - Clean and moisturize skin every   - Inspect skin when repositioning, toileting, and assisting with ADLS  - Assess under medical devices such as  every   - Assess extremities for adequate circulation and sensation     Bed Management:  - Have minimal linens on bed & keep smooth, unwrinkled  - Change linens as needed when moist or perspiring  - Avoid sitting or lying in one position for more than  hours while in bed?Keep HOB at degrees   - Toileting:  - Offer bedside commode  - Assess for incontinence every   - Use incontinent care products after each incontinent episode such as     Activity:  - Mobilize patient  times a day  - Encourage activity and walks on unit  - Encourage or provide ROM exercises   - Turn and reposition patient every  Hours  - Use appropriate equipment to lift or move patient in bed  - Instruct/ Assist with weight shifting every  when out of bed in chair  - Consider limitation of chair time  hour intervals    Skin Care:  - Avoid use of baby powder, tape, friction and shearing, hot water or constrictive clothing  - Relieve pressure over bony prominences using   - Do not massage red bony areas    Next Steps:  - Teach patient strategies to minimize risks such as   - Consider consults to  interdisciplinary teams such as   Outcome: Progressing     Problem: Nutrition/Hydration-ADULT  Goal: Nutrient/Hydration intake appropriate for improving, restoring or maintaining nutritional needs  Description: Monitor and assess patient's nutrition/hydration status for malnutrition. Collaborate with interdisciplinary team and initiate plan and interventions as ordered.  Monitor patient's weight and dietary intake as ordered or per policy. Utilize nutrition screening tool and  intervene as necessary. Determine patient's food preferences and provide high-protein, high-caloric foods as appropriate.     INTERVENTIONS:  - Monitor oral intake, urinary output, labs, and treatment plans  - Assess nutrition and hydration status and recommend course of action  - Evaluate amount of meals eaten  - Assist patient with eating if necessary   - Allow adequate time for meals  - Recommend/ encourage appropriate diets, oral nutritional supplements, and vitamin/mineral supplements  - Order, calculate, and assess calorie counts as needed  - Recommend, monitor, and adjust tube feedings and TPN/PPN based on assessed needs  - Assess need for intravenous fluids  - Provide specific nutrition/hydration education as appropriate  - Include patient/family/caregiver in decisions related to nutrition  Outcome: Progressing

## 2025-06-08 NOTE — PLAN OF CARE
Problem: PAIN - ADULT  Goal: Verbalizes/displays adequate comfort level or baseline comfort level  Description: Interventions:  - Encourage patient to monitor pain and request assistance  - Assess pain using appropriate pain scale  - Administer analgesics as ordered based on type and severity of pain and evaluate response  - Implement non-pharmacological measures as appropriate and evaluate response  - Consider cultural and social influences on pain and pain management  - Notify physician/advanced practitioner if interventions unsuccessful or patient reports new pain  - Educate patient/family on pain management process including their role and importance of  reporting pain   - Provide non-pharmacologic/complimentary pain relief interventions  Outcome: Progressing     Problem: INFECTION - ADULT  Goal: Absence or prevention of progression during hospitalization  Description: INTERVENTIONS:  - Assess and monitor for signs and symptoms of infection  - Monitor lab/diagnostic results  - Monitor all insertion sites, i.e. indwelling lines, tubes, and drains  - Monitor endotracheal if appropriate and nasal secretions for changes in amount and color  - [place] appropriate cooling/warming therapies per order  - Administer medications as ordered  - Instruct and encourage patient and family to use good hand hygiene technique  - Identify and instruct in appropriate isolation precautions for identified infection/condition  Outcome: Progressing       Problem: Knowledge Deficit  Goal: Patient/family/caregiver demonstrates understanding of disease process, treatment plan, medications, and discharge instructions  Description: Complete learning assessment and assess knowledge base.  Interventions:  - Provide teaching at level of understanding  - Provide teaching via preferred learning methods  Outcome: Progressing

## 2025-06-08 NOTE — PROGRESS NOTES
Progress Note - Orthopedics   Name: Prince Andrews 75 y.o. male I MRN: [id number]  Unit/Bed#: W -01 I Date of Admission: 6/5/2025   Date of Service: 6/8/2025 I Hospital Day: 3     Assessment & Plan  Right acetabular fracture (HCC)  POD1 s/p ORIF pelvis with Dr. Vázquez 6/8/2025  TTWB to right lower extremity  Patient is wheelchair-bound at baseline, only stands to transfer  Does not take any steps or walk any significant distance at baseline  PT/OT for further gait assessment  Will continue to monitor for ABLA and administer IVF/prbc as indicated for Greater than 2 gram drop or Hgb < 7.  Patient hemoglobin currently at 9.6  Pain control per primary team  DVT ppx  : Per primary team  All other medical comorbidities to be managed per primary team  Case reviewed and discussed with Dr. Vázquez      Dementia ([facility])    At risk for delirium    Orthopedics service will follow.  Please contact the SecureChat role for the Orthopedics service with any questions/concerns.    Subjective   75 y.o.male seen and examined at bedside.  He admits to some pain of the right hip however admits to relief from as needed medication.  He is resting comfortably in bed.    Objective :  Temp:  [97 °F (36.1 °C)-98.2 °F (36.8 °C)] 97.1 °F (36.2 °C)  HR:  [] 99  BP: ()/(57-93) 115/61  Resp:  [16-20] 16  SpO2:  [94 %-100 %] 97 %  O2 Device: Nasal cannula  Nasal Cannula O2 Flow Rate (L/min):  [3 L/min-5 L/min] 3 L/min    Physical Exam  Musculoskeletal: Right lower extremity  Skin warm and dry. No erythema or ecchymosis.  Vascular Status intact  Neurologic Status intact  Dressing clean dry and intact without strikethrough  Motor intact to +FHL/EHL, +ankle dorsi/plantar flexion  Sensation intact to saphenous, sural, tibial, superficial peroneal nerve, and deep peroneal  2+ DP pulse  No calf swelling or tenderness to palpation      Lab Results: I have reviewed the following results:  Recent Labs     06/06/25  1233 06/06/25  4867  "06/07/25  0306 06/07/25  1824 06/07/25  1855 06/08/25  0515   WBC 10.64*  --  10.48*  --   --  9.32   HGB 14.0   < > 14.2 11.9* 12.2 9.6*   HCT 45.1   < > 45.0 35* 36* 30.6*     --  172  --   --  186   BUN 30*  --  25  --   --  28*   CREATININE 1.25  --  1.21  --   --  1.48*    < > = values in this interval not displayed.     Blood Culture:  No results found for: \"BLOODCX\"  Wound Culture: No results found for: \"WOUNDCULT\"    "

## 2025-06-08 NOTE — ASSESSMENT & PLAN NOTE
Lab Results   Component Value Date    HGBA1C 5.8 (H) 06/06/2025       Recent Labs     06/07/25  1532 06/07/25  1937 06/08/25  0717 06/08/25  1032   POCGLU 102 172* 128 179*       Blood Sugar Average: Last 72 hrs:  (P) [id number]

## 2025-06-08 NOTE — ASSESSMENT & PLAN NOTE
POD1 s/p ORIF pelvis with Dr. Vázquez 6/8/2025  TTWB to right lower extremity  Patient is wheelchair-bound at baseline, only stands to transfer  Does not take any steps or walk any significant distance at baseline  PT/OT for further gait assessment  Will continue to monitor for ABLA and administer IVF/prbc as indicated for Greater than 2 gram drop or Hgb < 7.  Patient hemoglobin currently at 9.6  Pain control per primary team  DVT ppx  : Per primary team  All other medical comorbidities to be managed per primary team  Case reviewed and discussed with Dr. Vázquez

## 2025-06-08 NOTE — PHYSICAL THERAPY NOTE
PHYSICAL THERAPY EVALUATION NOTE          Patient Name: Prince Andrews  Today's Date: 2025          AGE:   75 y.o.  Mrn:   [id number]  ADMIT DX:  Acute pain due to trauma [G89.11]  Closed displaced fracture of right acetabulum, unspecified portion of acetabulum, initial encounter ([facility]) [S32.401A]  Chronic obstructive pulmonary disease, unspecified COPD type ([facility]) [J44.9]  Closed nondisplaced fracture of right acetabulum, unspecified portion of acetabulum, initial encounter ([facility]) [S32.401A]  Atrial fibrillation, unspecified type ([facility]) [I48.91]  Unspecified multiple injuries, initial encounter [T07.XXXA]  Type 2 diabetes mellitus without complication, unspecified whether long term insulin use ([facility]) [E11.9]    Past Medical History:  Past Medical History[1]    Past Surgical History:  Past Surgical History[2]  Length Of Stay: 3        PHYSICAL THERAPY EVALUATION:    Patient's identity confirmed via 2 patient identifiers (full name and ) at start of session       [id number]   PT Last Visit   PT Visit Date 25   Note Type   Note type Evaluation   Pain Assessment   Pain Assessment Tool FLACC   Pain Location/Orientation Orientation: Right;Location: Hip;Location: Leg   Pain Onset/Description Frequency: Intermittent  (during mobility)   Effect of Pain on Daily Activities limits overall tolerance to mobility, speed of activity completion   Hospital Pain Intervention(s) Repositioned;Ambulation/increased activity;Emotional support  (RN pre-medicated pt)   Pain Rating: FLACC (Rest) - Face 0   Pain Rating: FLACC (Rest) - Legs 0   Pain Rating: FLACC (Rest) - Activity 0   Pain Rating: FLACC (Rest) - Cry 0   Pain Rating: FLACC (Rest) - Consolability 0   Score: FLACC (Rest) 0   Pain Rating: FLACC (Activity) - Face 1   Pain Rating: FLACC (Activity) - Legs 0   Pain Rating: FLACC (Activity) - Activity 1   Pain Rating: FLACC (Activity) - Cry 1   Pain Rating: FLACC  "(Activity) - Consolability 1   Score: FLACC (Activity) 4   Restrictions/Precautions   Weight Bearing Precautions Per Order Yes   RLE Weight Bearing Per Order (S)  TTWB   Other Precautions Cognitive;Chair Alarm;Bed Alarm;WBS;Multiple lines;O2;Fall Risk;Pain  (3L O2 via NC, IV pole)   Home Living   Type of Home SNF  (LTC Resident at [facility])   Prior Function   Lives With Facility staff   Receives Help From   (facility staff)   IADLs Family/Friend/Other provides transportation;Family/Friend/Other provides meals;Family/Friend/Other provides medication management   Falls in the last 6 months   (at least 1 fall, reason for current admission)   Comments Pt is a highly unreliable historian, pt unable to report his baseline mobility when asked during PT eval (pt repeatedly stating, \"I don't know\" and laughing). Per chart review, pt initially told Ortho team he ambulates w/o AD, later stated he uses a WC but occasionally will get out of bed on his own with a walker and walk to the bathroom   General   Additional Pertinent History Pt admitted due to fall, dx w/ R closed displaced acetabular fx. S/p ORIF pelvis w/ posterior approach 25. Ortho: TTWB RLE   Family/Caregiver Present No   Cognition   Overall Cognitive Status Impaired   Arousal/Participation Cooperative   Orientation Level Oriented to person;Oriented to situation  (aware in hospital, stated year as \"eighty-eight, eighty-eight\", able to confirm he is at the hospital because he fell and \"was fixed\" yesterday)   Memory Decreased short term memory;Decreased recall of recent events;Decreased recall of precautions   Following Commands Follows one step commands with increased time or repetition   Comments Pt ID via name and ; pt agreeable to PT eval and mobility. Pt pleasantly confused throughout, expressing desire to move. Pt required continuous verbal and tactile cues to maintain RLE TTWB, unable to maintain independently   RLE Assessment   RLE Assessment " X   Strength RLE   R Knee Flexion 3/5   R Knee Extension 3-/5   R Ankle Dorsiflexion 3-/5   R Ankle Plantar Flexion 3-/5   LLE Assessment   LLE Assessment X   Strength LLE   L Knee Flexion 3+/5   L Knee Extension 3+/5   L Ankle Dorsiflexion 3+/5   L Ankle Plantar Flexion 3+/5   Bed Mobility   Supine to Sit 2  Maximal assistance   Additional items Assist x 2;HOB elevated;Bedrails;Increased time required;Verbal cues;LE management  (towards pt's R side at pt request)   Sit to Supine 2  Maximal assistance   Additional items Assist x 2;Increased time required;Verbal cues;LE management   Additional Comments Able to maintain sitting balance at EOB w/ supervision. BP: 109/61 sitting up at EOB, 120/56 once returned supine at end of session. Pt declined lightheadedness and/or dizziness w/ changes in position   Transfers   Sit to Stand 2  Maximal assistance   Additional items Assist x 2;Increased time required;Verbal cues   Stand to Sit 2  Maximal assistance   Additional items Assist x 2;Increased time required;Verbal cues   Additional Comments 3 sit<>stand transfer trials at EOB w/ max Ax2, bilateral hand-held assist, and w/ physical assistance provided to maintain RLE TTWB (vs NWB). Pt unable to clear EOB on 1st trial, cleared on 2nd, and achieved ~50% upright standing position for ~5 sec on 3rd trial - pt unable to maintain RLE WBS and was returned to a sitting position at EOB   Balance   Static Sitting Fair +   Dynamic Sitting Fair   Static Standing Zero  (Ax2)   Activity Tolerance   Activity Tolerance Patient limited by pain;Other (Comment)  (cognition, limited comprehension of WBS restriction, generalized weakness)   Medical Staff Made Aware Pt benefited from PT/OT care coordination w/ OT Bobbi due to signficant level of assistance required w/ mobility, cognitive/behavioral impairments affecting functional mobility, and allow for challenge of pt's activity tolerance, PT and OT goals were addressed individually during  session; Trauma Attending Dr. Parkinson, Trauma AP Aggie   Nurse Made Aware RN Tessy   Assessment   Problem List Decreased strength;Decreased endurance;Impaired balance;Decreased mobility;Decreased cognition;Impaired judgement;Decreased safety awareness;Decreased skin integrity;Orthopedic restrictions;Pain   Assessment Prince Andrews is a 75 y.o. Male who [facility] due to fall. Orders for PT eval and treat received. Comorbidities affecting pt's functional mobility at time of evaluation include: COPD, dementia, DM, HTN, PVD, peripheral sensory neuropathy, anemia, R acetabular fx. Personal factors affecting DC include: inability to navigate level surfaces w/o external assistance, decreased cognition, and positive fall history. Upon evaluation, pt presents w/ the following deficits: impaired strength, impaired skin integrity, impaired balance, impaired cognition, decreased safety awareness, decreased endurance/activity tolerance, and pain affecting mobility. Pt currently requires max Ax2 for bed mobility, max Ax2 for transfer trials. Pt's clinical presentation is unstable/unpredictable due to abnormal lab values, pain affecting mobility tolerance, need for input for mobility technique, need for input for task focus, need to input for safety awareness, recent h/o falls, ongoing medical management. From a PT/mobility standpoint given the above findings, DC recommendation is level: II (Moderate Rehab Resource Intensity). During current admission, pt will benefit from continued skilled inpatient PT in the acute care setting in order to address the above deficits and to maximize function and mobility prior to DC from acute care.   Goals   STG Expiration Date 06/18/25   Short Term Goal #1 Pt will: perform bilateral rolling bed mobility w/ supervision to decrease pt's burden of care; perform supine<>sit mobility w/ min Ax1 to increase pt's independence w/ functional mobility; sit at EOB w/ supervision for at  least 20 minutes to increase pt's tolerance to an upright sitting position and prepare for OOB transfers; perform transfers w/ min Ax1 to increase pt's OOB mobility; self-propel manual WC at least 50' and independently manage WC parts (breaks, armrests, leg rests) as an alternative means to ambulation for mobility;  increase balance ratings by at least 1 grade to decrease pt's risk of falls; PT to see to assess ambulation when pt is able to progress w/ mobility pending pt's baseline level of functional mobility   PT Treatment Day 0   Plan   Treatment/Interventions Functional transfer training;LE strengthening/ROM;Endurance training;Therapeutic exercise;Cognitive reorientation;Patient/family training;Bed mobility;Equipment eval/education  (WC training)   PT Frequency 3-5x/wk   Discharge Recommendation   Rehab Resource Intensity Level, PT II (Moderate Resource Intensity)   AM-PAC Basic Mobility Inpatient   Turning in Flat Bed Without Bedrails 2   Lying on Back to Sitting on Edge of Flat Bed Without Bedrails 1   Moving Bed to Chair 1   Standing Up From Chair Using Arms 1   Walk in Room 1   Climb 3-5 Stairs With Railing 1   Basic Mobility Inpatient Raw Score 7   [facility] Highest Level Of Mobility   -[facility] Goal 2: Bed activities/Dependent transfer   -[facility] Achieved 3: Sit at edge of bed   End of Consult   Patient Position at End of Consult Supine;Bed/Chair alarm activated;All needs within reach       The patient's AM-[facility] Basic Mobility Inpatient Short Form Raw Score is 7. A Raw score of less than or equal to 16 suggests the patient may benefit from discharge to post-acute rehabilitation services. Please also refer to the recommendation of the Physical Therapist for safe discharge planning.    Pt will benefit from skilled inpatient PT during this admission in order to facilitate progress towards goals and to maximize functional independence prior to DC      DC rec: level II (Moderate Rehab Resource  Intensity)        Emely Mata, PT, DPT  06/08/25               [1]   Past Medical History:  Diagnosis Date    Acute on chronic respiratory failure (HCC) 1/30/2017    Anemia 3/29/2020    Colonic polyp 2012    COPD exacerbation (HCC) 4/20/2019    Disc displacement, cervical     Peripheral vascular disease (HCC) 2011    Femoral- popliteal bypass   [2]   Past Surgical History:  Procedure Laterality Date    BYPASS FEMORAL-POPLITEAL  2011    COLONOSCOPY  2012    With polypectomy    CORONARY ARTERY BYPASS GRAFT  2012    LAMINECTOMY  2010

## 2025-06-09 PROBLEM — N17.9 AKI (ACUTE KIDNEY INJURY) (HCC): Status: ACTIVE | Noted: 2025-06-09

## 2025-06-09 LAB
ANION GAP SERPL CALCULATED.3IONS-SCNC: 11 MMOL/L (ref 4–13)
ANISOCYTOSIS BLD QL SMEAR: PRESENT
BASOPHILS # BLD MANUAL: 0.14 THOUSAND/UL (ref 0–0.1)
BASOPHILS NFR MAR MANUAL: 1 % (ref 0–1)
BUN SERPL-MCNC: 36 MG/DL (ref 5–25)
CALCIUM SERPL-MCNC: 8.7 MG/DL (ref 8.4–10.2)
CHLORIDE SERPL-SCNC: 97 MMOL/L (ref 96–108)
CO2 SERPL-SCNC: 26 MMOL/L (ref 21–32)
CREAT SERPL-MCNC: 2.13 MG/DL (ref 0.6–1.3)
EOSINOPHIL # BLD MANUAL: 0 THOUSAND/UL (ref 0–0.4)
EOSINOPHIL NFR BLD MANUAL: 0 % (ref 0–6)
ERYTHROCYTE [DISTWIDTH] IN BLOOD BY AUTOMATED COUNT: 15.2 % (ref 11.6–15.1)
GFR SERPL CREATININE-BSD FRML MDRD: 29 ML/MIN/1.73SQ M
GLUCOSE SERPL-MCNC: 117 MG/DL (ref 65–140)
GLUCOSE SERPL-MCNC: 133 MG/DL (ref 65–140)
GLUCOSE SERPL-MCNC: 133 MG/DL (ref 65–140)
GLUCOSE SERPL-MCNC: 135 MG/DL (ref 65–140)
GLUCOSE SERPL-MCNC: 150 MG/DL (ref 65–140)
HCT VFR BLD AUTO: 30.9 % (ref 36.5–49.3)
HGB BLD-MCNC: 9.5 G/DL (ref 12–17)
LYMPHOCYTES # BLD AUTO: 1.09 THOUSAND/UL (ref 0.6–4.47)
LYMPHOCYTES # BLD AUTO: 8 % (ref 14–44)
MACROCYTES BLD QL AUTO: PRESENT
MCH RBC QN AUTO: 28.6 PG (ref 26.8–34.3)
MCHC RBC AUTO-ENTMCNC: 30.7 G/DL (ref 31.4–37.4)
MCV RBC AUTO: 93 FL (ref 82–98)
MONOCYTES # BLD AUTO: 1.36 THOUSAND/UL (ref 0–1.22)
MONOCYTES NFR BLD: 10 % (ref 4–12)
NEUTROPHILS # BLD MANUAL: 11.01 THOUSAND/UL (ref 1.85–7.62)
NEUTS SEG NFR BLD AUTO: 81 % (ref 43–75)
PLATELET # BLD AUTO: 231 THOUSANDS/UL (ref 149–390)
PLATELET BLD QL SMEAR: ADEQUATE
PMV BLD AUTO: 9.8 FL (ref 8.9–12.7)
POTASSIUM SERPL-SCNC: 4.8 MMOL/L (ref 3.5–5.3)
RBC # BLD AUTO: 3.32 MILLION/UL (ref 3.88–5.62)
RBC MORPH BLD: PRESENT
SODIUM SERPL-SCNC: 134 MMOL/L (ref 135–147)
WBC # BLD AUTO: 13.59 THOUSAND/UL (ref 4.31–10.16)

## 2025-06-09 PROCEDURE — 85027 COMPLETE CBC AUTOMATED: CPT

## 2025-06-09 PROCEDURE — 85007 BL SMEAR W/DIFF WBC COUNT: CPT

## 2025-06-09 PROCEDURE — 82948 REAGENT STRIP/BLOOD GLUCOSE: CPT

## 2025-06-09 PROCEDURE — 99232 SBSQ HOSP IP/OBS MODERATE 35: CPT | Performed by: FAMILY MEDICINE

## 2025-06-09 PROCEDURE — 80048 BASIC METABOLIC PNL TOTAL CA: CPT

## 2025-06-09 PROCEDURE — 99024 POSTOP FOLLOW-UP VISIT: CPT | Performed by: PHYSICIAN ASSISTANT

## 2025-06-09 RX ORDER — FAMOTIDINE 20 MG/1
20 TABLET, FILM COATED ORAL DAILY
Status: DISCONTINUED | OUTPATIENT
Start: 2025-06-09 | End: 2025-06-11 | Stop reason: HOSPADM

## 2025-06-09 RX ORDER — MEMANTINE HYDROCHLORIDE 10 MG/1
10 TABLET ORAL 2 TIMES DAILY
Status: DISCONTINUED | OUTPATIENT
Start: 2025-06-09 | End: 2025-06-11 | Stop reason: HOSPADM

## 2025-06-09 RX ORDER — TRAZODONE HYDROCHLORIDE 100 MG/1
100 TABLET ORAL
Status: DISCONTINUED | OUTPATIENT
Start: 2025-06-09 | End: 2025-06-11 | Stop reason: HOSPADM

## 2025-06-09 RX ORDER — SODIUM CHLORIDE, SODIUM GLUCONATE, SODIUM ACETATE, POTASSIUM CHLORIDE, MAGNESIUM CHLORIDE, SODIUM PHOSPHATE, DIBASIC, AND POTASSIUM PHOSPHATE .53; .5; .37; .037; .03; .012; .00082 G/100ML; G/100ML; G/100ML; G/100ML; G/100ML; G/100ML; G/100ML
75 INJECTION, SOLUTION INTRAVENOUS CONTINUOUS
Status: DISCONTINUED | OUTPATIENT
Start: 2025-06-09 | End: 2025-06-11

## 2025-06-09 RX ORDER — ATORVASTATIN CALCIUM 40 MG/1
20 TABLET, FILM COATED ORAL
Status: DISCONTINUED | OUTPATIENT
Start: 2025-06-09 | End: 2025-06-11 | Stop reason: HOSPADM

## 2025-06-09 RX ADMIN — ATORVASTATIN CALCIUM 20 MG: 40 TABLET, FILM COATED ORAL at 17:50

## 2025-06-09 RX ADMIN — ACETAMINOPHEN 975 MG: 325 TABLET, FILM COATED ORAL at 04:12

## 2025-06-09 RX ADMIN — TRAZODONE HYDROCHLORIDE 100 MG: 100 TABLET ORAL at 21:06

## 2025-06-09 RX ADMIN — Medication 12.5 MG: at 21:07

## 2025-06-09 RX ADMIN — Medication 12.5 MG: at 08:04

## 2025-06-09 RX ADMIN — Medication 6 MG: at 21:06

## 2025-06-09 RX ADMIN — PAROXETINE HYDROCHLORIDE 35 MG: 20 TABLET, FILM COATED ORAL at 08:17

## 2025-06-09 RX ADMIN — FOLIC ACID 1 MG: 1 TABLET ORAL at 08:05

## 2025-06-09 RX ADMIN — ENOXAPARIN SODIUM 30 MG: 30 INJECTION SUBCUTANEOUS at 21:06

## 2025-06-09 RX ADMIN — ACETAMINOPHEN 975 MG: 325 TABLET, FILM COATED ORAL at 12:23

## 2025-06-09 RX ADMIN — MEMANTINE 10 MG: 10 TABLET ORAL at 17:50

## 2025-06-09 RX ADMIN — MEMANTINE 10 MG: 10 TABLET ORAL at 08:05

## 2025-06-09 RX ADMIN — INSULIN LISPRO 1 UNITS: 100 INJECTION, SOLUTION INTRAVENOUS; SUBCUTANEOUS at 12:24

## 2025-06-09 RX ADMIN — ENOXAPARIN SODIUM 30 MG: 30 INJECTION SUBCUTANEOUS at 08:04

## 2025-06-09 RX ADMIN — SENNOSIDES AND DOCUSATE SODIUM 1 TABLET: 50; 8.6 TABLET ORAL at 21:07

## 2025-06-09 RX ADMIN — DIVALPROEX SODIUM 125 MG: 125 TABLET, DELAYED RELEASE ORAL at 21:06

## 2025-06-09 RX ADMIN — POLYETHYLENE GLYCOL 3350 17 G: 17 POWDER, FOR SOLUTION ORAL at 08:04

## 2025-06-09 RX ADMIN — SODIUM CHLORIDE, SODIUM GLUCONATE, SODIUM ACETATE, POTASSIUM CHLORIDE, MAGNESIUM CHLORIDE, SODIUM PHOSPHATE, DIBASIC, AND POTASSIUM PHOSPHATE 75 ML/HR: .53; .5; .37; .037; .03; .012; .00082 INJECTION, SOLUTION INTRAVENOUS at 08:13

## 2025-06-09 RX ADMIN — FAMOTIDINE 20 MG: 20 TABLET, FILM COATED ORAL at 08:05

## 2025-06-09 NOTE — ASSESSMENT & PLAN NOTE
POD2 s/p ORIF pelvis with Dr. Vázquez 6/7/2025  TTWB to right lower extremity  Patient is wheelchair-bound at baseline, only stands to transfer  Does not take any steps or walk any significant distance at baseline  PT/OT for further gait assessment  Will continue to monitor for ABLA and administer IVF/prbc as indicated for Greater than 2 gram drop or Hgb < 7.  Patient hemoglobin currently at 9.5  Pain control per primary team  DVT ppx  : Per primary team  All other medical comorbidities to be managed per primary team  Patient to follow up with Dr. Vázquez upon discharge.

## 2025-06-09 NOTE — PROGRESS NOTES
Progress Note - Orthopedics   Name: Prince Andrews 75 y.o. male I MRN: [id number]  Unit/Bed#: W -01 I Date of Admission: 6/5/2025   Date of Service: 6/9/2025 I Hospital Day: 4    Assessment & Plan  Right acetabular fracture (HCC)  POD2 s/p ORIF pelvis with Dr. Vázquez 6/7/2025  TTWB to right lower extremity  Patient is wheelchair-bound at baseline, only stands to transfer  Does not take any steps or walk any significant distance at baseline  PT/OT for further gait assessment  Will continue to monitor for ABLA and administer IVF/prbc as indicated for Greater than 2 gram drop or Hgb < 7.  Patient hemoglobin currently at 9.5  Pain control per primary team  DVT ppx  : Per primary team  All other medical comorbidities to be managed per primary team  Patient to follow up with Dr. Vzáquez upon discharge.     Dementia (HCC)    At risk for delirium      Orthopedics service will follow.  Please contact the SecureChat role for the Orthopedics service with any questions/concerns.    Subjective   75 y.o.male seen and examined at bedside. Mild complaints of right hip pain. No other concerns.     Objective :  Temp:  [96 °F (35.6 °C)-98.1 °F (36.7 °C)] 96 °F (35.6 °C)  HR:  [79-96] 87  BP: ()/(45-65) 110/59  Resp:  [15-20] 18  SpO2:  [90 %-99 %] 92 %  O2 Device: None (Room air)    Physical Exam  Musculoskeletal: Right lower extremity  Surgical dressings dry. Distal margin has come loose, re-inforced with tegaderm.   No significant lucrecia incisional ttp.   Thigh soft and compressible.   Motor intact to +FHL/EHL, +ankle dorsi/plantar flexion  Sensation intact to saphenous, sural, tibial, superficial peroneal nerve, and deep peroneal  2+ DP pulse  No calf swelling or tenderness to palpation      Lab Results: I have reviewed the following results:  Recent Labs     06/07/25  0306 06/07/25  1824 06/08/25  0515 06/08/25  2049 06/09/25  0423   WBC 10.48*  --  9.32  --  13.59*   HGB 14.2   < > 9.6* 9.7* 9.5*   HCT 45.0   < > 30.6* 31.0*  "30.9*     --  186  --  231   BUN 25  --  28*  --  36*   CREATININE 1.21  --  1.48*  --  2.13*    < > = values in this interval not displayed.     Blood Culture:  No results found for: \"BLOODCX\"  Wound Culture: No results found for: \"WOUNDCULT\"        "

## 2025-06-09 NOTE — PLAN OF CARE
Problem: PAIN - ADULT  Goal: Verbalizes/displays adequate comfort level or baseline comfort level  Description: Interventions:  - Encourage patient to monitor pain and request assistance  - Assess pain using appropriate pain scale  - Administer analgesics as ordered based on type and severity of pain and evaluate response  - Implement non-pharmacological measures as appropriate and evaluate response  - Consider cultural and social influences on pain and pain management  - Notify physician/advanced practitioner if interventions unsuccessful or patient reports new pain  - Educate patient/family on pain management process including their role and importance of  reporting pain   - Provide non-pharmacologic/complimentary pain relief interventions  Outcome: Progressing     Problem: INFECTION - ADULT  Goal: Absence or prevention of progression during hospitalization  Description: INTERVENTIONS:  - Assess and monitor for signs and symptoms of infection  - Monitor lab/diagnostic results  - Monitor all insertion sites, i.e. indwelling lines, tubes, and drains  - Monitor endotracheal if appropriate and nasal secretions for changes in amount and color  - [place] appropriate cooling/warming therapies per order  - Administer medications as ordered  - Instruct and encourage patient and family to use good hand hygiene technique  - Identify and instruct in appropriate isolation precautions for identified infection/condition  Outcome: Progressing

## 2025-06-09 NOTE — ASSESSMENT & PLAN NOTE
Hbg 9.5  Continue to monitor CBC and transfuse if hemoglobin less than 7.0 or patient becomes symptomatic

## 2025-06-09 NOTE — ASSESSMENT & PLAN NOTE
Continue to monitor kidney function and manage as per primary team  Avoid nephrotoxic medication and hypotension  Monitor for urinary retention

## 2025-06-09 NOTE — PLAN OF CARE
Problem: PAIN - ADULT  Goal: Verbalizes/displays adequate comfort level or baseline comfort level  Description: Interventions:  - Encourage patient to monitor pain and request assistance  - Assess pain using appropriate pain scale  - Administer analgesics as ordered based on type and severity of pain and evaluate response  - Implement non-pharmacological measures as appropriate and evaluate response  - Consider cultural and social influences on pain and pain management  - Notify physician/advanced practitioner if interventions unsuccessful or patient reports new pain  - Educate patient/family on pain management process including their role and importance of  reporting pain   - Provide non-pharmacologic/complimentary pain relief interventions  Outcome: Progressing     Problem: INFECTION - ADULT  Goal: Absence or prevention of progression during hospitalization  Description: INTERVENTIONS:  - Assess and monitor for signs and symptoms of infection  - Monitor lab/diagnostic results  - Monitor all insertion sites, i.e. indwelling lines, tubes, and drains  - Monitor endotracheal if appropriate and nasal secretions for changes in amount and color  - [place] appropriate cooling/warming therapies per order  - Administer medications as ordered  - Instruct and encourage patient and family to use good hand hygiene technique  - Identify and instruct in appropriate isolation precautions for identified infection/condition  Outcome: Progressing  Goal: Absence of fever/infection during neutropenic period  Description: INTERVENTIONS:  - Monitor WBC  - Perform strict hand hygiene  - Limit to healthy visitors only  - No plants, dried, fresh or silk flowers with [place] in patient room  Outcome: Progressing     Problem: SAFETY ADULT  Goal: Patient will remain free of falls  Description: INTERVENTIONS:  - Educate patient/family on patient safety including physical limitations  - Instruct patient to call for assistance with activity   -  Consider consulting OT/PT to assist with strengthening/mobility based on AM PAC & JH-HLM score  - Consult OT/PT to assist with strengthening/mobility   - Keep Call bell within reach  - Keep bed low and locked with side rails adjusted as appropriate  - Keep care items and personal belongings within reach  - Initiate and maintain comfort rounds  - Make Fall Risk Sign visible to staff  - Offer Toileting every 2 Hours, in advance of need  - Initiate/Maintain bed alarm  - Obtain necessary fall risk management equipment  - Apply yellow socks and bracelet for high fall risk patients  - Consider moving patient to room near nurses station  Outcome: Progressing  Goal: Maintain or return to baseline ADL function  Description: INTERVENTIONS:  -  Assess patient's ability to carry out ADLs; assess patient's baseline for ADL function and identify physical deficits which impact ability to perform ADLs (bathing, care of mouth/teeth, toileting, grooming, dressing, etc.)  - Assess/evaluate cause of self-care deficits   - Assess range of motion  - Assess patient's mobility; develop plan if impaired  - Assess patient's need for assistive devices and provide as appropriate  - Encourage maximum independence but intervene and supervise when necessary  - Involve family in performance of ADLs  - Assess for home care needs following discharge   - Consider OT consult to assist with ADL evaluation and planning for discharge  - Provide patient education as appropriate  - Monitor functional capacity and physical performance, use of AM PAC & JH-HLM   - Monitor gait, balance and fatigue with ambulation    Outcome: Progressing  Goal: Maintains/Returns to pre admission functional level  Description: INTERVENTIONS:  - Perform AM-PAC 6 Click Basic Mobility/ Daily Activity assessment daily.  - Set and communicate daily mobility goal to care team and patient/family/caregiver.   - Collaborate with rehabilitation services on mobility goals if consulted  -  Perform Range of Motion 4 times a day.  - Reposition patient every 2 hours.  - Dangle patient 3 times a day  - Stand patient 3 times a day  - Ambulate patient 3 times a day  - Out of bed to chair 3 times a day   - Out of bed for meals 3 times a day  - Out of bed for toileting  - Record patient progress and toleration of activity level   Outcome: Progressing     Problem: DISCHARGE PLANNING  Goal: Discharge to home or other facility with appropriate resources  Description: INTERVENTIONS:  - Identify barriers to discharge w/patient and caregiver  - Arrange for needed discharge resources and transportation as appropriate  - Identify discharge learning needs (meds, wound care, etc.)  - Arrange for interpretive services to assist at discharge as needed  - Refer to Case Management Department for coordinating discharge planning if the patient needs post-hospital services based on physician/advanced practitioner order or complex needs related to functional status, cognitive ability, or social support system  Outcome: Progressing     Problem: Knowledge Deficit  Goal: Patient/family/caregiver demonstrates understanding of disease process, treatment plan, medications, and discharge instructions  Description: Complete learning assessment and assess knowledge base.  Interventions:  - Provide teaching at level of understanding  - Provide teaching via preferred learning methods  Outcome: Progressing     Problem: Prexisting or High Potential for Compromised Skin Integrity  Goal: Skin integrity is maintained or improved  Description: INTERVENTIONS:  - Identify patients at risk for skin breakdown  - Assess and monitor skin integrity including under and around medical devices   - Assess and monitor nutrition and hydration status  - Monitor labs  - Assess for incontinence   - Turn and reposition patient  - Assist with mobility/ambulation  - Relieve pressure over karli prominences   - Avoid friction and shearing  - Provide appropriate  hygiene as needed including keeping skin clean and dry  - Evaluate need for skin moisturizer/barrier cream  - Collaborate with interdisciplinary team  - Patient/family teaching  - Consider wound care consult    Assess:  - Review James scale daily  - Clean and moisturize skin  - Inspect skin when repositioning, toileting, and assisting with ADLS  - Assess under medical devices  - Assess extremities for adequate circulation and sensation     Bed Management:  - Have minimal linens on bed & keep smooth, unwrinkled  - Change linens as needed when moist or perspiring  - Avoid sitting or lying in one position for more than 2 hours while in bed?Keep HOB at 30 degrees   - Toileting:  - Offer bedside commode  - Assess for incontinence  - Use incontinent care products after each incontinent episode     Activity:  - Mobilize patient 4 times a day  - Encourage activity and walks on unit  - Encourage or provide ROM exercises   - Turn and reposition patient every 2 Hours  - Use appropriate equipment to lift or move patient in bed  - Instruct/ Assist with weight shifting every 30 min when out of bed in chair  - Consider limitation of chair time 2 hour intervals    Skin Care:  - Avoid use of baby powder, tape, friction and shearing, hot water or constrictive clothing  - Relieve pressure over bony prominences  - Do not massage red bony areas    Next Steps:  - Teach patient strategies to minimize risks  - Consider consults to  interdisciplinary teams  Outcome: Progressing     Problem: Nutrition/Hydration-ADULT  Goal: Nutrient/Hydration intake appropriate for improving, restoring or maintaining nutritional needs  Description: Monitor and assess patient's nutrition/hydration status for malnutrition. Collaborate with interdisciplinary team and initiate plan and interventions as ordered.  Monitor patient's weight and dietary intake as ordered or per policy. Utilize nutrition screening tool and intervene as necessary. Determine patient's  food preferences and provide high-protein, high-caloric foods as appropriate.     INTERVENTIONS:  - Monitor oral intake, urinary output, labs, and treatment plans  - Assess nutrition and hydration status and recommend course of action  - Evaluate amount of meals eaten  - Assist patient with eating if necessary   - Allow adequate time for meals  - Recommend/ encourage appropriate diets, oral nutritional supplements, and vitamin/mineral supplements  - Order, calculate, and assess calorie counts as needed  - Recommend, monitor, and adjust tube feedings and TPN/PPN based on assessed needs  - Assess need for intravenous fluids  - Provide specific nutrition/hydration education as appropriate  - Include patient/family/caregiver in decisions related to nutrition  Outcome: Progressing

## 2025-06-09 NOTE — DISCHARGE SUPPORT
Case Management Assessment & Discharge Planning Note    Patient name Prince Andrews  Location W /W -01 MRN [id number]  : 1950 Date 2025       Current Admission Date: 2025  Current Admission Diagnosis:Atrial fibrillation, chronic ([facility])   Patient Active Problem List    Diagnosis Date Noted    Right acetabular fracture ([facility]) 2025    At risk for delirium 2025    History of colon polyps 2024    Anticoagulant long-term use 2024    Antiplatelet or antithrombotic long-term use 2024    Paranoia ([facility]) 2021    Dementia ([facility]) 2021    Foreign body (FB) in soft tissue 2020    Nose dryness 2020    Weight loss 2020    Contusion of right eyeball 2020    Dry eye syndrome of bilateral lacrimal glands 2020    Hyphema, right eye 2020    Other secondary cataract, right eye 2020    Anemia 2020    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 ([facility]) 2020    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[facility]) 2020    Pain of left heel 2020    Localized edema 2019    Tobacco abuse 2019    Tinea pedis of both feet 2019    Panlobular emphysema ([facility]) 2019    Urinary retention 2019    Stage 3 chronic kidney disease ([facility]) 2019    Current use of long term anticoagulation 2019    Type 2 diabetes mellitus with stage 4 chronic kidney disease, without long-term current use of insulin ([facility]) 2018    Insulin dependent type 2 diabetes mellitus ([facility]) 2018    Encephalopathy 2018    Glaucoma 2018    Altered mental state 2018    Delirium 2018    Peripheral sensory neuropathy 2018    Chronic systolic heart failure ([facility]) 2017    Gastroesophageal reflux disease without esophagitis 2017    SENTHIL (obstructive sleep apnea) 2017    Chronic respiratory failure ([facility]) 2017    Essential hypertension 2017    Uncontrolled type 2  diabetes mellitus with hyperglycemia ([facility]) 01/25/2017    Lens replaced by other means 11/10/2016    Age-related nuclear cataract of left eye 11/10/2016    Chest wall pain, chronic 04/05/2016    Open angle with borderline findings and high glaucoma risk in both eyes 03/30/2016    Tear film insufficiency 03/30/2016    Primary open-angle glaucoma, right eye, severe stage 09/08/2015    Pterygium 08/31/2015    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    Atrial fibrillation, chronic (HCC) 07/03/2014    Benign essential hypertension 07/03/2014    Chronic obstructive lung disease ([facility]) 07/03/2014    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 07/03/2014    Diverticular disease of colon 07/03/2014    Gout 07/03/2014    Mixed hyperlipidemia 07/03/2014    Persistent insomnia 07/03/2014    Type 2 diabetes mellitus with diabetic polyneuropathy, with long-term current use of insulin ([facility]) 07/03/2014    Atherosclerosis of native artery of extremity ([facility]) 07/03/2014    Obesity 07/03/2014    Arteriosclerosis of coronary artery bypass graft 08/05/2013    Depressive disorder 08/05/2013    Peripheral vascular disease ([facility]) 01/01/2011      LOS (days): 4  Geometric Mean LOS (GMLOS) (days): 2.3  Days to GMLOS:-1.7   Other  Auth: Not Req'd  Auth Type: [facility]  Insurance: [facility]   Facility Name: [facility]   Case Reference #: [id number]   Reason/Notes Auth not Required: Received call from Liz @ [facility] (P#: [phone]). Stating no prior authorization is required for patient to transfer to SNF as patient has a nursing home plan. Per rep, once patient admits to the SNF then the facility would contact insurance and authorization request/review is completed at SNF. Rep stated patient would transfer without an authorization. Previous Ref# no longer valid.    Notified: Ben GERMAN     Updates to authorization status will be noted in chart.    Please reach out to CM for updates on any clinical information.

## 2025-06-09 NOTE — DISCHARGE SUPPORT
Case Management Assessment & Discharge Planning Note    Patient name Prince Andrews  Location W /W -01 MRN [id number]  : 1950 Date 2025       Current Admission Date: 2025  Current Admission Diagnosis:Atrial fibrillation, chronic ([facility])   Patient Active Problem List    Diagnosis Date Noted    Right acetabular fracture ([facility]) 2025    At risk for delirium 2025    History of colon polyps 2024    Anticoagulant long-term use 2024    Antiplatelet or antithrombotic long-term use 2024    Paranoia ([facility]) 2021    Dementia ([facility]) 2021    Foreign body (FB) in soft tissue 2020    Nose dryness 2020    Weight loss 2020    Contusion of right eyeball 2020    Dry eye syndrome of bilateral lacrimal glands 2020    Hyphema, right eye 2020    Other secondary cataract, right eye 2020    Anemia 2020    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 ([facility]) 2020    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[facility]) 2020    Pain of left heel 2020    Localized edema 2019    Tobacco abuse 2019    Tinea pedis of both feet 2019    Panlobular emphysema ([facility]) 2019    Urinary retention 2019    Stage 3 chronic kidney disease ([facility]) 2019    Current use of long term anticoagulation 2019    Type 2 diabetes mellitus with stage 4 chronic kidney disease, without long-term current use of insulin ([facility]) 2018    Insulin dependent type 2 diabetes mellitus ([facility]) 2018    Encephalopathy 2018    Glaucoma 2018    Altered mental state 2018    Delirium 2018    Peripheral sensory neuropathy 2018    Chronic systolic heart failure ([facility]) 2017    Gastroesophageal reflux disease without esophagitis 2017    SENTHIL (obstructive sleep apnea) 2017    Chronic respiratory failure ([facility]) 2017    Essential hypertension 2017    Uncontrolled type 2  diabetes mellitus with hyperglycemia (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) 07/03/2014    Benign essential hypertension 07/03/2014    Chronic obstructive lung disease (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) 07/03/2014    Atherosclerosis of native artery of extremity ([facility]) 07/03/2014    Obesity 07/03/2014    Arteriosclerosis of coronary artery bypass graft 08/05/2013    Depressive disorder 08/05/2013    Peripheral vascular disease ([facility]) 01/01/2011      LOS (days): 4  Geometric Mean LOS (GMLOS) (days): 2.3  Days to GMLOS:-1.5   Facility Authorization Initiated  [facility] received request for auth from : Ben Mata  Authorization Request Submitted for: [facility]  Requested Start of Care Date: 06/09/25  [facility] Name: [facility] [place]  Facility NPI: [id number]  Facility MD: Alejandro Soriano  [facility] MD NPI: [id number]  Authorization initiated by contacting insurance: [employer]/[facility]  Via: [facility] Portal  Clinicals submitted via: Portal Attachment  Pending reference #: [id number]  Other Notes: Patient not managed through H&CC.   notified: Ben Mata     Updates to authorization status will be noted in chart.    Please reach out to CM for updates on any clinical information.

## 2025-06-09 NOTE — PROGRESS NOTES
Progress Note - Geriatric Medicine   Name: Prince Andrews 75 y.o. male I MRN: [id number]  Unit/Bed#: W -01 I Date of Admission: 6/5/2025   Date of Service: 6/9/2025 I Hospital Day: 4     Assessment & Plan  Atrial fibrillation, chronic (HCC)  Currently rate controlled  Eliquis on hold due to upcoming surgery  Monitor electrolytes  Continue metoprolol with holding parameters  Benign essential hypertension  Soft BP.  Encourage po intake.  Continue to monitor     Chronic obstructive lung disease (HCC)  Currently stable  Continue home medications  Encourage out of bed as tolerated and incentive spirometry  Uncontrolled type 2 diabetes mellitus with hyperglycemia ([facility])  Lab Results   Component Value Date    HGBA1C 5.8 (H) 06/06/2025       Recent Labs     06/08/25  1655 06/09/25  0646 06/09/25  1108 06/09/25  1526   POCGLU 151* 135 150* 133       Blood Sugar Average: Last 72 hrs:  (P) [id number]    Avoid hypoglycemia  Most recent B12 level 527, TSH 5.4  Goal Hb A1c for  patient age and comorbidities would be around 8.0  Stage 3 chronic kidney disease ([facility])  Lab Results   Component Value Date    EGFR 29 06/09/2025    EGFR 45 06/08/2025    EGFR 57 06/07/2025    CREATININE 2.13 (H) 06/09/2025    CREATININE 1.48 (H) 06/08/2025    CREATININE 1.21 06/07/2025     MEÑO/CKD  Creatinine 2.13 trending up  Will avoid nephrotoxic medication.  Encourage po hydration.  Will monitor BMP.   Dementia (HCC)  He needs assistance with IADLs at baseline.  CT head shows microangiopathic changes  Mental status currently A/Ox1 at the time of encounter  Currently stable, no behaviors noted.  Will continue to provide supportive care, reorient as needed.  Patient is at high risk for delirium, will monitor closely and place on delirium precautions.  Maintain sleep/wake cycle.  Optimize pain regimen.  Monitor for constipation and urinary retention and manage as needed.   B12 level  527and TSH 5.4  Encourage family to visit.  Encourage to  wear glasses and hearing aids while awake.  Encourage po intake, assist with feeding if needed.   Continue memantine, Depakote  Consider switching paroxetine to Lexapro as it is better tolerated by elderly, can be done as outpatient  Right acetabular fracture (HCC)  CT lower extremities notes right acetabular fracture  Stated his hip pain kept him awake last night  Patient was seen by Ortho and had ORIF surgery on Saturday 6/7/25.  Patient was on Eliquis currently on hold.  continue Tylenol scheduled consider adding gabapentin 100 mg q.h.s..  Apply ice right hip 3 times a day.  Continue oxycodone p.r.n..  Avoid muscle relaxants   Monitor bowel movement and adjust regimen as needed.  Monitor for urinary retention  Continue physical therapy.   Encourage OOB with meals and incentive spirometry.  Monitor CBC CMP.  Follow-up with orthopedics.  Lovenox for DVT prophylaxis   At risk for delirium  - AAOx1, somnolent, more confused than prior  -Patient is high risk of delirium due to dementia at baseline, pain, insomnia, metabolic imbalance  -Initiate delirium precautions  -maintain normal sleep/wake cycle  -minimize overnight interruptions, group overnight vitals/labs/nursing checks as possible  -dim lights, close blinds and turn off tv to minimize stimulation and encourage sleep environment in evenings  -ensure that pain is well controlled   -monitor for fecal and urinary retention which may precipitate delirium  -encourage early mobilization and ambulation  -provide frequent reorientation and redirection  -encourage family and friends at the bedside to help calm patient if anxious  -avoid medications which may precipitate or worsen delirium such as tramadol, benzodiazepine, anticholinergics, and antihistaminics  -encourage hydration and nutrition , assist with feeding if needed  -redirect unwanted behaviors as first line, avoid physical restraints.   -QTc interval 509, avoid medications that could prolong Qtc  -If patient  "becomes agitated may increase dose of Depakote  Anemia  Hbg 9.5  Continue to monitor CBC and transfuse if hemoglobin less than 7.0 or patient becomes symptomatic  MEÑO (acute kidney injury) (HCC)  Continue to monitor kidney function and manage as per primary team  Avoid nephrotoxic medication and hypotension  Monitor for urinary retention      Subjective:   Prince is a 74 y/o  male seen today for geriatric F/U. On examination he was laying in bed calmly and without apparent discomfort. He was very tired at time of interview, giving very brief answers and at times requiring a question to be repeated before responding. He is oriented to person but not to place or time. He had no complaints of pain or discomfort. Per nursing staff he has had a decreased appetite.    Review of Systems   Unable to perform ROS: Mental status change   Constitutional:  Positive for fatigue. Negative for fever.   HENT:  Negative for sneezing and sore throat.    Eyes:  Negative for visual disturbance.   Respiratory:  Negative for cough, shortness of breath and wheezing.    Cardiovascular:  Negative for chest pain.   Gastrointestinal:  Negative for abdominal pain and nausea.   Genitourinary:  Negative for difficulty urinating and dysuria.   Musculoskeletal:  Negative for arthralgias and myalgias.   Neurological:  Positive for dizziness. Negative for headaches.   Psychiatric/Behavioral:  Negative for behavioral problems.          Objective:     Vitals: Blood pressure 108/61, pulse 72, temperature (!) 96.3 °F (35.7 °C), temperature source Oral, resp. rate 18, height 6' 1\" (1.854 m), weight 79.5 kg (175 lb 4.3 oz), SpO2 98%.,Body mass index is 23.12 kg/m².      Intake/Output Summary (Last 24 hours) at 6/9/2025 1716  Last data filed at 6/9/2025 1253  Gross per 24 hour   Intake 1480 ml   Output 400 ml   Net 1080 ml       Current Medications: Reviewed    Physical Exam:   Physical Exam  Vitals and nursing note reviewed.   Constitutional:       " General: He is not in acute distress.     Appearance: He is well-developed.   HENT:      Head: Normocephalic and atraumatic.     Eyes:      Conjunctiva/sclera: Conjunctivae normal.       Cardiovascular:      Rate and Rhythm: Normal rate and regular rhythm.      Heart sounds: No murmur heard.  Pulmonary:      Effort: Pulmonary effort is normal. No respiratory distress.      Breath sounds: Normal breath sounds.   Abdominal:      Palpations: Abdomen is soft.      Tenderness: There is no abdominal tenderness.     Musculoskeletal:         General: No swelling.      Cervical back: Neck supple.     Skin:     General: Skin is warm and dry.      Capillary Refill: Capillary refill takes less than 2 seconds.     Neurological:      Mental Status: He is alert.      Comments: A/Ox1   Psychiatric:         Mood and Affect: Mood normal.          Invasive Devices       Peripheral Intravenous Line  Duration             Peripheral IV 06/09/25 Left;Ventral (anterior) Forearm <1 day              Drain  Duration             Urethral Catheter Latex 16 Fr. 2 days

## 2025-06-10 LAB
ANION GAP SERPL CALCULATED.3IONS-SCNC: 9 MMOL/L (ref 4–13)
BASOPHILS # BLD AUTO: 0.06 THOUSANDS/ÂΜL (ref 0–0.1)
BASOPHILS NFR BLD AUTO: 1 % (ref 0–1)
BUN SERPL-MCNC: 38 MG/DL (ref 5–25)
CALCIUM SERPL-MCNC: 8.3 MG/DL (ref 8.4–10.2)
CHLORIDE SERPL-SCNC: 97 MMOL/L (ref 96–108)
CO2 SERPL-SCNC: 29 MMOL/L (ref 21–32)
CREAT SERPL-MCNC: 1.97 MG/DL (ref 0.6–1.3)
EOSINOPHIL # BLD AUTO: 0.36 THOUSAND/ÂΜL (ref 0–0.61)
EOSINOPHIL NFR BLD AUTO: 4 % (ref 0–6)
ERYTHROCYTE [DISTWIDTH] IN BLOOD BY AUTOMATED COUNT: 15.4 % (ref 11.6–15.1)
GFR SERPL CREATININE-BSD FRML MDRD: 32 ML/MIN/1.73SQ M
GLUCOSE SERPL-MCNC: 111 MG/DL (ref 65–140)
GLUCOSE SERPL-MCNC: 166 MG/DL (ref 65–140)
GLUCOSE SERPL-MCNC: 285 MG/DL (ref 65–140)
GLUCOSE SERPL-MCNC: 94 MG/DL (ref 65–140)
GLUCOSE SERPL-MCNC: 98 MG/DL (ref 65–140)
HCT VFR BLD AUTO: 29.8 % (ref 36.5–49.3)
HGB BLD-MCNC: 9.5 G/DL (ref 12–17)
IMM GRANULOCYTES # BLD AUTO: 0.08 THOUSAND/UL (ref 0–0.2)
IMM GRANULOCYTES NFR BLD AUTO: 1 % (ref 0–2)
LYMPHOCYTES # BLD AUTO: 1.23 THOUSANDS/ÂΜL (ref 0.6–4.47)
LYMPHOCYTES NFR BLD AUTO: 12 % (ref 14–44)
MCH RBC QN AUTO: 29.3 PG (ref 26.8–34.3)
MCHC RBC AUTO-ENTMCNC: 31.9 G/DL (ref 31.4–37.4)
MCV RBC AUTO: 92 FL (ref 82–98)
MONOCYTES # BLD AUTO: 1.32 THOUSAND/ÂΜL (ref 0.17–1.22)
MONOCYTES NFR BLD AUTO: 13 % (ref 4–12)
NEUTROPHILS # BLD AUTO: 7.35 THOUSANDS/ÂΜL (ref 1.85–7.62)
NEUTS SEG NFR BLD AUTO: 69 % (ref 43–75)
NRBC BLD AUTO-RTO: 0 /100 WBCS
PLATELET # BLD AUTO: 199 THOUSANDS/UL (ref 149–390)
PMV BLD AUTO: 9.4 FL (ref 8.9–12.7)
POTASSIUM SERPL-SCNC: 4.3 MMOL/L (ref 3.5–5.3)
RBC # BLD AUTO: 3.24 MILLION/UL (ref 3.88–5.62)
SODIUM SERPL-SCNC: 135 MMOL/L (ref 135–147)
WBC # BLD AUTO: 10.4 THOUSAND/UL (ref 4.31–10.16)

## 2025-06-10 PROCEDURE — 99024 POSTOP FOLLOW-UP VISIT: CPT | Performed by: PHYSICIAN ASSISTANT

## 2025-06-10 PROCEDURE — 80048 BASIC METABOLIC PNL TOTAL CA: CPT

## 2025-06-10 PROCEDURE — 85025 COMPLETE CBC W/AUTO DIFF WBC: CPT

## 2025-06-10 PROCEDURE — 99232 SBSQ HOSP IP/OBS MODERATE 35: CPT | Performed by: FAMILY MEDICINE

## 2025-06-10 PROCEDURE — 99232 SBSQ HOSP IP/OBS MODERATE 35: CPT | Performed by: EMERGENCY MEDICINE

## 2025-06-10 PROCEDURE — 82948 REAGENT STRIP/BLOOD GLUCOSE: CPT

## 2025-06-10 RX ORDER — TORSEMIDE 20 MG/1
20 TABLET ORAL DAILY
Status: DISCONTINUED | OUTPATIENT
Start: 2025-06-11 | End: 2025-06-11 | Stop reason: HOSPADM

## 2025-06-10 RX ORDER — FLUTICASONE FUROATE AND VILANTEROL 200; 25 UG/1; UG/1
1 POWDER RESPIRATORY (INHALATION) DAILY
Status: DISCONTINUED | OUTPATIENT
Start: 2025-06-11 | End: 2025-06-11 | Stop reason: HOSPADM

## 2025-06-10 RX ADMIN — MEMANTINE 10 MG: 10 TABLET ORAL at 17:11

## 2025-06-10 RX ADMIN — FAMOTIDINE 20 MG: 20 TABLET, FILM COATED ORAL at 09:14

## 2025-06-10 RX ADMIN — ATORVASTATIN CALCIUM 20 MG: 40 TABLET, FILM COATED ORAL at 17:11

## 2025-06-10 RX ADMIN — FOLIC ACID 1 MG: 1 TABLET ORAL at 09:14

## 2025-06-10 RX ADMIN — ACETAMINOPHEN 975 MG: 325 TABLET, FILM COATED ORAL at 12:04

## 2025-06-10 RX ADMIN — OXYCODONE HYDROCHLORIDE 5 MG: 5 TABLET ORAL at 07:33

## 2025-06-10 RX ADMIN — Medication 12.5 MG: at 09:14

## 2025-06-10 RX ADMIN — DIVALPROEX SODIUM 125 MG: 125 TABLET, DELAYED RELEASE ORAL at 20:28

## 2025-06-10 RX ADMIN — Medication 12.5 MG: at 20:28

## 2025-06-10 RX ADMIN — INSULIN LISPRO 2 UNITS: 100 INJECTION, SOLUTION INTRAVENOUS; SUBCUTANEOUS at 17:07

## 2025-06-10 RX ADMIN — POLYETHYLENE GLYCOL 3350 17 G: 17 POWDER, FOR SOLUTION ORAL at 09:20

## 2025-06-10 RX ADMIN — Medication 6 MG: at 20:28

## 2025-06-10 RX ADMIN — PAROXETINE HYDROCHLORIDE 35 MG: 20 TABLET, FILM COATED ORAL at 09:16

## 2025-06-10 RX ADMIN — ENOXAPARIN SODIUM 30 MG: 30 INJECTION SUBCUTANEOUS at 20:31

## 2025-06-10 RX ADMIN — SODIUM CHLORIDE, SODIUM GLUCONATE, SODIUM ACETATE, POTASSIUM CHLORIDE, MAGNESIUM CHLORIDE, SODIUM PHOSPHATE, DIBASIC, AND POTASSIUM PHOSPHATE 75 ML/HR: .53; .5; .37; .037; .03; .012; .00082 INJECTION, SOLUTION INTRAVENOUS at 13:28

## 2025-06-10 RX ADMIN — ENOXAPARIN SODIUM 30 MG: 30 INJECTION SUBCUTANEOUS at 09:14

## 2025-06-10 RX ADMIN — MEMANTINE 10 MG: 10 TABLET ORAL at 09:14

## 2025-06-10 RX ADMIN — ACETAMINOPHEN 975 MG: 325 TABLET, FILM COATED ORAL at 03:13

## 2025-06-10 RX ADMIN — ACETAMINOPHEN 975 MG: 325 TABLET, FILM COATED ORAL at 20:28

## 2025-06-10 NOTE — PLAN OF CARE
Problem: PAIN - ADULT  Goal: Verbalizes/displays adequate comfort level or baseline comfort level  Description: Interventions:  - Encourage patient to monitor pain and request assistance  - Assess pain using appropriate pain scale  - Administer analgesics as ordered based on type and severity of pain and evaluate response  - Implement non-pharmacological measures as appropriate and evaluate response  - Consider cultural and social influences on pain and pain management  - Notify physician/advanced practitioner if interventions unsuccessful or patient reports new pain  - Educate patient/family on pain management process including their role and importance of  reporting pain   - Provide non-pharmacologic/complimentary pain relief interventions  Outcome: Progressing     Problem: INFECTION - ADULT  Goal: Absence or prevention of progression during hospitalization  Description: INTERVENTIONS:  - Assess and monitor for signs and symptoms of infection  - Monitor lab/diagnostic results  - Monitor all insertion sites, i.e. indwelling lines, tubes, and drains  - Monitor endotracheal if appropriate and nasal secretions for changes in amount and color  - Mission appropriate cooling/warming therapies per order  - Administer medications as ordered  - Instruct and encourage patient and family to use good hand hygiene technique  - Identify and instruct in appropriate isolation precautions for identified infection/condition  Outcome: Progressing  Goal: Absence of fever/infection during neutropenic period  Description: INTERVENTIONS:  - Monitor WBC  - Perform strict hand hygiene  - Limit to healthy visitors only  - No plants, dried, fresh or silk flowers with coleman in patient room  Outcome: Progressing     Problem: SAFETY ADULT  Goal: Patient will remain free of falls  Description: INTERVENTIONS:  - Educate patient/family on patient safety including physical limitations  - Instruct patient to call for assistance with activity   -  Consider consulting OT/PT to assist with strengthening/mobility based on AM PAC & JH-HLM score  - Consult OT/PT to assist with strengthening/mobility   - Keep Call bell within reach  - Keep bed low and locked with side rails adjusted as appropriate  - Keep care items and personal belongings within reach  - Initiate and maintain comfort rounds  - Make Fall Risk Sign visible to staff  - Offer Toileting every  Hours, in advance of need  - Initiate/Maintain alarm  - Obtain necessary fall risk management equipment:   - Apply yellow socks and bracelet for high fall risk patients  - Consider moving patient to room near nurses station  Outcome: Progressing  Goal: Maintain or return to baseline ADL function  Description: INTERVENTIONS:  -  Assess patient's ability to carry out ADLs; assess patient's baseline for ADL function and identify physical deficits which impact ability to perform ADLs (bathing, care of mouth/teeth, toileting, grooming, dressing, etc.)  - Assess/evaluate cause of self-care deficits   - Assess range of motion  - Assess patient's mobility; develop plan if impaired  - Assess patient's need for assistive devices and provide as appropriate  - Encourage maximum independence but intervene and supervise when necessary  - Involve family in performance of ADLs  - Assess for home care needs following discharge   - Consider OT consult to assist with ADL evaluation and planning for discharge  - Provide patient education as appropriate  - Monitor functional capacity and physical performance, use of AM PAC & JH-HLM   - Monitor gait, balance and fatigue with ambulation    Outcome: Progressing  Goal: Maintains/Returns to pre admission functional level  Description: INTERVENTIONS:  - Perform AM-PAC 6 Click Basic Mobility/ Daily Activity assessment daily.  - Set and communicate daily mobility goal to care team and patient/family/caregiver.   - Collaborate with rehabilitation services on mobility goals if consulted  -  Perform Range of Motion  times a day.  - Reposition patient every  hours.  - Dangle patient  times a day  - Stand patient  times a day  - Ambulate patient  times a day  - Out of bed to chair times a day   - Out of bed for meals  times a day  - Out of bed for toileting  - Record patient progress and toleration of activity level   Outcome: Progressing     Problem: DISCHARGE PLANNING  Goal: Discharge to home or other facility with appropriate resources  Description: INTERVENTIONS:  - Identify barriers to discharge w/patient and caregiver  - Arrange for needed discharge resources and transportation as appropriate  - Identify discharge learning needs (meds, wound care, etc.)  - Arrange for interpretive services to assist at discharge as needed  - Refer to Case Management Department for coordinating discharge planning if the patient needs post-hospital services based on physician/advanced practitioner order or complex needs related to functional status, cognitive ability, or social support system  Outcome: Progressing     Problem: Knowledge Deficit  Goal: Patient/family/caregiver demonstrates understanding of disease process, treatment plan, medications, and discharge instructions  Description: Complete learning assessment and assess knowledge base.  Interventions:  - Provide teaching at level of understanding  - Provide teaching via preferred learning methods  Outcome: Progressing     Problem: Prexisting or High Potential for Compromised Skin Integrity  Goal: Skin integrity is maintained or improved  Description: INTERVENTIONS:  - Identify patients at risk for skin breakdown  - Assess and monitor skin integrity including under and around medical devices   - Assess and monitor nutrition and hydration status  - Monitor labs  - Assess for incontinence   - Turn and reposition patient  - Assist with mobility/ambulation  - Relieve pressure over karli prominences   - Avoid friction and shearing  - Provide appropriate hygiene as  needed including keeping skin clean and dry  - Evaluate need for skin moisturizer/barrier cream  - Collaborate with interdisciplinary team  - Patient/family teaching  - Consider wound care consult    Assess:  - Review James scale daily  - Clean and moisturize skin every   - Inspect skin when repositioning, toileting, and assisting with ADLS  - Assess under medical devices such as  every   - Assess extremities for adequate circulation and sensation     Bed Management:  - Have minimal linens on bed & keep smooth, unwrinkled  - Change linens as needed when moist or perspiring  - Avoid sitting or lying in one position for more than  hours while in bed?Keep HOB at degrees   - Toileting:  - Offer bedside commode  - Assess for incontinence every   - Use incontinent care products after each incontinent episode such as     Activity:  - Mobilize patient  times a day  - Encourage activity and walks on unit  - Encourage or provide ROM exercises   - Turn and reposition patient every  Hours  - Use appropriate equipment to lift or move patient in bed  - Instruct/ Assist with weight shifting every  when out of bed in chair  - Consider limitation of chair time  hour intervals    Skin Care:  - Avoid use of baby powder, tape, friction and shearing, hot water or constrictive clothing  - Relieve pressure over bony prominences using   - Do not massage red bony areas    Next Steps:  - Teach patient strategies to minimize risks such as   - Consider consults to  interdisciplinary teams such as   Outcome: Progressing     Problem: Nutrition/Hydration-ADULT  Goal: Nutrient/Hydration intake appropriate for improving, restoring or maintaining nutritional needs  Description: Monitor and assess patient's nutrition/hydration status for malnutrition. Collaborate with interdisciplinary team and initiate plan and interventions as ordered.  Monitor patient's weight and dietary intake as ordered or per policy. Utilize nutrition screening tool and  intervene as necessary. Determine patient's food preferences and provide high-protein, high-caloric foods as appropriate.     INTERVENTIONS:  - Monitor oral intake, urinary output, labs, and treatment plans  - Assess nutrition and hydration status and recommend course of action  - Evaluate amount of meals eaten  - Assist patient with eating if necessary   - Allow adequate time for meals  - Recommend/ encourage appropriate diets, oral nutritional supplements, and vitamin/mineral supplements  - Order, calculate, and assess calorie counts as needed  - Recommend, monitor, and adjust tube feedings and TPN/PPN based on assessed needs  - Assess need for intravenous fluids  - Provide specific nutrition/hydration education as appropriate  - Include patient/family/caregiver in decisions related to nutrition  Outcome: Progressing

## 2025-06-10 NOTE — ASSESSMENT & PLAN NOTE
- Delirium Precautions:   - Maintain normal sleep/wake cycle  - Minimize overnight interruptions, group overnight vitals/labs/nursing checks as possible.  - Dim lights, close blinds and turn off tv to minimize stimulation and encourage sleep environment in evenings.  - Ensure that pain is well controlled.  - Monitor for fecal and urinary retention which may precipitate delirium.  - Encourage early mobilization and ambulation.  - Provide frequent reorientation and redirection.  - Encourage family and friends at the bedside to help help calm patient if anxious.  - Avoid medications which may precipitate or worsen delirium such as tramadol, benzodiazepine, anticholinergics, and benadryl.  - Encourage hydration and nutrition.

## 2025-06-10 NOTE — ASSESSMENT & PLAN NOTE
BUN 38, Creatinine 1.97  Continue to monitor kidney function and manage as per primary team  Avoid nephrotoxic medication and hypotension  Monitor for urinary retention

## 2025-06-10 NOTE — ASSESSMENT & PLAN NOTE
Eliqous and Plavix on hold  Lovenox til surgery   OT IRP Treatment Session    Pt seen on IRP nursing unit.                                                          Frequency Comments: 90 minutes a day at least 5 x per week     Admitting complaint:: Lumbar radicular syndrome [M54.16]                                                                                         Precautions  Back Brace: Yes;When out of bed (03/27/20 1417)  Lumbar Precautions: Yes (03/25/20 1246)  Other Precautions: lumbar precautions post op (03/27/20 1417)    ASSESSMENT:   Today's treatment focused on transfers,mobility,grooming,home dc planning. Pt requires supv for ambulation due to pain. Grooming with max encouragement at the sink, Per pt,\"the sink is too high,the mirror is too high\". Pt self limiting in session. Discussion on home assist and pt needing to participate in sessions to assist in independence in ADLs.         Current overall ADL status is minimal assist  Current functional mobility is Marissa for ADL and Instrumental-ADL tasks.      The patient is demonstrating fair progress as evidenced by pain.   At this time the patient continues to demonstrate impairments in activity tolerance, balance, cognitive changes, coordination, limited knowledge of compensatory strategies, pain, postural control, ROM and safety awareness which are limiting the performance of all ADLs and all functional mobility.     Further skilled occupational therapy services are reasonable and necessary to address the above impairments and performance deficits to maximize the patient's independence.    Discussed patient goal of home 24 hr, discharge planning options and therapy progress made to date with Patient.    OT Identified Barriers to Discharge: ADL dysfunction, impaired balance and mobility, pain, lives alone, lumbar precautions       PLAN AND RECOMMENDATIONS:   Recommendations for Discharge:  Recommendations for Discharge: OT WI: 24 Hour assist, Home therapy      Plan:   Continue skilled OT, including  the following Treatment Interventions: ADL retraining;Functional transfer training;Endurance training;Cognitive reorientation;Patient/Family training;Compensatory technique education (03/29/20 1130)      OT Frequency: 7 days/week (03/27/20 1417)    Treatment Plan for Next Session: review lower body dressing  using a/e, grooming in stance    Additional Plan Considerations: post op pain limiting function    Plan Comments: Educate pt on pain rating scale and meaning of numerical value every time prior to asking pt to rate her pain level as she tends to rate pain intensity at 8 or 9 at all times (whether or not she is pre-medicated for therapy)        EQUIPMENT:   PT/OT ADL Equipment for Discharge: Has only grab bars by shower and toilet (03/27/20 1031)  PT/OT Mobility Equipment for Discharge: Continue to assess- has 4ww, cane (03/28/20 1501)     EDUCATION:   On this date, the patient was educated on self-care activities.  The response to education was: Needs reinforcement.     SUBJECTIVE:       Subjective: Pt agreeable to therapy session (03/29/20 1130)  Subjective/Objective Comments: Pt up in chair with all needs within reach awaiting lunch (03/29/20 1130)     OBJECTIVE:   Safety Measures: Alarms;Other (comment)(call light within reach) (03/28/20 1501)    RN reported Luna Fall Scale Score: 75     ADLs  Self Cares/ADL's  Grooming Assistance: Supervision;Standing at sink (03/29/20 1130)  Oral Hygiene Assistance: Supervision;Standing at sink (03/29/20 1130)  Grooming/Oral Hygiene Deficit: Teeth care;Wash/dry hands;Wash/dry face;Increased time to complete (03/29/20 1130)  Self Cares/ADL's Comments #1: Pt requries max encouragement for grooming completion dueto self limitng (03/29/20 1130)    Household mobility  Household Mobility  Rolling: Modified Independent (03/29/20 1130)  Supine to Sit: Modified Independent (03/29/20 1130)  Bed to Chair: Supervision (03/29/20 1130)  Sit to Stand: Modified Independent (03/29/20  1130)  Stand to Sit: Modified Independent (03/29/20 1130)  Transfer Equipment: 2ww/gait belt (03/29/20 1130)  Sitting - Static: Independent (03/29/20 1130)  Sitting - Dynamic: Independent (03/29/20 1130)  Standing - Static: Supervision (03/29/20 1130)  Standing - Dynamic: Supervision (03/29/20 1130)  Household Mobility Comments #1: Pt requires supv for ambulation due to pain (03/29/20 1130)    Home Management       Other Interventions         Tolerance  OT Activity Tolerance  Activity Tolerance: 1:2 Activity to rest (03/29/20 1130)  Activity Tolerance Comments: fair-,limited by pain and self limiting (03/29/20 1130)    Cognition       Patient's Personal Goal: return home (03/24/20 0900)     GOALS:   Short Term Goals to Be Reviewed On: 04/03/20 (03/27/20 1417)  Short Term Goals Are The Same as Discharge Goals: No (03/22/20 1550)  Goal Agreement: Patient agrees with goals and treatment plan (03/22/20 1550)  Grooming Short Term Goal 1: supervision standing sink side (03/27/20 0725)  Grooming Discharge Goal 1: modified independent  (03/27/20 0725)  Bathing Short Term Goal 1: min assist seated in shower (03/27/20 0725)  Bathing Discharge Goal 1: supervision seated for showering (03/27/20 0725)  Bathing Discharge Goal Progress 1: Outcome not met, continue to monitor (03/27/20 0725)  Dressing Short Term Goal 1: pt to complete UB dressing with min assist (03/27/20 0725)  Dressing Discharge Goal 1: pt to complete UB dressign with supervision (03/27/20 0725)  Dressing Discharge Goal Progress 1: Outcome not met, continue to monitor (03/27/20 0725)  Dressing Short Term Goal 2: pt to complete LB dressing with min assist (03/27/20 0725)  Dressing Discharge Goal 2: pt to complete LB dressing with sueprvision (03/27/20 0725)  Dressing Discharge Goal Progress 2: Outcome not met, continue to monitor (03/27/20 0725)  Toileting Short Term Goal 1: toileting with min assist (03/27/20 0725)  Toileting Discharge Goal 1: pt to complete  toileting with modified independence (03/27/20 0725)  Toileting Discharge Goal Progress 1: Outcome not met, continue to monitor (03/27/20 0725)  Home Management Short Term Goal 1: meal prep with supervision (03/27/20 0725)  Home Management Discharge Goal 1: pt to complete meal prep with modified independence (03/27/20 0725)  Home Management Discharge Goal Progress 1: Outcome not met, continue to monitor (03/27/20 0725)       BILLING INFORMATION:   Total Treatment Time: OT Time Spent: 30 minutes

## 2025-06-10 NOTE — ASSESSMENT & PLAN NOTE
- R acetabular fx, present on admission.  - Status post ORIF on 6/7/2025 by Dr. Vázquez.  - Appreciate Orthopedic surgery evaluation, recommendations and interventions as noted.  - Maintain toe touch weightbearing status on the left lower extremity.  - Monitor left lower extremity neurovascular exam.  - Continue multimodal analgesic regimen.  - Continue DVT prophylaxis.  - PT and OT evaluation and treatment as indicated.  - Outpatient follow up with Dr. Vázquez, Orthopedic surgery for re-evaluation.

## 2025-06-10 NOTE — PLAN OF CARE
Problem: PAIN - ADULT  Goal: Verbalizes/displays adequate comfort level or baseline comfort level  Description: Interventions:  - Encourage patient to monitor pain and request assistance  - Assess pain using appropriate pain scale  - Administer analgesics as ordered based on type and severity of pain and evaluate response  - Implement non-pharmacological measures as appropriate and evaluate response  - Consider cultural and social influences on pain and pain management  - Notify physician/advanced practitioner if interventions unsuccessful or patient reports new pain  - Educate patient/family on pain management process including their role and importance of  reporting pain   - Provide non-pharmacologic/complimentary pain relief interventions  Outcome: Progressing     Problem: INFECTION - ADULT  Goal: Absence or prevention of progression during hospitalization  Description: INTERVENTIONS:  - Assess and monitor for signs and symptoms of infection  - Monitor lab/diagnostic results  - Monitor all insertion sites, i.e. indwelling lines, tubes, and drains  - Monitor endotracheal if appropriate and nasal secretions for changes in amount and color  - [place] appropriate cooling/warming therapies per order  - Administer medications as ordered  - Instruct and encourage patient and family to use good hand hygiene technique  - Identify and instruct in appropriate isolation precautions for identified infection/condition  Outcome: Progressing  Goal: Absence of fever/infection during neutropenic period  Description: INTERVENTIONS:  - Monitor WBC  - Perform strict hand hygiene  - Limit to healthy visitors only  - No plants, dried, fresh or silk flowers with coleman in patient room  Outcome: Progressing     Problem: SAFETY ADULT  Goal: Patient will remain free of falls  Description: INTERVENTIONS:  - Educate patient/family on patient safety including physical limitations  - Instruct patient to call for assistance with activity   -  Consider consulting OT/PT to assist with strengthening/mobility based on AM PAC & JH-HLM score  - Consult OT/PT to assist with strengthening/mobility   - Keep Call bell within reach  - Keep bed low and locked with side rails adjusted as appropriate  - Keep care items and personal belongings within reach  - Initiate and maintain comfort rounds  - Make Fall Risk Sign visible to staff  - Offer Toileting every 2 Hours, in advance of need  - Initiate/Maintain bed/chair alarm  - Obtain necessary fall risk management equipment: 2  - Apply yellow socks and bracelet for high fall risk patients  - Consider moving patient to room near nurses station  Outcome: Progressing  Goal: Maintain or return to baseline ADL function  Description: INTERVENTIONS:  -  Assess patient's ability to carry out ADLs; assess patient's baseline for ADL function and identify physical deficits which impact ability to perform ADLs (bathing, care of mouth/teeth, toileting, grooming, dressing, etc.)  - Assess/evaluate cause of self-care deficits   - Assess range of motion  - Assess patient's mobility; develop plan if impaired  - Assess patient's need for assistive devices and provide as appropriate  - Encourage maximum independence but intervene and supervise when necessary  - Involve family in performance of ADLs  - Assess for home care needs following discharge   - Consider OT consult to assist with ADL evaluation and planning for discharge  - Provide patient education as appropriate  - Monitor functional capacity and physical performance, use of AM PAC & JH-HLM   - Monitor gait, balance and fatigue with ambulation    Outcome: Progressing  Goal: Maintains/Returns to pre admission functional level  Description: INTERVENTIONS:  - Perform AM-PAC 6 Click Basic Mobility/ Daily Activity assessment daily.  - Set and communicate daily mobility goal to care team and patient/family/caregiver.   - Collaborate with rehabilitation services on mobility goals if  consulted  - Perform Range of Motion 2 times a day.  - Reposition patient every 2 hours.  - Dangle patient 2 times a day  - Stand patient 2 times a day  - Ambulate patient 2 times a day  - Out of bed to chair 2 times a day   - Out of bed for meals 2 times a day  - Out of bed for toileting  - Record patient progress and toleration of activity level   Outcome: Progressing     Problem: DISCHARGE PLANNING  Goal: Discharge to home or other facility with appropriate resources  Description: INTERVENTIONS:  - Identify barriers to discharge w/patient and caregiver  - Arrange for needed discharge resources and transportation as appropriate  - Identify discharge learning needs (meds, wound care, etc.)  - Arrange for interpretive services to assist at discharge as needed  - Refer to Case Management Department for coordinating discharge planning if the patient needs post-hospital services based on physician/advanced practitioner order or complex needs related to functional status, cognitive ability, or social support system  Outcome: Progressing     Problem: Knowledge Deficit  Goal: Patient/family/caregiver demonstrates understanding of disease process, treatment plan, medications, and discharge instructions  Description: Complete learning assessment and assess knowledge base.  Interventions:  - Provide teaching at level of understanding  - Provide teaching via preferred learning methods  Outcome: Progressing     Problem: Prexisting or High Potential for Compromised Skin Integrity  Goal: Skin integrity is maintained or improved  Description: INTERVENTIONS:  - Identify patients at risk for skin breakdown  - Assess and monitor skin integrity including under and around medical devices   - Assess and monitor nutrition and hydration status  - Monitor labs  - Assess for incontinence   - Turn and reposition patient  - Assist with mobility/ambulation  - Relieve pressure over karli prominences   - Avoid friction and shearing  - Provide  appropriate hygiene as needed including keeping skin clean and dry  - Evaluate need for skin moisturizer/barrier cream  - Collaborate with interdisciplinary team  - Patient/family teaching  - Consider wound care consult    Assess:  - Review James scale daily  - Clean and moisturize skin every 2  - Inspect skin when repositioning, toileting, and assisting with ADLS  - Assess under medical devices such as 2 every 2  - Assess extremities for adequate circulation and sensation     Bed Management:  - Have minimal linens on bed & keep smooth, unwrinkled  - Change linens as needed when moist or perspiring  - Avoid sitting or lying in one position for more than 2 hours while in bed?Keep HOB at 2degrees   - Toileting:  - Offer bedside commode  - Assess for incontinence every 2  - Use incontinent care products after each incontinent episode such as 2    Activity:  - Mobilize patient 2 times a day  - Encourage activity and walks on unit  - Encourage or provide ROM exercises   - Turn and reposition patient every 2 Hours  - Use appropriate equipment to lift or move patient in bed  - Instruct/ Assist with weight shifting every 2 when out of bed in chair  - Consider limitation of chair time 2 hour intervals    Skin Care:  - Avoid use of baby powder, tape, friction and shearing, hot water or constrictive clothing  - Relieve pressure over bony prominences using 2  - Do not massage red bony areas    Next Steps:  - Teach patient strategies to minimize risks such as 2  - Consider consults to  interdisciplinary teams such as 2  Outcome: Progressing     Problem: Nutrition/Hydration-ADULT  Goal: Nutrient/Hydration intake appropriate for improving, restoring or maintaining nutritional needs  Description: Monitor and assess patient's nutrition/hydration status for malnutrition. Collaborate with interdisciplinary team and initiate plan and interventions as ordered.  Monitor patient's weight and dietary intake as ordered or per policy.  Utilize nutrition screening tool and intervene as necessary. Determine patient's food preferences and provide high-protein, high-caloric foods as appropriate.     INTERVENTIONS:  - Monitor oral intake, urinary output, labs, and treatment plans  - Assess nutrition and hydration status and recommend course of action  - Evaluate amount of meals eaten  - Assist patient with eating if necessary   - Allow adequate time for meals  - Recommend/ encourage appropriate diets, oral nutritional supplements, and vitamin/mineral supplements  - Order, calculate, and assess calorie counts as needed  - Recommend, monitor, and adjust tube feedings and TPN/PPN based on assessed needs  - Assess need for intravenous fluids  - Provide specific nutrition/hydration education as appropriate  - Include patient/family/caregiver in decisions related to nutrition  Outcome: Progressing

## 2025-06-10 NOTE — ASSESSMENT & PLAN NOTE
Lab Results   Component Value Date    EGFR 32 06/10/2025    EGFR 29 06/09/2025    EGFR 45 06/08/2025    CREATININE 1.97 (H) 06/10/2025    CREATININE 2.13 (H) 06/09/2025    CREATININE 1.48 (H) 06/08/2025     MEÑO/CKD  Creatinine down to 1.97 from 2.13 yesterday  Will avoid nephrotoxic medication.  Encourage po hydration.  Will monitor BMP.

## 2025-06-10 NOTE — ASSESSMENT & PLAN NOTE
Recent Labs     06/08/25  2049 06/09/25  0423 06/10/25  0601   HGB 9.7* 9.5* 9.5*       Monitor hgb on AM CBC for acute blood loss anemia  Transfuse if hgb < 7

## 2025-06-10 NOTE — ASSESSMENT & PLAN NOTE
Lab Results   Component Value Date    HGBA1C 5.8 (H) 06/06/2025       Recent Labs     06/09/25  1526 06/09/25  2122 06/10/25  0721 06/10/25  1041   POCGLU 133 117 94 111       Blood Sugar Average: Last 72 hrs:  (P) 133.4498837434913112    Avoid hypoglycemia  Most recent B12 level 527, TSH 5.4  Goal Hb A1c for  patient age and comorbidities would be around 8.0

## 2025-06-10 NOTE — ASSESSMENT & PLAN NOTE
Lab Results   Component Value Date    EGFR 29 06/09/2025    EGFR 45 06/08/2025    EGFR 57 06/07/2025    CREATININE 2.13 (H) 06/09/2025    CREATININE 1.48 (H) 06/08/2025    CREATININE 1.21 06/07/2025

## 2025-06-10 NOTE — ASSESSMENT & PLAN NOTE
Lab Results   Component Value Date    EGFR 32 06/10/2025    EGFR 29 06/09/2025    EGFR 45 06/08/2025    CREATININE 1.97 (H) 06/10/2025    CREATININE 2.13 (H) 06/09/2025    CREATININE 1.48 (H) 06/08/2025

## 2025-06-10 NOTE — ASSESSMENT & PLAN NOTE
Lab Results   Component Value Date    HGBA1C 5.8 (H) 06/06/2025       Recent Labs     06/09/25  1108 06/09/25  1526 06/09/25  2122 06/10/25  0721   POCGLU 150* 133 117 94       Blood Sugar Average: Last 72 hrs:  (P) 135.7722739587617377    On insulin SS

## 2025-06-10 NOTE — ASSESSMENT & PLAN NOTE
Lab Results   Component Value Date    HGBA1C 5.8 (H) 06/06/2025       Recent Labs     06/09/25  0646 06/09/25  1108 06/09/25  1526 06/09/25 2122   POCGLU 135 150* 133 117       Blood Sugar Average: Last 72 hrs:  (P) 139.9279063008047355    On insulin SS

## 2025-06-10 NOTE — PROGRESS NOTES
Progress Note - Trauma   Name: Prince Andrews 75 y.o. male I MRN: [id number]  Unit/Bed#: W -01 I Date of Admission: 6/5/2025   Date of Service: 6/10/2025 I Hospital Day: 5  { ?Quick Links I Problem List I PORCH I Billing Tip:88315}  Assessment & Plan  Atrial fibrillation, chronic (HCC)  Eliqous and Plavix on hold  Lovenox til surgery  Benign essential hypertension  Continue home antihypertensives  Chronic obstructive lung disease (HCC)  - Reports using albuterol  - Albuterol inhaler and nebulizer PRN  - uses 2 L NC at night  Uncontrolled type 2 diabetes mellitus with hyperglycemia ([facility])  Lab Results   Component Value Date    HGBA1C 5.8 (H) 06/06/2025       Recent Labs     06/09/25  1108 06/09/25  1526 06/09/25  2122 06/10/25  0721   POCGLU 150* 133 117 94       Blood Sugar Average: Last 72 hrs:  (P) 135.3990769054949382    On insulin SS  Stage 3 chronic kidney disease ([facility])  Lab Results   Component Value Date    EGFR 32 06/10/2025    EGFR 29 06/09/2025    EGFR 45 06/08/2025    CREATININE 1.97 (H) 06/10/2025    CREATININE 2.13 (H) 06/09/2025    CREATININE 1.48 (H) 06/08/2025     Dementia (HCC)  Oriented to person and place  Continue Namenda  Right acetabular fracture (HCC)  - R acetabular fx, present on admission.  - Status post ORIF on 6/7/2025 by Dr. Vázquez.  - Appreciate Orthopedic surgery evaluation, recommendations and interventions as noted.  - Maintain toe touch weightbearing status on the left lower extremity.  - Monitor left lower extremity neurovascular exam.  - Continue multimodal analgesic regimen.  - Continue DVT prophylaxis.  - PT and OT evaluation and treatment as indicated.  - Outpatient follow up with Dr. Vázquez, Orthopedic surgery for re-evaluation.    At risk for delirium  - Delirium Precautions:   - Maintain normal sleep/wake cycle  - Minimize overnight interruptions, group overnight vitals/labs/nursing checks as possible.  - Dim lights, close blinds and turn off tv to minimize stimulation and  encourage sleep environment in evenings.  - Ensure that pain is well controlled.  - Monitor for fecal and urinary retention which may precipitate delirium.  - Encourage early mobilization and ambulation.  - Provide frequent reorientation and redirection.  - Encourage family and friends at the bedside to help help calm patient if anxious.  - Avoid medications which may precipitate or worsen delirium such as tramadol, benzodiazepine, anticholinergics, and benadryl.  - Encourage hydration and nutrition.    Anemia  Recent Labs     06/08/25  2049 06/09/25  0423 06/10/25  0601   HGB 9.7* 9.5* 9.5*       Monitor hgb on AM CBC for acute blood loss anemia  Transfuse if hgb < 7   MEÑO (acute kidney injury) (HCC)  Recent Labs     06/08/25  0515 06/09/25  0423 06/10/25  0601   CREATININE 1.48* 2.13* 1.97*   EGFR 45 29 32     Estimated Creatinine Clearance: 35.9 mL/min (A) (by C-G formula based on SCr of 1.97 mg/dL (H)).    Trend Cr  Avoid nephrotoxic meds    Bowel Regimen: ***  VTE Prophylaxis:{Pharmacologic VTE Prophylaxis:560391487}     Disposition: *** {MDM/Admin Statements (Optional):86516}    24 Hour Events : ***  Subjective : ***    Objective :{?Quick Links I ICU Summary I Vitals I I/Os I LDAs I Mobility (PT/OT) I Code Status / ACP   ?Quick Links I Active Meds I Pain Meds I Antibiotics I Anticoagulants:80687}  Temp:  [96.3 °F (35.7 °C)-97.9 °F (36.6 °C)] 96.9 °F (36.1 °C)  HR:  [50-87] 66  BP: (107-139)/(58-81) 128/67  Resp:  [16-20] 16  SpO2:  [48 %-100 %] 100 %    I/O         06/08 0701 06/09 0700 06/09 0701  06/10 0700 06/10 0701 06/11 0700    P.O. 240 960 0    I.V. (mL/kg) 1000 (12.6)      IV Piggyback       Total Intake(mL/kg) 1240 (15.6) 960 (12.1) 0 (0)    Urine (mL/kg/hr) 400 (0.2) 1750 (0.9)     Emesis/NG output 0      Stool 0      Blood       Total Output 400 1750     Net +840 -790 0           Unmeasured Urine Occurrence 0 x      Unmeasured Stool Occurrence 0 x      Unmeasured Emesis Occurrence 0 x         "    Lines/Drains/Airways       Active Status       Name Placement date Placement time Site Days    Urethral Catheter Latex 16 Fr. 06/07/25  1700  Latex  2                  Physical Exam ***       {?Quick Links I Lab Review I Micro Results I Radiology I Cardiology:17153}  Lab Results: I have reviewed the following results:  Recent Labs     06/07/25  1855 06/08/25  0515 06/10/25  0601   WBC  --    < > 10.40*   HGB 12.2   < > 9.5*   HCT 36*   < > 29.8*   PLT  --    < > 199   SODIUM  --    < > 135   K  --    < > 4.3   CL  --    < > 97   CO2 30   < > 29   BUN  --    < > 38*   CREATININE  --    < > 1.97*   GLUC  --    < > 98   CAIONIZED 1.16  --   --     < > = values in this interval not displayed.       {Imaging Results Review:03219::\"No pertinent imaging studies reviewed.\"}  {Other Study Results Review:53943::\"No additional pertinent studies reviewed.\"}  "

## 2025-06-10 NOTE — ASSESSMENT & PLAN NOTE
Recent Labs     06/08/25  0515 06/09/25  0423   CREATININE 1.48* 2.13*   EGFR 45 29     Estimated Creatinine Clearance: 33.2 mL/min (A) (by C-G formula based on SCr of 2.13 mg/dL (H)).    Trend Cr  Avoid nephrotoxic meds

## 2025-06-10 NOTE — PROGRESS NOTES
Progress Note - Orthopedics   Name: Prince Andrews 75 y.o. male I MRN: [id number]  Unit/Bed#: W -01 I Date of Admission: 6/5/2025   Date of Service: 6/10/2025 I Hospital Day: 5    Assessment & Plan  Right acetabular fracture (HCC)  POD3 s/p ORIF pelvis with Dr. Vázquez 6/7/2025  TTWB to right lower extremity  Patient is wheelchair-bound at baseline, only stands to transfer  Does not take any steps or walk any significant distance at baseline  PT/OT for further gait assessment  Will continue to monitor for ABLA and administer IVF/prbc as indicated for Greater than 2 gram drop or Hgb < 7.  Patient hemoglobin currently at 9.5  Pain control per primary team  DVT ppx  : Per primary team  All other medical comorbidities to be managed per primary team  Patient to follow up with Dr. Vázquez upon discharge.     Dementia (HCC)    At risk for delirium    Anemia    MEÑO (acute kidney injury) (HCC)      Ok for discharge from Orthopedics service perspective.  Orthopedics service will follow.  Please contact the SecureChat role for the Orthopedics service with any questions/concerns.    Subjective   75 y.o.male seen and examined at bedside. Complains of mild hip pain and soreness. No other complaints or concerns.     Objective :  Temp:  [96.3 °F (35.7 °C)-97.9 °F (36.6 °C)] 96.9 °F (36.1 °C)  HR:  [50-87] 66  BP: (107-139)/(58-81) 128/67  Resp:  [16-20] 16  SpO2:  [48 %-100 %] 100 %    Physical Exam  Musculoskeletal: Right lower extremity  Surgical dressing clean, dry and intact. Distal margin holding with tegaderm.   No significant lucrecia incisional ttp.   Thigh and calf soft and compressible.   Motor intact to +FHL/EHL, + ankle dorsi/plantar flexion.   Sensation intact to saphenous, sural, tibial, superficial peroneal nerve and deep peroneal nerve.   No calf swelling or tenderness to palpation.       Lab Results: I have reviewed the following results:  Recent Labs     06/08/25  0515 06/08/25 2049 06/09/25  0423 06/10/25  0601  "  WBC 9.32  --  13.59* 10.40*   HGB 9.6* 9.7* 9.5* 9.5*   HCT 30.6* 31.0* 30.9* 29.8*     --  231 199   BUN 28*  --  36* 38*   CREATININE 1.48*  --  2.13* 1.97*     Blood Culture:  No results found for: \"BLOODCX\"  Wound Culture: No results found for: \"WOUNDCULT\"    "

## 2025-06-10 NOTE — PROGRESS NOTES
Progress Note - Geriatric Medicine   Name: Prince Andrews 75 y.o. male I MRN: [id number]  Unit/Bed#: W -01 I Date of Admission: 6/5/2025   Date of Service: 6/10/2025 I Hospital Day: 5     Assessment & Plan  Atrial fibrillation, chronic (HCC)  Currently rate controlled  Eliquis on hold due to upcoming surgery  Monitor electrolytes  Continue metoprolol with holding parameters  Benign essential hypertension  Controlled  Encourage po intake.  Continue to monitor     Chronic obstructive lung disease (HCC)  Currently stable  Continue home medications  Encourage out of bed as tolerated and incentive spirometry  Uncontrolled type 2 diabetes mellitus with hyperglycemia ([facility])  Lab Results   Component Value Date    HGBA1C 5.8 (H) 06/06/2025       Recent Labs     06/09/25  1526 06/09/25  2122 06/10/25  0721 06/10/25  1041   POCGLU 133 117 94 111       Blood Sugar Average: Last 72 hrs:  (P) 133.7389500385305904    Avoid hypoglycemia  Most recent B12 level 527, TSH 5.4  Goal Hb A1c for  patient age and comorbidities would be around 8.0  Stage 3 chronic kidney disease ([facility])  Lab Results   Component Value Date    EGFR 32 06/10/2025    EGFR 29 06/09/2025    EGFR 45 06/08/2025    CREATININE 1.97 (H) 06/10/2025    CREATININE 2.13 (H) 06/09/2025    CREATININE 1.48 (H) 06/08/2025     MEÑO/CKD  Creatinine down to 1.97 from 2.13 yesterday  Will avoid nephrotoxic medication.  Encourage po hydration.  Will monitor BMP.   Dementia (HCC)  He needs assistance with IADLs at baseline.  CT head shows microangiopathic changes  Mental status currently A/Ox1 at the time of encounter  Currently stable, no behaviors noted.  Will continue to provide supportive care, reorient as needed.  Patient is at high risk for delirium, will monitor closely and place on delirium precautions.  Maintain sleep/wake cycle.  Optimize pain regimen.  Monitor for constipation and urinary retention and manage as needed.   B12 level  527and TSH 5.4  Encourage family to  visit.  Encourage to wear glasses and hearing aids while awake.  Encourage po intake, assist with feeding if needed.   Continue memantine, Depakote  Consider switching paroxetine to Lexapro as it is better tolerated by elderly, can be done as outpatient  Right acetabular fracture (HCC)  CT lower extremities notes right acetabular fracture  Stated his hip pain kept him awake last night  Patient was seen by Ortho and had ORIF surgery on Saturday 6/7/25.  Patient was on Eliquis currently on hold.  continue Tylenol scheduled consider adding gabapentin 100 mg q.h.s..  Apply ice right hip 3 times a day.  Continue oxycodone p.r.n..  Avoid muscle relaxants   Monitor bowel movement and adjust regimen as needed.  Monitor for urinary retention  Continue physical therapy.   Encourage OOB with meals and incentive spirometry.  Monitor CBC CMP.  Follow-up with orthopedics.  Lovenox for DVT prophylaxis   At risk for delirium  - AAOx1, somnolent, more confused than prior  -Patient is high risk of delirium due to dementia at baseline, pain, insomnia, metabolic imbalance  -Initiate delirium precautions  -maintain normal sleep/wake cycle  -minimize overnight interruptions, group overnight vitals/labs/nursing checks as possible  -dim lights, close blinds and turn off tv to minimize stimulation and encourage sleep environment in evenings  -ensure that pain is well controlled   -monitor for fecal and urinary retention which may precipitate delirium  -encourage early mobilization and ambulation  -provide frequent reorientation and redirection  -encourage family and friends at the bedside to help calm patient if anxious  -avoid medications which may precipitate or worsen delirium such as tramadol, benzodiazepine, anticholinergics, and antihistaminics  -encourage hydration and nutrition , assist with feeding if needed  -redirect unwanted behaviors as first line, avoid physical restraints.   -QTc interval 509, avoid medications that could  "prolong Qtc  -If patient becomes agitated may increase dose of Depakote  Anemia  Hbg 9.5  Continue to monitor CBC and transfuse if hemoglobin less than 7.0 or patient becomes symptomatic  MEÑO (acute kidney injury) (HCC)  BUN 38, Creatinine 1.97  Continue to monitor kidney function and manage as per primary team  Avoid nephrotoxic medication and hypotension  Monitor for urinary retention    Subjective:   Prince is a 74 y/o  male seen today for geriatric F/U. Per nursing staff this morning he removed his own IV, requiring nurse intervention. He reportedly slept well last night and has been eating well. He seems to still be fatigued and confused today, answering only with brief replies and needing some questions repeated before acknowledgement. He had no complaints and stated his hip pain is \"okay.\"    Review of Systems   Constitutional:  Positive for fatigue. Negative for chills and fever.   HENT:  Negative for ear pain, sneezing, sore throat and tinnitus.    Eyes:  Negative for visual disturbance.   Respiratory:  Negative for cough, shortness of breath and wheezing.    Cardiovascular:  Negative for chest pain.   Gastrointestinal:  Negative for abdominal pain and nausea.   Endocrine: Negative for cold intolerance and heat intolerance.   Genitourinary:  Negative for difficulty urinating and dysuria.   Musculoskeletal:  Positive for arthralgias and gait problem. Negative for myalgias.   Skin:  Negative for rash and wound.   Neurological:  Negative for dizziness, light-headedness, numbness and headaches.   Psychiatric/Behavioral:  Positive for behavioral problems.          Objective:     Vitals: Blood pressure 128/67, pulse 66, temperature (!) 96.9 °F (36.1 °C), temperature source Oral, resp. rate 16, height 6' 1\" (1.854 m), weight 79.5 kg (175 lb 4.3 oz), SpO2 100%.,Body mass index is 23.12 kg/m².      Intake/Output Summary (Last 24 hours) at 6/10/2025 1423  Last data filed at 6/10/2025 0921  Gross per 24 hour "   Intake 480 ml   Output 1750 ml   Net -1270 ml       Current Medications: Reviewed    Physical Exam:   Physical Exam  Vitals and nursing note reviewed.   Constitutional:       Appearance: He is well-developed.   HENT:      Head: Normocephalic and atraumatic.     Cardiovascular:      Rate and Rhythm: Normal rate and regular rhythm.      Heart sounds: Normal heart sounds. No murmur heard.     No friction rub. No gallop.   Pulmonary:      Effort: No respiratory distress.      Breath sounds: Normal breath sounds. No wheezing or rales.   Chest:      Chest wall: No tenderness.   Abdominal:      General: Bowel sounds are normal. There is no distension.      Palpations: Abdomen is soft.      Tenderness: There is no abdominal tenderness. There is no rebound.     Musculoskeletal:         General: No tenderness. Normal range of motion.      Cervical back: Normal range of motion and neck supple.     Skin:     General: Skin is warm and dry.     Neurological:      Mental Status: He is alert.      Comments: A/Ox1        Invasive Devices       Peripheral Intravenous Line  Duration             Peripheral IV 06/10/25 Right;Ventral (anterior) Forearm <1 day              Drain  Duration             Urethral Catheter Latex 16 Fr. 2 days

## 2025-06-10 NOTE — CASE MANAGEMENT
Case Management Progress Note    Patient name Prince Andrews  Location W /W -01 MRN [id number]  : 1950 Date 6/10/2025       LOS (days): 5  Geometric Mean LOS (GMLOS) (days): 3.9  Days to GMLOS:-1.2        OBJECTIVE:        Current admission status: Inpatient  Preferred Pharmacy:   [address] [address]  Phone: [phone] Fax: [phone]    Primary Care Provider: Filomena Ragland (Inactive)    Primary Insurance: [employer] [facility] RE[facility]  Secondary Insurance: [facility]    PROGRESS NOTE:    Weekly Care Management Length of Stay Review     Current LOS: 5 Days    Most Recent Labs:     Lab Results   Component Value Date/Time    WBC 10.40 (H) 06/10/2025 06:01 AM    HGB 9.5 (L) 06/10/2025 06:01 AM    HCT 29.8 (L) 06/10/2025 06:01 AM     06/10/2025 06:01 AM    SODIUM 135 06/10/2025 06:01 AM    K 4.3 06/10/2025 06:01 AM    CL 97 06/10/2025 06:01 AM    CO2 29 06/10/2025 06:01 AM    CO2 30 2025 06:55 PM    BUN 38 (H) 06/10/2025 06:01 AM    CREATININE 1.97 (H) 06/10/2025 06:01 AM    GLUC 98 06/10/2025 06:01 AM       Most Recent Vitals:   Vitals:    06/10/25 1555   BP: 114/65   Pulse: 84   Resp:    Temp: 97.8 °F (36.6 °C)   SpO2: 93%        Identified Barriers to Discharge/Discharge Goals/Care Management Interventions:  fall, acetabulum fractuer. Awaiting medical clearance.     Intended Discharge Disposition: from [facility]. Will need auth.     Expected Discharge Date:  24-48hrs

## 2025-06-10 NOTE — ASSESSMENT & PLAN NOTE
POD3 s/p ORIF pelvis with Dr. Vázquez 6/7/2025  TTWB to right lower extremity  Patient is wheelchair-bound at baseline, only stands to transfer  Does not take any steps or walk any significant distance at baseline  PT/OT for further gait assessment  Will continue to monitor for ABLA and administer IVF/prbc as indicated for Greater than 2 gram drop or Hgb < 7.  Patient hemoglobin currently at 9.5  Pain control per primary team  DVT ppx  : Per primary team  All other medical comorbidities to be managed per primary team  Patient to follow up with Dr. Vázquez upon discharge.

## 2025-06-10 NOTE — ASSESSMENT & PLAN NOTE
Recent Labs     06/08/25  0515 06/09/25  0423 06/10/25  0601   CREATININE 1.48* 2.13* 1.97*   EGFR 45 29 32     Estimated Creatinine Clearance: 35.9 mL/min (A) (by C-G formula based on SCr of 1.97 mg/dL (H)).    Trend Cr  Avoid nephrotoxic meds

## 2025-06-10 NOTE — PROGRESS NOTES
Progress Note - Trauma   Name: Prince Andrews 75 y.o. male I MRN: [id number]  Unit/Bed#: W -01 I Date of Admission: 6/5/2025   Date of Service: 6/10/2025 I Hospital Day: 5    Assessment & Plan  Atrial fibrillation, chronic (HCC)  Eliqous and Plavix on hold  Lovenox til surgery  Benign essential hypertension  Continue home antihypertensives  Chronic obstructive lung disease (HCC)  - Reports using albuterol  - Albuterol inhaler and nebulizer PRN  - uses 2 L NC at night  Uncontrolled type 2 diabetes mellitus with hyperglycemia ([facility])  Lab Results   Component Value Date    HGBA1C 5.8 (H) 06/06/2025       Recent Labs     06/09/25  0646 06/09/25  1108 06/09/25  1526 06/09/25 2122   POCGLU 135 150* 133 117       Blood Sugar Average: Last 72 hrs:  (P) 139.0607970887814160    On insulin SS  Stage 3 chronic kidney disease ([facility])  Lab Results   Component Value Date    EGFR 29 06/09/2025    EGFR 45 06/08/2025    EGFR 57 06/07/2025    CREATININE 2.13 (H) 06/09/2025    CREATININE 1.48 (H) 06/08/2025    CREATININE 1.21 06/07/2025     Dementia ([facility])  Oriented to person and place  Continue Namenda  Right acetabular fracture ([facility])  - R acetabular fx, present on admission.  - Status post ORIF on 6/7/2025 by Dr. Vázquez.  - Appreciate Orthopedic surgery evaluation, recommendations and interventions as noted.  - Maintain toe touch weightbearing status on the left lower extremity.  - Monitor left lower extremity neurovascular exam.  - Continue multimodal analgesic regimen.  - Continue DVT prophylaxis.  - PT and OT evaluation and treatment as indicated.  - Outpatient follow up with Dr. Vázquez, Orthopedic surgery for re-evaluation.    At risk for delirium  - Delirium Precautions:   - Maintain normal sleep/wake cycle  - Minimize overnight interruptions, group overnight vitals/labs/nursing checks as possible.  - Dim lights, close blinds and turn off tv to minimize stimulation and encourage sleep environment in evenings.  - Ensure that pain  is well controlled.  - Monitor for fecal and urinary retention which may precipitate delirium.  - Encourage early mobilization and ambulation.  - Provide frequent reorientation and redirection.  - Encourage family and friends at the bedside to help help calm patient if anxious.  - Avoid medications which may precipitate or worsen delirium such as tramadol, benzodiazepine, anticholinergics, and benadryl.  - Encourage hydration and nutrition.    Anemia  Recent Labs     06/08/25  2049 06/09/25  0423 06/10/25  0601   HGB 9.7* 9.5* 9.5*       Monitor hgb on AM CBC for acute blood loss anemia  Transfuse if hgb < 7   MEÑO (acute kidney injury) (HCC)  Recent Labs     06/08/25  0515 06/09/25  0423   CREATININE 1.48* 2.13*   EGFR 45 29     Estimated Creatinine Clearance: 33.2 mL/min (A) (by C-G formula based on SCr of 2.13 mg/dL (H)).    Trend Cr  Avoid nephrotoxic meds    Bowel Regimen: senokot and miralax scheduled  VTE Prophylaxis:Enoxaparin (Lovenox)     Disposition: continue medsurg, monitoring Cr    24 Hour Events : no acute events overnight  Subjective : Pt sleeping comfortably, in NAD. Reports pain is controlled with current regimen. No acute complaints overnight or new symptoms. Has martinez in, draining clear yellow urine.     Objective :  Temp:  [96 °F (35.6 °C)-97.9 °F (36.6 °C)] 97.6 °F (36.4 °C)  HR:  [59-87] 65  BP: (107-139)/(59-81) 126/67  Resp:  [18-20] 20  SpO2:  [92 %-100 %] 100 %    I/O         06/08 0701  06/09 0700 06/09 0701  06/10 0700    P.O. 240 960    I.V. (mL/kg) 1000 (12.6)     Total Intake(mL/kg) 1240 (15.6) 960 (12.1)    Urine (mL/kg/hr) 400 (0.2) 1750 (0.9)    Emesis/NG output 0     Stool 0     Total Output 400 1750    Net +840 -790          Unmeasured Urine Occurrence 0 x     Unmeasured Stool Occurrence 0 x     Unmeasured Emesis Occurrence 0 x           Lines/Drains/Airways       Active Status       Name Placement date Placement time Site Days    Urethral Catheter Latex 16 Fr. 06/07/25  1700   Latex  2                  Physical Exam     GENERAL APPEARANCE: Patient in no acute distress.  HEENT: NCAT; PERRL, EOMs intact; Mucous membranes moist  CV: Regular rate and rhythm; no murmur/gallops/rubs appreciated.  CHEST / LUNGS: Clear to auscultation; no wheezes/rales/rhonci.  ABD: NABS; soft; non-distended; non-tender.  : martinez cath, draining urine  EXT: diminished pulses in extremities, but as good sensation, able to move extremities without pain, cramping.  NEURO: GCS 15; no focal neurologic deficits; neurovascularly intact.  SKIN: Warm, dry and well perfused; no rash; no jaundice.         Lab Results: I have reviewed the following results:  Recent Labs     06/07/25  1855 06/08/25  0515 06/09/25  0423 06/10/25  0601   WBC  --    < > 13.59* 10.40*   HGB 12.2   < > 9.5* 9.5*   HCT 36*   < > 30.9* 29.8*   PLT  --    < > 231 199   SODIUM  --    < > 134*  --    K  --    < > 4.8  --    CL  --    < > 97  --    CO2 30   < > 26  --    BUN  --    < > 36*  --    CREATININE  --    < > 2.13*  --    GLUC  --    < > 133  --    CAIONIZED 1.16  --   --   --     < > = values in this interval not displayed.

## 2025-06-11 VITALS
DIASTOLIC BLOOD PRESSURE: 76 MMHG | WEIGHT: 175.27 LBS | HEIGHT: 73 IN | RESPIRATION RATE: 20 BRPM | SYSTOLIC BLOOD PRESSURE: 133 MMHG | HEART RATE: 58 BPM | BODY MASS INDEX: 23.23 KG/M2 | OXYGEN SATURATION: 100 % | TEMPERATURE: 97 F
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
## 2025-06-11 LAB
ANION GAP SERPL CALCULATED.3IONS-SCNC: 4 MMOL/L (ref 4–13)
BASOPHILS # BLD AUTO: 0.04 THOUSANDS/ÂΜL (ref 0–0.1)
BASOPHILS NFR BLD AUTO: 1 % (ref 0–1)
BUN SERPL-MCNC: 29 MG/DL (ref 5–25)
CALCIUM SERPL-MCNC: 8.1 MG/DL (ref 8.4–10.2)
CHLORIDE SERPL-SCNC: 99 MMOL/L (ref 96–108)
CO2 SERPL-SCNC: 33 MMOL/L (ref 21–32)
CREAT SERPL-MCNC: 1.53 MG/DL (ref 0.6–1.3)
EOSINOPHIL # BLD AUTO: 0.24 THOUSAND/ÂΜL (ref 0–0.61)
EOSINOPHIL NFR BLD AUTO: 3 % (ref 0–6)
ERYTHROCYTE [DISTWIDTH] IN BLOOD BY AUTOMATED COUNT: 15.7 % (ref 11.6–15.1)
GFR SERPL CREATININE-BSD FRML MDRD: 43 ML/MIN/1.73SQ M
GLUCOSE SERPL-MCNC: 137 MG/DL (ref 65–140)
GLUCOSE SERPL-MCNC: 150 MG/DL (ref 65–140)
GLUCOSE SERPL-MCNC: 171 MG/DL (ref 65–140)
HCT VFR BLD AUTO: 29 % (ref 36.5–49.3)
HGB BLD-MCNC: 8.9 G/DL (ref 12–17)
IMM GRANULOCYTES # BLD AUTO: 0.07 THOUSAND/UL (ref 0–0.2)
IMM GRANULOCYTES NFR BLD AUTO: 1 % (ref 0–2)
LYMPHOCYTES # BLD AUTO: 0.76 THOUSANDS/ÂΜL (ref 0.6–4.47)
LYMPHOCYTES NFR BLD AUTO: 10 % (ref 14–44)
MCH RBC QN AUTO: 29.1 PG (ref 26.8–34.3)
MCHC RBC AUTO-ENTMCNC: 30.7 G/DL (ref 31.4–37.4)
MCV RBC AUTO: 95 FL (ref 82–98)
MONOCYTES # BLD AUTO: 0.86 THOUSAND/ÂΜL (ref 0.17–1.22)
MONOCYTES NFR BLD AUTO: 11 % (ref 4–12)
NEUTROPHILS # BLD AUTO: 5.8 THOUSANDS/ÂΜL (ref 1.85–7.62)
NEUTS SEG NFR BLD AUTO: 74 % (ref 43–75)
NRBC BLD AUTO-RTO: 0 /100 WBCS
PLATELET # BLD AUTO: 183 THOUSANDS/UL (ref 149–390)
PMV BLD AUTO: 9.8 FL (ref 8.9–12.7)
POTASSIUM SERPL-SCNC: 3.9 MMOL/L (ref 3.5–5.3)
RBC # BLD AUTO: 3.06 MILLION/UL (ref 3.88–5.62)
SODIUM SERPL-SCNC: 136 MMOL/L (ref 135–147)
WBC # BLD AUTO: 7.77 THOUSAND/UL (ref 4.31–10.16)

## 2025-06-11 PROCEDURE — 80048 BASIC METABOLIC PNL TOTAL CA: CPT | Performed by: SURGERY

## 2025-06-11 PROCEDURE — 85025 COMPLETE CBC W/AUTO DIFF WBC: CPT | Performed by: SURGERY

## 2025-06-11 PROCEDURE — 82948 REAGENT STRIP/BLOOD GLUCOSE: CPT

## 2025-06-11 PROCEDURE — NC001 PR NO CHARGE: Performed by: NURSE PRACTITIONER

## 2025-06-11 PROCEDURE — 99024 POSTOP FOLLOW-UP VISIT: CPT

## 2025-06-11 PROCEDURE — 99232 SBSQ HOSP IP/OBS MODERATE 35: CPT | Performed by: FAMILY MEDICINE

## 2025-06-11 PROCEDURE — 99238 HOSP IP/OBS DSCHRG MGMT 30/<: CPT | Performed by: NURSE PRACTITIONER

## 2025-06-11 RX ORDER — PAROXETINE 20 MG/1
35 TABLET, FILM COATED ORAL DAILY
Start: 2025-06-11

## 2025-06-11 RX ORDER — OXYCODONE HYDROCHLORIDE 5 MG/1
5 TABLET ORAL SEE ADMIN INSTRUCTIONS
Qty: 15 TABLET | Refills: 0 | Status: SHIPPED | OUTPATIENT
Start: 2025-06-11 | End: 2025-06-21

## 2025-06-11 RX ORDER — POLYETHYLENE GLYCOL 3350 17 G/17G
17 POWDER, FOR SOLUTION ORAL DAILY
Start: 2025-06-12 | End: 2025-06-22

## 2025-06-11 RX ORDER — DIVALPROEX SODIUM 125 MG/1
125 CAPSULE, COATED PELLETS ORAL
COMMUNITY

## 2025-06-11 RX ORDER — AMOXICILLIN 250 MG
1 CAPSULE ORAL
Start: 2025-06-11

## 2025-06-11 RX ORDER — METOPROLOL TARTRATE 25 MG/1
12.5 TABLET, FILM COATED ORAL EVERY 12 HOURS SCHEDULED
Start: 2025-06-11

## 2025-06-11 RX ADMIN — FOLIC ACID 1 MG: 1 TABLET ORAL at 09:22

## 2025-06-11 RX ADMIN — FLUTICASONE FUROATE AND VILANTEROL TRIFENATATE 1 PUFF: 200; 25 POWDER RESPIRATORY (INHALATION) at 09:25

## 2025-06-11 RX ADMIN — UMECLIDINIUM 1 PUFF: 62.5 AEROSOL, POWDER ORAL at 09:25

## 2025-06-11 RX ADMIN — ACETAMINOPHEN 975 MG: 325 TABLET, FILM COATED ORAL at 11:36

## 2025-06-11 RX ADMIN — PAROXETINE HYDROCHLORIDE 35 MG: 20 TABLET, FILM COATED ORAL at 09:25

## 2025-06-11 RX ADMIN — FAMOTIDINE 20 MG: 20 TABLET, FILM COATED ORAL at 09:22

## 2025-06-11 RX ADMIN — Medication 12.5 MG: at 09:22

## 2025-06-11 RX ADMIN — INSULIN LISPRO 1 UNITS: 100 INJECTION, SOLUTION INTRAVENOUS; SUBCUTANEOUS at 11:42

## 2025-06-11 RX ADMIN — SODIUM CHLORIDE, SODIUM GLUCONATE, SODIUM ACETATE, POTASSIUM CHLORIDE, MAGNESIUM CHLORIDE, SODIUM PHOSPHATE, DIBASIC, AND POTASSIUM PHOSPHATE 75 ML/HR: .53; .5; .37; .037; .03; .012; .00082 INJECTION, SOLUTION INTRAVENOUS at 01:47

## 2025-06-11 RX ADMIN — MEMANTINE 10 MG: 10 TABLET ORAL at 09:22

## 2025-06-11 RX ADMIN — POLYETHYLENE GLYCOL 3350 17 G: 17 POWDER, FOR SOLUTION ORAL at 09:22

## 2025-06-11 RX ADMIN — ENOXAPARIN SODIUM 30 MG: 30 INJECTION SUBCUTANEOUS at 09:22

## 2025-06-11 RX ADMIN — ACETAMINOPHEN 975 MG: 325 TABLET, FILM COATED ORAL at 04:34

## 2025-06-11 NOTE — ASSESSMENT & PLAN NOTE
Lab Results   Component Value Date    HGBA1C 5.8 (H) 06/06/2025       Recent Labs     06/10/25  1041 06/10/25  1539 06/10/25  2054 06/11/25  0709   POCGLU 111 285* 166* 137       Blood Sugar Average: Last 72 hrs:  (P) 148.4728897932981424    On insulin SS

## 2025-06-11 NOTE — PLAN OF CARE
Problem: PAIN - ADULT  Goal: Verbalizes/displays adequate comfort level or baseline comfort level  Description: Interventions:  - Encourage patient to monitor pain and request assistance  - Assess pain using appropriate pain scale  - Administer analgesics as ordered based on type and severity of pain and evaluate response  - Implement non-pharmacological measures as appropriate and evaluate response  - Consider cultural and social influences on pain and pain management  - Notify physician/advanced practitioner if interventions unsuccessful or patient reports new pain  - Educate patient/family on pain management process including their role and importance of  reporting pain   - Provide non-pharmacologic/complimentary pain relief interventions  Outcome: Progressing     Problem: INFECTION - ADULT  Goal: Absence or prevention of progression during hospitalization  Description: INTERVENTIONS:  - Assess and monitor for signs and symptoms of infection  - Monitor lab/diagnostic results  - Monitor all insertion sites, i.e. indwelling lines, tubes, and drains  - Monitor endotracheal if appropriate and nasal secretions for changes in amount and color  - [place] appropriate cooling/warming therapies per order  - Administer medications as ordered  - Instruct and encourage patient and family to use good hand hygiene technique  - Identify and instruct in appropriate isolation precautions for identified infection/condition  Outcome: Progressing  Goal: Absence of fever/infection during neutropenic period  Description: INTERVENTIONS:  - Monitor WBC  - Perform strict hand hygiene  - Limit to healthy visitors only  - No plants, dried, fresh or silk flowers with [place] in patient room  Outcome: Progressing     Problem: SAFETY ADULT  Goal: Patient will remain free of falls  Description: INTERVENTIONS:  - Educate patient/family on patient safety including physical limitations  - Instruct patient to call for assistance with activity   -  Consider consulting OT/PT to assist with strengthening/mobility based on AM PAC & JH-HLM score  - Consult OT/PT to assist with strengthening/mobility   - Keep Call bell within reach  - Keep bed low and locked with side rails adjusted as appropriate  - Keep care items and personal belongings within reach  - Initiate and maintain comfort rounds  - Make Fall Risk Sign visible to staff  - Apply yellow socks and bracelet for high fall risk patients  - Consider moving patient to room near nurses station  Outcome: Progressing  Goal: Maintain or return to baseline ADL function  Description: INTERVENTIONS:  -  Assess patient's ability to carry out ADLs; assess patient's baseline for ADL function and identify physical deficits which impact ability to perform ADLs (bathing, care of mouth/teeth, toileting, grooming, dressing, etc.)  - Assess/evaluate cause of self-care deficits   - Assess range of motion  - Assess patient's mobility; develop plan if impaired  - Assess patient's need for assistive devices and provide as appropriate  - Encourage maximum independence but intervene and supervise when necessary  - Involve family in performance of ADLs  - Assess for home care needs following discharge   - Consider OT consult to assist with ADL evaluation and planning for discharge  - Provide patient education as appropriate  - Monitor functional capacity and physical performance, use of AM PAC & JH-HLM   - Monitor gait, balance and fatigue with ambulation    Outcome: Progressing  Goal: Maintains/Returns to pre admission functional level  Description: INTERVENTIONS:  - Perform AM-PAC 6 Click Basic Mobility/ Daily Activity assessment daily.  - Set and communicate daily mobility goal to care team and patient/family/caregiver.   - Collaborate with rehabilitation services on mobility goals if consulted  - Out of bed for toileting  - Record patient progress and toleration of activity level   Outcome: Progressing     Problem: DISCHARGE  PLANNING  Goal: Discharge to home or other facility with appropriate resources  Description: INTERVENTIONS:  - Identify barriers to discharge w/patient and caregiver  - Arrange for needed discharge resources and transportation as appropriate  - Identify discharge learning needs (meds, wound care, etc.)  - Arrange for interpretive services to assist at discharge as needed  - Refer to Case Management Department for coordinating discharge planning if the patient needs post-hospital services based on physician/advanced practitioner order or complex needs related to functional status, cognitive ability, or social support system  Outcome: Progressing     Problem: Knowledge Deficit  Goal: Patient/family/caregiver demonstrates understanding of disease process, treatment plan, medications, and discharge instructions  Description: Complete learning assessment and assess knowledge base.  Interventions:  - Provide teaching at level of understanding  - Provide teaching via preferred learning methods  Outcome: Progressing     Problem: Prexisting or High Potential for Compromised Skin Integrity  Goal: Skin integrity is maintained or improved  Description: INTERVENTIONS:  - Identify patients at risk for skin breakdown  - Assess and monitor skin integrity including under and around medical devices   - Assess and monitor nutrition and hydration status  - Monitor labs  - Assess for incontinence   - Turn and reposition patient  - Assist with mobility/ambulation  - Relieve pressure over karli prominences   - Avoid friction and shearing  - Provide appropriate hygiene as needed including keeping skin clean and dry  - Evaluate need for skin moisturizer/barrier cream  - Collaborate with interdisciplinary team  - Patient/family teaching  - Consider wound care consult       Problem: Nutrition/Hydration-ADULT  Goal: Nutrient/Hydration intake appropriate for improving, restoring or maintaining nutritional needs  Description: Monitor and assess  patient's nutrition/hydration status for malnutrition. Collaborate with interdisciplinary team and initiate plan and interventions as ordered.  Monitor patient's weight and dietary intake as ordered or per policy. Utilize nutrition screening tool and intervene as necessary. Determine patient's food preferences and provide high-protein, high-caloric foods as appropriate.     INTERVENTIONS:  - Monitor oral intake, urinary output, labs, and treatment plans  - Assess nutrition and hydration status and recommend course of action  - Evaluate amount of meals eaten  - Assist patient with eating if necessary   - Allow adequate time for meals  - Recommend/ encourage appropriate diets, oral nutritional supplements, and vitamin/mineral supplements  - Order, calculate, and assess calorie counts as needed  - Recommend, monitor, and adjust tube feedings and TPN/PPN based on assessed needs  - Assess need for intravenous fluids  - Provide specific nutrition/hydration education as appropriate  - Include patient/family/caregiver in decisions related to nutrition  Outcome: Progressing

## 2025-06-11 NOTE — CASE MANAGEMENT
Case Management Discharge Planning Note    Patient name Prince Andrews  Location W /W -01 MRN [id number]  : 1950 Date 2025       Current Admission Date: 2025  Current Admission Diagnosis:Atrial fibrillation, chronic ([facility])   Patient Active Problem List    Diagnosis Date Noted    MEÑO (acute kidney injury) ([facility]) 2025    Right acetabular fracture ([facility]) 2025    At risk for delirium 2025    History of colon polyps 2024    Anticoagulant long-term use 2024    Antiplatelet or antithrombotic long-term use 2024    Paranoia ([facility]) 2021    Dementia ([facility]) 2021    Foreign body (FB) in soft tissue 2020    Nose dryness 2020    Weight loss 2020    Contusion of right eyeball 2020    Dry eye syndrome of bilateral lacrimal glands 2020    Hyphema, right eye 2020    Other secondary cataract, right eye 2020    Anemia 2020    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 ([facility]) 2020    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[facility]) 2020    Pain of left heel 2020    Localized edema 2019    Tobacco abuse 2019    Tinea pedis of both feet 2019    Panlobular emphysema ([facility]) 2019    Urinary retention 2019    Stage 3 chronic kidney disease ([facility]) 2019    Current use of long term anticoagulation 2019    Type 2 diabetes mellitus with stage 4 chronic kidney disease, without long-term current use of insulin ([facility]) 2018    Insulin dependent type 2 diabetes mellitus ([facility]) 2018    Encephalopathy 2018    Glaucoma 2018    Altered mental state 2018    Delirium 2018    Peripheral sensory neuropathy 2018    Chronic systolic heart failure ([facility]) 2017    Gastroesophageal reflux disease without esophagitis 2017    SENTHIL (obstructive sleep apnea) 2017    Chronic respiratory failure ([facility]) 2017    Essential hypertension  01/30/2017    Uncontrolled type 2 diabetes mellitus with hyperglycemia ([facility]) 01/25/2017    Lens replaced by other means 11/10/2016    Age-related nuclear cataract of left eye 11/10/2016    Chest wall pain, chronic 04/05/2016    Open angle with borderline findings and high glaucoma risk in both eyes 03/30/2016    Tear film insufficiency 03/30/2016    Primary open-angle glaucoma, right eye, severe stage 09/08/2015    Pterygium 08/31/2015    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    Atrial fibrillation, chronic ([facility]) 07/03/2014    Benign essential hypertension 07/03/2014    Chronic obstructive lung disease ([facility]) 07/03/2014    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 07/03/2014    Diverticular disease of colon 07/03/2014    Gout 07/03/2014    Mixed hyperlipidemia 07/03/2014    Persistent insomnia 07/03/2014    Type 2 diabetes mellitus with diabetic polyneuropathy, with long-term current use of insulin ([facility]) 07/03/2014    Atherosclerosis of native artery of extremity ([facility]) 07/03/2014    Obesity 07/03/2014    Arteriosclerosis of coronary artery bypass graft 08/05/2013    Depressive disorder 08/05/2013    Peripheral vascular disease ([facility]) 01/01/2011      LOS (days): 6  Geometric Mean LOS (GMLOS) (days): 3.9  Days to GMLOS:-1.9     OBJECTIVE:  Risk of Unplanned Readmission Score: 29.03         Current admission status: Inpatient   Preferred Pharmacy:   [address] [address]  Phone: [phone] Fax: [phone]    Primary Care Provider: Filomena Ragland (Inactive)    Primary Insurance: [facility] REP  Secondary Insurance: [facility]    DISCHARGE DETAILS:    Discharge planning discussed with:: pt/facility  [place] of Choice: Yes  Comments - Freedom of Choice: Pt is stable for DC and is able to return to [facility] where he is a  resident.  Pt will get rehab upon return.  Auth has been obtained.  Pt will need assistance with  transportation to return.     Contacts  Patient Contacts: pt  Relationship to Patient:: Other (Comment) (self)  Contact Method: In Person  Reason/Outcome: Discharge Planning, Continuity of Care, Referral     Other Referral/Resources/Interventions Provided:  Interventions: Transportation  Referral Comments: Request has been made for transport for pt via S for 1200.  [facility] will transport pt at this time.  All parties involved have been made aware of DC arrangements.  No further CM interventions needed at this time.     Treatment Team Recommendation: Short Term Rehab, SNF  Expected Discharge Disposition: Skilled Nursing Facility     Transport at Discharge : [facility]        Transported by (Company and Unit #): [facility]  ETA of Transport (Date): 06/11/25  ETA of Transport (Time): 1200         IMM Given (Date):: 06/11/25  IMM Given to:: Patient   IMM reviewed with patient, patient agrees with discharge determination.

## 2025-06-11 NOTE — PROGRESS NOTES
Progress Note - Geriatric Medicine   Name: Prince Andrews 75 y.o. male I MRN: [id number]  Unit/Bed#: W -01 I Date of Admission: 6/5/2025   Date of Service: 6/11/2025 I Hospital Day: 6     Assessment & Plan  Atrial fibrillation, chronic (HCC)  Currently rate controlled  Eliquis on hold due to upcoming surgery  Monitor electrolytes  Continue metoprolol with holding parameters  Benign essential hypertension  Controlled  Encourage po intake.  Continue to monitor     Chronic obstructive lung disease (HCC)  Currently stable  Continue home medications  Encourage out of bed as tolerated and incentive spirometry  Uncontrolled type 2 diabetes mellitus with hyperglycemia ([facility])  Lab Results   Component Value Date    HGBA1C 5.8 (H) 06/06/2025       Recent Labs     06/10/25  1041 06/10/25  1539 06/10/25  2054 06/11/25  0709   POCGLU 111 285* 166* 137       Blood Sugar Average: Last 72 hrs:  (P) 148.2176588741427983    Avoid hypoglycemia  Most recent B12 level 527, TSH 5.4  Goal Hb A1c for  patient age and comorbidities would be around 8.0  Stage 3 chronic kidney disease (HCC)  Lab Results   Component Value Date    EGFR 43 06/11/2025    EGFR 32 06/10/2025    EGFR 29 06/09/2025    CREATININE 1.53 (H) 06/11/2025    CREATININE 1.97 (H) 06/10/2025    CREATININE 2.13 (H) 06/09/2025     MEÑO/CKD  Creatinine improving  Will avoid nephrotoxic medication.  Encourage po hydration.  Will monitor BMP.   Dementia (HCC)  He needs assistance with IADLs at baseline.  CT head shows microangiopathic changes  Mental status currently A/Ox1 at the time of encounter  Currently stable, no behaviors noted.  Will continue to provide supportive care, reorient as needed.  Patient is at high risk for delirium, will monitor closely and place on delirium precautions.  Maintain sleep/wake cycle.  Optimize pain regimen.  Monitor for constipation and urinary retention and manage as needed.   B12 level  527and TSH 5.4  Encourage family to visit.  Encourage to  wear glasses and hearing aids while awake.  Encourage po intake, assist with feeding if needed.   Continue memantine, Depakote  Consider switching paroxetine to Lexapro as it is better tolerated by elderly, can be done as outpatient  Right acetabular fracture (HCC)  CT lower extremities notes right acetabular fracture  Stated his hip pain kept him awake last night  Patient was seen by Ortho and had ORIF surgery on Saturday 6/7/25.  Patient was on Eliquis currently on hold.  continue Tylenol scheduled consider adding gabapentin 100 mg q.h.s..  Apply ice right hip 3 times a day.  Continue oxycodone p.r.n..  Avoid muscle relaxants   Monitor bowel movement and adjust regimen as needed.  Monitor for urinary retention  Continue physical therapy.   Encourage OOB with meals and incentive spirometry.  Monitor CBC CMP.  Follow-up with orthopedics.  Lovenox for DVT prophylaxis   At risk for delirium  - AAOx1, somnolent, more confused than prior  -Patient is high risk of delirium due to dementia at baseline, pain, insomnia, metabolic imbalance  -Initiate delirium precautions  -maintain normal sleep/wake cycle  -minimize overnight interruptions, group overnight vitals/labs/nursing checks as possible  -dim lights, close blinds and turn off tv to minimize stimulation and encourage sleep environment in evenings  -ensure that pain is well controlled   -monitor for fecal and urinary retention which may precipitate delirium  -encourage early mobilization and ambulation  -provide frequent reorientation and redirection  -encourage family and friends at the bedside to help calm patient if anxious  -avoid medications which may precipitate or worsen delirium such as tramadol, benzodiazepine, anticholinergics, and antihistaminics  -encourage hydration and nutrition , assist with feeding if needed  -redirect unwanted behaviors as first line, avoid physical restraints.   -QTc interval 509, avoid medications that could prolong Qtc  -If patient  "becomes agitated may increase dose of Depakote  Anemia  Hbg 8.9  Continue to monitor CBC and transfuse if hemoglobin less than 7.0 or patient becomes symptomatic  MEÑO (acute kidney injury) (HCC)  Creatinine improving today 1.53  Continue to monitor kidney function and manage as per primary team  Avoid nephrotoxic medication and hypotension  Monitor for urinary retention  Encourage p.o. hydration      Subjective:   Patient seen and examined at bedside for geriatric follow-up.  Somnolent at the time of encounter.  Denies any acute complaints.  No acute events reported by nursing staff    Review of Systems   Unable to perform ROS: Dementia   Gastrointestinal:  Negative for abdominal pain.   Musculoskeletal:  Positive for arthralgias and gait problem.         Objective:     Vitals: Blood pressure 121/70, pulse 64, temperature (!) 96.4 °F (35.8 °C), temperature source Axillary, resp. rate 20, height 6' 1\" (1.854 m), weight 79.5 kg (175 lb 4.3 oz), SpO2 100%.,Body mass index is 23.12 kg/m².      Intake/Output Summary (Last 24 hours) at 6/11/2025 1029  Last data filed at 6/11/2025 0939  Gross per 24 hour   Intake 4207.5 ml   Output 1700 ml   Net 2507.5 ml       Current Medications: Reviewed    Physical Exam:   Physical Exam  Vitals and nursing note reviewed.   Constitutional:       General: He is not in acute distress.     Appearance: He is well-developed.   HENT:      Head: Normocephalic and atraumatic.      Mouth/Throat:      Mouth: Mucous membranes are dry.     Eyes:      Conjunctiva/sclera: Conjunctivae normal.       Cardiovascular:      Rate and Rhythm: Normal rate and regular rhythm.      Heart sounds: No murmur heard.  Pulmonary:      Effort: Pulmonary effort is normal. No respiratory distress.      Breath sounds: Normal breath sounds.   Abdominal:      Palpations: Abdomen is soft.      Tenderness: There is no abdominal tenderness.   Genitourinary:     Comments: cath    Musculoskeletal:         General: No swelling. "      Cervical back: Neck supple.      Right lower leg: No edema.      Left lower leg: No edema.     Skin:     General: Skin is warm and dry.      Capillary Refill: Capillary refill takes less than 2 seconds.     Neurological:      Mental Status: He is alert.        Invasive Devices       Peripheral Intravenous Line  Duration             Peripheral IV 06/10/25 Right;Ventral (anterior) Forearm <1 day              Drain  Duration             Urethral Catheter Latex 16 Fr. 3 days                    Lab, Imaging and other studies:

## 2025-06-11 NOTE — ASSESSMENT & PLAN NOTE
Lab Results   Component Value Date    HGBA1C 5.8 (H) 06/06/2025       Recent Labs     06/10/25  1041 06/10/25  1539 06/10/25  2054 06/11/25  0709   POCGLU 111 285* 166* 137       Blood Sugar Average: Last 72 hrs:  (P) [id number]    Avoid hypoglycemia  Most recent B12 level 527, TSH 5.4  Goal Hb A1c for  patient age and comorbidities would be around 8.0

## 2025-06-11 NOTE — ASSESSMENT & PLAN NOTE
Hbg 8.9  Continue to monitor CBC and transfuse if hemoglobin less than 7.0 or patient becomes symptomatic

## 2025-06-11 NOTE — ASSESSMENT & PLAN NOTE
- R acetabular fx, present on admission.  - Status post ORIF on 6/7/2025 by Dr. Vázquez.  - Appreciate Orthopedic surgery evaluation, recommendations and interventions as noted.  - Maintain toe touch weightbearing status on the left lower extremity.  - Monitor left lower extremity neurovascular exam.  - Continue multimodal analgesic regimen.  - Continue DVT prophylaxis-Eliquis  - PT and OT evaluation and treatment as indicated.  - Outpatient follow up with Dr. Vázquez, Orthopedic surgery for re-evaluation.

## 2025-06-11 NOTE — ASSESSMENT & PLAN NOTE
Recent Labs     06/09/25  0423 06/10/25  0601   CREATININE 2.13* 1.97*   EGFR 29 32     Estimated Creatinine Clearance: 35.9 mL/min (A) (by C-G formula based on SCr of 1.97 mg/dL (H)).    Trend Cr  Avoid nephrotoxic meds

## 2025-06-11 NOTE — ASSESSMENT & PLAN NOTE
Recent Labs     06/09/25  0423 06/10/25  0601 06/11/25  0938   HGB 9.5* 9.5* 8.9*   Hemoglobin stable  Resume home Eliquis

## 2025-06-11 NOTE — PROGRESS NOTES
Progress Note - Orthopedics   Name: Prince Andrews 75 y.o. male I MRN: [id number]  Unit/Bed#: W -01 I Date of Admission: 6/5/2025   Date of Service: 6/11/2025 I Hospital Day: 6    Assessment & Plan  Right acetabular fracture (HCC)  POD4 s/p ORIF pelvis with Dr. Vázquez 6/7/2025  TTWB to right lower extremity  Patient is wheelchair-bound at baseline, only stands to transfer  Does not take any steps or walk any significant distance at baseline  PT/OT for further gait assessment  Will continue to monitor for ABLA and administer IVF/prbc as indicated for Greater than 2 gram drop or Hgb < 7.  Patient hemoglobin currently at 8.9  Pain control per primary team  DVT ppx  : Per primary team  All other medical comorbidities to be managed per primary team  Patient to follow up with Dr. Vázquez upon discharge.         Subjective   75 y.o.male seen and examined at the bedside.  Patient is drowsy on exam.  Patient states he has no pain in the right lower extremity at time of examination.  He is able to move digits 1 through 5.  Denies any new injuries or traumas.  Denies any acute numbness or paresthesias of the right lower extremity.    Objective :  Temp:  [96.4 °F (35.8 °C)-98.5 °F (36.9 °C)] 96.4 °F (35.8 °C)  HR:  [56-84] 64  BP: (107-149)/(60-78) 121/70  Resp:  [16-21] 20  SpO2:  [89 %-100 %] 100 %  O2 Device: None (Room air)  Nasal Cannula O2 Flow Rate (L/min):  [3 L/min] 3 L/min    Physical Exam  Musculoskeletal: Right lower extremity  The right lower extremity was exposed and inspected.  Surgical incisions clean, dry, intact without strikethrough appreciated.  Visible skin intact without erythema, ecchymosis, effusion or obvious osseous deformity. TTP diffusely over right hip. Sensation intact to superficial peroneal, deep peroneal, sural, saphenous, plantar nerve distributions. Motor intact to extensor hallux longus, tibialis anterior, gastrocnemius muscles, extensor mechanism intact. Limb is well perfused. Brisk  "capillary refill in all 5 digits. Compartments soft and compressible.       Lab Results: I have reviewed the following results:  Recent Labs     06/09/25  0423 06/10/25  0601 06/11/25  0938   WBC 13.59* 10.40* 7.77   HGB 9.5* 9.5* 8.9*   HCT 30.9* 29.8* 29.0*    199 183   BUN 36* 38* 29*   CREATININE 2.13* 1.97* 1.53*     Blood Culture:  No results found for: \"BLOODCX\"  Wound Culture: No results found for: \"WOUNDCULT\"      "

## 2025-06-11 NOTE — NURSING NOTE
Patient discharged via [facility] back to [facility]. All IV's removed at this time, face sheet and medical necessity sent with [facility]. Report called to Idrsi at [facility]

## 2025-06-11 NOTE — DISCHARGE SUMMARY
Discharge Summary - Trauma   Name: Prince Andrews 75 y.o. male I MRN: [id number]  Unit/Bed#: W -01 I Date of Admission: 6/5/2025   Date of Service: 6/11/2025 I Hospital Day: 6    Assessment & Plan  Fall  - Status post fall with the below noted injuries.  - Fall precautions.  - Geriatric Medicine consultation for evaluation, medication review and recommendations.  - PT and OT evaluation and treatment as indicated.  - Case Management consultation for disposition planning.  Right acetabular fracture (HCC)  - R acetabular fx, present on admission.  - Status post ORIF on 6/7/2025 by Dr. Vázquez.  - Appreciate Orthopedic surgery evaluation, recommendations and interventions as noted.  - Maintain toe touch weightbearing status on the left lower extremity.  - Monitor left lower extremity neurovascular exam.  - Continue multimodal analgesic regimen.  - Continue DVT prophylaxis-Eliquis  - PT and OT evaluation and treatment as indicated.  - Outpatient follow up with Dr. Vázquez, Orthopedic surgery for re-evaluation.  Atrial fibrillation, chronic (HCC)  Resume home Eliquis  Benign essential hypertension  Continue home antihypertensive regimen  Chronic obstructive lung disease (HCC)  - Reports using albuterol  - Continue Trelegy  - Albuterol inhaler and nebulizer PRN  - uses 2 L NC at night  -SpO2 goal 88%  Uncontrolled type 2 diabetes mellitus with hyperglycemia ([facility])  Lab Results   Component Value Date    HGBA1C 5.8 (H) 06/06/2025       Recent Labs     06/10/25  1539 06/10/25  2054 06/11/25  0709 06/11/25  1100   POCGLU 285* 166* 137 150*       Blood Sugar Average: Last 72 hrs:  (P) 148.0337734394542384    No longer taking insulin.  Follow-up with PCP as an outpatient.  Stage 3 chronic kidney disease (HCC)  Lab Results   Component Value Date    EGFR 43 06/11/2025    EGFR 32 06/10/2025    EGFR 29 06/09/2025    CREATININE 1.53 (H) 06/11/2025    CREATININE 1.97 (H) 06/10/2025    CREATININE 2.13 (H) 06/09/2025   Trending back to  baseline  Follow-up with PCP for continued monitoring.  Dementia ([facility])  Oriented to person and place  [facility]  Geriatrics consulted note appreciated  At risk for delirium  - Delirium Precautions:   - Maintain normal sleep/wake cycle  - Minimize overnight interruptions, group overnight vitals/labs/nursing checks as possible.  - Dim lights, close blinds and turn off tv to minimize stimulation and encourage sleep environment in evenings.  - Ensure that pain is well controlled.  - Monitor for fecal and urinary retention which may precipitate delirium.  - Encourage early mobilization and ambulation.  - Provide frequent reorientation and redirection.  - Encourage family and friends at the bedside to help help calm patient if anxious.  - Avoid medications which may precipitate or worsen delirium such as tramadol, benzodiazepine, anticholinergics, and benadryl.  - Encourage hydration and nutrition.    Anemia  Recent Labs     06/09/25  0423 06/10/25  0601 06/11/25  0938   HGB 9.5* 9.5* 8.9*   Hemoglobin stable  Resume home Eliquis  MEÑO (acute kidney injury) ([facility])  Recent Labs     06/09/25  0423 06/10/25  0601 06/11/25  0938   CREATININE 2.13* 1.97* 1.53*   EGFR 29 32 43     Estimated Creatinine Clearance: 46.2 mL/min (A) (by C-G formula based on SCr of 1.53 mg/dL (H)).  Improved back to baseline  Follow-up with PCP for continued monitoring  Urinary retention  Failed urinary retention protocol-Hui catheter placed patient to go undergo voiding trial while at rehab when he becomes more [id number] with urology as needed.    Admission Date: 6/5/2025 0942  Discharge Date: 06/11/25  Admitting Diagnosis: Acute pain due to trauma [G89.11]  Closed displaced fracture of right acetabulum, unspecified portion of acetabulum, initial encounter ([facility]) [S32.401A]  Chronic obstructive pulmonary disease, unspecified COPD type ([facility]) [J44.9]  Closed nondisplaced fracture of right acetabulum, unspecified portion of acetabulum,  "initial encounter ([facility]) [S32.401A]  Atrial fibrillation, unspecified type ([facility]) [I48.91]  Unspecified multiple injuries, initial encounter [T07.XXXA]  Type 2 diabetes mellitus without complication, unspecified whether long term insulin use ([facility]) [E11.9]  Discharge Diagnosis:   Medical Problems       Resolved Problems  Date Reviewed: 8/9/2023   None         HPI: \"Prince Andrews is a 75 y.o. male who presents after fall yesterday. Pt reports a mechanical fall, causing him to land on his R hip. Reports needing help getting up by facility staff, presented to the ER today for worsening pain. Denies head strike, LOC. Takes Eliquis and Plavix. Denies lightheadedness/dizziness, CP, SOB, weakness, or any other symptoms at this time.\" - Per Dr. Samayoa's H&P on 6/5/2025    Procedures Performed: No orders of the defined types were placed in this encounter.      Summary of Hospital Course: 75-year-old male who suffered a fall resulting in acetabular fracture.  He is admitted to the trauma service with orthopedic consultation.  He was taken to the OR on 6/7 where he underwent ORIF of his pelvis.  Multimodal pain regiment was initiated and he was weaned to oral regiment.  He was evaluated by PT/OT and recommended for rehab.  He is toe-touch weightbearing given his surgery, but has limited mobility at baseline standing and pivoting, but otherwise wheelchair-bound.  He was noted to have MEÑO following surgery and received IV fluid improving back to baseline--he is to follow-up with PCP for continued monitoring.  He also had urinary retention and a Hui catheter was placed.  He may undergo voiding trial at rehab and follow-up with urology as needed.  He will continue on his home Eliquis for atrial fibrillation which will also act as DVT prophylaxis.  Hold his Plavix for 1 week before resuming.  Follow-up with orthopedics in 2 weeks.    Significant Findings, Care, Treatment and Services Provided:   XR pelvis complete 3+ vw  Result Date: " 6/8/2025  Impression: Fluoroscopy provided for procedure guidance. Please refer to the separate procedure note for additional details. Workstation performed: [id number]     XR chest portable  Result Date: 6/7/2025  Impression: Scar in the right lung with no definite acute disease. Workstation performed: [id number]     CT chest abdomen pelvis w contrast  Result Date: 6/5/2025  Impression: 1.  Comminuted right acetabular fracture involving the anterior and posterior columns as well as the anterior and posterior acetabular walls. Associated small amount of extraperitoneal hemorrhage along the anterior right pelvis is not significantly changed in extent as compared to the dedicated right hip CT earlier the same day. No clear evidence of any active hemorrhage on this single phase examination. No other traumatic injury identified within the chest, abdomen, or pelvis. 2.  Emphysema and diffuse bronchial wall thickening suggesting chronic bronchitis, similar to prior. 3.  Chronic findings, detailed above. The study was marked in EPIC for immediate notification. Computerized Assisted Algorithm (CAA) may have aided analysis of applicable images. Workstation performed: [id number]     XR pelvis complete 3+ vw  Result Date: 6/5/2025  Impression: Comminuted right acetabular fracture better characterized on the concurrent CT. Computerized Assisted Algorithm (CAA) may have been used to analyze all applicable images. Workstation performed: [id number]     CT cervical spine without contrast  Result Date: 6/5/2025  Impression: No cervical spine fracture or traumatic malalignment. Multilevel cervical spondylosis, as described above Workstation performed: [id number]     XR chest 1 view portable  Result Date: 6/5/2025  Impression: 1.  Emphysema. 2.  Right basilar scarring. Workstation performed: [id number]     XR femur 2 views RIGHT  Result Date: 6/5/2025  Impression: Comminuted right acetabular fracture, better characterized on the CT.  "Computerized Assisted Algorithm (CAA) may have been used to analyze all applicable images. Workstation performed: [id number]     CT lower extremity wo contrast right  Result Date: 6/5/2025  Impression: 1.  Acetabular fracture as described, classification is consistent with a transverse fracture plus posterior wall 2.  Pelvic hemorrhage Workstation performed: [id number]     CT head without contrast  Result Date: 6/5/2025  Impression: No acute intracranial abnormality. Workstation performed: [id number]     Complications: none    Discharge Day Visit / Exam:   /76   Pulse 58   Temp (!) 95.9 °F (35.5 °C)   Resp 20   Ht 6' 1\" (1.854 m)   Wt 79.5 kg (175 lb 4.3 oz)   SpO2 100%   BMI 23.12 kg/m²   Physical Exam  Constitutional:       General: He is not in acute distress.     Appearance: He is not ill-appearing.   HENT:      Right Ear: External ear normal.      Left Ear: External ear normal.      Mouth/Throat:      Mouth: Mucous membranes are moist.      Pharynx: Oropharynx is clear.     Cardiovascular:      Rate and Rhythm: Normal rate and regular rhythm.      Pulses: Normal pulses.      Heart sounds: Normal heart sounds.   Pulmonary:      Effort: Pulmonary effort is normal. No respiratory distress.      Breath sounds: Normal breath sounds. No stridor. No wheezing, rhonchi or rales.   Chest:      Chest wall: No tenderness.   Abdominal:      General: Abdomen is flat. There is no distension.      Palpations: Abdomen is soft.      Tenderness: There is no abdominal tenderness. There is no guarding.     Musculoskeletal:      Comments: Limited range of motion of lower extremity due to pain in pelvis.  He is able to range feet and ankles.     Skin:     General: Skin is warm and dry.      Capillary Refill: Capillary refill takes less than 2 seconds.     Neurological:      Mental Status: He is alert. Mental status is at baseline. He is disoriented.          Condition at Discharge: good       Discharge " instructions/Information to patient and family:   See After Visit Summary (AVS) for information provided to patient and family.      Provisions for Follow-Up Care:  See after visit summary for information related to follow-up care and any pertinent home health orders.      PCP: Filomena Ragland (Inactive)    Disposition: See After Visit Summary for discharge disposition information.    Planned Readmission: No     Discharge Medications:  See after visit summary for reconciled discharge medications provided to patient and family.      Discharge Statement:  I have spent a total time of 24 minutes in caring for this patient on the day of the visit/encounter.

## 2025-06-11 NOTE — ASSESSMENT & PLAN NOTE
Creatinine improving today 1.53  Continue to monitor kidney function and manage as per primary team  Avoid nephrotoxic medication and hypotension  Monitor for urinary retention  Encourage p.o. hydration

## 2025-06-11 NOTE — ASSESSMENT & PLAN NOTE
POD4 s/p ORIF pelvis with Dr. Vázquez 6/7/2025  TTWB to right lower extremity  Patient is wheelchair-bound at baseline, only stands to transfer  Does not take any steps or walk any significant distance at baseline  PT/OT for further gait assessment  Will continue to monitor for ABLA and administer IVF/prbc as indicated for Greater than 2 gram drop or Hgb < 7.  Patient hemoglobin currently at 8.9  Pain control per primary team  DVT ppx  : Per primary team  All other medical comorbidities to be managed per primary team  Patient to follow up with Dr. Vázquez upon discharge.

## 2025-06-11 NOTE — ASSESSMENT & PLAN NOTE
Lab Results   Component Value Date    EGFR 43 06/11/2025    EGFR 32 06/10/2025    EGFR 29 06/09/2025    CREATININE 1.53 (H) 06/11/2025    CREATININE 1.97 (H) 06/10/2025    CREATININE 2.13 (H) 06/09/2025   Trending back to baseline  Follow-up with PCP for continued monitoring.

## 2025-06-11 NOTE — ASSESSMENT & PLAN NOTE
- Reports using albuterol  - Continue Trelegy  - Albuterol inhaler and nebulizer PRN  - uses 2 L NC at night  -SpO2 goal 88%

## 2025-06-11 NOTE — ASSESSMENT & PLAN NOTE
Failed urinary retention protocol-Hui catheter placed patient to go undergo voiding trial while at rehab when he becomes more [id number]-[id number]-xf with urology as needed.

## 2025-06-11 NOTE — ASSESSMENT & PLAN NOTE
Recent Labs     06/09/25  0423 06/10/25  0601 06/11/25  0938   CREATININE 2.13* 1.97* 1.53*   EGFR 29 32 43     Estimated Creatinine Clearance: 46.2 mL/min (A) (by C-G formula based on SCr of 1.53 mg/dL (H)).  Improved back to baseline  Follow-up with PCP for continued monitoring

## 2025-06-11 NOTE — ASSESSMENT & PLAN NOTE
Lab Results   Component Value Date    HGBA1C 5.8 (H) 06/06/2025       Recent Labs     06/10/25  1539 06/10/25  2054 06/11/25  0709 06/11/25  1100   POCGLU 285* 166* 137 150*       Blood Sugar Average: Last 72 hrs:  (P) 148.2139617326052722    No longer taking insulin.  Follow-up with PCP as an outpatient.

## 2025-06-11 NOTE — PLAN OF CARE
Problem: PAIN - ADULT  Goal: Verbalizes/displays adequate comfort level or baseline comfort level  Description: Interventions:  - Encourage patient to monitor pain and request assistance  - Assess pain using appropriate pain scale  - Administer analgesics as ordered based on type and severity of pain and evaluate response  - Implement non-pharmacological measures as appropriate and evaluate response  - Consider cultural and social influences on pain and pain management  - Notify physician/advanced practitioner if interventions unsuccessful or patient reports new pain  - Educate patient/family on pain management process including their role and importance of  reporting pain   - Provide non-pharmacologic/complimentary pain relief interventions  6/11/2025 0736 by Rowdy Stacy, RN  Outcome: Progressing  6/11/2025 0139 by Rowdy Stacy, RN  Outcome: Progressing     Problem: INFECTION - ADULT  Goal: Absence or prevention of progression during hospitalization  Description: INTERVENTIONS:  - Assess and monitor for signs and symptoms of infection  - Monitor lab/diagnostic results  - Monitor all insertion sites, i.e. indwelling lines, tubes, and drains  - Monitor endotracheal if appropriate and nasal secretions for changes in amount and color  - Adams appropriate cooling/warming therapies per order  - Administer medications as ordered  - Instruct and encourage patient and family to use good hand hygiene technique  - Identify and instruct in appropriate isolation precautions for identified infection/condition  Outcome: Progressing     Problem: SAFETY ADULT  Goal: Patient will remain free of falls  Description: INTERVENTIONS:  - Educate patient/family on patient safety including physical limitations  - Instruct patient to call for assistance with activity   - Consider consulting OT/PT to assist with strengthening/mobility based on AM PAC & JH-HLM score  - Consult OT/PT to assist with strengthening/mobility   - Keep Call  bell within reach  - Keep bed low and locked with side rails adjusted as appropriate  - Keep care items and personal belongings within reach  - Initiate and maintain comfort rounds  - Make Fall Risk Sign visible to staff  - Offer Toileting every  Hours, in advance of need  - Initiate/Maintain alarm  - Obtain necessary fall risk management equipment:   - Apply yellow socks and bracelet for high fall risk patients  - Consider moving patient to room near nurses station  6/11/2025 0736 by Rowdy Stacy RN  Outcome: Progressing  6/11/2025 0139 by Rowdy Stacy RN  Outcome: Progressing  Goal: Maintain or return to baseline ADL function  Description: INTERVENTIONS:  -  Assess patient's ability to carry out ADLs; assess patient's baseline for ADL function and identify physical deficits which impact ability to perform ADLs (bathing, care of mouth/teeth, toileting, grooming, dressing, etc.)  - Assess/evaluate cause of self-care deficits   - Assess range of motion  - Assess patient's mobility; develop plan if impaired  - Assess patient's need for assistive devices and provide as appropriate  - Encourage maximum independence but intervene and supervise when necessary  - Involve family in performance of ADLs  - Assess for home care needs following discharge   - Consider OT consult to assist with ADL evaluation and planning for discharge  - Provide patient education as appropriate  - Monitor functional capacity and physical performance, use of AM PAC & JH-HLM   - Monitor gait, balance and fatigue with ambulation    6/11/2025 0736 by Rowdy Stacy RN  Outcome: Progressing  6/11/2025 0139 by Rowdy Stacy RN  Outcome: Progressing  Goal: Maintains/Returns to pre admission functional level  Description: INTERVENTIONS:  - Perform AM-PAC 6 Click Basic Mobility/ Daily Activity assessment daily.  - Set and communicate daily mobility goal to care team and patient/family/caregiver.   - Collaborate with rehabilitation services on  mobility goals if consulted  - Perform Range of Motion  times a day.  - Reposition patient every  hours.  - Dangle patient  times a day  - Stand patient  times a day  - Ambulate patient  times a day  - Out of bed to chair  times a day   - Out of bed for meals  times a day  - Out of bed for toileting  - Record patient progress and toleration of activity level   6/11/2025 0736 by Rowdy Stacy RN  Outcome: Progressing  6/11/2025 0139 by Rowdy Stacy RN  Outcome: Progressing     Problem: Prexisting or High Potential for Compromised Skin Integrity  Goal: Skin integrity is maintained or improved  Description: INTERVENTIONS:  - Identify patients at risk for skin breakdown  - Assess and monitor skin integrity including under and around medical devices   - Assess and monitor nutrition and hydration status  - Monitor labs  - Assess for incontinence   - Turn and reposition patient  - Assist with mobility/ambulation  - Relieve pressure over karli prominences   - Avoid friction and shearing  - Provide appropriate hygiene as needed including keeping skin clean and dry  - Evaluate need for skin moisturizer/barrier cream  - Collaborate with interdisciplinary team  - Patient/family teaching  - Consider wound care consult    Assess:  - Review James scale daily  - Clean and moisturize skin every   - Inspect skin when repositioning, toileting, and assisting with ADLS  - Assess under medical devices such as  every  - Assess extremities for adequate circulation and sensation     Bed Management:  - Have minimal linens on bed & keep smooth, unwrinkled  - Change linens as needed when moist or perspiring  - Avoid sitting or lying in one position for more than  hours while in bed?Keep HOB at degrees   - Toileting:  - Offer bedside commode  - Assess for incontinence every   - Use incontinent care products after each incontinent episode such as     Activity:  - Mobilize patient  times a day  - Encourage activity and walks on unit  -  Encourage or provide ROM exercises   - Turn and reposition patient every  Hours  - Use appropriate equipment to lift or move patient in bed  - Instruct/ Assist with weight shifting every  when out of bed in chair  - Consider limitation of chair time  hour intervals    Skin Care:  - Avoid use of baby powder, tape, friction and shearing, hot water or constrictive clothing  - Relieve pressure over bony prominences using   - Do not massage red bony areas    Next Steps:  - Teach patient strategies to minimize risks such as   - Consider consults to  interdisciplinary teams such as   6/11/2025 0736 by Rowdy Stacy RN  Outcome: Progressing  6/11/2025 0139 by Rowdy Stacy, RN  Outcome: Progressing     Problem: Nutrition/Hydration-ADULT  Goal: Nutrient/Hydration intake appropriate for improving, restoring or maintaining nutritional needs  Description: Monitor and assess patient's nutrition/hydration status for malnutrition. Collaborate with interdisciplinary team and initiate plan and interventions as ordered.  Monitor patient's weight and dietary intake as ordered or per policy. Utilize nutrition screening tool and intervene as necessary. Determine patient's food preferences and provide high-protein, high-caloric foods as appropriate.     INTERVENTIONS:  - Monitor oral intake, urinary output, labs, and treatment plans  - Assess nutrition and hydration status and recommend course of action  - Evaluate amount of meals eaten  - Assist patient with eating if necessary   - Allow adequate time for meals  - Recommend/ encourage appropriate diets, oral nutritional supplements, and vitamin/mineral supplements  - Order, calculate, and assess calorie counts as needed  - Recommend, monitor, and adjust tube feedings and TPN/PPN based on assessed needs  - Assess need for intravenous fluids  - Provide specific nutrition/hydration education as appropriate  - Include patient/family/caregiver in decisions related to nutrition  6/11/2025  0736 by Rowdy Stacy, RN  Outcome: Progressing  6/11/2025 0139 by Rowdy Stacy, RN  Outcome: Progressing

## 2025-06-11 NOTE — ASSESSMENT & PLAN NOTE
Lab Results   Component Value Date    EGFR 43 06/11/2025    EGFR 32 06/10/2025    EGFR 29 06/09/2025    CREATININE 1.53 (H) 06/11/2025    CREATININE 1.97 (H) 06/10/2025    CREATININE 2.13 (H) 06/09/2025     MEÑO/CKD  Creatinine improving  Will avoid nephrotoxic medication.  Encourage po hydration.  Will monitor BMP.

## 2025-06-11 NOTE — INCIDENTAL FINDINGS
The following findings require follow up:  Radiographic finding   Finding: No hydronephrosis or urinary tract calculi. A few scattered renal vascular calcifications bilaterally. Subcentimeter hypoattenuating renal lesion(s), too small to characterize but statistically likely benign, which do not warrant   follow-up (Radiology June 2019).   Follow up required: Follow up with PCP for continued monitoring.    Follow up should be done within 2-4 week(s)    Please notify the following clinician to assist with the follow up:   Dr. Simi Srivastava    Incidental finding results were discussed with the Patient by JACINTA La on 06/11/25.   They expressed understanding and all questions answered.

## 2025-06-11 NOTE — PLAN OF CARE
Problem: PAIN - ADULT  Goal: Verbalizes/displays adequate comfort level or baseline comfort level  Description: Interventions:  - Encourage patient to monitor pain and request assistance  - Assess pain using appropriate pain scale  - Administer analgesics as ordered based on type and severity of pain and evaluate response  - Implement non-pharmacological measures as appropriate and evaluate response  - Consider cultural and social influences on pain and pain management  - Notify physician/advanced practitioner if interventions unsuccessful or patient reports new pain  - Educate patient/family on pain management process including their role and importance of  reporting pain   - Provide non-pharmacologic/complimentary pain relief interventions  Outcome: Progressing     Problem: SAFETY ADULT  Goal: Patient will remain free of falls  Description: INTERVENTIONS:  - Educate patient/family on patient safety including physical limitations  - Instruct patient to call for assistance with activity   - Consider consulting OT/PT to assist with strengthening/mobility based on AM PAC & JH-HLM score  - Consult OT/PT to assist with strengthening/mobility   - Keep Call bell within reach  - Keep bed low and locked with side rails adjusted as appropriate  - Keep care items and personal belongings within reach  - Initiate and maintain comfort rounds  - Make Fall Risk Sign visible to staff  - Offer Toileting every  Hours, in advance of need  - Initiate/Maintain alarm  - Obtain necessary fall risk management equipment:   - Apply yellow socks and bracelet for high fall risk patients  - Consider moving patient to room near nurses station  Outcome: Progressing  Goal: Maintain or return to baseline ADL function  Description: INTERVENTIONS:  -  Assess patient's ability to carry out ADLs; assess patient's baseline for ADL function and identify physical deficits which impact ability to perform ADLs (bathing, care of mouth/teeth, toileting,  grooming, dressing, etc.)  - Assess/evaluate cause of self-care deficits   - Assess range of motion  - Assess patient's mobility; develop plan if impaired  - Assess patient's need for assistive devices and provide as appropriate  - Encourage maximum independence but intervene and supervise when necessary  - Involve family in performance of ADLs  - Assess for home care needs following discharge   - Consider OT consult to assist with ADL evaluation and planning for discharge  - Provide patient education as appropriate  - Monitor functional capacity and physical performance, use of AM PAC & -HLM   - Monitor gait, balance and fatigue with ambulation    Outcome: Progressing  Goal: Maintains/Returns to pre admission functional level  Description: INTERVENTIONS:  - Perform AM-PAC 6 Click Basic Mobility/ Daily Activity assessment daily.  - Set and communicate daily mobility goal to care team and patient/family/caregiver.   - Collaborate with rehabilitation services on mobility goals if consulted  - Perform Range of Motion  times a day.  - Reposition patient every  hours.  - Dangle patient  times a day  - Stand patient  times a day  - Ambulate patient  times a day  - Out of bed to chair  times a day   - Out of bed for meals  times a day  - Out of bed for toileting  - Record patient progress and toleration of activity level   Outcome: Progressing     Problem: Prexisting or High Potential for Compromised Skin Integrity  Goal: Skin integrity is maintained or improved  Description: INTERVENTIONS:  - Identify patients at risk for skin breakdown  - Assess and monitor skin integrity including under and around medical devices   - Assess and monitor nutrition and hydration status  - Monitor labs  - Assess for incontinence   - Turn and reposition patient  - Assist with mobility/ambulation  - Relieve pressure over karli prominences   - Avoid friction and shearing  - Provide appropriate hygiene as needed including keeping skin clean  and dry  - Evaluate need for skin moisturizer/barrier cream  - Collaborate with interdisciplinary team  - Patient/family teaching  - Consider wound care consult    Assess:  - Review James scale daily  - Clean and moisturize skin every   - Inspect skin when repositioning, toileting, and assisting with ADLS  - Assess under medical devices such as  every   - Assess extremities for adequate circulation and sensation     Bed Management:  - Have minimal linens on bed & keep smooth, unwrinkled  - Change linens as needed when moist or perspiring  - Avoid sitting or lying in one position for more than  hours while in bed?Keep HOB at degrees   - Toileting:  - Offer bedside commode  - Assess for incontinence every   - Use incontinent care products after each incontinent episode such as     Activity:  - Mobilize patient  times a day  - Encourage activity and walks on unit  - Encourage or provide ROM exercises   - Turn and reposition patient every  Hours  - Use appropriate equipment to lift or move patient in bed  - Instruct/ Assist with weight shifting every  when out of bed in chair  - Consider limitation of chair time  hour intervals    Skin Care:  - Avoid use of baby powder, tape, friction and shearing, hot water or constrictive clothing  - Relieve pressure over bony prominences using   - Do not massage red bony areas    Next Steps:  - Teach patient strategies to minimize risks such as   - Consider consults to  interdisciplinary teams such as   Outcome: Progressing     Problem: Nutrition/Hydration-ADULT  Goal: Nutrient/Hydration intake appropriate for improving, restoring or maintaining nutritional needs  Description: Monitor and assess patient's nutrition/hydration status for malnutrition. Collaborate with interdisciplinary team and initiate plan and interventions as ordered.  Monitor patient's weight and dietary intake as ordered or per policy. Utilize nutrition screening tool and intervene as necessary. Determine  patient's food preferences and provide high-protein, high-caloric foods as appropriate.     INTERVENTIONS:  - Monitor oral intake, urinary output, labs, and treatment plans  - Assess nutrition and hydration status and recommend course of action  - Evaluate amount of meals eaten  - Assist patient with eating if necessary   - Allow adequate time for meals  - Recommend/ encourage appropriate diets, oral nutritional supplements, and vitamin/mineral supplements  - Order, calculate, and assess calorie counts as needed  - Recommend, monitor, and adjust tube feedings and TPN/PPN based on assessed needs  - Assess need for intravenous fluids  - Provide specific nutrition/hydration education as appropriate  - Include patient/family/caregiver in decisions related to nutrition  Outcome: Progressing

## 2025-06-12 NOTE — ASSESSMENT & PLAN NOTE
Failed urinary retention protocol-Hui catheter placed patient to go undergo voiding trial while at rehab when he becomes more [id number] with urology as needed.

## 2025-06-12 NOTE — PROGRESS NOTES
Progress Note - Trauma   Name: Prince Andrews 75 y.o. male I MRN: [id number]  Unit/Bed#: W -01 I Date of Admission: 6/5/2025   Date of Service: 6/12/2025 I Hospital Day: 6     Assessment & Plan  Fall  - Status post fall with the below noted injuries.  - Fall precautions.  - Geriatric Medicine consultation for evaluation, medication review and recommendations.  - PT and OT evaluation and treatment as indicated.  - Case Management consultation for disposition planning.  Right acetabular fracture (HCC)  - R acetabular fx, present on admission.  - Status post ORIF on 6/7/2025 by Dr. Vázquez.  - Appreciate Orthopedic surgery evaluation, recommendations and interventions as noted.  - Maintain toe touch weightbearing status on the left lower extremity.  - Monitor left lower extremity neurovascular exam.  - Continue multimodal analgesic regimen.  - Continue DVT prophylaxis-Eliquis  - PT and OT evaluation and treatment as indicated.  - Outpatient follow up with Dr. Vázquez, Orthopedic surgery for re-evaluation.  Atrial fibrillation, chronic (HCC)  Resume home Eliquis  Benign essential hypertension  Continue home antihypertensive regimen  Chronic obstructive lung disease (HCC)  - Reports using albuterol  - Continue Trelegy  - Albuterol inhaler and nebulizer PRN  - uses 2 L NC at night  -SpO2 goal 88%  Uncontrolled type 2 diabetes mellitus with hyperglycemia ([facility])  Lab Results   Component Value Date    HGBA1C 5.8 (H) 06/06/2025       Recent Labs     06/10/25  1539 06/10/25  2054 06/11/25  0709 06/11/25  1100   POCGLU 285* 166* 137 150*       Blood Sugar Average: Last 72 hrs:  (P) 147.8    No longer taking insulin.  Follow-up with PCP as an outpatient.  Stage 3 chronic kidney disease (HCC)  Lab Results   Component Value Date    EGFR 43 06/11/2025    EGFR 32 06/10/2025    EGFR 29 06/09/2025    CREATININE 1.53 (H) 06/11/2025    CREATININE 1.97 (H) 06/10/2025    CREATININE 2.13 (H) 06/09/2025   Trending back to baseline  Follow-up  with PCP for continued monitoring.  Dementia ([facility])  Oriented to person and place  Continue Namenda  Geriatrics consulted note appreciated  At risk for delirium  - Delirium Precautions:   - Maintain normal sleep/wake cycle  - Minimize overnight interruptions, group overnight vitals/labs/nursing checks as possible.  - Dim lights, close blinds and turn off tv to minimize stimulation and encourage sleep environment in evenings.  - Ensure that pain is well controlled.  - Monitor for fecal and urinary retention which may precipitate delirium.  - Encourage early mobilization and ambulation.  - Provide frequent reorientation and redirection.  - Encourage family and friends at the bedside to help help calm patient if anxious.  - Avoid medications which may precipitate or worsen delirium such as tramadol, benzodiazepine, anticholinergics, and benadryl.  - Encourage hydration and nutrition.    Anemia  Recent Labs     06/10/25  0601 06/11/25  0938   HGB 9.5* 8.9*   Hemoglobin stable  Resume home Eliquis  MEÑO (acute kidney injury) ([facility])  Recent Labs     06/10/25  0601 06/11/25  0938   CREATININE 1.97* 1.53*   EGFR 32 43     Estimated Creatinine Clearance: 46.2 mL/min (A) (by C-G formula based on SCr of 1.53 mg/dL (H)).  Improved back to baseline  Follow-up with PCP for continued monitoring  Urinary retention  Failed urinary retention protocol-Hui catheter placed patient to go undergo voiding trial while at rehab when he becomes more [id number] with urology as needed.  Drop in Hemoglobin after surgical intervention is acceptable.  Follow up on Hemoglobins til stable

## 2025-06-12 NOTE — UTILIZATION REVIEW
NOTIFICATION OF ADMISSION DISCHARGE   This is a Notification of Discharge from [facility]. Please be advised that this patient has been discharge from our facility. Below you will find the admission and discharge date and time including the patient’s disposition.   UTILIZATION REVIEW CONTACT:  Utilization Review Assistants  Network Utilization Review Department  Phone: [phone] x carefully listen to the prompts. All voicemails are confidential.  Email: [email or web address]     ADMISSION INFORMATION  PRESENTATION DATE: 6/5/2025  9:42 AM  OBERVATION ADMISSION DATE: N/A  INPATIENT ADMISSION DATE: 6/5/25  3:06 PM   DISCHARGE DATE: 6/11/2025 12:23 PM   DISPOSITION:Non [facility] SNF/TCU/SNU    Network Utilization Review Department  ATTENTION: Please call with any questions or concerns to [phone] and carefully listen to the prompts so that you are directed to the right person. All voicemails are confidential.   For Discharge needs, contact Care Management DC Support Team at [phone] opt. 2  Send all requests for admission clinical reviews, approved or denied determinations and any other requests to dedicated fax number below belonging to the campus where the patient is receiving treatment. List of dedicated fax numbers for the Facilities:  FACILITY NAME UR FAX NUMBER   ADMISSION DENIALS (Administrative/Medical Necessity) [phone]   DISCHARGE SUPPORT TEAM ([facility]) [phone]   PARENT CHILD HEALTH (Maternity/NICU/Pediatrics) [phone]   [facility] [phone]   [facility] [phone]   [facility] [phone]   [facility] [phone]   [facility] [phone]   [facility] [phone]   [facility] [phone]   [facility] [phone]   [facility]  [facility] [phone]   [facility] [phone]   [facility] [phone]   [facility] [phone]

## 2025-06-12 NOTE — ASSESSMENT & PLAN NOTE
Recent Labs     06/10/25  0601 06/11/25  0938   HGB 9.5* 8.9*   Hemoglobin stable  Resume home Eliquis

## 2025-06-12 NOTE — ASSESSMENT & PLAN NOTE
Lab Results   Component Value Date    HGBA1C 5.8 (H) 06/06/2025       Recent Labs     06/10/25  1539 06/10/25  2054 06/11/25  0709 06/11/25  1100   POCGLU 285* 166* 137 150*       Blood Sugar Average: Last 72 hrs:  (P) 147.8    No longer taking insulin.  Follow-up with PCP as an outpatient.

## 2025-06-12 NOTE — ASSESSMENT & PLAN NOTE
Recent Labs     06/10/25  0601 06/11/25  0938   CREATININE 1.97* 1.53*   EGFR 32 43     Estimated Creatinine Clearance: 46.2 mL/min (A) (by C-G formula based on SCr of 1.53 mg/dL (H)).  Improved back to baseline  Follow-up with PCP for continued monitoring

## 2025-06-16 DIAGNOSIS — S32.401A CLOSED NONDISPLACED FRACTURE OF RIGHT ACETABULUM, UNSPECIFIED PORTION OF ACETABULUM, INITIAL ENCOUNTER (HCC): Primary | ICD-10-CM

## 2025-06-23 ENCOUNTER — HOSPITAL ENCOUNTER (OUTPATIENT)
Dept: RADIOLOGY | Facility: HOSPITAL | Age: 75
Discharge: HOME | End: 2025-06-23
Attending: ORTHOPAEDIC SURGERY
Payer: COMMERCIAL

## 2025-06-23 ENCOUNTER — OFFICE VISIT (OUTPATIENT)
Dept: OBGYN CLINIC | Facility: HOSPITAL | Age: 75
End: 2025-06-23

## 2025-06-23 VITALS — HEIGHT: 73 IN | BODY MASS INDEX: 23.12 KG/M2

## 2025-06-23 DIAGNOSIS — S32.401A CLOSED NONDISPLACED FRACTURE OF RIGHT ACETABULUM, UNSPECIFIED PORTION OF ACETABULUM, INITIAL ENCOUNTER (HCC): Primary | ICD-10-CM

## 2025-06-23 DIAGNOSIS — S32.401A CLOSED NONDISPLACED FRACTURE OF RIGHT ACETABULUM, UNSPECIFIED PORTION OF ACETABULUM, INITIAL ENCOUNTER (HCC): ICD-10-CM

## 2025-06-23 PROCEDURE — 72190 X-RAY EXAM OF PELVIS: CPT

## 2025-06-23 PROCEDURE — 99024 POSTOP FOLLOW-UP VISIT: CPT | Performed by: ORTHOPAEDIC SURGERY

## 2025-06-23 NOTE — PROGRESS NOTES
"Orthopaedic Surgery - Office Note  Pricne Andrews (75 y.o. male)   : 1950   MRN: [id number]  Encounter Date: 2025    Assessment & Plan  Closed nondisplaced fracture of right acetabulum, unspecified portion of acetabulum, initial encounter ([facility])             Assessment / Plan    S/p ORIF right pelvis, posterior approach   TDWB for the next 8 weeks, this was noted in on the nursing home papers   Ordered and reviewed XR during the visit   Next visit XR   Follow-up:  Return in about 1 month (around 2025).      Chief Complaint / Date of Onset  Right hip pain   Injury Mechanism / Date  25 fell at home  Surgery / Date  25    History of Present Illness   Prince Andrews is a 75 y.o. male who presents for 2 week post op of ORIF right pelvis, posterior approach. He presents today on a stretcher from the nursing home. He states that his pain is manageable. Prior to surgery he was not ambulating independently.  He offers no complaints today.     Treatment Summary  Medications / Modalities  Tylenol, Eliquis  Bracing / Immobilization  None  Physical Therapy  None  Injections  None  Prior Surgeries  None  Other Treatments  None    Employment / Current Status  N/A      Review of Systems  Pertinent items are noted in HPI.  All other systems were reviewed and are negative.      Physical Exam  Ht 6' 1\" (1.854 m)   BMI 23.12 kg/m²   Cons: Appears well.  No apparent distress.  Psych: Alert. Oriented x3.  Mood and affect normal.  Eyes: PERRLA, EOMI  Resp: Normal effort.  No audible wheezing or stridor.  CV: Palpable pulse.  No discernable arrhythmia.  No LE edema.  Lymph:  No palpable cervical, axillary, or inguinal lymphadenopathy.  Skin: Warm.  No palpable masses.  No visible lesions.  Neuro: Normal muscle tone.  Normal and symmetric DTR's.     Right Lower Extremity Exam  Alignment:  Normal resting hip posture.  Inspection:  no swelling. no ecchymosis. Incision clean and dry.  Palpation:  mild incisional " tenderness. no effusion.  ROM:  Normal knee ROM. Normal ankle ROM.  Strength:  Not tested.  Stability:  Not tested.  Neurovascular:  Sensation intact in DP/SP/Whelan/Sa/T nerve distributions. 2+ DP & PT pulses.  Gait:  Not tested.       Studies Reviewed  I have personally reviewed pertinent films in PACS.  XR of right pelvis - images from 25 showing stable alignment of hardware      Procedures  No procedures today.    Medical, Surgical, Family, and Social History  The patient's medical history, family history, and social history, were reviewed and updated as appropriate.    Past Medical History[1]    Past Surgical History[2]    Family History[3]    Social History     Occupational History    Occupation: retired   Tobacco Use    Smoking status: Former     Current packs/day: 0.00     Average packs/day: 1 pack/day for 37.0 years (37.0 ttl pk-yrs)     Types: Cigarettes     Start date: 1977     Quit date: 2014     Years since quittin.4    Smokeless tobacco: Never    Tobacco comments:     Pt states he restarted smoking a few weeks ago   Vaping Use    Vaping status: Never Used   Substance and Sexual Activity    Alcohol use: Not Currently    Drug use: No    Sexual activity: Not Currently     Partners: Female     Birth control/protection: None       Allergies[4]    Current Medications[5]      Kay Mejia    Scribe Attestation      I,:  Kay Mejia am acting as a scribe while in the presence of the attending physician.:       I,:  North Vázquez MD personally performed the services described in this documentation    as scribed in my presence.:                [1]   Past Medical History:  Diagnosis Date    Acute on chronic respiratory failure (HCC) 2017    Anemia 3/29/2020    Colonic polyp     COPD exacerbation (HCC) 2019    Disc displacement, cervical     Peripheral vascular disease (HCC)     Femoral- popliteal bypass   [2]   Past Surgical History:  Procedure Laterality Date    BYPASS  FEMORAL-POPLITEAL  2011    COLONOSCOPY  2012    With polypectomy    CORONARY ARTERY BYPASS GRAFT  2012    LAMINECTOMY  2010    ORIF PELVIS Right 6/7/2025    Procedure: OPEN REDUCTION W/ INTERNAL FIXATION (ORIF) PELVIS POSTERIOR APPROACH;  Surgeon: North Vázquez MD;  Location: [facility];  Service: Orthopedics   [3]   Family History  Problem Relation Name Age of Onset    Heart attack Mother      Heart attack Father     [4]   Allergies  Allergen Reactions    Vancomycin Other (See Comments)    No Active Allergies    [5]   Current Outpatient Medications:     acetaminophen (TYLENOL) 650 mg CR tablet, in the morning and in the evening and before bedtime., Disp: , Rfl:     albuterol (2.5 mg/3 mL) 0.083 % nebulizer solution, Take 1 vial (2.5 mg total) by nebulization every 6 (six) hours as needed for wheezing or shortness of breath, Disp: 100 vial, Rfl: 6    albuterol (VENTOLIN HFA) 90 mcg/act inhaler, Inhale 1 puff every 4 (four) hours as needed for wheezing, Disp: 1 Inhaler, Rfl: 3    Cholecalciferol (VITAMIN D3) 18708 units CAPS, Take 1 capsule by mouth once a week, Disp: , Rfl: 0    clopidogrel (PLAVIX) 75 mg tablet, , Disp: , Rfl:     divalproex sodium (DEPAKOTE SPRINKLE) 125 MG capsule, Take 125 mg by mouth daily at bedtime, Disp: , Rfl:     Eliquis 5 MG, , Disp: , Rfl:     Empagliflozin (Jardiance) 10 MG TABS tablet, Take 10 mg by mouth every morning, Disp: , Rfl:     famotidine (PEPCID) 20 mg tablet, Take 20 mg by mouth in the morning and 20 mg in the evening., Disp: , Rfl:     folic acid (FOLVITE) 1 mg tablet, Take 1 tablet (1 mg total) by mouth daily, Disp: 30 tablet, Rfl: 6    memantine (NAMENDA) 10 mg tablet, , Disp: , Rfl:     metoprolol tartrate (LOPRESSOR) 25 mg tablet, Take 0.5 tablets (12.5 mg total) by mouth every 12 (twelve) hours, Disp: , Rfl:     multivitamin (THERAGRAN) TABS, Take 1 tablet by mouth daily, Disp: 30 tablet, Rfl: 6    PARoxetine (PAXIL) 20 mg tablet, Take 2 tablets (40 mg total) by mouth  daily Patient takes 35mg (20mg tab and 1.5 tab of 10mg tab), Disp: , Rfl:     potassium chloride (KLOR-CON) 20 mEq packet, Take 20 mEq by mouth in the morning and 20 mEq in the evening., Disp: , Rfl:     rosuvastatin (CRESTOR) 10 MG tablet, Take 1 tablet (10 mg total) by mouth daily, Disp: 90 tablet, Rfl: 1    senna-docusate sodium (SENOKOT S) 8.6-50 mg per tablet, Take 1 tablet by mouth daily at bedtime, Disp: , Rfl:     torsemide (DEMADEX) 20 mg tablet, Take 10 mg by mouth in the morning., Disp: , Rfl:     traZODone (DESYREL) 50 mg tablet, Take 100 mg by mouth daily at bedtime, Disp: , Rfl:     Trelegy Ellipta 200-62.5-25 MCG/ACT AEPB inhaler, , Disp: , Rfl:     vitamin B-12 (VITAMIN B-12) 1,000 mcg tablet, Take 1 tablet (1,000 mcg total) by mouth daily, Disp: 30 tablet, Rfl: 6    bisacodyl (DULCOLAX) 5 mg EC tablet, Take 10 mg by mouth daily as needed (Patient not taking: Reported on 4/30/2024), Disp: , Rfl:     budesonide (PULMICORT) 0.5 mg/2 mL nebulizer solution, Take 1 vial (0.5 mg total) by nebulization 2 (two) times a day Rinse mouth after use. (Patient not taking: Reported on 4/30/2024), Disp: 120 vial, Rfl: 6    dorzolamide (TRUSOPT) 2 % ophthalmic solution, INSTILL 1 DROP INTO AFFECTED EYE 3 TIMES A DAY (Patient not taking: Reported on 12/21/2022), Disp: , Rfl: 3    polyethylene glycol (GOLYTELY) 4000 mL solution, Take 4,000 mL by mouth once for 1 dose, Disp: 4000 mL, Rfl: 0    polyethylene glycol (MIRALAX) 17 g packet, Take 17 g by mouth daily for 10 days, Disp: , Rfl:

## 2025-06-24 DIAGNOSIS — S32.401A CLOSED NONDISPLACED FRACTURE OF RIGHT ACETABULUM, UNSPECIFIED PORTION OF ACETABULUM, INITIAL ENCOUNTER (HCC): Primary | ICD-10-CM

## 2025-07-29 ENCOUNTER — HOSPITAL ENCOUNTER (OUTPATIENT)
Dept: RADIOLOGY | Facility: HOSPITAL | Age: 75
Discharge: HOME | End: 2025-07-29
Attending: ORTHOPAEDIC SURGERY
Payer: COMMERCIAL

## 2025-07-29 ENCOUNTER — OFFICE VISIT (OUTPATIENT)
Dept: OBGYN CLINIC | Facility: HOSPITAL | Age: 75
End: 2025-07-29

## 2025-07-29 VITALS — HEIGHT: 73 IN | BODY MASS INDEX: 23.12 KG/M2

## 2025-07-29 DIAGNOSIS — S32.401A CLOSED NONDISPLACED FRACTURE OF RIGHT ACETABULUM, UNSPECIFIED PORTION OF ACETABULUM, INITIAL ENCOUNTER (HCC): ICD-10-CM

## 2025-07-29 DIAGNOSIS — S32.401A CLOSED NONDISPLACED FRACTURE OF RIGHT ACETABULUM, UNSPECIFIED PORTION OF ACETABULUM, INITIAL ENCOUNTER (HCC): Primary | ICD-10-CM

## 2025-07-29 DIAGNOSIS — M70.61 TROCHANTERIC BURSITIS OF RIGHT HIP: ICD-10-CM

## 2025-07-29 PROCEDURE — 72190 X-RAY EXAM OF PELVIS: CPT

## 2025-07-29 PROCEDURE — 99024 POSTOP FOLLOW-UP VISIT: CPT | Performed by: ORTHOPAEDIC SURGERY

## (undated) DEVICE — NEPTUNE E-SEP SMOKE EVACUATION PENCIL, COATED, 70MM BLADE, PUSH BUTTON SWITCH: Brand: NEPTUNE E-SEP

## (undated) DEVICE — DRESSING MEPILEX AG BORDER POST-OP 4 X 8 IN

## (undated) DEVICE — SUT VICRYL PLUS 2-0 CTB-1 27 IN VCPB259H

## (undated) DEVICE — STOCKINETTE: Brand: DEROYAL

## (undated) DEVICE — TRAY FOLEY 16FR URIMETER SURESTEP

## (undated) DEVICE — GLOVE INDICATOR PI UNDERGLOVE SZ 9 BLUE

## (undated) DEVICE — GLOVE SRG BIOGEL 9

## (undated) DEVICE — BETHLEHEM TOTAL HIP, KIT: Brand: CARDINAL HEALTH

## (undated) DEVICE — 2.5MM THREE-FLUTED DRILL BIT QC/230MM/200MM CALIBRATION

## (undated) DEVICE — INTENDED FOR TISSUE SEPARATION, AND OTHER PROCEDURES THAT REQUIRE A SHARP SURGICAL BLADE TO PUNCTURE OR CUT.: Brand: BARD-PARKER SAFETY BLADES SIZE 10, STERILE

## (undated) DEVICE — SUT VICRYL PLUS 1 CTB-1 36 IN VCPB947H

## (undated) DEVICE — C-ARM: Brand: UNBRANDED

## (undated) DEVICE — PREP SURGICAL PURPREP 26ML

## (undated) DEVICE — SCD SEQUENTIAL COMPRESSION COMFORT SLEEVE MEDIUM KNEE LENGTH: Brand: KENDALL SCD